# Patient Record
Sex: MALE | Race: WHITE | Employment: OTHER | URBAN - METROPOLITAN AREA
[De-identification: names, ages, dates, MRNs, and addresses within clinical notes are randomized per-mention and may not be internally consistent; named-entity substitution may affect disease eponyms.]

---

## 2022-10-03 ENCOUNTER — HOSPITAL ENCOUNTER (OUTPATIENT)
Age: 67
Discharge: HOME | End: 2022-10-03
Payer: MEDICARE

## 2022-10-03 PROCEDURE — 80053 COMPREHEN METABOLIC PANEL: CPT

## 2022-10-03 PROCEDURE — 84153 ASSAY OF PSA TOTAL: CPT

## 2022-10-03 PROCEDURE — 84443 ASSAY THYROID STIM HORMONE: CPT

## 2022-10-03 PROCEDURE — 85025 COMPLETE CBC W/AUTO DIFF WBC: CPT

## 2022-10-04 LAB
A/G RATIO: 1.3 (ref 1.1–2.2)
ALBUMIN SERPL-MCNC: 4 G/DL (ref 3.4–5)
ALP BLD-CCNC: 120 U/L (ref 40–129)
ALT SERPL-CCNC: 7 U/L (ref 10–40)
ANION GAP SERPL CALCULATED.3IONS-SCNC: 18 MMOL/L (ref 3–16)
AST SERPL-CCNC: 16 U/L (ref 15–37)
BASOPHILS ABSOLUTE: 0.1 K/UL (ref 0–0.2)
BASOPHILS RELATIVE PERCENT: 1 %
BILIRUB SERPL-MCNC: 0.5 MG/DL (ref 0–1)
BUN BLDV-MCNC: 56 MG/DL (ref 7–20)
CALCIUM SERPL-MCNC: 8.6 MG/DL (ref 8.3–10.6)
CHLORIDE BLD-SCNC: 94 MMOL/L (ref 99–110)
CO2: 20 MMOL/L (ref 21–32)
CREAT SERPL-MCNC: 2.5 MG/DL (ref 0.8–1.3)
EOSINOPHILS ABSOLUTE: 0 K/UL (ref 0–0.6)
EOSINOPHILS RELATIVE PERCENT: 0.2 %
GFR AFRICAN AMERICAN: 31
GFR NON-AFRICAN AMERICAN: 26
GLUCOSE BLD-MCNC: 127 MG/DL (ref 70–99)
HCT VFR BLD CALC: 30.8 % (ref 40.5–52.5)
HEMOGLOBIN: 9.7 G/DL (ref 13.5–17.5)
LYMPHOCYTES ABSOLUTE: 0.2 K/UL (ref 1–5.1)
LYMPHOCYTES RELATIVE PERCENT: 3.8 %
MCH RBC QN AUTO: 24.2 PG (ref 26–34)
MCHC RBC AUTO-ENTMCNC: 31.5 G/DL (ref 31–36)
MCV RBC AUTO: 76.9 FL (ref 80–100)
MONOCYTES ABSOLUTE: 0.5 K/UL (ref 0–1.3)
MONOCYTES RELATIVE PERCENT: 7.5 %
NEUTROPHILS ABSOLUTE: 5.6 K/UL (ref 1.7–7.7)
NEUTROPHILS RELATIVE PERCENT: 87.5 %
PDW BLD-RTO: 17.6 % (ref 12.4–15.4)
PLATELET # BLD: 351 K/UL (ref 135–450)
PMV BLD AUTO: 6.5 FL (ref 5–10.5)
POTASSIUM SERPL-SCNC: 3.7 MMOL/L (ref 3.5–5.1)
PROSTATE SPECIFIC ANTIGEN: 2.71 NG/ML (ref 0–4)
RBC # BLD: 4 M/UL (ref 4.2–5.9)
SODIUM BLD-SCNC: 132 MMOL/L (ref 136–145)
TOTAL PROTEIN: 7.1 G/DL (ref 6.4–8.2)
TSH SERPL DL<=0.05 MIU/L-ACNC: 1.48 UIU/ML (ref 0.27–4.2)
WBC # BLD: 6.4 K/UL (ref 4–11)

## 2022-10-11 ENCOUNTER — HOSPITAL ENCOUNTER (OUTPATIENT)
Dept: CT IMAGING | Age: 67
Discharge: HOME | End: 2022-10-11
Payer: MEDICARE

## 2022-10-11 DIAGNOSIS — E11.9 DIABETES MELLITUS, STABLE (HCC): ICD-10-CM

## 2022-10-11 DIAGNOSIS — N40.1 ENLARGED PROSTATE WITH URINARY OBSTRUCTION: ICD-10-CM

## 2022-10-11 DIAGNOSIS — R33.9 RETENTION OF URINE, UNSPECIFIED: ICD-10-CM

## 2022-10-11 DIAGNOSIS — N39.46 MIXED INCONTINENCE URGE AND STRESS (MALE)(FEMALE): ICD-10-CM

## 2022-10-11 DIAGNOSIS — N13.8 ENLARGED PROSTATE WITH URINARY OBSTRUCTION: ICD-10-CM

## 2022-10-11 PROCEDURE — 74176 CT ABD & PELVIS W/O CONTRAST: CPT

## 2022-10-13 ENCOUNTER — APPOINTMENT (OUTPATIENT)
Dept: CT IMAGING | Age: 67
DRG: 871 | End: 2022-10-13
Payer: MEDICARE

## 2022-10-13 ENCOUNTER — APPOINTMENT (OUTPATIENT)
Dept: GENERAL RADIOLOGY | Age: 67
DRG: 871 | End: 2022-10-13
Payer: MEDICARE

## 2022-10-13 ENCOUNTER — HOSPITAL ENCOUNTER (INPATIENT)
Age: 67
LOS: 1 days | Discharge: ANOTHER HOSPITAL | DRG: 871 | End: 2022-10-14
Attending: EMERGENCY MEDICINE | Admitting: INTERNAL MEDICINE
Payer: MEDICARE

## 2022-10-13 DIAGNOSIS — R33.9 URINARY RETENTION: ICD-10-CM

## 2022-10-13 DIAGNOSIS — N17.9 AKI (ACUTE KIDNEY INJURY) (HCC): ICD-10-CM

## 2022-10-13 DIAGNOSIS — A41.9 SEVERE SEPSIS (HCC): ICD-10-CM

## 2022-10-13 DIAGNOSIS — T68.XXXA HYPOTHERMIA, INITIAL ENCOUNTER: ICD-10-CM

## 2022-10-13 DIAGNOSIS — E86.0 DEHYDRATION: Primary | ICD-10-CM

## 2022-10-13 DIAGNOSIS — N30.00 ACUTE CYSTITIS WITHOUT HEMATURIA: ICD-10-CM

## 2022-10-13 DIAGNOSIS — R65.20 SEVERE SEPSIS (HCC): ICD-10-CM

## 2022-10-13 LAB
A/G RATIO: 1 (ref 1.1–2.2)
ALBUMIN SERPL-MCNC: 3.6 G/DL (ref 3.4–5)
ALP BLD-CCNC: 130 U/L (ref 40–129)
ALT SERPL-CCNC: 7 U/L (ref 10–40)
ANION GAP SERPL CALCULATED.3IONS-SCNC: 13 MMOL/L (ref 3–16)
ANION GAP SERPL CALCULATED.3IONS-SCNC: 21 MMOL/L (ref 3–16)
AST SERPL-CCNC: 8 U/L (ref 15–37)
BASE EXCESS VENOUS: -8.5 MMOL/L (ref -3–3)
BASOPHILS ABSOLUTE: 0 K/UL (ref 0–0.2)
BASOPHILS RELATIVE PERCENT: 0.1 %
BILIRUB SERPL-MCNC: 0.5 MG/DL (ref 0–1)
BILIRUBIN URINE: NEGATIVE
BLOOD, URINE: ABNORMAL
BUN BLDV-MCNC: 119 MG/DL (ref 7–20)
BUN BLDV-MCNC: 128 MG/DL (ref 7–20)
CALCIUM SERPL-MCNC: 6.5 MG/DL (ref 8.3–10.6)
CALCIUM SERPL-MCNC: 8.6 MG/DL (ref 8.3–10.6)
CARBOXYHEMOGLOBIN: 2.9 % (ref 0–1.5)
CHLORIDE BLD-SCNC: 84 MMOL/L (ref 99–110)
CHLORIDE BLD-SCNC: 97 MMOL/L (ref 99–110)
CHLORIDE URINE RANDOM: 46 MMOL/L
CLARITY: ABNORMAL
CO2: 16 MMOL/L (ref 21–32)
CO2: 17 MMOL/L (ref 21–32)
COLOR: YELLOW
COMMENT UA: ABNORMAL
CREAT SERPL-MCNC: 3.8 MG/DL (ref 0.8–1.3)
CREAT SERPL-MCNC: 4.8 MG/DL (ref 0.8–1.3)
EKG ATRIAL RATE: 88 BPM
EKG DIAGNOSIS: NORMAL
EKG P AXIS: 92 DEGREES
EKG P-R INTERVAL: 164 MS
EKG Q-T INTERVAL: 364 MS
EKG QRS DURATION: 86 MS
EKG QTC CALCULATION (BAZETT): 440 MS
EKG R AXIS: 81 DEGREES
EKG T AXIS: 55 DEGREES
EKG VENTRICULAR RATE: 88 BPM
EOSINOPHILS ABSOLUTE: 0 K/UL (ref 0–0.6)
EOSINOPHILS RELATIVE PERCENT: 0.1 %
GFR AFRICAN AMERICAN: 15
GFR AFRICAN AMERICAN: 19
GFR NON-AFRICAN AMERICAN: 12
GFR NON-AFRICAN AMERICAN: 16
GLUCOSE BLD-MCNC: 104 MG/DL (ref 70–99)
GLUCOSE BLD-MCNC: 186 MG/DL (ref 70–99)
GLUCOSE BLD-MCNC: 189 MG/DL (ref 70–99)
GLUCOSE URINE: NEGATIVE MG/DL
HCO3 VENOUS: 16.8 MMOL/L (ref 23–29)
HCT VFR BLD CALC: 29.3 % (ref 40.5–52.5)
HEMOGLOBIN: 9.4 G/DL (ref 13.5–17.5)
INFLUENZA A: NOT DETECTED
INFLUENZA B: NOT DETECTED
KETONES, URINE: NEGATIVE MG/DL
LACTIC ACID, SEPSIS: 1.4 MMOL/L (ref 0.4–1.9)
LACTIC ACID, SEPSIS: 3.3 MMOL/L (ref 0.4–1.9)
LEUKOCYTE ESTERASE, URINE: ABNORMAL
LIPASE: 30 U/L (ref 13–60)
LYMPHOCYTES ABSOLUTE: 0.2 K/UL (ref 1–5.1)
LYMPHOCYTES RELATIVE PERCENT: 3.3 %
MCH RBC QN AUTO: 24.3 PG (ref 26–34)
MCHC RBC AUTO-ENTMCNC: 32 G/DL (ref 31–36)
MCV RBC AUTO: 76.2 FL (ref 80–100)
METHEMOGLOBIN VENOUS: 0.3 %
MICROSCOPIC EXAMINATION: YES
MONOCYTES ABSOLUTE: 0.3 K/UL (ref 0–1.3)
MONOCYTES RELATIVE PERCENT: 4.6 %
NEUTROPHILS ABSOLUTE: 6.4 K/UL (ref 1.7–7.7)
NEUTROPHILS RELATIVE PERCENT: 91.9 %
NITRITE, URINE: NEGATIVE
O2 CONTENT, VEN: 9 VOL %
O2 SAT, VEN: 81 %
O2 THERAPY: ABNORMAL
OSMOLALITY URINE: 304 MOSM/KG (ref 390–1070)
PCO2, VEN: 33.3 MMHG (ref 40–50)
PDW BLD-RTO: 18.4 % (ref 12.4–15.4)
PERFORMED ON: ABNORMAL
PH UA: 6 (ref 5–8)
PH VENOUS: 7.32 (ref 7.35–7.45)
PLATELET # BLD: 264 K/UL (ref 135–450)
PMV BLD AUTO: 6.7 FL (ref 5–10.5)
PO2, VEN: 47.7 MMHG (ref 25–40)
POTASSIUM REFLEX MAGNESIUM: 4.1 MMOL/L (ref 3.5–5.1)
POTASSIUM SERPL-SCNC: 3.9 MMOL/L (ref 3.5–5.1)
POTASSIUM, UR: 17.1 MMOL/L
PRO-BNP: 1069 PG/ML (ref 0–124)
PROTEIN UA: 100 MG/DL
RBC # BLD: 3.85 M/UL (ref 4.2–5.9)
RBC UA: ABNORMAL /HPF (ref 0–4)
SARS-COV-2 RNA, RT PCR: NOT DETECTED
SODIUM BLD-SCNC: 122 MMOL/L (ref 136–145)
SODIUM BLD-SCNC: 126 MMOL/L (ref 136–145)
SODIUM URINE: 74 MMOL/L
SPECIFIC GRAVITY UA: 1.01 (ref 1–1.03)
TCO2 CALC VENOUS: 18 MMOL/L
TOTAL PROTEIN: 7.1 G/DL (ref 6.4–8.2)
TROPONIN: 0.02 NG/ML
TROPONIN: 0.03 NG/ML
TROPONIN: 0.04 NG/ML
URINE REFLEX TO CULTURE: YES
URINE TYPE: ABNORMAL
UROBILINOGEN, URINE: 0.2 E.U./DL
WBC # BLD: 6.9 K/UL (ref 4–11)
WBC UA: >100 /HPF (ref 0–5)

## 2022-10-13 PROCEDURE — 93005 ELECTROCARDIOGRAM TRACING: CPT | Performed by: EMERGENCY MEDICINE

## 2022-10-13 PROCEDURE — 2580000003 HC RX 258: Performed by: INTERNAL MEDICINE

## 2022-10-13 PROCEDURE — 70450 CT HEAD/BRAIN W/O DYE: CPT

## 2022-10-13 PROCEDURE — 2000000000 HC ICU R&B

## 2022-10-13 PROCEDURE — 82436 ASSAY OF URINE CHLORIDE: CPT

## 2022-10-13 PROCEDURE — 93010 ELECTROCARDIOGRAM REPORT: CPT | Performed by: INTERNAL MEDICINE

## 2022-10-13 PROCEDURE — 99285 EMERGENCY DEPT VISIT HI MDM: CPT

## 2022-10-13 PROCEDURE — 6360000002 HC RX W HCPCS: Performed by: EMERGENCY MEDICINE

## 2022-10-13 PROCEDURE — 2700000000 HC OXYGEN THERAPY PER DAY

## 2022-10-13 PROCEDURE — 87086 URINE CULTURE/COLONY COUNT: CPT

## 2022-10-13 PROCEDURE — 85025 COMPLETE CBC W/AUTO DIFF WBC: CPT

## 2022-10-13 PROCEDURE — 96367 TX/PROPH/DG ADDL SEQ IV INF: CPT

## 2022-10-13 PROCEDURE — 94761 N-INVAS EAR/PLS OXIMETRY MLT: CPT

## 2022-10-13 PROCEDURE — 80053 COMPREHEN METABOLIC PANEL: CPT

## 2022-10-13 PROCEDURE — 96365 THER/PROPH/DIAG IV INF INIT: CPT

## 2022-10-13 PROCEDURE — 84133 ASSAY OF URINE POTASSIUM: CPT

## 2022-10-13 PROCEDURE — 2580000003 HC RX 258: Performed by: NURSE PRACTITIONER

## 2022-10-13 PROCEDURE — 99223 1ST HOSP IP/OBS HIGH 75: CPT | Performed by: INTERNAL MEDICINE

## 2022-10-13 PROCEDURE — 82803 BLOOD GASES ANY COMBINATION: CPT

## 2022-10-13 PROCEDURE — 6360000002 HC RX W HCPCS: Performed by: INTERNAL MEDICINE

## 2022-10-13 PROCEDURE — 36415 COLL VENOUS BLD VENIPUNCTURE: CPT

## 2022-10-13 PROCEDURE — 84484 ASSAY OF TROPONIN QUANT: CPT

## 2022-10-13 PROCEDURE — 2500000003 HC RX 250 WO HCPCS: Performed by: INTERNAL MEDICINE

## 2022-10-13 PROCEDURE — 6370000000 HC RX 637 (ALT 250 FOR IP): Performed by: INTERNAL MEDICINE

## 2022-10-13 PROCEDURE — 87636 SARSCOV2 & INF A&B AMP PRB: CPT

## 2022-10-13 PROCEDURE — 6360000002 HC RX W HCPCS: Performed by: NURSE PRACTITIONER

## 2022-10-13 PROCEDURE — 83605 ASSAY OF LACTIC ACID: CPT

## 2022-10-13 PROCEDURE — 83690 ASSAY OF LIPASE: CPT

## 2022-10-13 PROCEDURE — 84300 ASSAY OF URINE SODIUM: CPT

## 2022-10-13 PROCEDURE — 81001 URINALYSIS AUTO W/SCOPE: CPT

## 2022-10-13 PROCEDURE — 2580000003 HC RX 258: Performed by: EMERGENCY MEDICINE

## 2022-10-13 PROCEDURE — 87040 BLOOD CULTURE FOR BACTERIA: CPT

## 2022-10-13 PROCEDURE — 83935 ASSAY OF URINE OSMOLALITY: CPT

## 2022-10-13 PROCEDURE — 83880 ASSAY OF NATRIURETIC PEPTIDE: CPT

## 2022-10-13 PROCEDURE — P9047 ALBUMIN (HUMAN), 25%, 50ML: HCPCS | Performed by: INTERNAL MEDICINE

## 2022-10-13 PROCEDURE — 71045 X-RAY EXAM CHEST 1 VIEW: CPT

## 2022-10-13 RX ORDER — ACETAMINOPHEN 650 MG/1
650 SUPPOSITORY RECTAL EVERY 6 HOURS PRN
Status: DISCONTINUED | OUTPATIENT
Start: 2022-10-13 | End: 2022-10-15 | Stop reason: HOSPADM

## 2022-10-13 RX ORDER — SODIUM CHLORIDE 9 MG/ML
INJECTION, SOLUTION INTRAVENOUS PRN
Status: DISCONTINUED | OUTPATIENT
Start: 2022-10-13 | End: 2022-10-13 | Stop reason: SDUPTHER

## 2022-10-13 RX ORDER — ALBUMIN (HUMAN) 12.5 G/50ML
25 SOLUTION INTRAVENOUS EVERY 6 HOURS
Status: COMPLETED | OUTPATIENT
Start: 2022-10-13 | End: 2022-10-14

## 2022-10-13 RX ORDER — ACETAMINOPHEN 325 MG/1
650 TABLET ORAL EVERY 6 HOURS PRN
Status: DISCONTINUED | OUTPATIENT
Start: 2022-10-13 | End: 2022-10-15 | Stop reason: HOSPADM

## 2022-10-13 RX ORDER — 0.9 % SODIUM CHLORIDE 0.9 %
1000 INTRAVENOUS SOLUTION INTRAVENOUS ONCE
Status: COMPLETED | OUTPATIENT
Start: 2022-10-13 | End: 2022-10-13

## 2022-10-13 RX ORDER — POLYETHYLENE GLYCOL 3350 17 G/17G
17 POWDER, FOR SOLUTION ORAL DAILY PRN
Status: DISCONTINUED | OUTPATIENT
Start: 2022-10-13 | End: 2022-10-15 | Stop reason: HOSPADM

## 2022-10-13 RX ORDER — SODIUM CHLORIDE 9 MG/ML
INJECTION, SOLUTION INTRAVENOUS PRN
Status: DISCONTINUED | OUTPATIENT
Start: 2022-10-13 | End: 2022-10-15 | Stop reason: HOSPADM

## 2022-10-13 RX ORDER — SODIUM CHLORIDE 0.9 % (FLUSH) 0.9 %
5-40 SYRINGE (ML) INJECTION EVERY 12 HOURS SCHEDULED
Status: DISCONTINUED | OUTPATIENT
Start: 2022-10-13 | End: 2022-10-15 | Stop reason: HOSPADM

## 2022-10-13 RX ORDER — ONDANSETRON 2 MG/ML
4 INJECTION INTRAMUSCULAR; INTRAVENOUS EVERY 6 HOURS PRN
Status: DISCONTINUED | OUTPATIENT
Start: 2022-10-13 | End: 2022-10-15 | Stop reason: HOSPADM

## 2022-10-13 RX ORDER — SODIUM CHLORIDE 9 MG/ML
INJECTION, SOLUTION INTRAVENOUS CONTINUOUS
Status: CANCELLED | OUTPATIENT
Start: 2022-10-13

## 2022-10-13 RX ORDER — MIDODRINE HYDROCHLORIDE 10 MG/1
10 TABLET ORAL
Status: DISCONTINUED | OUTPATIENT
Start: 2022-10-14 | End: 2022-10-15 | Stop reason: HOSPADM

## 2022-10-13 RX ORDER — SODIUM CHLORIDE 0.9 % (FLUSH) 0.9 %
5-40 SYRINGE (ML) INJECTION PRN
Status: DISCONTINUED | OUTPATIENT
Start: 2022-10-13 | End: 2022-10-13 | Stop reason: SDUPTHER

## 2022-10-13 RX ORDER — ONDANSETRON 4 MG/1
4 TABLET, ORALLY DISINTEGRATING ORAL EVERY 8 HOURS PRN
Status: DISCONTINUED | OUTPATIENT
Start: 2022-10-13 | End: 2022-10-15 | Stop reason: HOSPADM

## 2022-10-13 RX ORDER — MIDODRINE HYDROCHLORIDE 10 MG/1
10 TABLET ORAL ONCE
Status: COMPLETED | OUTPATIENT
Start: 2022-10-13 | End: 2022-10-13

## 2022-10-13 RX ORDER — HEPARIN SODIUM 5000 [USP'U]/ML
5000 INJECTION, SOLUTION INTRAVENOUS; SUBCUTANEOUS 2 TIMES DAILY
Status: DISCONTINUED | OUTPATIENT
Start: 2022-10-13 | End: 2022-10-15 | Stop reason: HOSPADM

## 2022-10-13 RX ORDER — TAMSULOSIN HYDROCHLORIDE 0.4 MG/1
0.4 CAPSULE ORAL DAILY
Status: ON HOLD | COMMUNITY
End: 2022-10-22 | Stop reason: HOSPADM

## 2022-10-13 RX ORDER — SODIUM CHLORIDE 0.9 % (FLUSH) 0.9 %
5-40 SYRINGE (ML) INJECTION EVERY 12 HOURS SCHEDULED
Status: DISCONTINUED | OUTPATIENT
Start: 2022-10-13 | End: 2022-10-13 | Stop reason: SDUPTHER

## 2022-10-13 RX ORDER — 0.9 % SODIUM CHLORIDE 0.9 %
30 INTRAVENOUS SOLUTION INTRAVENOUS ONCE
Status: COMPLETED | OUTPATIENT
Start: 2022-10-13 | End: 2022-10-13

## 2022-10-13 RX ORDER — SODIUM CHLORIDE 0.9 % (FLUSH) 0.9 %
5-40 SYRINGE (ML) INJECTION PRN
Status: DISCONTINUED | OUTPATIENT
Start: 2022-10-13 | End: 2022-10-15 | Stop reason: HOSPADM

## 2022-10-13 RX ADMIN — HEPARIN SODIUM 5000 UNITS: 5000 INJECTION INTRAVENOUS; SUBCUTANEOUS at 21:15

## 2022-10-13 RX ADMIN — ALBUMIN (HUMAN) 25 G: 0.25 INJECTION, SOLUTION INTRAVENOUS at 23:52

## 2022-10-13 RX ADMIN — VANCOMYCIN HYDROCHLORIDE 1250 MG: 10 INJECTION, POWDER, LYOPHILIZED, FOR SOLUTION INTRAVENOUS at 13:47

## 2022-10-13 RX ADMIN — SODIUM CHLORIDE 1000 ML: 9 INJECTION, SOLUTION INTRAVENOUS at 14:17

## 2022-10-13 RX ADMIN — MIDODRINE HYDROCHLORIDE 10 MG: 10 TABLET ORAL at 18:10

## 2022-10-13 RX ADMIN — ALBUMIN (HUMAN) 25 G: 0.25 INJECTION, SOLUTION INTRAVENOUS at 18:05

## 2022-10-13 RX ADMIN — SODIUM BICARBONATE: 84 INJECTION, SOLUTION INTRAVENOUS at 17:15

## 2022-10-13 RX ADMIN — SODIUM CHLORIDE 1470 ML: 9 INJECTION, SOLUTION INTRAVENOUS at 11:55

## 2022-10-13 RX ADMIN — CEFEPIME 1000 MG: 1 INJECTION, POWDER, FOR SOLUTION INTRAMUSCULAR; INTRAVENOUS at 20:19

## 2022-10-13 RX ADMIN — PIPERACILLIN AND TAZOBACTAM 3375 MG: 3; .375 INJECTION, POWDER, FOR SOLUTION INTRAVENOUS at 12:24

## 2022-10-13 ASSESSMENT — PAIN SCALES - GENERAL: PAINLEVEL_OUTOF10: 0

## 2022-10-13 NOTE — PROGRESS NOTES
10/13/22 1533   Encounter Summary   Encounter Overview/Reason  Initial Encounter   Service Provided For: Patient and family together   Referral/Consult From: [facility]   Support System Spouse   Last Encounter  10/13/22   Complexity of Encounter Moderate   Begin Time 1518   End Time  1543   Total Time Calculated 25 min   Spiritual/Emotional needs   Type Spiritual Support

## 2022-10-13 NOTE — PROGRESS NOTES
17:34 Pt to [address] [facility], alert to self, situation, and place confused to month year .  VSS BP on soft side Daughter and ex wife at bedside and updated, Dr. Ruby Pitts aware pt to ICU  19:00 Bedside handoff given to night nurse Ani Dubon , pt awake alert, stable @ handoff BP improving 102/65 , pt stable at handoff

## 2022-10-13 NOTE — ED NOTES
1608-Call placed to Nephrology on consult. 1630-Call back received from Dr. Yumiko Chan spoke with Dr. Mattie Quick.       Bev Martinez  10/13/22 4686

## 2022-10-13 NOTE — ED NOTES
Report given to Nola Noriega RN face to face, questions answered, care transferred. Pt off unit via stretcher by RN x2 on monitor.      Tony Burgos RN  10/13/22 7703

## 2022-10-13 NOTE — ED NOTES
Two lines obtained. Pt is oriented to self only. Pt is verbal. Pt is hypotensive 70's SBP and hypothermic. Pt placed on Bear Huggar and warm fluids started. Other bag of fluids placed on pressure bag. Bladder scanner performed and reports of 0ml in bladder. Pt brief is dry but has remnants of pellets from previous soiled diaper.  Pt reports dyuria and sts he just peed prior to arrival.      Meir Encinas RN  10/13/22 4943

## 2022-10-13 NOTE — PROGRESS NOTES
Nephrology consult called in ED, 10/13/22-Carlos Dey.     Urology consult called in ED- 10/13/22 Lizy Fernández

## 2022-10-13 NOTE — ED NOTES
1215-Call placed to Urology for consult. Bev Martinez  10/13/22 1220  1332-Call back received from Urology spoke with Dr. Mattie Quick.      Bev Martinez  10/13/22 9830

## 2022-10-13 NOTE — ED NOTES
0414-Dr. Mercedes Sánchez informed this writer that she spoke with Pulmonary Critical Care Dr. Matteo Aden regarding the consult that was placed.       Shruti Blank  10/13/22 1083

## 2022-10-13 NOTE — PROGRESS NOTES
4 Eyes Skin Assessment     The patient is being assess for   Admission    I agree that 2 RN's have performed a thorough Head to Toe Skin Assessment on the patient. ALL assessment sites listed below have been assessed. Areas assessed by both nurses:   [x]   Head, Face, and Ears   [x]   Shoulders, Back, and Chest, Abdomen  [x]   Arms, Elbows, and Hands   [x]   Coccyx, Sacrum, and Ischium  [x]   Legs, Feet, and Heels            **SHARE this note so that the co-signing nurse is able to place an eSignature**    Co-signer eSignature: Electronically signed by Mery Del Valle RN on 10/13/22 at 6:52 PM EDT    Does the Patient have Skin Breakdown?   No, pt has scattered bruising and abrasions but no open areas, coccyx red but blanchable          Damon Prevention initiated:  Yes   Wound Care Orders initiated:  NA      [facility] nurse consulted for Pressure Injury (Stage 3,4, Unstageable, DTI, NWPT, Complex wounds)and New or Established Ostomies:  NA      Primary Nurse eSignature: Electronically signed by Ame Dominguez RN on 10/13/22 at 6:59 PM EDT

## 2022-10-13 NOTE — ED NOTES
Pt calm and resting quietly with equal rise and fall of chest. Pt in NAD, RR even and unlabored. Side rails up, bed locked and in lowest position. Pt updated on plan of care. No needs at this time. Pt instructed on use of call light if needed.       Vicenta Leung RN  10/13/22 7702

## 2022-10-13 NOTE — ED PROVIDER NOTES
[address] ED      CHIEF COMPLAINT  Dysuria (Reports painful urination, reports not eating in over a week, not drinking. Reports getting results from the urologist and scans were completed. Told to come to the hospital. )       HISTORY OF PRESENT ILLNESS  Huey Mercado is a 75 y.o. male who presents to the emergency department for evaluation of urinary incontinence. Patient reports receiving a call from urology that his CT scan that was done as outpatient was abnormal and that he needed to come to the hospital.  Per the family members who arrived to bedside, patient has not been eating much over the past week. He was complaining of pain with urination. They received a phone call that the CT scan showed a bladder mass that was concerning for cancer. Family reports patient lives alone. He is coming to the doctors. Has lost a ton of weight over the past few weeks. Says they do not think he has eaten or drank any fluids over the past week to week and a half. No recent illnesses that they are aware of. No other complaints, modifying factors or associated symptoms. I have reviewed the following from the nursing documentation. Past Medical History:   Diagnosis Date    Anxiety     Depression     Hypertension      Past Surgical History:   Procedure Laterality Date    CYST REMOVAL      FINGER SURGERY       No family history on file.   Social History     Socioeconomic History    Marital status:      Spouse name: Not on file    Number of children: Not on file    Years of education: Not on file    Highest education level: Not on file   Occupational History    Not on file   Tobacco Use    Smoking status: Every Day     Packs/day: 2.00     Types: Cigarettes    Smokeless tobacco: Never   Substance and Sexual Activity    Alcohol use: Yes     Comment: beer 3 times a week    Drug use: No    Sexual activity: Not on file   Other Topics Concern    Not on file   Social History Narrative    Not on mg/dL    Urobilinogen, Urine 0.2 <2.0 E.U./dL    Nitrite, Urine Negative Negative    Leukocyte Esterase, Urine SMALL (A) Negative    Microscopic Examination YES     Urine Type NotGiven     Urine Reflex to Culture Yes    Comprehensive Metabolic Panel w/ Reflex to MG   Result Value Ref Range    Sodium 122 (L) 136 - 145 mmol/L    Potassium reflex Magnesium 4.1 3.5 - 5.1 mmol/L    Chloride 84 (L) 99 - 110 mmol/L    CO2 17 (L) 21 - 32 mmol/L    Anion Gap 21 (H) 3 - 16    Glucose 189 (H) 70 - 99 mg/dL     (HH) 7 - 20 mg/dL    Creatinine 4.8 (H) 0.8 - 1.3 mg/dL    GFR Non- 12 (A) >60    GFR  15 (A) >60    Calcium 8.6 8.3 - 10.6 mg/dL    Total Protein 7.1 6.4 - 8.2 g/dL    Albumin 3.6 3.4 - 5.0 g/dL    Albumin/Globulin Ratio 1.0 (L) 1.1 - 2.2    Total Bilirubin 0.5 0.0 - 1.0 mg/dL    Alkaline Phosphatase 130 (H) 40 - 129 U/L    ALT 7 (L) 10 - 40 U/L    AST 8 (L) 15 - 37 U/L   Lactate, Sepsis   Result Value Ref Range    Lactic Acid, Sepsis 3.3 (H) 0.4 - 1.9 mmol/L   Lactate, Sepsis   Result Value Ref Range    Lactic Acid, Sepsis 1.4 0.4 - 1.9 mmol/L   Blood gas, venous   Result Value Ref Range    pH, Emeka 7.320 (L) 7.350 - 7.450    pCO2, Emeka 33.3 (L) 40.0 - 50.0 mmHg    pO2, Emeka 47.7 (H) 25.0 - 40.0 mmHg    HCO3, Venous 16.8 (L) 23.0 - 29.0 mmol/L    Base Excess, Emeka -8.5 (L) -3.0 - 3.0 mmol/L    O2 Sat, Emeka 81 Not Established %    Carboxyhemoglobin 2.9 (H) 0.0 - 1.5 %    MetHgb, Emeka 0.3 <1.5 %    TC02 (Calc), Emeka 18 Not Established mmol/L    O2 Content, Emeka 9 Not Established VOL %    O2 Therapy Unknown    Brain Natriuretic Peptide   Result Value Ref Range    Pro-BNP 1,069 (H) 0 - 124 pg/mL   Microscopic Urinalysis   Result Value Ref Range    WBC, UA >100 (A) 0 - 5 /HPF    RBC, UA see below (A) 0 - 4 /HPF    Urinalysis Comments see below    POCT Glucose   Result Value Ref Range    POC Glucose 186 (H) 70 - 99 mg/dl    Performed on ACCU-CHEK    EKG 12 Lead   Result Value Ref Range Ventricular Rate 88 BPM    Atrial Rate 88 BPM    P-R Interval 164 ms    QRS Duration 86 ms    Q-T Interval 364 ms    QTc Calculation (Bazett) 440 ms    P Axis 92 degrees    R Axis 81 degrees    T Axis 55 degrees    Diagnosis       Sinus rhythm with Premature supraventricular complexesOtherwise normal ECGNo previous ECGs available       I have reviewed all labs for this visit. ECG  The Ekg interpreted by me shows  Sinus rhythm with premature supraventricular complexes with a rate of 88  Axis is normal  QTc is normal  Intervals and Durations are unremarkable. ST Segments: nonspecific ST changes    RADIOLOGY  CT ABDOMEN PELVIS WO CONTRAST Additional Contrast? None    Result Date: 10/11/2022  EXAMINATION: CT OF THE ABDOMEN AND PELVIS WITHOUT CONTRAST 10/11/2022 6:57 pm TECHNIQUE: CT of the abdomen and pelvis was performed without the administration of intravenous contrast. Multiplanar reformatted images are provided for review. Automated exposure control, iterative reconstruction, and/or weight based adjustment of the mA/kV was utilized to reduce the radiation dose to as low as reasonably achievable. Moderately limited exam due to significant motion artifact. COMPARISON: None. HISTORY: ORDERING SYSTEM PROVIDED HISTORY: Retention of urine, unspecified TECHNOLOGIST PROVIDED HISTORY: Additional Contrast?->None Reason for Exam: urinary retention FINDINGS: Lower Chest: Emphysematous changes. Lung bases otherwise clear. Organs: The unenhanced liver, gallbladder, spleen, pancreas and adrenal glands are grossly unremarkable. There is moderate right hydroureteronephrosis. No left hydronephrosis. Probable 3 mm left nephrolithiasis. GI/Bowel: No dilated loops of bowel or bowel wall thickening. No free air. Moderate amount stool in the colon. Pelvis: The bladder is markedly distended measuring up to 20 cm in the craniocaudal dimension.   There are multifocal areas of nodular wall thickening throughout the bladder, for example on the left lateral aspect measuring up to 9 x 3 cm (image 111). Within the base of the bladder, there is also more focal masslike soft tissue measuring 4 x 3 cm. Prostate gland is grossly unremarkable. No definite pathologically enlarged adenopathy or free fluid. Peritoneum/Retroperitoneum: The aorta is tortuous. There is a 3.3 cm abdominal aortic aneurysm. Severe atherosclerosis is noted. No pathologically enlarged adenopathy or free fluid. Bones/Soft Tissues: Diffuse osteopenia. 1. Markedly dilated bladder measuring up to 20 cm in the craniocaudal dimension. Multifocal areas of nodular wall thickening, the largest measuring up to 9 x 3 cm along the left lateral aspect suspicious for neoplasm. 2. Moderate right hydroureteronephrosis. 3. 3.3 cm abdominal aortic aneurysm. Results were discussed with Dr. Sherleen Schaumann on 10/11/2022 at 19:54. RECOMMENDATIONS: 3.3 cm abdominal aortic aneurysm. Recommend follow-up every 3 years. Reference: J Am Jacky Radiol 7703;71:197-290. CT head without contrast    Result Date: 10/13/2022  EXAMINATION: CT OF THE HEAD WITHOUT CONTRAST  10/13/2022 12:41 pm TECHNIQUE: CT of the head was performed without the administration of intravenous contrast. Automated exposure control, iterative reconstruction, and/or weight based adjustment of the mA/kV was utilized to reduce the radiation dose to as low as reasonably achievable. COMPARISON: None HISTORY: ORDERING SYSTEM PROVIDED HISTORY: hypothermic. TECHNOLOGIST PROVIDED HISTORY: Reason for exam:->hypothermic. Has a \"code stroke\" or \"stroke alert\" been called? ->No Decision Support Exception - unselect if not a suspected or confirmed emergency medical condition->Emergency Medical Condition (MA) Reason for Exam: hypothermic FINDINGS: BRAIN/VENTRICLES: The ventricular system is within normal limits. No evidence of mass effect or midline shift.   Prominence of sulci overlying convexities of cerebral hemispheres and cerebellum consistent with atrophy. Foci of low attenuation within periventricular/subcortical white matter nonspecific however likely on the basis of changes of minimal ischemic leukoencephalopathy. Few foci of low attenuation approaching density of CSF identified in right centrosylvian/basal ganglia and periventricular region suggestive of small foci of remote ischemic change. There is suggestion of tiny remote lacunar infarct involving the left thalamus as well (image 35). No abnormal extra-axial fluid collection is identified. There is atherosclerotic calcification of distal internal carotid and to lesser extent distal vertebral arteries. ORBITS: The visualized portion of the orbits demonstrate no acute abnormality. SINUSES: The visualized paranasal sinuses and mastoid air cells demonstrate no acute abnormality. SOFT TISSUES/SKULL:  No acute abnormality of the visualized skull or soft tissues. Focal lucency surrounding portions of the roots of the left maxillary premolars may be on the basis of periodontal disease (images 9-13). No evidence of acute intracranial abnormality. XR CHEST PORTABLE    Result Date: 10/13/2022  EXAMINATION: ONE XRAY VIEW OF THE CHEST 10/13/2022 12:15 pm COMPARISON: None available. HISTORY: ORDERING SYSTEM PROVIDED HISTORY: hypothermic TECHNOLOGIST PROVIDED HISTORY: Reason for exam:->hypothermic Reason for Exam: Dysuria (Reports painful urination, reports not eating in over a week, not drinking. Reports getting results from the urologist and scans were completed. Told to come to the hospital. ) FINDINGS: Airspace disease within the bilateral upper lungs is likely due to scarring as there is associated volume loss. The lungs are hyperexpanded. The cardiac silhouette is within normal limits. There is no pneumothorax or pleural effusion. The cardiac silhouette is within normal limits. There is no pneumothorax or pleural effusion.      1. Bilateral upper lobe airspace disease favoring scarring rather than pneumonia. VL Extremity Venous Bilateral    Result Date: 10/14/2022  Lower Extremities DVT Study  Demographics   Patient Name       Henrik Licona   Date of Study      10/14/2022         Gender              Male   Patient Number     [id number]         Date of Birth       1955   Visit Number       [id number]          Age                 75 year(s)   Accession Number   [id number]         Room Number         3007   Corporate ID       [id number]           Gia Andrea RVT,                                                            [address]   Ordering Physician Alpa Ryan,   Seth        [facility]                     MD                 Physician           Readers                                                            Karlee Polo MD  Procedure Type of Study:   Veins:Lower Extremities DVT Study, VASC EXTREMITY VENOUS DUPLEX BILATERAL. Vascular Sonographer Report  Additional Indications:Evaluate for DVT during current hospitalization. Leg swelling Left > right. Port/ICU. Venous Duplex Scan: B-mode imaging of the deep and superficial veins, with compression maneuvers, including color and Doppler spectral waveform analysis. Impressions Right Impression Technically difficult and limited study due to body habitus, positioning and calcific shadowing. There is evidence of acute totally occluding deep venous thrombosis involving the right peroneal veins (2 of 2). There is no other evidence of deep or superficial venous thrombosis involving the right lower extremity. Left Impression There is evidence of acute totally occluding deep venous thrombosis involving the left common femoral extending into the external iliac vein, deep femoral, femoral, popliteal, soleal, posterior tibial (2 of 2, proximal calf) and peroneal ( 2 of 2, proximal to mid calf) veins.  This does not appear to extend into the common iliac vein or inferior vena cava, however, visualization is limited due to overlying bowel gas and peristalsis. There is acute totally occluding superficial venous thrombosis involving the left greater saphenous vein at the saphenofemoral junction. There is no other evidence of deep or superficial venous thrombosis involving the left lower extremity. Incidental finding of an aneurysmal formation involving the distal abdominal aorta measuring 2.6 x 3.8 cm. CT abdomen/pelvis 10/7/22: States \"There is a 3.3 cm abdominal aortic aneurysm. \" Limited abdominal vascular ultrasound exam due to overlying bowel gas. There are no previous studies for comparison. Conclusions   Summary   Extensive acute deep venous thrombosis involving the left lower extremity as  described above. Acute deep venous thrombosis involving the right peroneal veins. Within the limits of this exam, there is no other evidence of deep or  superficial venous thrombosis in the lower extremities bilaterally. Incidental finding of an aneurysmal formation involving the distal abdominal  aorta measuring 2.6 x 3.8 cm. Signature   ------------------------------------------------------------------  Electronically signed by Brendon Decker MD (Interpreting  physician) on 10/14/2022 at 04:06 PM  ------------------------------------------------------------------  Patient Status:Routine. Study [address] [place] [address] Vascular Lab. Technical Quality:Limited visualization due to calcific shadowing.   - Results were reported to:Rean VILLALTA upon completion of exam. Velocities are measured in cm/s ; Diameters are measured in mm Right Lower Extremities DVT Study Measurements Right 2D Measurements +------------------------+----------+---------------+----------+ ! Location                ! Visualized! Compressibility! Thrombosis! +------------------------+----------+---------------+----------+ ! Sapheno Femoral Junction! Yes       ! Yes            ! None      ! +------------------------+----------+---------------+----------+ ! GSV Thigh               ! Yes       ! Yes            ! None      ! +------------------------+----------+---------------+----------+ ! Common Femoral          !Yes       ! Yes            ! None      ! +------------------------+----------+---------------+----------+ ! Prox Femoral            !Yes       ! Yes            ! None      ! +------------------------+----------+---------------+----------+ ! Mid Femoral             !Yes       ! Yes            ! None      ! +------------------------+----------+---------------+----------+ ! Dist Femoral            !Partial   !Yes            ! None      ! +------------------------+----------+---------------+----------+ ! Deep Femoral            !Yes       ! Yes            ! None      ! +------------------------+----------+---------------+----------+ ! Popliteal               !Yes       ! Yes            ! None      ! +------------------------+----------+---------------+----------+ ! GSV Below Knee          ! Yes       ! Yes            ! None      ! +------------------------+----------+---------------+----------+ ! Gastroc                 ! Yes       ! Yes            ! None      ! +------------------------+----------+---------------+----------+ ! Soleal                  !Yes       ! Yes            ! None      ! +------------------------+----------+---------------+----------+ ! PTV                     ! Yes       ! Yes            ! None      ! +------------------------+----------+---------------+----------+ ! Peroneal                !Yes       ! No             !Acute     ! +------------------------+----------+---------------+----------+ ! GSV Calf                ! Yes       ! Yes            ! None      ! +------------------------+----------+---------------+----------+ ! SSV                     ! Yes       ! Yes            ! None      ! +------------------------+----------+---------------+----------+ Right Doppler Measurements +------------------------+------+------+------------+ ! Location                ! Signal!Reflux! Reflux (sec)! +------------------------+------+------+------------+ ! Sapheno Femoral Junction! Phasic! No    !            ! +------------------------+------+------+------------+ ! Common Femoral          !Phasic! No    !            ! +------------------------+------+------+------------+ ! Prox Femoral            !Phasic! No    !            ! +------------------------+------+------+------------+ ! Dist Femoral            !Phasic!      !            ! +------------------------+------+------+------------+ ! Deep Femoral            !Phasic! No    !            ! +------------------------+------+------+------------+ ! Popliteal               !Phasic! No    !            ! +------------------------+------+------+------------+ Left Lower Extremities DVT Study Measurements Left 2D Measurements +------------------------+----------+---------------+----------+ ! Location                ! Visualized! Compressibility! Thrombosis! +------------------------+----------+---------------+----------+ ! Sapheno Femoral Junction! Yes       ! No             !Acute     ! +------------------------+----------+---------------+----------+ ! GSV Thigh               ! Yes       ! Yes            ! None      ! +------------------------+----------+---------------+----------+ ! Common Femoral          !Yes       ! No             !Acute     ! +------------------------+----------+---------------+----------+ ! Prox Femoral            !Yes       ! No             !Acute     ! +------------------------+----------+---------------+----------+ ! Mid Femoral             !Yes       ! No             !Acute     ! +------------------------+----------+---------------+----------+ ! Dist Femoral            !Yes       ! No             !Acute     ! +------------------------+----------+---------------+----------+ ! Deep Femoral            !Yes       ! No             !Acute     ! +------------------------+----------+---------------+----------+ ! Popliteal               !Yes       ! No             !Acute     ! +------------------------+----------+---------------+----------+ ! GSV Below Knee          ! Yes       ! Yes            ! None      ! +------------------------+----------+---------------+----------+ ! Gastroc                 ! Yes       ! Yes            ! None      ! +------------------------+----------+---------------+----------+ ! Soleal                  !Yes       ! No             !Acute     ! +------------------------+----------+---------------+----------+ ! PTV                     ! Yes       ! No             !Acute     ! +------------------------+----------+---------------+----------+ ! Peroneal                !Yes       ! No             !Acute     ! +------------------------+----------+---------------+----------+ ! GSV Calf                ! Yes       ! Yes            ! None      ! +------------------------+----------+---------------+----------+ ! SSV                     ! Partial   !Yes            ! None      ! +------------------------+----------+---------------+----------+ Left Doppler Measurements +------------------------+------+------+------------+ ! Location                ! Signal!Reflux! Reflux (sec)! +------------------------+------+------+------------+ ! Sapheno Femoral Junction! Absent!      !            ! +------------------------+------+------+------------+ ! Common Femoral          !Absent!      !            ! +------------------------+------+------+------------+ ! Prox Femoral            !Absent!      !            ! +------------------------+------+------+------------+ ! Dist Femoral            !Absent!      !            ! +------------------------+------+------+------------+ ! Deep Femoral            !Absent!      !            ! +------------------------+------+------+------------+ ! Popliteal               !Absent!      !            ! +------------------------+------+------+------------+     ED COURSE/MDM  Patient seen and evaluated. At presentation, patient was hypothermic to temp of 93, hypotensive with blood pressure of 72/55, and generally ill-appearing. His abdomen was distended. Per chart review, he had a CT abdomen pelvis done as outpatient that showed markedly distended bladder measuring up to 20 cm with multifocal areas of wall thickening concerning for neoplasm. Septic work-up obtained. Patient given 30 cc/kg IV fluid bolus. He was placed on a Rodrigo hugger. Given vancomycin and cefepime for empiric antibiotics. Mc placed. I spoke with urology who will follow the patient. Lactic elevated at 3.3. Urinalysis shows greater than 100 WBC. No leukocytosis. Hemoglobin 9.4. Repeat lactic after the 30 cc/kg bolus was improved at 1.4. Family reports he has not been eating or drinking for at least 1 week. Patient appears dry and labs indicative of prerenal MATHEUS with BUN of 128. Will give additional 1 L IV fluid bolus. Blood pressure is improved. Temperature improved to 94. Initial troponin 0.04. Patient denies having chest pain, shortness of breath, and suspect that the elevated troponin is due to renal function. 3-hour troponin ordered and pending at this time. Discussed consulting nephrology with hospitalist.  Hospitalist recommends repeating BMP for now given the urinary retention with 20cm bladder and >3.1L UOP since mc placement. Spoke with Dr. Alfredo Wells with nephrology who will place orders for fluids with bicarb. Patient [facility] by pulm. Patient admitted to the ICU. Is this patient to be included in the SEP-1 Core Measure due to severe sepsis or septic shock? Yes   SEP-1 CORE MEASURE DATA      Sepsis Criteria   Severe Sepsis Criteria   Septic Shock Criteria     Must be confirmed or suspected to move forward with diagnosis of sepsis.     Must meet 2:    X temperature > 100.9 F (38.3 C)        or < 96.8 F (36 C)  X HR > 90  RR > 20  X WBC > 12 or < 4 or 10% bands      AND:      Infection Confirmed or        Suspected. Must meet 1:    X lactate > 2       or   X signs of Organ Dysfunction:    - SBP < 90 or MAP < 65  - Altered mental status  - Creatinine > 2 or increased from      baseline  - Urine Output < 0.5 ml/kg/hr  - Bilirubin > 2  - INR > 1.5 (not anticoagulated)  - Platelets < 172,149  - Acute Respiratory Failure as     evidenced by new need for NIPPV     or mechanical ventilation      No criteria met for Severe Sepsis. Must meet 1:    Lactate > 4        or   SBP < 90 or MAP < 65 for at        least two readings in the first        hour after fluid bolus        administration      Vasopressors initiated (if hypotension persists after fluid resuscitation)        No criteria met for Septic Shock.    Patient Vitals for the past 6 hrs:   BP Temp Pulse Resp SpO2 Height Weight Weight Method Percent Weight Change   10/13/22 1138 (!) 72/55 -- 92 24 -- -- -- -- --   10/13/22 1139 (!) 72/55 -- 97 (!) 31 -- -- -- -- --   10/13/22 1152 (!) 76/51 (!) 91.9 °F (33.3 °C) 88 25 100 % -- -- -- --   10/13/22 1202 111/84 -- -- 27 -- -- -- -- --   10/13/22 1211 -- -- -- -- -- 5' 6\" (1.676 m) 108 lb (49 kg) Stated;Estimated 0   10/13/22 1219 (!) 90/57 -- 92 25 99 % -- -- -- --   10/13/22 1231 (!) 88/58 -- 92 24 97 % -- -- -- --   10/13/22 1246 94/62 -- 87 19 96 % -- -- -- --   10/13/22 1253 -- (!) 93.4 °F (34.1 °C) -- -- -- -- -- -- --   10/13/22 1327 90/64 -- 91 16 97 % -- -- -- --   10/13/22 1346 85/66 -- 88 17 97 % -- -- -- --   10/13/22 1359 101/61 -- 89 19 99 % -- -- -- --   10/13/22 1402 92/61 -- 87 20 99 % -- -- -- --   10/13/22 1413 (!) 81/55 -- 91 14 98 % -- -- -- --   10/13/22 1417 (!) 78/59 -- 87 13 98 % -- -- -- --   10/13/22 1420 (!) 83/56 -- 88 15 98 % -- -- -- --   10/13/22 1422 -- -- 90 17 97 % -- -- -- --   10/13/22 1437 (!) 88/51 -- 86 19 98 % -- -- -- --   10/13/22 1447 (!) 80/59 -- 90 21 97 % -- -- -- --   10/13/22 1452 -- -- 86 15 98 % -- -- -- -- 10/13/22 1501 99/61 -- 89 17 97 % -- -- -- --   10/13/22 1517 97/64 -- 92 16 96 % -- -- -- --   10/13/22 1532 94/68 (!) 94 °F (34.4 °C) 90 15 96 % -- -- -- --      Recent Labs     10/13/22  1247   WBC 6.9   CREATININE 4.8*   BILITOT 0.5            Time Septic Shock Identified: 1204    Fluid Resuscitation Rational: at least 30mL/kg based on entered actual weight at time of triage    Repeat lactate level: improving    Reassessment Exam:   Not applicable. Patient does not have septic shock. I provided at least 60 minutes of critical care excluding separately billable procedures. Pt was seen during the [place] 19 pandemic. Appropriate PPE worn by ME during patient encounters. Patient was cared for during a time with constrained hospital bed capacity with nationwide stress on resources and staffing. During the patient's ED course, the patient was given:  Medications   sodium chloride flush 0.9 % injection 5-40 mL (has no administration in time range)   sodium chloride flush 0.9 % injection 5-40 mL (has no administration in time range)   0.9 % sodium chloride infusion (has no administration in time range)   piperacillin-tazobactam (ZOSYN) 3,375 mg in sodium chloride 0.9 % 100 mL IVPB (mini-bag) (0 mg IntraVENous Stopped 10/13/22 1254)   vancomycin (VANCOCIN) 1,250 mg in dextrose 5 % 250 mL IVPB (0 mg IntraVENous Stopped 10/13/22 1517)   0.9 % sodium chloride IV bolus 1,470 mL (0 mL/kg × 49 kg IntraVENous Stopped 10/13/22 1225)   0.9 % sodium chloride bolus (0 mLs IntraVENous Stopped 10/13/22 1554)        CLINICAL IMPRESSION  1. Dehydration    2. Urinary retention    3. Acute cystitis without hematuria    4. MATHEUS (acute kidney injury) ([facility] [address]    5. Hypothermia, initial encounter    6. Severe sepsis (HCC)        Blood pressure 94/68, pulse 90, temperature (!) 94 °F (34.4 °C), temperature source Rectal, resp. rate 15, height 5' 6\" (1.676 m), weight 108 lb (49 kg), SpO2 96 %.     [address] was admitted to the hospital.      Patient was given scripts for the following medications. I counseled patient how to take these medications. New Prescriptions    No medications on file       Follow-up with:  No follow-up provider specified. DISCLAIMER: This chart was created using Dragon dictation software. Efforts were made by me to ensure accuracy, however some errors may be present due to limitations of this technology and occasionally words are not transcribed correctly.        Shawn Santos MD  10/15/22 8390

## 2022-10-13 NOTE — ED NOTES
Chief Complaint   Patient presents with    Dysuria     Reports painful urination, reports not eating in over a week, not drinking. Reports getting results from the urologist and scans were completed. Told to come to the hospital.         / Level of Consciousness: New confusion or agitation (1)    Vitals:    10/13/22 1517 10/13/22 1532 10/13/22 1601 10/13/22 1621   BP: 97/64 94/68 90/62 (!) 82/55   Pulse: 92 90 91 92   Resp: 16 15 18 18   Temp:  (!) 94 °F (34.4 °C)     TempSrc:  Rectal     SpO2: 96% 96% 96% 96%   Weight:       Height:              Allergies   Allergen Reactions    Bee Venom Itching and Swelling       Current Facility-Administered Medications   Medication Dose Route Frequency Provider Last Rate Last Admin    sodium chloride flush 0.9 % injection 5-40 mL  5-40 mL IntraVENous 2 times per day Dwayne Headley MD        sodium chloride flush 0.9 % injection 5-40 mL  5-40 mL IntraVENous PRN Dwayne Headley MD        0.9 % sodium chloride infusion   IntraVENous PRN Dwayne Headley MD         Current Outpatient Medications   Medication Sig Dispense Refill    clonazePAM (KLONOPIN) 0.5 MG tablet Take 0.5 mg by mouth 2 times daily as needed      lisinopril-hydrochlorothiazide (PRINZIDE;ZESTORETIC) 20-12.5 MG per tablet Take 1 tablet by mouth daily      citalopram (CELEXA) 20 MG tablet Take 20 mg by mouth daily      hydrOXYzine (ATARAX) 25 MG tablet Take 25 mg by mouth 3 times daily as needed for Itching      ibuprofen (ADVIL;MOTRIN) 400 MG tablet Take 400 mg by mouth every 8 hours as needed for Pain      desoximetasone 0.25 % LIQD Applied to the affected area twice daily for 2 weeks 100 mL 0      List of medications patient is currently taking is complete. Source of medications in list are unable to obtain.        Patient Active Problem List   Diagnosis    Right carpal tunnel syndrome    Sepsis ([facility] [address]                     Vitals:    10/13/22 1517 10/13/22 1532 10/13/22 1601 10/13/22 1621   BP: 97/64 94/68 90/62 (!) 82/55   Pulse: 92 90 91 92   Resp: 16 15 18 18   Temp:  (!) 94 °F (34.4 °C)     TempSrc:  Rectal     SpO2: 96% 96% 96% 96%   Weight:       Height:              -----------------------------------------------------------------------------------------    Pt was updated about admission. *To be filled out after report is complete*    Bed assignment: 3007    Report called to ICU. Pertinent labs, allergies, medications and conditions were reviewed. Present  issues include hypothermia and hypotension. Patient belongings that will be going with the patient during admission include robe, slippers, sweatpants, long sleeve shirt. Emergency contact daughter at bedside was notified of admission.      PAWAN Feliz RN  10/13/22 4052

## 2022-10-13 NOTE — ED NOTES
[id number]-Call back received from Romulo Ramirez NP spoke with Dr. Amy Espinal.      Sandip Murray  10/13/22 4742

## 2022-10-13 NOTE — PROGRESS NOTES
RN [facility]; [id number]  16:46 Room in process of being cleaned  Electronically signed by Serina Lee RN on 10/13/2022 at 4:47 PM

## 2022-10-13 NOTE — PLAN OF CARE
Admit to ICU    Sepsis  Hypotension  Hypothermic   UTI  Urinary retention, per ED mc placed with good output  MATHEUS, hyponatremia, HAGMA on labs upon arrival   Repeat to see if improved after mc placed   CT on 10/11 showed possible bladder cancer   Hyponatremia   Nephrology consulted, and will defer continuous IVF to nephrology   Urology consulted  intensivist consult, was seen in ED     Mildly elevated troponin, likely 2/2 MATHEUS--trend     Home meds not verified, nursing communication placed.      Timi Flowers, APRN - CNP

## 2022-10-14 ENCOUNTER — APPOINTMENT (OUTPATIENT)
Dept: VASCULAR LAB | Age: 67
DRG: 871 | End: 2022-10-14
Payer: MEDICARE

## 2022-10-14 PROBLEM — D64.9 ANEMIA: Status: ACTIVE | Noted: 2022-10-14

## 2022-10-14 PROBLEM — J44.9 COPD (CHRONIC OBSTRUCTIVE PULMONARY DISEASE) (HCC): Status: ACTIVE | Noted: 2022-10-14

## 2022-10-14 PROBLEM — E43 SEVERE PROTEIN-CALORIE MALNUTRITION (HCC): Status: ACTIVE | Noted: 2022-10-14

## 2022-10-14 PROBLEM — R33.9 URINARY RETENTION: Status: ACTIVE | Noted: 2022-10-14

## 2022-10-14 PROBLEM — R31.9 HEMATURIA: Status: ACTIVE | Noted: 2022-10-14

## 2022-10-14 PROBLEM — N17.9 AKI (ACUTE KIDNEY INJURY) (HCC): Status: ACTIVE | Noted: 2022-10-14

## 2022-10-14 PROBLEM — E87.1 HYPONATREMIA: Status: ACTIVE | Noted: 2022-10-14

## 2022-10-14 PROBLEM — N30.01 ACUTE CYSTITIS WITH HEMATURIA: Status: ACTIVE | Noted: 2022-10-14

## 2022-10-14 LAB
A/G RATIO: 1.6 (ref 1.1–2.2)
ABO/RH: NORMAL
ALBUMIN SERPL-MCNC: 3.6 G/DL (ref 3.4–5)
ALP BLD-CCNC: 86 U/L (ref 40–129)
ALT SERPL-CCNC: 6 U/L (ref 10–40)
ANION GAP SERPL CALCULATED.3IONS-SCNC: 14 MMOL/L (ref 3–16)
ANION GAP SERPL CALCULATED.3IONS-SCNC: 16 MMOL/L (ref 3–16)
ANTIBODY SCREEN: NORMAL
AST SERPL-CCNC: 8 U/L (ref 15–37)
BASOPHILS ABSOLUTE: 0 K/UL (ref 0–0.2)
BASOPHILS ABSOLUTE: 0 K/UL (ref 0–0.2)
BASOPHILS RELATIVE PERCENT: 0.5 %
BASOPHILS RELATIVE PERCENT: 0.7 %
BILIRUB SERPL-MCNC: 0.5 MG/DL (ref 0–1)
BLOOD BANK DISPENSE STATUS: NORMAL
BLOOD BANK PRODUCT CODE: NORMAL
BPU ID: NORMAL
BUN BLDV-MCNC: 100 MG/DL (ref 7–20)
BUN BLDV-MCNC: 104 MG/DL (ref 7–20)
CALCIUM SERPL-MCNC: 7.6 MG/DL (ref 8.3–10.6)
CALCIUM SERPL-MCNC: 7.8 MG/DL (ref 8.3–10.6)
CHLORIDE BLD-SCNC: 96 MMOL/L (ref 99–110)
CHLORIDE BLD-SCNC: 97 MMOL/L (ref 99–110)
CO2: 17 MMOL/L (ref 21–32)
CO2: 19 MMOL/L (ref 21–32)
CREAT SERPL-MCNC: 2.6 MG/DL (ref 0.8–1.3)
CREAT SERPL-MCNC: 2.9 MG/DL (ref 0.8–1.3)
DESCRIPTION BLOOD BANK: NORMAL
EOSINOPHILS ABSOLUTE: 0 K/UL (ref 0–0.6)
EOSINOPHILS ABSOLUTE: 0 K/UL (ref 0–0.6)
EOSINOPHILS RELATIVE PERCENT: 0.2 %
EOSINOPHILS RELATIVE PERCENT: 0.5 %
GFR AFRICAN AMERICAN: 26
GFR AFRICAN AMERICAN: 30
GFR NON-AFRICAN AMERICAN: 22
GFR NON-AFRICAN AMERICAN: 25
GLUCOSE BLD-MCNC: 79 MG/DL (ref 70–99)
GLUCOSE BLD-MCNC: 88 MG/DL (ref 70–99)
HCT VFR BLD CALC: 21.4 % (ref 40.5–52.5)
HCT VFR BLD CALC: 21.4 % (ref 40.5–52.5)
HCT VFR BLD CALC: 24.1 % (ref 40.5–52.5)
HEMOGLOBIN: 6.7 G/DL (ref 13.5–17.5)
HEMOGLOBIN: 6.9 G/DL (ref 13.5–17.5)
HEMOGLOBIN: 7.9 G/DL (ref 13.5–17.5)
LACTIC ACID: 0.6 MMOL/L (ref 0.4–2)
LYMPHOCYTES ABSOLUTE: 0.2 K/UL (ref 1–5.1)
LYMPHOCYTES ABSOLUTE: 0.2 K/UL (ref 1–5.1)
LYMPHOCYTES RELATIVE PERCENT: 4.5 %
LYMPHOCYTES RELATIVE PERCENT: 5.7 %
MCH RBC QN AUTO: 23.7 PG (ref 26–34)
MCH RBC QN AUTO: 24.2 PG (ref 26–34)
MCHC RBC AUTO-ENTMCNC: 31.3 G/DL (ref 31–36)
MCHC RBC AUTO-ENTMCNC: 32.3 G/DL (ref 31–36)
MCV RBC AUTO: 74.7 FL (ref 80–100)
MCV RBC AUTO: 75.6 FL (ref 80–100)
MONOCYTES ABSOLUTE: 0.2 K/UL (ref 0–1.3)
MONOCYTES ABSOLUTE: 0.2 K/UL (ref 0–1.3)
MONOCYTES RELATIVE PERCENT: 4.1 %
MONOCYTES RELATIVE PERCENT: 4.3 %
NEUTROPHILS ABSOLUTE: 3.5 K/UL (ref 1.7–7.7)
NEUTROPHILS ABSOLUTE: 3.8 K/UL (ref 1.7–7.7)
NEUTROPHILS RELATIVE PERCENT: 89.3 %
NEUTROPHILS RELATIVE PERCENT: 90.2 %
OCCULT BLOOD DIAGNOSTIC: ABNORMAL
PDW BLD-RTO: 18.2 % (ref 12.4–15.4)
PDW BLD-RTO: 18.5 % (ref 12.4–15.4)
PHOSPHORUS: 4.6 MG/DL (ref 2.5–4.9)
PLATELET # BLD: 179 K/UL (ref 135–450)
PLATELET # BLD: 181 K/UL (ref 135–450)
PMV BLD AUTO: 6.3 FL (ref 5–10.5)
PMV BLD AUTO: 6.8 FL (ref 5–10.5)
POTASSIUM REFLEX MAGNESIUM: 3.6 MMOL/L (ref 3.5–5.1)
POTASSIUM SERPL-SCNC: 3.6 MMOL/L (ref 3.5–5.1)
POTASSIUM SERPL-SCNC: 3.7 MMOL/L (ref 3.5–5.1)
PROCALCITONIN: 0.15 NG/ML (ref 0–0.15)
RBC # BLD: 2.83 M/UL (ref 4.2–5.9)
RBC # BLD: 2.86 M/UL (ref 4.2–5.9)
SODIUM BLD-SCNC: 129 MMOL/L (ref 136–145)
SODIUM BLD-SCNC: 130 MMOL/L (ref 136–145)
TOTAL PROTEIN: 5.8 G/DL (ref 6.4–8.2)
TROPONIN: 0.03 NG/ML
URINE CULTURE, ROUTINE: NORMAL
VANCOMYCIN RANDOM: 14.6 UG/ML
WBC # BLD: 3.9 K/UL (ref 4–11)
WBC # BLD: 4.2 K/UL (ref 4–11)

## 2022-10-14 PROCEDURE — 85014 HEMATOCRIT: CPT

## 2022-10-14 PROCEDURE — 94761 N-INVAS EAR/PLS OXIMETRY MLT: CPT

## 2022-10-14 PROCEDURE — 86901 BLOOD TYPING SEROLOGIC RH(D): CPT

## 2022-10-14 PROCEDURE — 99291 CRITICAL CARE FIRST HOUR: CPT | Performed by: INTERNAL MEDICINE

## 2022-10-14 PROCEDURE — 86923 COMPATIBILITY TEST ELECTRIC: CPT

## 2022-10-14 PROCEDURE — 86850 RBC ANTIBODY SCREEN: CPT

## 2022-10-14 PROCEDURE — 85018 HEMOGLOBIN: CPT

## 2022-10-14 PROCEDURE — 83605 ASSAY OF LACTIC ACID: CPT

## 2022-10-14 PROCEDURE — 93970 EXTREMITY STUDY: CPT

## 2022-10-14 PROCEDURE — 99223 1ST HOSP IP/OBS HIGH 75: CPT | Performed by: INTERNAL MEDICINE

## 2022-10-14 PROCEDURE — 2700000000 HC OXYGEN THERAPY PER DAY

## 2022-10-14 PROCEDURE — 36415 COLL VENOUS BLD VENIPUNCTURE: CPT

## 2022-10-14 PROCEDURE — 84145 PROCALCITONIN (PCT): CPT

## 2022-10-14 PROCEDURE — 84484 ASSAY OF TROPONIN QUANT: CPT

## 2022-10-14 PROCEDURE — 86900 BLOOD TYPING SEROLOGIC ABO: CPT

## 2022-10-14 PROCEDURE — 6360000002 HC RX W HCPCS: Performed by: NURSE PRACTITIONER

## 2022-10-14 PROCEDURE — 85025 COMPLETE CBC W/AUTO DIFF WBC: CPT

## 2022-10-14 PROCEDURE — 82270 OCCULT BLOOD FECES: CPT

## 2022-10-14 PROCEDURE — 2500000003 HC RX 250 WO HCPCS: Performed by: INTERNAL MEDICINE

## 2022-10-14 PROCEDURE — P9016 RBC LEUKOCYTES REDUCED: HCPCS

## 2022-10-14 PROCEDURE — 6370000000 HC RX 637 (ALT 250 FOR IP): Performed by: INTERNAL MEDICINE

## 2022-10-14 PROCEDURE — 2000000000 HC ICU R&B

## 2022-10-14 PROCEDURE — 80202 ASSAY OF VANCOMYCIN: CPT

## 2022-10-14 PROCEDURE — 2580000003 HC RX 258: Performed by: NURSE PRACTITIONER

## 2022-10-14 PROCEDURE — 2580000003 HC RX 258: Performed by: INTERNAL MEDICINE

## 2022-10-14 PROCEDURE — 36430 TRANSFUSION BLD/BLD COMPNT: CPT

## 2022-10-14 PROCEDURE — 80053 COMPREHEN METABOLIC PANEL: CPT

## 2022-10-14 RX ORDER — SODIUM CHLORIDE 9 MG/ML
INJECTION, SOLUTION INTRAVENOUS PRN
Status: DISCONTINUED | OUTPATIENT
Start: 2022-10-14 | End: 2022-10-15 | Stop reason: HOSPADM

## 2022-10-14 RX ADMIN — MIDODRINE HYDROCHLORIDE 10 MG: 10 TABLET ORAL at 08:07

## 2022-10-14 RX ADMIN — CEFEPIME 1000 MG: 1 INJECTION, POWDER, FOR SOLUTION INTRAMUSCULAR; INTRAVENOUS at 20:08

## 2022-10-14 RX ADMIN — SODIUM BICARBONATE: 84 INJECTION, SOLUTION INTRAVENOUS at 22:25

## 2022-10-14 RX ADMIN — Medication 10 ML: at 20:06

## 2022-10-14 RX ADMIN — Medication 10 ML: at 08:06

## 2022-10-14 RX ADMIN — CEFEPIME 1000 MG: 1 INJECTION, POWDER, FOR SOLUTION INTRAMUSCULAR; INTRAVENOUS at 08:13

## 2022-10-14 RX ADMIN — SODIUM BICARBONATE: 84 INJECTION, SOLUTION INTRAVENOUS at 07:47

## 2022-10-14 RX ADMIN — VANCOMYCIN HYDROCHLORIDE 500 MG: 500 INJECTION, POWDER, LYOPHILIZED, FOR SOLUTION INTRAVENOUS at 08:11

## 2022-10-14 ASSESSMENT — PAIN SCALES - GENERAL
PAINLEVEL_OUTOF10: 0

## 2022-10-14 NOTE — PROGRESS NOTES
Comprehensive Nutrition Assessment    Type and Reason for Visit:  Initial, Positive Nutrition Screen (+ screen for MST = 4)    Nutrition Recommendations/Plan:   Continue ADULT DIET; Regular diet order. Added Ensure Enlive with meals. Monitor appetite, meal intake, and acceptance/intake of ONS. Monitor nutrition-related labs, bowel function, and weight trends. Malnutrition Assessment:  Malnutrition Status:  Severe malnutrition (10/14/22 1413)    Context:  Acute Illness     Findings of the 6 clinical characteristics of malnutrition:  Energy Intake:  50% or less of estimated energy requirements for 5 or more days  Weight Loss:  Unable to assess     Body Fat Loss: Moderate body fat loss Orbital, Triceps, Fat Overlying Ribs, Buccal region   Muscle Mass Loss: Moderate muscle mass loss Temples (temporalis), Clavicles (pectoralis & deltoids), Thigh (quadraceps), Calf (gastrocnemius), Hand (interosseous), Scapula (trapezius)  Fluid Accumulation:  No significant fluid accumulation     Strength:  Not Performed    Nutrition Assessment:    patient is severely malnourished AEB poor appetite/po intake PTA and bladder mass found (concern for cancer) and he is at risk for further compromise d/t BMI = 11.70, severe fat loss and muscle wasting, and altered nutrition-related labs; will continue ADULT DIET; Regular diet order and add Ensure Enlive with meals    Nutrition Related Findings:    patient is A & O x 4; CT showed a bladder mass with a concern for cancer; surgery not planned at this time; abdomen is flat, soft, and bowel sounds are active; per patient's daughter, patient's po intake was decreased x 1 week PTA; + 1 unit PRBC given;  Na Bicarb 100 mEq in 1/2 NS at 75 ml/hr currently infusing; patient is very thin and cachectic; patient's ex-wife and his daughter were at bedside this am during rounds Wound Type: None       Current Nutrition Intake & Therapies:    Average Meal Intake: Unable to assess  Average Supplements Intake: None Ordered  ADULT DIET; Regular  ADULT ORAL NUTRITION SUPPLEMENT; Breakfast, Lunch, Dinner; Standard High Calorie/High Protein Oral Supplement    Anthropometric Measures:  Height: 5' 6\" (167.6 cm)  Ideal Body Weight (IBW): 142 lbs (65 kg)    Admission Body Weight: 70 lb 4.8 oz (31.9 kg) (obtained on 10/13/22; actual weight)  Current Body Weight: 72 lb 8 oz (32.9 kg) (obtained on 10/14/22; actual weight), 51.1 % IBW.  Weight Source: Bed Scale  Current BMI (kg/m2): 11.7  Usual Body Weight: 70 lb 4.8 oz (31.9 kg) (obtained on 10/13/22; actual weight)  % Weight Change (Calculated): 3.1  Weight Adjustment For: No Adjustment                 BMI Categories: Underweight (BMI less than 22) age over 72    Estimated Daily Nutrient Needs:  Energy Requirements Based On: Kcal/kg  Weight Used for Energy Requirements: Current  Energy (kcal/day): 1089 - 1155 kcals based on 33-35 kcals/kg/CBW  Weight Used for Protein Requirements: Current  Protein (g/day): 56 - 59 g protein based on 1.7-1.8 g/kg/CBW  Method Used for Fluid Requirements: 1 ml/kcal  Fluid (ml/day): 1089 - 1155 ml    Nutrition Diagnosis:   Severe malnutrition related to inadequate protein-energy intake, altered GI function, increase demand for energy/nutrients, catabolic illness as evidenced by poor intake prior to admission, BMI, lab values, GI abnormality, moderate loss of subcutaneous fat, moderate muscle loss    Nutrition Interventions:   Food and/or Nutrient Delivery: Continue Current Diet, Start Oral Nutrition Supplement  Nutrition Education/Counseling: No recommendation at this time  Coordination of Nutrition Care: Continue to monitor while inpatient, Interdisciplinary Rounds  Plan of Care discussed with: ICU Team    Goals:     Goals: PO intake 50% or greater, by next RD assessment       Nutrition Monitoring and Evaluation:   Behavioral-Environmental Outcomes: None Identified  Food/Nutrient Intake Outcomes: Food and Nutrient Intake, Supplement Intake, IVF Intake  Physical Signs/Symptoms Outcomes: Biochemical Data, GI Status, Meal Time Behavior, Nutrition Focused Physical Findings, Skin, Weight    Discharge Planning:    Continue current diet, Continue Oral Nutrition Supplement     Lara Rodney, [address], LD  Contact: [phone]

## 2022-10-14 NOTE — PROGRESS NOTES
Admission questions completed. Pt states that he only takes lisinopril but not every day. Does not remember the last time he took it. Pt is currently a full code. Consult to spiritual care to consider advanced directives. Patient will need  to set up possible home care, meals on wheels and assistance with medications. Pt needs hearing aides but expresses financial strain.

## 2022-10-14 NOTE — PROGRESS NOTES
Left leg swelling noted. Updated Dr. Heriberto Ortega and Dr. Osmin Chappell at bedside, new order for venous doppler.

## 2022-10-14 NOTE — PROGRESS NOTES
Michelle Mensah, [address], at bedside, manually irrigated mc with no plan for surgery. Patient ok for diet.

## 2022-10-14 NOTE — PROGRESS NOTES
Received call from access center, information given to assist with patient transfer to [facility].

## 2022-10-14 NOTE — PROGRESS NOTES
[facility]                                 Pulmonary Consult /Progress Note :                                                                  CHIEF COMPLAINT: hypotension ,urinary retention ,    Consulting provider: Dr Amy Espinal     HPI:   Doing better  On bicarb 75   BP stable  Did not need pressors  On 2 L  Significant hematuria  Left leg > Right leg swelling ,    Objective:   PHYSICAL EXAM:    Blood pressure 104/73, pulse 96, temperature 99.4 °F (37.4 °C), temperature source Bladder, resp. rate 15, height 5' 6\" (1.676 m), weight 72 lb 8 oz (32.9 kg), SpO2 100 %.' on RA  Gen: No distress. Eyes: PERRL. No sclera icterus. No conjunctival injection. ENT: No discharge. Pharynx clear. Neck: Trachea midline. No obvious mass. Resp: rhonchi   CV: Regular rate. Regular rhythm. No murmur or rub. No edema. GI: Non-tender. Non-distended. No hernia. Skin: Warm and dry. No nodule on exposed extremities. Lymph: No cervical LAD. No supraclavicular LAD. M/S: No cyanosis. No joint deformity. No clubbing. Neuro: Awake. Alert. Moves all four extremities. Psych: Oriented x 3. No anxiety.              LABS/IMAGING:    CBC:  Lab Results   Component Value Date    WBC 3.9 (L) 10/14/2022    HGB 6.7 (LL) 10/14/2022    HCT 21.4 (L) 10/14/2022    MCV 75.6 (L) 10/14/2022     10/14/2022    LYMPHOPCT 4.5 10/14/2022    RBC 2.83 (L) 10/14/2022    MCH 23.7 (L) 10/14/2022    MCHC 31.3 10/14/2022    RDW 18.5 (H) 10/14/2022    NEUTOPHILPCT 90.2 10/14/2022    MONOPCT 4.1 10/14/2022    BASOPCT 0.7 10/14/2022    NEUTROABS 3.5 10/14/2022    LYMPHSABS 0.2 (L) 10/14/2022    MONOSABS 0.2 10/14/2022    EOSABS 0.0 10/14/2022    BASOSABS 0.0 10/14/2022       Recent Labs     10/14/22  0630 10/14/22  0352 10/13/22  1247   WBC 3.9* 4.2 6.9   HGB 6.7* 6.9* 9.4*   HCT 21.4* 21.4* 29.3*   MCV 75.6* 74.7* 76.2*    179 264         BMP:   Recent Labs     10/13/22  1551 10/14/22  0055 10/14/22  0352   * 129* 130*   K 3.9 3.7 3.6  3.6   CL 97* 96* 97*   CO2 16* 17* 19*   PHOS  --   --  4.6   * 104* 100*   CREATININE 3.8* 2.9* 2.6*         MG: No results found for: MG  Ca/Phos:   Lab Results   Component Value Date    CALCIUM 7.8 (L) 10/14/2022    PHOS 4.6 10/14/2022     LIVER PROFILE:   Recent Labs     10/13/22  1247 10/14/22  0352   AST 8* 8*   ALT 7* 6*   LIPASE 30.0  --    BILITOT 0.5 0.5   ALKPHOS 130* 86         PT/INR: No results for input(s): PROTIME, INR in the last 72 hours. APTT: No results for input(s): APTT in the last 72 hours. Cardiac Enzymes:  Lab Results   Component Value Date    TROPONINI 0.03 (H) 10/14/2022       Assessment:       -Sepsis   -UTI with possible obstructive uropathy   -bladder tumor  -MATHEUS   -Hyponateremia  COPD  Anemia    Bilateral DVT left> right . Plan:      *-S/P ,urology consulted may need cystscopy ,staff to call and find the plan   *-CT scan chest at some point   *-BP whenI saw 100/55 abd awake and alert snd respond to fluid ,if hypotensive nocturnist to place central line    *- had vancomycin will keep   *_keep cefpime 1 g bid   *- had more than 3 L fluid  *-repeat bmp ,lactate  Hold heparin ,scd   Bactroban   Ct chest when better condition   Keep in ICU   Discussed in details in family he want [facility] after surgery  Will ask vascular for IVC as soon ,IM team in contact with them   Can not anticoagulate Hb 6.5 and gross hematuria     Care reviewed with nursing staff, medical and surgical specialty care, primary care and the patient's family as available. Chart review/lab review/X-ray viewing/documentation and had long Conversation with patient/family re: prognosis, care options and any end of life issues:      Critical care time spent reviewing labs/films, examining patient, collaborating with other physicians more than 35  Minutes  excluding procedures . Almaz Luna M.D.   10/14/2022  10:03 AM      Thank you very much for allowing me to participate in the care of this pleasant patient , should you have any questions ,please do not hesitate to contact me      Starr Vela MD,[facility]  Pulmonary&Critical Care Medicine    Reagan Pearson    NOTE: This report was transcribed using voice recognition software. Every effort was made to ensure accuracy; however, inadvertent computerized transcription errors may be present.

## 2022-10-14 NOTE — PROGRESS NOTES
Day: 2  Recent Labs     10/13/22  1553 10/14/22  0055 10/14/22  0352   CREATININE 3.8* 2.9* 2.6*     Estimated Creatinine Clearance: 13 mL/min (A) (based on SCr of 2.6 mg/dL (H)). Recent Labs     10/13/22  1247 10/14/22  0352   WBC 6.9 4.2       Cefepime-Extended Infusion (4-hour infusion) - Preferred Dosing Strategy   Renal Function (CrCl mL/min) ? 60 30 - 59 11 - 29 ? 10, HD PD CRRT   All indications - Loading dose of 2000 milligrams x 1 over 30 minutes or via IV push. Maintenance dose   should begin at the next regularly scheduled dosing interval based on indication/renal function. Intra-abdominal infections, Skin and soft tissue infections, Urinary tract infections 2000mg q12h 2000mg q24h 1000mg q24h 500mg q24h 1000mg q24h 2000mg q24h*   Bacteremia, CNS infections, Cystic fibrosis, Diabetic foot infections, Endocarditis, Febrile neutropenia, Healthcare-associated infections, Osteomyelitis/joint infections, Pneumonia, Sepsis, BMI > 40* 2000mg q8h 2000mg q12h 1000mg q12h 1000mg q24h 1000mg q24h 2000mg q12h   *Consider 2000mg q12h for indication of Urinary tract infections in BMI > 40. Adjust for decreased renal function. ^Consider 2000mg q12h for CRRT effluent rates > 3L/h  Consider 2000mg q8h for CRRT effluent rates ? 3L/h      Cefepime Intermittent Dosing (30 min infusion)    Renal Function (CrCl mL/min) ? 64 30 - 61 11 - 34 ?10, HD PD CRRT   Intra-abdominal infections, Skin and soft tissue infections, Urinary tract infections 1000mg q8h 1000mg q12h 1000mg q24h 500 mg q24h 1000mg q24h 2000mg q12h^   Bacteremia, CNS infections, Cystic fibrosis, Diabetic foot infections, Endocarditis, Febrile neutropenia, Healthcare-associated infections, Osteomyelitis/joint infections, Pneumonia, Sepsis, BMI ?  40* 2000mg q8h 2000mg q12h 1000mg q12h 1000mg q24h       Cefepime increased to q12h for improved renal function    HERMILO Pablo RUDDY [facility] 10/14/2022 5:49 AM

## 2022-10-14 NOTE — PROGRESS NOTES
Pharmacy Vancomycin Consult     Vancomycin Day: 2 of 7  Current Dosing: pulse dosing  Current indication: UTI, sepsis    Temp max:  99.3    Recent Labs     10/13/22  1247 10/13/22  1553 10/14/22  0055 10/14/22  0352   *   < > 104* 100*   CREATININE 4.8*   < > 2.9* 2.6*   WBC 6.9  --   --  4.2    < > = values in this interval not displayed. Intake/Output Summary (Last 24 hours) at 10/14/2022 0622  Last data filed at 10/14/2022 0516  Gross per 24 hour   Intake 4610.11 ml   Output 2950 ml   Net 1660.11 ml     Culture Date      Source                       Results      Ht Readings from Last 1 Encounters:   10/13/22 5' 6\" (1.676 m)        Wt Readings from Last 1 Encounters:   10/14/22 72 lb 8 oz (32.9 kg)       Body mass index is 11.7 kg/m². Estimated Creatinine Clearance: 13 mL/min (A) (based on SCr of 2.6 mg/dL (H)). Random: 14.6    Assessment/Plan:  Will give 500 mg dose of vancomycin today. Continue with daily levels and pulse-dosing of vancomycin.

## 2022-10-14 NOTE — CONSULTS
[facility] Consult Note           Reason for Consult:  Acute kidney injury  Requesting Physician:  Dr. Rehana Wright    Chief Complaint:  Difficulty voiding, decreased intake  History Obtained From:  patient, electronic medical record    History of Present Ilness:    75year old male with HTN admitted with dysuria, difficulty voiding, decreased intake. We have been asked to assist in further mgmt of his MATHEUS. Hypotensive on arrival, ~70's/50's, given ~4 liters of fluids. Mc placed with 3 liters of urine drained. CT with massively enlarged bladder and abnormal bladder wall findings with moderate right hydroureteronephrosis. SCr 4.8 on admission, improving with IVF's, mc, having hematuria after mc placed. Sr previously 2.5 from earlier this month. Has had difficulty voiding with urine incontinence for ~1 year, worsening. +weak, intake reduced, low grade fevers. Past Medical History:        Diagnosis Date    Anxiety     Depression     Hypertension        Past Surgical History:        Procedure Laterality Date    CYST REMOVAL      FINGER SURGERY         Home Medications:    No current facility-administered medications on file prior to encounter.      Current Outpatient Medications on File Prior to Encounter   Medication Sig Dispense Refill    tamsulosin (FLOMAX) 0.4 MG capsule Take 0.4 mg by mouth daily         Allergies:  Bee venom    Social History:    Social History     Socioeconomic History    Marital status:      Spouse name: Not on file    Number of children: Not on file    Years of education: Not on file    Highest education level: Not on file   Occupational History    Not on file   Tobacco Use    Smoking status: Every Day     Packs/day: 2.00     Types: Cigarettes    Smokeless tobacco: Never   Substance and Sexual Activity    Alcohol use: Yes     Comment: beer 3 times a week    Drug use: No    Sexual activity: Not on file   Other Topics Concern Not on file   Social History Narrative    Not on file     Social Determinants of Health     Financial Resource Strain: Not on file   Food Insecurity: Not on file   Transportation Needs: Not on file   Physical Activity: Not on file   Stress: Not on file   Social Connections: Not on file   Intimate Partner Violence: Not on file   Housing Stability: Not on file       Family History:   No history of kidney disease. Review of Systems:   Pertinent positives stated above in HPI. Remainder of 10 point review of systems were reviewed and were negative.     Physical exam:   Constitutional:  VITALS:  /66   Pulse 95   Temp 100.1 °F (37.8 °C) (Bladder)   Resp 19   Ht 5' 6\" (1.676 m)   Wt 72 lb 8 oz (32.9 kg)   SpO2 94%   BMI 11.70 kg/m²   Gen: alert, awake, ill-appearing  Skin: no rash, turgor wnl  Heent:  eomi, mmm  Neck: no bruits or jvd noted, thyroid normal  Cardiovascular:  S1, S2 without m/r/g  Respiratory: CTA B without w/r/r; respiratory effort normal  Abdomen:  +bs, soft, nt, nd, no hepatosplenomegaly  Ext: no lower extremity edema  Psychiatric: mood and affect appropriate; judgement and insight intact  Musculoskeletal:  Rom, muscular strength limited; digits, nails normal    Data/  CBC:   Lab Results   Component Value Date/Time    WBC 3.9 10/14/2022 06:30 AM    RBC 2.83 10/14/2022 06:30 AM    HGB 6.7 10/14/2022 06:30 AM    HCT 21.4 10/14/2022 06:30 AM    MCV 75.6 10/14/2022 06:30 AM    MCH 23.7 10/14/2022 06:30 AM    MCHC 31.3 10/14/2022 06:30 AM    RDW 18.5 10/14/2022 06:30 AM     10/14/2022 06:30 AM    MPV 6.3 10/14/2022 06:30 AM     BMP:    Lab Results   Component Value Date/Time     10/14/2022 03:52 AM    K 3.6 10/14/2022 03:52 AM    K 3.6 10/14/2022 03:52 AM    CL 97 10/14/2022 03:52 AM    CO2 19 10/14/2022 03:52 AM     10/14/2022 03:52 AM    LABALBU 3.6 10/14/2022 03:52 AM    CREATININE 2.6 10/14/2022 03:52 AM    CALCIUM 7.8 10/14/2022 03:52 AM    GFRAA 30 10/14/2022 03:52 AM LABGLOM 25 10/14/2022 03:52 AM    GLUCOSE 79 10/14/2022 03:52 AM     UA moderate blood, 100 protein, s.g. 1.025, >100 wbc's  Urine sodium 74  Urine chloride 46  Urine osmolality 304    Assessment/    - Acute kidney injury - pre-renal + bladder outlet obstruction    - Bladder outlet obstruction with suspected enlarged prostate - plans per Urology with eventual cystoscopy    - Hyponatremia - responding to volume    - Anion-gap metabolic acidosis - better with improving MATHEUS & IVF's with bicarb replacement      Plan/    - Continue IVF's with bicarb replacement  - Trend labs, bp's, & urine output for hopeful improvement      Thank you for the consultation. Please do not hesitate to call with questions. ____________________________________  Bernabe Osorio MD  [facility]  [email or web address]  Office: [phone]

## 2022-10-14 NOTE — PROGRESS NOTES
1 unit PRBC transfusing, patient observed for initial 15 minutes with no s/s of transfusion reaction. Reassessment complete. Patient with no changes to physical assessment at this time. Patient with no needs voiced. Family at bedside. Call light and personal belongings within reach.

## 2022-10-14 NOTE — CONSULTS
Urology Consult Note      Reason for Consultation: urinary retention    Chief Complaint: \"dribbling urine\"  HPI:  Radha Lopez is a 75 y.o. male who does not follow with a primary care provider. He has been having urinary symptoms for the past 1 year, he states its been very difficult for him to void, he is constantly incontinent and dribbling urine. He has to strain to void. No prior history of prostate issues. Was seen at the [facility] office last week and found to be in urinary retention. Prostate on NARCISO noted to be 4+. Sent for CT scan which showed massively enlarged bladder with some abnormal bladder wall findings. Came to the ER was hypotensive, hypothermic, renal failure. Alexander catheter placed and 3 L of urine drained.   Admitted to the ICU for further care    Past Medical History:   Diagnosis Date    Anxiety     Depression     Hypertension        Past Surgical History:   Procedure Laterality Date    CYST REMOVAL      FINGER SURGERY         Medication List reviewed:      Current Facility-Administered Medications   Medication Dose Route Frequency Provider Last Rate Last Admin    cefepime (MAXIPIME) 1000 mg IVPB minibag  1,000 mg IntraVENous Q12H Radhaadriana Styles APRN - CNP 12.5 mL/hr at 10/14/22 0813 1,000 mg at 10/14/22 0813    0.9 % sodium chloride infusion   IntraVENous PRN Danya Goel MD        mupirocin (BACTROBAN) 2 % ointment   Nasal BID South Cline MD        sodium chloride flush 0.9 % injection 5-40 mL  5-40 mL IntraVENous 2 times per day Radha Styles, APRN - CNP   10 mL at 10/14/22 0806    sodium chloride flush 0.9 % injection 5-40 mL  5-40 mL IntraVENous PRN Radha Styles, APRN - CNP        0.9 % sodium chloride infusion   IntraVENous PRN Radha Styles, APRN - CNP        [Held by provider] heparin (porcine) injection 5,000 Units  5,000 Units SubCUTAneous BID Radha Styles, APRN - CNP   5,000 Units at 10/13/22 2115    ondansetron (ZOFRAN-ODT) disintegrating tablet 4 mg  4 mg Oral Q8H PRN Janki Nicola, APRN - CNP        Or    ondansetron [facility]) injection 4 mg  4 mg IntraVENous Q6H PRN Janki Nicola, APRN - CNP        polyethylene glycol (GLYCOLAX) packet 17 g  17 g Oral Daily PRN Janki Nicola, APRN - CNP        acetaminophen (TYLENOL) tablet 650 mg  650 mg Oral Q6H PRN Janki Nicola, APRN - CNP        Or    acetaminophen (TYLENOL) suppository 650 mg  650 mg Rectal Q6H PRN Janki Nicola, APRN - CNP        sodium bicarbonate 100 mEq in sodium chloride 0.45 % 1,000 mL infusion   IntraVENous Continuous Fransisco Morales MD 75 mL/hr at 10/14/22 0747 New Bag at 10/14/22 0747    midodrine (PROAMATINE) tablet 10 mg  10 mg Oral TID  South Carballo MD   10 mg at 10/14/22 9049    vancomycin (VANCOCIN) intermittent dosing (placeholder)   Other RX Placeholder Janki Nicola, APRN - CNP           Allergies   Allergen Reactions    Bee Venom Itching and Swelling       No family history on file. Social History     Tobacco Use    Smoking status: Every Day     Packs/day: 2.00     Types: Cigarettes    Smokeless tobacco: Never   Substance Use Topics    Alcohol use: Yes     Comment: beer 3 times a week    Drug use: No         Review of Systems: A 12 point ROS was performed and was unremarkable unless listed in the history of present illness. I/O last 3 completed shifts: In: 4610.1 [I.V.:3000; IV Piggyback:1610.1]  Out: 2950 [Urine:2950]    Vitals:  /63   Pulse 91   Temp 99.8 °F (37.7 °C) (Bladder)   Resp 16   Ht 5' 6\" (1.676 m)   Wt 72 lb 8 oz (32.9 kg)   SpO2 99%   BMI 11.70 kg/m²   Temp  Av.4 °F (33 °C)  Min: 33.8 °F (1 °C)  Max: 99.8 °F (37.7 °C)    Intake/Output Summary (Last 24 hours) at 10/14/2022 1133  Last data filed at 10/14/2022 1057  Gross per 24 hour   Intake 4610.11 ml   Output 3550 ml   Net 1060.11 ml         Physical:  Cachectic male who appears older than stated age  Mood indicates no abnormalities. Pt doesnt appear depressed.   Very hard of hearing  Orientated to time and place  Neck is supple, trachea is midline  Abdomen no masses or hernias are palpated, there is no tenderness. No apparent organomegaly       Male :  Penis appears normal and circumcised  Urethral meatus is normal in size and location  Alexander in place draining light red urine      Labs:  WBC:    Lab Results   Component Value Date/Time    WBC 3.9 10/14/2022 06:30 AM     Hemoglobin/Hematocrit:    Lab Results   Component Value Date/Time    HGB 6.7 10/14/2022 06:30 AM    HCT 21.4 10/14/2022 06:30 AM     BMP:    Lab Results   Component Value Date/Time     10/14/2022 03:52 AM    K 3.6 10/14/2022 03:52 AM    K 3.6 10/14/2022 03:52 AM    CL 97 10/14/2022 03:52 AM    CO2 19 10/14/2022 03:52 AM     10/14/2022 03:52 AM    LABALBU 3.6 10/14/2022 03:52 AM    CREATININE 2.6 10/14/2022 03:52 AM    CALCIUM 7.8 10/14/2022 03:52 AM    GFRAA 30 10/14/2022 03:52 AM    LABGLOM 25 10/14/2022 03:52 AM     PT/INR:  No results found for: PROTIME, INR  PTT:  No results found for: APTT[APTT    Urinalysis:    Latest Reference Range & Units 10/13/22 12:44   Color, UA Straw/Yellow  Yellow   Clarity, UA Clear  CLOUDY ! Glucose, UA Negative mg/dL Negative   Bilirubin, Urine Negative  Negative   Ketones, Urine Negative mg/dL Negative   Specific Gravity, UA 1.005 - 1.030  1.015   Blood, Urine Negative  MODERATE !   pH, UA 5.0 - 8.0  6.0   Protein, UA Negative mg/dL 100 ! Urobilinogen, Urine <2.0 E.U./dL 0.2   Nitrite, Urine Negative  Negative   Leukocyte Esterase, Urine Negative  SMALL ! Urine Type  NotGiven   Urinalysis Comments  see below   Urine Reflex to Culture  Yes   WBC, UA 0 - 5 /HPF >100 ! RBC, UA 0 - 4 /HPF see below ! Microscopic Examination  YES   !: Data is abnormal    Urine Culture: NG    Blood Culture: pending     Antibiotic Therapy: cefepime     Imaging    CT AP  FINDINGS:   Lower Chest: Emphysematous changes. Lung bases otherwise clear. Organs:  The unenhanced liver, gallbladder, spleen, pancreas and adrenal   glands are grossly unremarkable. There is moderate right   hydroureteronephrosis. No left hydronephrosis. Probable 3 mm left   nephrolithiasis. GI/Bowel: No dilated loops of bowel or bowel wall thickening. No free air. Moderate amount stool in the colon. Pelvis: The bladder is markedly distended measuring up to 20 cm in the   craniocaudal dimension. There are multifocal areas of nodular wall   thickening throughout the bladder, for example on the left lateral aspect   measuring up to 9 x 3 cm (image 111). Within the base of the bladder, there   is also more focal masslike soft tissue measuring 4 x 3 cm. Prostate gland   is grossly unremarkable. No definite pathologically enlarged adenopathy or   free fluid. Peritoneum/Retroperitoneum: The aorta is tortuous. There is a 3.3 cm   abdominal aortic aneurysm. Severe atherosclerosis is noted. No   pathologically enlarged adenopathy or free fluid. Bones/Soft Tissues: Diffuse osteopenia. Impression   1. Markedly dilated bladder measuring up to 20 cm in the craniocaudal   dimension. Multifocal areas of nodular wall thickening, the largest   measuring up to 9 x 3 cm along the left lateral aspect suspicious for   neoplasm. 2. Moderate right hydroureteronephrosis. 3. 3.3 cm abdominal aortic aneurysm. Results were discussed with Dr. Giovany Rodriguez on 10/11/2022 at 19:54. RECOMMENDATIONS:   3.3 cm abdominal aortic aneurysm. Recommend follow-up every 3 years. Impression/Plan:     Urinary retention  Right hydronephrosis   Abnormal bladder wall thickening   Hematuria   Enlarged prostate   UTI  MATHEUS    - suspect enlarged prostate leading to retention and right hydro  - start flomax when medically improved if BP can tolerate   - abx for UTI  - keep mc - he will likely need to go home with mc in place   - monitor hematuria.   I easily irrigated it today without clots noted   - he will eventually need outpatient cystoscopy to evaluate abnormal bladder on CT and evaluation for prostate surgery. He requests to go to go [facility] office due to lack of transportation and proximity to his home.   We will arrange outpatient follow up    Eli Page PA-C  10/14/2022

## 2022-10-14 NOTE — CARE COORDINATION
Case Management Assessment  Initial Evaluation      Patient Name: Dennise Collins  YOB: 1955  Diagnosis: Dehydration [E86.0]  Urinary retention [R33.9]  MATHEUS (acute kidney injury) ([facility] [N17.9]  Acute cystitis without hematuria [N30.00]  Hypothermia, initial encounter Lala Fall. XXXA]  Sepsis ([facility] [A41.9]  Severe sepsis ([facility] [A41.9, R65.20]  Date / Time: 10/13/2022 11:27 AM    Admission status/Date:10/13/2022 Inpatient   Chart Reviewed: Yes      Patient Interviewed: Isaias Guzman:  Yes - pt's daughter Reba Medellin and ex-wife      Hospitalization in the last 30 days:  No    Health Care Decision Maker :   Primary Decision Maker: Genet Luevano - Child - [phone]    (CM - must 1st enter selection under Navigator - emergency contact- [place] Relationship and pick relationship)   Who do you trust or have selected to make healthcare decisions for you    Met with: pt and family   Interview conducted  (bedside/phone):bedside    Current PCP: Gerald Vela; saw a week ago    [address] Medicare  Precert required for SNF : Y          3 night stay required -  N    ADLS  Support Systems/Care Needs: Spouse/Significant Other, Children  Transportation: family    Meal Preparation: self    Housing  Living Arrangements: pt lives at home alone with his dog  Steps: 1 on the back [address] for return to present living arrangements: Yes  Identified Issues: [id number] [address] [facility] with [address] : No Agency:(Services)  Type of Home Care Services: None  Passport/Waiver : No  :                      Phone Number:    Passport/Waiver Services:  n/a          Durable Medical Equiptment   DME Provider: n/a  Equipment: n/a    Home O2 Use :  No    If No for home O2---if presently on O2 during hospitalization:  Yes  if yes CM to follow for potential DC O2 need    Community Service Affiliation  Dialysis:  No    Agency:  Location:  Dialysis Schedule:  Phone:   Fax:     Other Community Services: n/a    DISCHARGE PLAN: Explained Case Management role/services. Chart review completed. Met with pt, his daughter and ex-wife at bedside with pt's permission. Pt stated he is normally independent at home with his ADL's and plans on returning home. He stated that he will have a ride home on discharge. His family stated he only leaves the house if he has too for appointments, etc.     Completed Interdisciplinary rounds with ICU staff. CM will continue to follow and assist. Please notify CM if needs or concerns arise.     Cherise Aguilar MSW, TERRI-S

## 2022-10-14 NOTE — PROGRESS NOTES
Occult stool positive. Perfect serve to Dr. Deisy Orosco sent and read with no orders received at this time.

## 2022-10-14 NOTE — H&P
History and Physical      Chief Complaint   Patient presents with    Dysuria     Reports painful urination, reports not eating in over a week, not drinking. Reports getting results from the urologist and scans were completed. Told to come to the hospital.         HISTORY OF PRESENT ILLNESS:     Patient is a 62-year-old white man with a very poor historian. He was sent to the hospital urinary symptoms. He has had trouble voiding and has been incontinent of urine. He has had issues with straining to urinate. He was found to have urinary retention. Prostate was enlarged. CT scan showed a massively enlarged bladder. Showed bladder wall thickening. In the ER he was hypotensive and hypothermic. He had renal failure. A Alexander catheter was placed and 3 L of urine was obtained. He is admitted the ICU for further care. He has a Alexander in place. This shows hematuria. Denies any chest pain or shortness of breath. Overall weak. He has a long smoking history. He does not follow-up with her PCP. Patient is allergic to bee venom. Past Medical History:   Diagnosis Date    Anxiety     Depression     Hypertension        Past Surgical History:   Procedure Laterality Date    CYST REMOVAL      FINGER SURGERY         Scheduled Meds:   cefepime  1,000 mg IntraVENous Q12H    vancomycin  500 mg IntraVENous Once    sodium chloride flush  5-40 mL IntraVENous 2 times per day    heparin (porcine)  5,000 Units SubCUTAneous BID    midodrine  10 mg Oral TID WC    vancomycin (VANCOCIN) intermittent dosing (placeholder)   Other RX Placeholder       Continuous Infusions:   sodium chloride      sodium chloride      IV infusion builder 75 mL/hr at 10/13/22 1715       PRN Meds:  sodium chloride, sodium chloride flush, sodium chloride, ondansetron **OR** ondansetron, polyethylene glycol, acetaminophen **OR** acetaminophen       reports that he has been smoking cigarettes. He has been smoking an average of 2 packs per day.  He has never used smokeless tobacco.    No family history on file. Social History     Socioeconomic History    Marital status:      Spouse name: None    Number of children: None    Years of education: None    Highest education level: None   Tobacco Use    Smoking status: Every Day     Packs/day: 2.00     Types: Cigarettes    Smokeless tobacco: Never   Substance and Sexual Activity    Alcohol use: Yes     Comment: beer 3 times a week    Drug use: No     REVIEW OF SYSTEMS:   Constitutional: Negative for fever + generalized weakness + weight loss  HENT: Negative for sore throat   Eyes: Negative for redness   Respiratory: Negative for dyspnea, cough   Cardiovascular: Negative for chest pain   Gastrointestinal: Negative for vomiting, diarrhea   Genitourinary: Positive for hematuria   Musculoskeletal: Negative for arthralgias   Skin: Negative for rash   Neurological: Negative for syncope   Hematological: Negative for adenopathy   Psychiatric/Behavorial: Negative for anxiety    VS:   BP 83/66   Pulse 91   Temp 99.3 °F (37.4 °C) (Bladder)   Resp 13   Ht 5' 6\" (1.676 m)   Wt 72 lb 8 oz (32.9 kg)   SpO2 100%   BMI 11.70 kg/m²     Gen: Awake and alert. Ill-appearing. Eyes: PERRL. No sclera icterus. No conjunctival injection. ENT: No discharge. Pharynx clear. Neck: Trachea midline. Normal thyroid. Resp: No accessory muscle use. No crackles. No wheezes. No rhonchi. No dullness on percussion. CV: Regular rate. Regular rhythm. No murmur or rub. LLE swollen. GI: Non-tender. Non-distended. No masses. No organomegaly. Normal bowel sounds. No hernia. Skin: Warm and dry. No nodule on exposed extremities. No rash on exposed extremities. Lymph: No cervical LAD. No supraclavicular LAD. M/S: No cyanosis. No joint deformity. No clubbing. Neuro: Awake. Reflexes 2+ symmetric bilaterally. Moves all 4 extremities, non focal  Psych: Oriented x 3. No anxiety or agitation.      CBC:   Recent Labs     10/13/22  1247 10/14/22  0352 10/14/22  0630   WBC 6.9 4.2 3.9*   HGB 9.4* 6.9* 6.7*   HCT 29.3* 21.4* 21.4*   MCV 76.2* 74.7* 75.6*    179 181     BMP:   Recent Labs     10/13/22  1553 10/14/22  0055 10/14/22  0352   * 129* 130*   K 3.9 3.7 3.6  3.6   CL 97* 96* 97*   CO2 16* 17* 19*   PHOS  --   --  4.6   * 104* 100*   CREATININE 3.8* 2.9* 2.6*     LIVER PROFILE:   Recent Labs     10/13/22  1247 10/14/22  0352   AST 8* 8*   ALT 7* 6*   LIPASE 30.0  --    BILITOT 0.5 0.5   ALKPHOS 130* 86     PT/INR: No results for input(s): PROTIME, INR in the last 72 hours. APTT: No results for input(s): APTT in the last 72 hours. UA:  Recent Labs     10/13/22  1244   COLORU Yellow   PHUR 6.0   WBCUA >100*   RBCUA see below*   CLARITYU CLOUDY*   SPECGRAV 1.015   LEUKOCYTESUR SMALL*   UROBILINOGEN 0.2   BILIRUBINUR Negative   BLOODU MODERATE*   GLUCOSEU Negative      Latest Reference Range & Units 10/14/22 00:55   Procalcitonin 0.00 - 0.15 ng/mL 0.15     CT head without contrast   Preliminary Result   No evidence of acute intracranial abnormality. XR CHEST PORTABLE   Final Result   1. Bilateral upper lobe airspace disease favoring scarring rather than   pneumonia. VL Extremity Venous Bilateral    (Results Pending)        ASSESSMENT:    Principal Problem:    Sepsis ([address]  Resolved Problems:    * No resolved hospital problems. *      PLAN:    #Sepsis. Most likely obstructive urinary tract infection. Admitted to the ICU. Intensivist consultation. Received IV fluids. Received vancomycin and cefepime. Continue cefepime. Await urine cultures. #UTI with hematuria. On IV cefepime. Urology consultation. #Hematuria with possible bladder mass. Concerning for bladder cancer. Urology will need to do a cystoscopy when he is more stable. #MATHEUS. Possibly obstructive. Nephrology consultation. Has a Alexander in place. Monitor I's and O's. #Hyponatremia. Likely from urinary retention and dehydration. Sodium is slowly improving. Monitor. #COPD. Not in exacerbation. #Anemia likely from acute blood loss including blood loss from hematuria. Patient will need a blood transfusion. #SCD for DVT prophylaxis. #LLE swelling. Check venous US of the LLE    #Severe protein calorie malnutrition. Patient is a limited prognosis. IMPORTANT: Please note that some portions of this note may have been created using Dragon voice recognition software. Some \"sound-alike\" and totally wrong word substitutions may have taken place due to known inherent limitations of any such software, including this voice recognition software. In spite of efforts to eliminate such errors, some may not have been corrected. So please read the note with this in mind and recognize such mistakes and understand the correct version using the  context. If there are still uncertainties in the mind of the medical provider reading this note about any aspect of the note, the provider can feel free to contact me. Thanks. Aria Matamoros MD 10/14/2022 7:27 AM      Addendum:    DVT of the LLE. I have ordered IVC filter placement.

## 2022-10-14 NOTE — CONSULTS
[facility]                                 Pulmonary Consult /Progress Note :                                                                  CHIEF COMPLAINT: hypotension ,urinary retention ,    Consulting provider: Dr Ranjeet Jamison     HPI:     Denia Rosales is a 75 y.o. male who presents to the emergency department for evaluation of urinary /retention. Patient reports receiving a call from urology that his CT scan that was done as outpatient was abnormal and that he needed to come to the hospital.  , patient has not been eating much over the past week. He was complaining of pain with urination. They received a phone call that the CT scan showed a bladder mass that was concerning for cancer. His ex wife states he is more than 60-70 PPY smoking history and he was not following with any physician   He has been weak      No other complaints, modifying factors or associated symptoms. Past Medical History:   Diagnosis Date    Anxiety     Depression     Hypertension        Past Surgical History:        Procedure Laterality Date    CYST REMOVAL      FINGER SURGERY         Allergies:  is allergic to bee venom. Social History:    TOBACCO:   reports that he has been smoking cigarettes. He has been smoking an average of 2 packs per day. He has never used smokeless tobacco.  ETOH:   reports current alcohol use. Family History:   No family history on file.     Current Medications:    Current Facility-Administered Medications:     sodium chloride flush 0.9 % injection 5-40 mL, 5-40 mL, IntraVENous, 2 times per day, Alfredito Grief, APRN - CNP    sodium chloride flush 0.9 % injection 5-40 mL, 5-40 mL, IntraVENous, PRN, Alfredito Grief, APRN - CNP    0.9 % sodium chloride infusion, , IntraVENous, PRN, Alfredito Grief, APRN - CNP    heparin (porcine) injection 5,000 Units, 5,000 Units, SubCUTAneous, BID, Alfredito Grief, APRN - CNP, 5,000 Units at 10/13/22 2115    ondansetron (ZOFRAN-ODT) disintegrating tablet 4 mg, 4 mg, Oral, Q8H PRN **OR** ondansetron (ZOFRAN) injection 4 mg, 4 mg, IntraVENous, Q6H PRN, NALINI Davison CNP    polyethylene glycol (GLYCOLAX) packet 17 g, 17 g, Oral, Daily PRN, Stephani Juarez APRN - CNP    acetaminophen (TYLENOL) tablet 650 mg, 650 mg, Oral, Q6H PRN **OR** acetaminophen (TYLENOL) suppository 650 mg, 650 mg, Rectal, Q6H PRN, NALINI Davison - CNP    sodium bicarbonate 100 mEq in sodium chloride 0.45 % 1,000 mL infusion, , IntraVENous, Continuous, Jesse Banegas MD, Last Rate: 75 mL/hr at 10/13/22 1715, New Bag at 10/13/22 1715    albumin human 25 % IV solution 25 g, 25 g, IntraVENous, Q6H, South Chapa MD, Stopped at 10/13/22 1940      REVIEW OF SYSTEMS:  Constitutional: Negative for fever  HENT: Negative for sore throat  Eyes: Negative for redness   Respiratory: mild   Cardiovascular: Negative for chest pain  Gastrointestinal: Negative for vomiting, diarrhea   Genitourinary:retention   Musculoskeletal: Negative for arthralgias   Skin: Negative for rash  Neurological: Negative for syncope  Hematological: Negative for adenopathy  Psychiatric/Behavorial: Negative for anxiety      Objective:   PHYSICAL EXAM:    Blood pressure (!) 85/52, pulse 99, temperature 97.3 °F (36.3 °C), temperature source Bladder, resp. rate 16, height 5' 6\" (1.676 m), weight 70 lb 4.8 oz (31.9 kg), SpO2 92 %.' on RA  Gen: No distress. Eyes: PERRL. No sclera icterus. No conjunctival injection. ENT: No discharge. Pharynx clear. Neck: Trachea midline. No obvious mass. Resp: rhonchi   CV: Regular rate. Regular rhythm. No murmur or rub. No edema. GI: Non-tender. Non-distended. No hernia. Skin: Warm and dry. No nodule on exposed extremities. Lymph: No cervical LAD. No supraclavicular LAD. M/S: No cyanosis. No joint deformity. No clubbing. Neuro: Awake. Alert. Moves all four extremities. Psych: Oriented x 3. No anxiety.              LABS/IMAGING:    CBC:  Lab Results Component Value Date    WBC 6.9 10/13/2022    HGB 9.4 (L) 10/13/2022    HCT 29.3 (L) 10/13/2022    MCV 76.2 (L) 10/13/2022     10/13/2022    LYMPHOPCT 3.3 10/13/2022    RBC 3.85 (L) 10/13/2022    MCH 24.3 (L) 10/13/2022    MCHC 32.0 10/13/2022    RDW 18.4 (H) 10/13/2022    NEUTOPHILPCT 91.9 10/13/2022    MONOPCT 4.6 10/13/2022    BASOPCT 0.1 10/13/2022    NEUTROABS 6.4 10/13/2022    LYMPHSABS 0.2 (L) 10/13/2022    MONOSABS 0.3 10/13/2022    EOSABS 0.0 10/13/2022    BASOSABS 0.0 10/13/2022       Recent Labs     10/13/22  1247 10/03/22  1726   WBC 6.9 6.4   HGB 9.4* 9.7*   HCT 29.3* 30.8*   MCV 76.2* 76.9*    351       BMP:   Recent Labs     10/13/22  1247 10/13/22  1553   * 126*   K 4.1 3.9   CL 84* 97*   CO2 17* 16*   * 119*   CREATININE 4.8* 3.8*       MG: No results found for: MG  Ca/Phos:   Lab Results   Component Value Date    CALCIUM 6.5 (L) 10/13/2022     LIVER PROFILE:   Recent Labs     10/13/22  1247   AST 8*   ALT 7*   LIPASE 30.0   BILITOT 0.5   ALKPHOS 130*       PT/INR: No results for input(s): PROTIME, INR in the last 72 hours. APTT: No results for input(s): APTT in the last 72 hours.     Cardiac Enzymes:  Lab Results   Component Value Date    TROPONINI 0.03 (H) 10/13/2022       Assessment:       -Sepsis   -UTI with possible obstructive uropathy   -bladder tumor  -MATHEUS   -Hyponateremia  COPD  Anemia        Plan:      *-S/P ,urology consulted mneed cystscopy   *-CT scan  *-BP whenI saw 100/55 abd awake and alert snd respond to fluid ,if hypotensive nocturnist to place central line    *- had vancomycin  *_keep cefpime 1 g bid   *- had more than 3 L fluid  *-repeat bmp ,lactate  Heparin dvt px  Bactroban   Ct chest when better condition   Keep in ICU       Thank you very much for allowing me to participate in the care of this pleasant patient , should you have any questions ,please do not hesitate to contact me      [place] Hanson MD,[facility]  Pulmonary&Critical Care Medicine Professor Jesus Oneal    NOTE: This report was transcribed using voice recognition software. Every effort was made to ensure accuracy; however, inadvertent computerized transcription errors may be present.

## 2022-10-14 NOTE — PROGRESS NOTES
Reassessment complete. Patient seen awake in bed. Physical assessment unchanged at this time. Dr. Servando Martin at bedside to discuss possibility of transfer with patient and family r/t unable to place IVC filter and unable to anti-coagulate, Dr. Servando Martin to contact Dr. Talon Cabrera regarding transfer for vascular surgery.

## 2022-10-14 NOTE — PROGRESS NOTES
Shift assessment complete- see flowsheets. Pt is A&Ox4. VSS  BP soft. 85/52 MAP 63. Goal of MAP above 60. Respirations are easy, even and unlabored. 88% at times. Placed on 2L NC. PIVs WDL. Flushed at this time. - 100mEq bicarb infusing at 75mLhr. Alexander in place. Patent. Blood noted to urine. Plan of care and goals reviewed. Call light within reach. Bed in lowest position with wheels locked.

## 2022-10-14 NOTE — PROGRESS NOTES
Morning assessment complete. Patient seen awake in bed. Patient alert and oriented x 4. Patient seen 100% on 2 lpm o2 per NC. Attempted to remove oxygen, patient with desaturation to 87% at rest. Patient with no c/o pain. Patient with no s/s of distress noted at this time. Physical assessment as charted in flow sheets. Scheduled medications given per orders. Sodium BiCarb gtt infusing at 75 mL/hr. Peripheral IV C/D/I and functioning properly. Alexander intact and secure, red bloody urine draining. Patient repositioned for comfort. Patient's family at bedside and updated on current plan of care. Patient educated on use of call light and to call out with needs, verbalized understanding. Call light and personal belongings within reach.

## 2022-10-14 NOTE — PLAN OF CARE
IR contacted regarding possible IVC filter placement, no plans for filter placement at this time, discussed with requesting pulmonary/critical care physician. 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       Ronit Gutierres DO  10/14/2022, 3:37 PM  Interventional Radiology  [phone])

## 2022-10-14 NOTE — CONSULTS
Extended Infusion B-Lactam Antibiotics: Cefepime     Day: 1  Recent Labs     10/13/22  1247 10/13/22  1553   CREATININE 4.8* 3.8*     Estimated Creatinine Clearance: 8 mL/min (A) (based on SCr of 3.8 mg/dL (H)). Recent Labs     10/13/22  1247   WBC 6.9       Sepsis, UTI  Cefepime 1000 mg IV Q24 hours (extended infusion) Infuse each dose over 4 hours. Pharmacy will continue to monitor and adjust as necessary.

## 2022-10-14 NOTE — CONSULTS
Pharmacy Note  Vancomycin Consult    Francisca Mahan is a 75 y.o. male started on Vancomycin for UTI, Sepsis; consult received from NALINI Pelaez CNP to manage therapy. Also receiving the following antibiotics: Cefepime IV. Allergies:  Bee venom     Tmax: 98.4    Recent Labs     10/13/22  1247 10/13/22  1553   CREATININE 4.8* 3.8*       Recent Labs     10/13/22  1247   WBC 6.9       Estimated Creatinine Clearance: 8 mL/min (A) (based on SCr of 3.8 mg/dL (H)). Intake/Output Summary (Last 24 hours) at 10/13/2022 2322  Last data filed at 10/13/2022 2242  Gross per 24 hour   Intake 4510.11 ml   Output 2200 ml   Net 2310.11 ml       Wt Readings from Last 1 Encounters:   10/13/22 70 lb 4.8 oz (31.9 kg)         Body mass index is 11.35 kg/m². Pulse dose: fluctuating renal function, MATHEUS, ESRD   Goal Vancomycin trough: 10-15 mcg/mL or 15-20 mcg/mL       Assessment/Plan:  Patient received a one time dose of Vancomycin 1250 mg IV x 1 in the ED at 1347 today. Based on patient's weight and current renal function will pulse dose vancomycin. Vancomycin random level ordered for tomorrow morning at 0600. Will pulse dose vancomycin based on random level results. Pharmacy will continue to monitor and adjust as necessary. Thank you for the consult.

## 2022-10-14 NOTE — ACP (ADVANCE CARE PLANNING)
Advance Care Planning     General Advance Care Planning (ACP) Conversation    Date of Conversation: 10/13/2022  Conducted with: Patient with Decision Making Capacity   Healthcare Decision Maker: Next of Kin by law (only applies in absence of above) (name) pt's daughter Reba Medellin and ex-wife at bedside with pt's permission     Healthcare Decision Maker:    Primary Decision Maker: Vicenta Medrano - [facility] - [phone]  Click here to complete Healthcare Decision Makers including selection of the Healthcare Decision Maker Relationship (ie \"Primary\"). Today we documented Decision Maker(s) consistent with Legal Next of Kin hierarchy. Pt wants to complete ACP documents for healthcare POA. He is aware Spiritual Care will be consulted to assist.     Content/Action Overview:  Has NO ACP documents/care preferences - requested patient complete ACP documents    Addressed current code status of Full Code what this means. Pt stated he would never want to be put on a Vent but would want CPR, Resuscitative Medications and Shocking. Rena VILLALTA aware and Dr. Markie Baxter notified verbally via ICU rounds. Dr. Markie Baxter addressed code status with pt and family at bedside; MD states that pt will remain a full code until surgery then after surgery, MD will change code status.      Length of Voluntary ACP Conversation in minutes:  <16 minutes (Non-Billable)    Wheeler Goldmann MSW, ERENDIRA

## 2022-10-15 ENCOUNTER — HOSPITAL ENCOUNTER (INPATIENT)
Age: 67
LOS: 7 days | Discharge: NURSING, REHAB OR LONG-TERM CARE | DRG: 668 | End: 2022-10-22
Attending: HOSPITALIST | Admitting: HOSPITALIST
Payer: MEDICARE

## 2022-10-15 VITALS
DIASTOLIC BLOOD PRESSURE: 70 MMHG | HEART RATE: 93 BPM | RESPIRATION RATE: 19 BRPM | TEMPERATURE: 98 F | HEIGHT: 66 IN | SYSTOLIC BLOOD PRESSURE: 120 MMHG | OXYGEN SATURATION: 98 % | WEIGHT: 72.5 LBS | BODY MASS INDEX: 11.65 KG/M2

## 2022-10-15 DIAGNOSIS — N32.89 MASS OF URINARY BLADDER: Primary | ICD-10-CM

## 2022-10-15 DIAGNOSIS — C67.9 MALIGNANT NEOPLASM OF URINARY BLADDER, UNSPECIFIED SITE (HCC): ICD-10-CM

## 2022-10-15 PROBLEM — I82.462 ACUTE DEEP VEIN THROMBOSIS (DVT) OF CALF MUSCLE VEIN OF LEFT LOWER EXTREMITY (HCC): Status: ACTIVE | Noted: 2022-10-15

## 2022-10-15 PROBLEM — D50.9 MICROCYTIC ANEMIA: Status: ACTIVE | Noted: 2022-10-14

## 2022-10-15 PROBLEM — I82.422 ACUTE DEEP VEIN THROMBOSIS (DVT) OF ILIAC VEIN OF LEFT LOWER EXTREMITY (HCC): Status: ACTIVE | Noted: 2022-10-15

## 2022-10-15 PROBLEM — Z72.0 TOBACCO ABUSE: Status: ACTIVE | Noted: 2022-10-15

## 2022-10-15 LAB
A/G RATIO: 1.3 (ref 1.1–2.2)
ALBUMIN SERPL-MCNC: 3.3 G/DL (ref 3.4–5)
ALP BLD-CCNC: 83 U/L (ref 40–129)
ALT SERPL-CCNC: <5 U/L (ref 10–40)
ANION GAP SERPL CALCULATED.3IONS-SCNC: 11 MMOL/L (ref 3–16)
AST SERPL-CCNC: 11 U/L (ref 15–37)
BASOPHILS ABSOLUTE: 0 K/UL (ref 0–0.2)
BASOPHILS RELATIVE PERCENT: 0.5 %
BILIRUB SERPL-MCNC: 0.8 MG/DL (ref 0–1)
BUN BLDV-MCNC: 55 MG/DL (ref 7–20)
CALCIUM SERPL-MCNC: 7.8 MG/DL (ref 8.3–10.6)
CHLORIDE BLD-SCNC: 101 MMOL/L (ref 99–110)
CO2: 27 MMOL/L (ref 21–32)
CREAT SERPL-MCNC: 1 MG/DL (ref 0.8–1.3)
EOSINOPHILS ABSOLUTE: 0 K/UL (ref 0–0.6)
EOSINOPHILS RELATIVE PERCENT: 0.2 %
FERRITIN: 71.2 NG/ML (ref 30–400)
FOLATE: 6.34 NG/ML (ref 4.78–24.2)
GFR AFRICAN AMERICAN: >60
GFR NON-AFRICAN AMERICAN: >60
GLUCOSE BLD-MCNC: 105 MG/DL (ref 70–99)
HCT VFR BLD CALC: 25.8 % (ref 40.5–52.5)
HEMOGLOBIN: 8.4 G/DL (ref 13.5–17.5)
IMMATURE RETIC FRACT: 0.54 (ref 0.21–0.37)
INR BLD: 1.22 (ref 0.87–1.14)
IRON SATURATION: 31 % (ref 20–50)
IRON: 62 UG/DL (ref 59–158)
LACTATE DEHYDROGENASE: 221 U/L (ref 100–190)
LYMPHOCYTES ABSOLUTE: 0.4 K/UL (ref 1–5.1)
LYMPHOCYTES RELATIVE PERCENT: 9.1 %
MAGNESIUM: 1.8 MG/DL (ref 1.8–2.4)
MCH RBC QN AUTO: 25.8 PG (ref 26–34)
MCHC RBC AUTO-ENTMCNC: 32.6 G/DL (ref 31–36)
MCV RBC AUTO: 79.3 FL (ref 80–100)
MONOCYTES ABSOLUTE: 0.3 K/UL (ref 0–1.3)
MONOCYTES RELATIVE PERCENT: 6.8 %
NEUTROPHILS ABSOLUTE: 4 K/UL (ref 1.7–7.7)
NEUTROPHILS RELATIVE PERCENT: 83.4 %
PDW BLD-RTO: 19.7 % (ref 12.4–15.4)
PLATELET # BLD: 155 K/UL (ref 135–450)
PMV BLD AUTO: 6.7 FL (ref 5–10.5)
POTASSIUM REFLEX MAGNESIUM: 3.1 MMOL/L (ref 3.5–5.1)
PROTHROMBIN TIME: 15.4 SEC (ref 11.7–14.5)
RBC # BLD: 3.25 M/UL (ref 4.2–5.9)
RETICULOCYTE ABSOLUTE COUNT: 0.05 M/UL
RETICULOCYTE COUNT PCT: 1.52 % (ref 0.5–2.18)
SODIUM BLD-SCNC: 139 MMOL/L (ref 136–145)
TOTAL IRON BINDING CAPACITY: 200 UG/DL (ref 260–445)
TOTAL PROTEIN: 5.9 G/DL (ref 6.4–8.2)
VANCOMYCIN RANDOM: 9.1 UG/ML
VITAMIN B-12: 480 PG/ML (ref 211–911)
WBC # BLD: 4.7 K/UL (ref 4–11)

## 2022-10-15 PROCEDURE — 2700000000 HC OXYGEN THERAPY PER DAY

## 2022-10-15 PROCEDURE — 37191 INS ENDOVAS VENA CAVA FILTR: CPT | Performed by: SURGERY

## 2022-10-15 PROCEDURE — 83615 LACTATE (LD) (LDH) ENZYME: CPT

## 2022-10-15 PROCEDURE — 6360000002 HC RX W HCPCS: Performed by: INTERNAL MEDICINE

## 2022-10-15 PROCEDURE — 2580000003 HC RX 258: Performed by: INTERNAL MEDICINE

## 2022-10-15 PROCEDURE — 83540 ASSAY OF IRON: CPT

## 2022-10-15 PROCEDURE — 97530 THERAPEUTIC ACTIVITIES: CPT

## 2022-10-15 PROCEDURE — 2060000000 HC ICU INTERMEDIATE R&B

## 2022-10-15 PROCEDURE — 83550 IRON BINDING TEST: CPT

## 2022-10-15 PROCEDURE — 82607 VITAMIN B-12: CPT

## 2022-10-15 PROCEDURE — 6370000000 HC RX 637 (ALT 250 FOR IP): Performed by: INTERNAL MEDICINE

## 2022-10-15 PROCEDURE — 85610 PROTHROMBIN TIME: CPT

## 2022-10-15 PROCEDURE — 85025 COMPLETE CBC W/AUTO DIFF WBC: CPT

## 2022-10-15 PROCEDURE — 97162 PT EVAL MOD COMPLEX 30 MIN: CPT

## 2022-10-15 PROCEDURE — 2500000003 HC RX 250 WO HCPCS

## 2022-10-15 PROCEDURE — 85045 AUTOMATED RETICULOCYTE COUNT: CPT

## 2022-10-15 PROCEDURE — 80053 COMPREHEN METABOLIC PANEL: CPT

## 2022-10-15 PROCEDURE — 97116 GAIT TRAINING THERAPY: CPT

## 2022-10-15 PROCEDURE — C1880 VENA CAVA FILTER: HCPCS

## 2022-10-15 PROCEDURE — 6360000002 HC RX W HCPCS

## 2022-10-15 PROCEDURE — 2709999900 HC NON-CHARGEABLE SUPPLY

## 2022-10-15 PROCEDURE — 97110 THERAPEUTIC EXERCISES: CPT

## 2022-10-15 PROCEDURE — 80202 ASSAY OF VANCOMYCIN: CPT

## 2022-10-15 PROCEDURE — 06H03DZ INSERTION OF INTRALUMINAL DEVICE INTO INFERIOR VENA CAVA, PERCUTANEOUS APPROACH: ICD-10-PCS | Performed by: SURGERY

## 2022-10-15 PROCEDURE — 97166 OT EVAL MOD COMPLEX 45 MIN: CPT

## 2022-10-15 PROCEDURE — 82728 ASSAY OF FERRITIN: CPT

## 2022-10-15 PROCEDURE — C1769 GUIDE WIRE: HCPCS

## 2022-10-15 PROCEDURE — 94761 N-INVAS EAR/PLS OXIMETRY MLT: CPT

## 2022-10-15 PROCEDURE — 99222 1ST HOSP IP/OBS MODERATE 55: CPT | Performed by: SURGERY

## 2022-10-15 PROCEDURE — C1894 INTRO/SHEATH, NON-LASER: HCPCS

## 2022-10-15 PROCEDURE — 36415 COLL VENOUS BLD VENIPUNCTURE: CPT

## 2022-10-15 PROCEDURE — 37191 INS ENDOVAS VENA CAVA FILTR: CPT

## 2022-10-15 PROCEDURE — 97535 SELF CARE MNGMENT TRAINING: CPT

## 2022-10-15 PROCEDURE — 82746 ASSAY OF FOLIC ACID SERUM: CPT

## 2022-10-15 PROCEDURE — 83735 ASSAY OF MAGNESIUM: CPT

## 2022-10-15 RX ORDER — CALCIUM CARBONATE 200(500)MG
1000 TABLET,CHEWABLE ORAL 3 TIMES DAILY PRN
Status: DISCONTINUED | OUTPATIENT
Start: 2022-10-15 | End: 2022-10-22 | Stop reason: HOSPADM

## 2022-10-15 RX ORDER — NICOTINE 21 MG/24HR
1 PATCH, TRANSDERMAL 24 HOURS TRANSDERMAL DAILY
Status: DISCONTINUED | OUTPATIENT
Start: 2022-10-15 | End: 2022-10-22 | Stop reason: HOSPADM

## 2022-10-15 RX ORDER — ACETAMINOPHEN 650 MG/1
650 SUPPOSITORY RECTAL EVERY 6 HOURS PRN
Status: DISCONTINUED | OUTPATIENT
Start: 2022-10-15 | End: 2022-10-22 | Stop reason: HOSPADM

## 2022-10-15 RX ORDER — LANOLIN ALCOHOL/MO/W.PET/CERES
3 CREAM (GRAM) TOPICAL NIGHTLY PRN
Status: DISCONTINUED | OUTPATIENT
Start: 2022-10-15 | End: 2022-10-22 | Stop reason: HOSPADM

## 2022-10-15 RX ORDER — SODIUM CHLORIDE 9 MG/ML
INJECTION, SOLUTION INTRAVENOUS PRN
Status: DISCONTINUED | OUTPATIENT
Start: 2022-10-15 | End: 2022-10-22 | Stop reason: HOSPADM

## 2022-10-15 RX ORDER — IPRATROPIUM BROMIDE AND ALBUTEROL SULFATE 2.5; .5 MG/3ML; MG/3ML
1 SOLUTION RESPIRATORY (INHALATION) 4 TIMES DAILY PRN
Status: DISCONTINUED | OUTPATIENT
Start: 2022-10-15 | End: 2022-10-15

## 2022-10-15 RX ORDER — TAMSULOSIN HYDROCHLORIDE 0.4 MG/1
0.4 CAPSULE ORAL DAILY
Status: DISCONTINUED | OUTPATIENT
Start: 2022-10-15 | End: 2022-10-22 | Stop reason: HOSPADM

## 2022-10-15 RX ORDER — POTASSIUM CHLORIDE 7.45 MG/ML
10 INJECTION INTRAVENOUS
Status: COMPLETED | OUTPATIENT
Start: 2022-10-15 | End: 2022-10-15

## 2022-10-15 RX ORDER — ONDANSETRON 2 MG/ML
4 INJECTION INTRAMUSCULAR; INTRAVENOUS EVERY 6 HOURS PRN
Status: DISCONTINUED | OUTPATIENT
Start: 2022-10-15 | End: 2022-10-22 | Stop reason: HOSPADM

## 2022-10-15 RX ORDER — IPRATROPIUM BROMIDE AND ALBUTEROL SULFATE 2.5; .5 MG/3ML; MG/3ML
1 SOLUTION RESPIRATORY (INHALATION) 4 TIMES DAILY PRN
Status: DISCONTINUED | OUTPATIENT
Start: 2022-10-15 | End: 2022-10-22 | Stop reason: HOSPADM

## 2022-10-15 RX ORDER — SODIUM CHLORIDE 0.9 % (FLUSH) 0.9 %
5-40 SYRINGE (ML) INJECTION PRN
Status: DISCONTINUED | OUTPATIENT
Start: 2022-10-15 | End: 2022-10-22 | Stop reason: HOSPADM

## 2022-10-15 RX ORDER — ONDANSETRON 4 MG/1
4 TABLET, ORALLY DISINTEGRATING ORAL EVERY 8 HOURS PRN
Status: DISCONTINUED | OUTPATIENT
Start: 2022-10-15 | End: 2022-10-22 | Stop reason: HOSPADM

## 2022-10-15 RX ORDER — POLYETHYLENE GLYCOL 3350 17 G/17G
17 POWDER, FOR SOLUTION ORAL DAILY PRN
Status: DISCONTINUED | OUTPATIENT
Start: 2022-10-15 | End: 2022-10-22 | Stop reason: HOSPADM

## 2022-10-15 RX ORDER — ACETAMINOPHEN 325 MG/1
650 TABLET ORAL EVERY 6 HOURS PRN
Status: DISCONTINUED | OUTPATIENT
Start: 2022-10-15 | End: 2022-10-22 | Stop reason: HOSPADM

## 2022-10-15 RX ORDER — MIDODRINE HYDROCHLORIDE 5 MG/1
10 TABLET ORAL
Status: DISCONTINUED | OUTPATIENT
Start: 2022-10-15 | End: 2022-10-22 | Stop reason: HOSPADM

## 2022-10-15 RX ORDER — SODIUM CHLORIDE 9 MG/ML
INJECTION, SOLUTION INTRAVENOUS CONTINUOUS
Status: DISCONTINUED | OUTPATIENT
Start: 2022-10-15 | End: 2022-10-16

## 2022-10-15 RX ADMIN — MIDODRINE HYDROCHLORIDE 10 MG: 5 TABLET ORAL at 12:57

## 2022-10-15 RX ADMIN — POTASSIUM CHLORIDE 10 MEQ: 7.46 INJECTION, SOLUTION INTRAVENOUS at 14:00

## 2022-10-15 RX ADMIN — Medication 10 ML: at 09:35

## 2022-10-15 RX ADMIN — POTASSIUM CHLORIDE 10 MEQ: 7.46 INJECTION, SOLUTION INTRAVENOUS at 15:07

## 2022-10-15 RX ADMIN — MUPIROCIN: 20 OINTMENT TOPICAL at 22:02

## 2022-10-15 RX ADMIN — SODIUM CHLORIDE: 9 INJECTION, SOLUTION INTRAVENOUS at 14:02

## 2022-10-15 RX ADMIN — MUPIROCIN: 20 OINTMENT TOPICAL at 09:35

## 2022-10-15 RX ADMIN — TAMSULOSIN HYDROCHLORIDE 0.4 MG: 0.4 CAPSULE ORAL at 09:34

## 2022-10-15 RX ADMIN — CEFEPIME 1000 MG: 1 INJECTION, POWDER, FOR SOLUTION INTRAMUSCULAR; INTRAVENOUS at 09:37

## 2022-10-15 RX ADMIN — CEFEPIME 1000 MG: 1 INJECTION, POWDER, FOR SOLUTION INTRAMUSCULAR; INTRAVENOUS at 22:02

## 2022-10-15 RX ADMIN — POTASSIUM CHLORIDE 10 MEQ: 7.46 INJECTION, SOLUTION INTRAVENOUS at 16:11

## 2022-10-15 RX ADMIN — MIDODRINE HYDROCHLORIDE 10 MG: 5 TABLET ORAL at 09:35

## 2022-10-15 RX ADMIN — VANCOMYCIN HYDROCHLORIDE 750 MG: 750 INJECTION, POWDER, LYOPHILIZED, FOR SOLUTION INTRAVENOUS at 06:28

## 2022-10-15 RX ADMIN — MIDODRINE HYDROCHLORIDE 10 MG: 5 TABLET ORAL at 17:32

## 2022-10-15 ASSESSMENT — PAIN SCALES - GENERAL: PAINLEVEL_OUTOF10: 0

## 2022-10-15 NOTE — CONSULTS
Pharmacy to Dose Vancomycin    Dx: UTI  Goal trough = 10-20  Pt wt = 43.1 kg  Recent Labs     10/13/22  1553 10/14/22  0055 10/14/22  0352   CREATININE 3.8* 2.9* 2.6*     Recent Labs     10/13/22  1247 10/14/22  0352 10/14/22  0630   WBC 6.9 4.2 3.9*     Pt Tx from [facility] on Vancomycin. Last dose Vanc 500 mg x1 given 10/14 at 0800. Pulse Dose  Vancomycin random 10/15 [address] [place] [address] 10/15/2022 2:20 AM    Vancomycin Level, Random [[id number]]    Collected: 10/15/22 0426    Updated: 10/15/22 0544    Specimen Type: Blood     Vancomycin Rm 9.1 ug/mL     Recent Labs     10/14/22  0055 10/14/22  0352 10/15/22  0426   CREATININE 2.9* 2.6* 1.0       Estimated Creatinine Clearance: 43 mL/min (based on SCr of 1 mg/dL). Give Vancomycin 750 mg x1 now. Vanc random 10/16 [address] [place] [address] 10/15/2022 5:56 AM    Vancomycin Level, Random [[id number]]    Collected: 10/16/22 0413    Updated: 10/16/22 0509    Specimen Type: Blood     Vancomycin Rm 6.9 ug/mL     Recent Labs     10/14/22  0352 10/15/22  0426 10/16/22  0413   CREATININE 2.6* 1.0 0.7*       Estimated Creatinine Clearance: 62 mL/min (A) (based on SCr of 0.7 mg/dL (L)). Regimen: 750 mg IV every 12 hours. Start time: 05:44 on 10/16/2022  Exposure target: AUC24 (range)400-600 mg/L.hr   AUC24,ss: 547 mg/L.hr  Probability of AUC24 > 400: 82 %  Ctrough,ss: 16.9 mg/L  Probability of Ctrough,ss > 20: 35 %  Probability of nephrotoxicity (Lodise LENCHO 2009): 13 %  10/17 0600 vtr  [address] 10/16/2022 5:42 AM    Vancomycin Level, Trough [[id number]]    Collected: 10/17/22 0423    Updated: 10/17/22 0600    Specimen Type: Blood     Vancomycin Tr 10.0 ug/mL     Regimen: 750 mg IV every 12 hours.   Start time: 06:35 on 10/17/2022  Exposure target: AUC24 (range)400-600 mg/L.hr   AUC24,ss: 540 mg/L.hr  Probability of AUC24 > 400: 91 %  Ctrough,ss: 16.6 mg/L  Probability of Ctrough,ss > 20: 27 %  Probability of nephrotoxicity (Lodise LENCHO 2009): 12 %  Vanc monitor  [employer], Pharm D.10/17/2022 6:32 AM

## 2022-10-15 NOTE — PROGRESS NOTES
4 Eyes Skin Assessment     The patient is being assess for   Admission    I agree that 2 RN's have performed a thorough Head to Toe Skin Assessment on the patient. ALL assessment sites listed below have been assessed. Areas assessed for pressure by both nurses:   [x]   Head, Face, and Ears   [x]   Shoulders, Back, and Chest, Abdomen  [x]   Arms, Elbows, and Hands   [x]   Coccyx, Sacrum, and Ischium  [x]   Legs, Feet, and Heels    Red coccyx- blanchable. Heels red- blanchable. Scattered abrasions and bruising  Skin Assessed Under all Medical Devices by both nurses:  O2 device tubing and mc              All Mepilex Borders were peeled back and area peeked at by both nurses:  Yes  Please list where Mepilex Borders are located:  Coccyx/Sacrum             **SHARE this note so that the co-signing nurse is able to place an eSignature**    Co-signer eSignature: Electronically signed by Randa Moss RN on 10/15/22 at 7:00 AM EDT    Does the Patient have Skin Breakdown related to pressure?   No     (Insert Photo here)         Damon Prevention initiated:  Yes   Wound Care Orders initiated:  Yes      [id number] nurse consulted for Pressure Injury (Stage 3,4, Unstageable, DTI, NWPT, Complex wounds)and New or Established Ostomies:  NA      Primary Nurse eSignature: Electronically signed by Rizwana Snow RN on 10/15/22 at 1:02 AM EDT

## 2022-10-15 NOTE — RT PROTOCOL NOTE
RT Inhaler-Nebulizer Bronchodilator Protocol Note    There is a bronchodilator order in the chart from a provider indicating to follow the RT Bronchodilator Protocol and there is an Initiate RT Inhaler-Nebulizer Bronchodilator Protocol order as well (see protocol at bottom of note). CXR Findings:  XR CHEST PORTABLE    Result Date: 10/13/2022  1. Bilateral upper lobe airspace disease favoring scarring rather than pneumonia. The findings from the last RT Protocol Assessment were as follows:   History Pulmonary Disease: Chronic pulmonary disease  Respiratory Pattern: Regular pattern and RR 12-20 bpm  Breath Sounds: Clear breath sounds  Cough: Strong, spontaneous, non-productive  Indication for Bronchodilator Therapy: None  Bronchodilator Assessment Score: 2    Aerosolized bronchodilator medication orders have been revised according to the RT Inhaler-Nebulizer Bronchodilator Protocol below. Respiratory Therapist to perform RT Therapy Protocol Assessment initially then follow the protocol. Repeat RT Therapy Protocol Assessment PRN for score 0-3 or on second treatment, BID, and PRN for scores above 3. No Indications - adjust the frequency to every 6 hours PRN wheezing or bronchospasm, if no treatments needed after 48 hours then discontinue using Per Protocol order mode. If indication present, adjust the RT bronchodilator orders based on the Bronchodilator Assessment Score as indicated below. Use Inhaler orders unless patient has one or more of the following: on home nebulizer, not able to hold breath for 10 seconds, is not alert and oriented, cannot activate and use MDI correctly, or respiratory rate 25 breaths per minute or more, then use the equivalent nebulizer order(s) with same Frequency and PRN reasons based on the score. If a patient is on this medication at home then do not decrease Frequency below that used at home.     0-3 - enter or revise RT bronchodilator order(s) to equivalent RT Bronchodilator order with Frequency of every 4 hours PRN for wheezing or increased work of breathing using Per Protocol order mode. 4-6 - enter or revise RT Bronchodilator order(s) to two equivalent RT bronchodilator orders with one order with BID Frequency and one order with Frequency of every 4 hours PRN wheezing or increased work of breathing using Per Protocol order mode. 7-10 - enter or revise RT Bronchodilator order(s) to two equivalent RT bronchodilator orders with one order with TID Frequency and one order with Frequency of every 4 hours PRN wheezing or increased work of breathing using Per Protocol order mode. 11-13 - enter or revise RT Bronchodilator order(s) to one equivalent RT bronchodilator order with QID Frequency and an Albuterol order with Frequency of every 4 hours PRN wheezing or increased work of breathing using Per Protocol order mode. Greater than 13 - enter or revise RT Bronchodilator order(s) to one equivalent RT bronchodilator order with every 4 hours Frequency and an Albuterol order with Frequency of every 2 hours PRN wheezing or increased work of breathing using Per Protocol order mode. RT to enter RT Home Evaluation for COPD & MDI Assessment order using Per Protocol order mode.     Electronically signed by Rachna Maldonado RCP on 10/15/2022 at 1:42 AM

## 2022-10-15 NOTE — FLOWSHEET NOTE
10/15/22 0030   Vital Signs   Temp 98.1 °F (36.7 °C)   Temp Source Oral   Heart Rate 95   Heart Rate Source Monitor   Resp 18   /76   BP Location Right upper arm   BP Method Automatic   MAP (Calculated) 94.33   Patient Position Semi fowlers   Level of Consciousness 0   MEWS Score 1   Pain Assessment   Pain Assessment None - Denies Pain   Pain Level 0   Oxygen Therapy   SpO2 96 %   Pulse Oximetry Type Intermittent   O2 Device Nasal cannula   O2 Flow Rate (L/min) 2 L/min   Height and Weight   Weight 95 lb 0.3 oz (43.1 kg)   Weight Method Bed scale   BMI (Calculated) 0

## 2022-10-15 NOTE — PROGRESS NOTES
Pt. Arrived via ambulance to [address] [place] [address] in room 422. Pt belongings in tow. Page out to hospitalist to notify him of pt arrival. Awaiting orders.

## 2022-10-15 NOTE — H&P
Hospital Medicine History & Physical      Patient: Ilan Knapp  :  1955  MRN:  [id number]    Date of Service: 10/15/22    Chief Complaint  Dysuria (Reports painful urination, reports not eating in over a week, not drinking. Reports getting results from the urologist and scans were completed. Told to come to the hospital. )     HISTORY OF PRESENT ILLNESS:    Ilan Knapp is a 75 y.o. male. He was accepted in transfer from [address] [place] [address]. He was initially hospitalized on 10/13/22. He had seen a urologist for a year of progressive urinary symptoms. The urologist ordered an abdominopelvic CT scan. The scan showed a very distended bladder and a bladder mass. He was initially hypothermic and hypotensive. A mc catheter was placed (3L urine drained initially) and empiric antibiotics (cefepime, vancomycin) were started with a working diagnosis of septic shock due to UTI. He was also found to have an MATHEUS w/ presenting SCr of 4.8. He was also found to have an extensive left lower extremity DVT. Because he was having quite a bit of hematuria there were concerns about accelerating bleeding if anticoagulants were started so IR evaluated the patient for an IVC filter. His  IVC was too small to safely accommodate a filter though. He was transferred to [facility] for evaluation by vascular surgery and possible thrombectomy. Patient has had minimal medical follow up for years and has a 60-70 pack year smoking history. He reports gradual unintentional weight loss and progressive weakness limiting his mobility. Review of Systems:  All pertinent positives and negatives are as noted in the HPI section. All other systems were reviewed and are negative.     Past Medical History:   Diagnosis Date    Anxiety     Depression     Hypertension        Past Surgical History:   Procedure Laterality Date    CYST REMOVAL      FINGER SURGERY           Prior to Admission medications Medication Sig Start Date End Date Taking? Authorizing Provider   tamsulosin (FLOMAX) 0.4 MG capsule Take 0.4 mg by mouth daily    Historical Provider, MD       Allergies:   Bee venom    Social:   reports that he has been smoking cigarettes. He has been smoking an average of 2 packs per day. He has never used smokeless tobacco.   reports current alcohol use. Social History     Substance and Sexual Activity   Drug Use No       History reviewed. No pertinent family history. Confirmed no known contributory family history. PHYSICAL EXAM:  I performed this physical examination. Vitals:  Patient Vitals for the past 24 hrs:   BP Temp Temp src Pulse Resp SpO2 Weight   10/15/22 0030 131/76 98.1 °F (36.7 °C) Oral 95 18 96 % 95 lb 0.3 oz (43.1 kg)     Room air    GEN:  Appearance:  Cachectic WM appearing older than stated age [de-identified] NAD . Very [place]  Level of Consciousness:  alert . Orientation:  full    HEENT: Sclera anicteric.  no conjunctival chemosis. moist mucus membranes. no specific or diagnostic oral lesions. NECK:  no signs of meningismus. Jugular veins not distended. Carotid pulses  2+.  no cervical lymphadenopathy. no thyromegaly. CV:  regular rhythm. normal S1 & S2.    no murmur. no rub.  no gallop. PULM:  Chest excursion is symmetric. Breath sounds are globally diminished throughout all fields. Adventitious sounds:  none    AB:  Abdominal shape is scaphoid. Bowel sounds are active. Generally soft to palpation. no tenderness is present. no involuntary guarding. no rebound guarding. RECTAL:   :  Alexander in situ. Blood tinged urine in reservoir. EXTR:  Skin is warm. Capillary refill brisk. no specific or pathognomic rash. no clubbing. 1-2+ pitting edema of the LLE. Left leg is notably larger than right  no active wound or ulcer.   Pulses 2+ x 4    LABS:  Lab Results   Component Value Date    WBC 3.9 (L) 10/14/2022    HGB 7.9 (L) 10/14/2022    HCT 24.1 (L) 10/14/2022    MCV 75.6 (L) 10/14/2022     10/14/2022     Lab Results   Component Value Date    CREATININE 2.6 (H) 10/14/2022     (HH) 10/14/2022     (L) 10/14/2022    K 3.6 10/14/2022    K 3.6 10/14/2022    CL 97 (L) 10/14/2022    CO2 19 (L) 10/14/2022     Lab Results   Component Value Date    ALT 6 (L) 10/14/2022    AST 8 (L) 10/14/2022    ALKPHOS 86 10/14/2022    BILITOT 0.5 10/14/2022     Lab Results   Component Value Date    TROPONINI 0.03 (H) 10/14/2022     No results for input(s): PHART, XNN8OVY, PO2ART in the last 72 hours. IMAGING:  CT ABDOMEN PELVIS WO CONTRAST Additional Contrast? None    Result Date: 10/11/2022  EXAMINATION: CT OF THE ABDOMEN AND PELVIS WITHOUT CONTRAST 10/11/2022 6:57 pm TECHNIQUE: CT of the abdomen and pelvis was performed without the administration of intravenous contrast. Multiplanar reformatted images are provided for review. Automated exposure control, iterative reconstruction, and/or weight based adjustment of the mA/kV was utilized to reduce the radiation dose to as low as reasonably achievable. Moderately limited exam due to significant motion artifact. COMPARISON: None. HISTORY: ORDERING SYSTEM PROVIDED HISTORY: Retention of urine, unspecified TECHNOLOGIST PROVIDED HISTORY: Additional Contrast?->None Reason for Exam: urinary retention FINDINGS: Lower Chest: Emphysematous changes. Lung bases otherwise clear. Organs: The unenhanced liver, gallbladder, spleen, pancreas and adrenal glands are grossly unremarkable. There is moderate right hydroureteronephrosis. No left hydronephrosis. Probable 3 mm left nephrolithiasis. GI/Bowel: No dilated loops of bowel or bowel wall thickening. No free air. Moderate amount stool in the colon. Pelvis: The bladder is markedly distended measuring up to 20 cm in the craniocaudal dimension.   There are multifocal areas of nodular wall thickening throughout the bladder, for example on the left lateral aspect measuring up to 9 x 3 cm (image 111). Within the base of the bladder, there is also more focal masslike soft tissue measuring 4 x 3 cm. Prostate gland is grossly unremarkable. No definite pathologically enlarged adenopathy or free fluid. Peritoneum/Retroperitoneum: The aorta is tortuous. There is a 3.3 cm abdominal aortic aneurysm. Severe atherosclerosis is noted. No pathologically enlarged adenopathy or free fluid. Bones/Soft Tissues: Diffuse osteopenia. 1. Markedly dilated bladder measuring up to 20 cm in the craniocaudal dimension. Multifocal areas of nodular wall thickening, the largest measuring up to 9 x 3 cm along the left lateral aspect suspicious for neoplasm. 2. Moderate right hydroureteronephrosis. 3. 3.3 cm abdominal aortic aneurysm. CT head without contrast    Result Date: 10/13/2022  EXAMINATION: CT OF THE HEAD WITHOUT CONTRAST  10/13/2022 12:41 pm TECHNIQUE: CT of the head was performed without the administration of intravenous contrast. Automated exposure control, iterative reconstruction, and/or weight based adjustment of the mA/kV was utilized to reduce the radiation dose to as low as reasonably achievable. COMPARISON: None HISTORY: ORDERING SYSTEM PROVIDED HISTORY: hypothermic. TECHNOLOGIST PROVIDED HISTORY: Reason for exam:->hypothermic. Has a \"code stroke\" or \"stroke alert\" been called? ->No Decision Support Exception - unselect if not a suspected or confirmed emergency medical condition->Emergency Medical Condition (MA) Reason for Exam: hypothermic FINDINGS: BRAIN/VENTRICLES: The ventricular system is within normal limits. No evidence of mass effect or midline shift. Prominence of sulci overlying convexities of cerebral hemispheres and cerebellum consistent with atrophy. Foci of low attenuation within periventricular/subcortical white matter nonspecific however likely on the basis of changes of minimal ischemic leukoencephalopathy.   Few foci of low attenuation approaching density of CSF identified in right centrosylvian/basal ganglia and periventricular region suggestive of small foci of remote ischemic change. There is suggestion of tiny remote lacunar infarct involving the left thalamus as well (image 35). No abnormal extra-axial fluid collection is identified. There is atherosclerotic calcification of distal internal carotid and to lesser extent distal vertebral arteries. ORBITS: The visualized portion of the orbits demonstrate no acute abnormality. SINUSES: The visualized paranasal sinuses and mastoid air cells demonstrate no acute abnormality. SOFT TISSUES/SKULL:  No acute abnormality of the visualized skull or soft tissues. Focal lucency surrounding portions of the roots of the left maxillary premolars may be on the basis of periodontal disease (images 9-13). No evidence of acute intracranial abnormality. XR CHEST PORTABLE    Result Date: 10/13/2022  EXAMINATION: ONE XRAY VIEW OF THE CHEST 10/13/2022 12:15 pm COMPARISON: None available. HISTORY: ORDERING SYSTEM PROVIDED HISTORY: hypothermic TECHNOLOGIST PROVIDED HISTORY: Reason for exam:->hypothermic Reason for Exam: Dysuria (Reports painful urination, reports not eating in over a week, not drinking. Reports getting results from the urologist and scans were completed. Told to come to the hospital. ) FINDINGS: Airspace disease within the bilateral upper lungs is likely due to scarring as there is associated volume loss. The lungs are hyperexpanded. The cardiac silhouette is within normal limits. There is no pneumothorax or pleural effusion. The cardiac silhouette is within normal limits. There is no pneumothorax or pleural effusion. 1. Bilateral upper lobe airspace disease favoring scarring rather than pneumonia. VL Extremity Venous Bilateral  Result Date: 10/14/2022  Right Impression Technically difficult and limited study due to body habitus, positioning and calcific shadowing.  There is evidence of acute totally occluding deep venous thrombosis involving the right peroneal veins (2 of 2). There is no other evidence of deep or superficial venous thrombosis involving the right lower extremity. Left Impression There is evidence of acute totally occluding deep venous thrombosis involving the left common femoral extending into the external iliac vein, deep femoral, femoral, popliteal, soleal, posterior tibial (2 of 2, proximal calf) and peroneal ( 2 of 2, proximal to mid calf) veins. This does not appear to extend into the common iliac vein or inferior vena cava, however, visualization is limited due to overlying bowel gas and peristalsis. There is acute totally occluding superficial venous thrombosis involving the left greater saphenous vein at the saphenofemoral junction. There is no other evidence of deep or superficial venous thrombosis involving the left lower extremity. Incidental finding of an aneurysmal formation involving the distal abdominal aorta measuring 2.6 x 3.8 cm. CT abdomen/pelvis 10/7/22: States \"There is a 3.3 cm abdominal aortic aneurysm. \" Limited abdominal vascular ultrasound exam due to overlying bowel gas. There are no previous studies for comparison. Conclusions   Summary   Extensive acute deep venous thrombosis involving the left lower extremity as  described above. Acute deep venous thrombosis involving the right peroneal veins. Within the limits of this exam, there is no other evidence of deep or  superficial venous thrombosis in the lower extremities bilaterally. Incidental finding of an aneurysmal formation involving the distal abdominal  aorta measuring 2.6 x 3.8 cm. I directly reviewed all recent imaging studies as well as pertinent prior studies. Radiology reports may or may not be available at the time of my review. EKG:  New and pertinent prior tracings were directly reviewed. My interpretation is as follows:  Normal sinus rhythm.   Extensive baseline noise and wander limits interpretation. Active Hospital Problems    Diagnosis Date Noted    Bladder mass [N32.89] 10/15/2022     Priority: Medium    Tobacco abuse [Z72.0] 10/15/2022     Priority: Medium    Acute deep vein thrombosis (DVT) of iliac vein of left lower extremity ([facility] [address] [I82.422] 10/15/2022     Priority: Medium    Acute deep vein thrombosis (DVT) of calf muscle vein of left lower extremity ([facility] [address] [I82.462] 10/15/2022     Priority: Medium    Urinary retention [R33.9] 10/14/2022     Priority: Medium    MATHEUS (acute kidney injury) ([facility] [address] [N17.9] 10/14/2022     Priority: Medium    Acute cystitis with hematuria [N30.01] 10/14/2022     Priority: Medium    Hematuria [R31.9] 10/14/2022     Priority: Medium       ASSESSMENT & PLAN  LLE DVT  -  Anticoagulation not started at [facility] d/t anemia and ongoing hematuria. IVC considered but patient's anatomy was not conducive. -  Vascular surgery input requested and appreciated regarding thrombectomy. Patient will be NPO overnight. Bladder Mass  -  Presumed to be malignant until proven otherwise. Other issues have delayed cystoscopy and biopsy. Will request urology continue to follow the patient while hospitalized. Malnutrition and poor functional status limited treatment options in the short term. -  Alexander catheter placed on presentation and continues to be necessary due to bladder outlet obstruction. MATHEUS, Anion gap metabolic acidosis  -  Obstructive. Unknown baseline SCr but on 10/3 SCr was 2.5 and then by the time of presentation up to 4.8.  SCr has downtrended since admission and currently 2.6.  -  Will continue 100mM NaHCO3 in 0.45NS infusion started at [facility]. Will request nephrology continue to assist with patient. Possible UTI  -  Initial U/A was suggestive of UTI. No bacturia. There was significant pyruia, but in the context of gross hematuria. Blood and Urine cx 10/13 are NGTD.   -  Patient has been maintained on cefepime and vancomycin since presentation on 10/13.  3-5 days of empiric treatment is indicated while awaiting final culture results. Should at least be safe to d/c vancomycin after three days if no GPC grow. Microcytic anemia  -  Hgb = 9.4 g/dL on presentation and droppedas low as 6.7 following initial crystalloid resuscitation. He was given 1 unit pRBC the afternoon of 10/14. His Hgb increased to 7.9.  -  Anemia screening panel added to morning labs. COPD, Tobacco abuse  -  Titrate level of respiratory support according to patient's needs. Target goal SpO2 = 90-94%. Currently patient is requiring no support. -  Short acting bronchodilators made available on an as-needed basis. -  Smoking cessation advised. NRT provided. Malnutrition  -  Resume regular diet and nutritional supplements when no longer NPO. DVT prophylaxis: None  Code Status:  Full code. Guarded prognosis. Palliative care follow up requested. Disposition:  Inpatient for the foreseeable future. Placement likely required for therapy. PT/OT assessments requested.     Luiz Salazar MD MD

## 2022-10-15 NOTE — CONSULTS
[facility]   Follow-up note           Reason for Consult:  Acute kidney injury  Requesting Physician:  Dr. Dane Cameron history  Patient has been transferred from [facility] to [place] on 10/14 for vascular surgery input    Patient does not have any new complaints  Creatinine at 1, peak creatinine of 4.8 at [facility] on 10/13/2022    Last 24 h uop 950 mL charted    ROS: No chest pain/shortness of breath/fever/nausea/vomiting  PSFH: +visitor    Scheduled Meds:   tamsulosin  0.4 mg Oral Daily    cefepime  1,000 mg IntraVENous Q12H    midodrine  10 mg Oral TID WC    mupirocin   Nasal BID    nicotine  1 patch TransDERmal Daily    vancomycin (VANCOCIN) intermittent dosing (placeholder)   Other RX Placeholder     Continuous Infusions:   IV infusion builder 75 mL/hr at 10/15/22 0133    sodium chloride      sodium chloride       PRN Meds:.sodium chloride, sodium chloride, acetaminophen **OR** acetaminophen, ondansetron **OR** ondansetron, polyethylene glycol, sodium chloride flush, melatonin, calcium carbonate, HYDROmorphone, ipratropium-albuterol      History of Present Illness on 10/14/2022  75year old male with HTN admitted with dysuria, difficulty voiding, decreased intake. We have been asked to assist in further mgmt of his MATHEUS. Hypotensive on arrival, ~70's/50's, given ~4 liters of fluids. Mc placed with 3 liters of urine drained. CT with massively enlarged bladder and abnormal bladder wall findings with moderate right hydroureteronephrosis. SCr 4.8 on admission, improving with IVF's, mc, having hematuria after mc placed. Sr previously 2.5 from earlier this month. Has had difficulty voiding with urine incontinence for ~1 year, worsening. +weak, intake reduced, low grade fevers.     Physical exam:   Constitutional:  VITALS:  /65   Pulse 95   Temp 98 °F (36.7 °C) (Axillary)   Resp 18   Wt 95 lb 0.3 oz (43.1 kg) SpO2 99%   BMI 15.34 kg/m²   Gen: alert, awake, ill-appearing  Skin: no rash, turgor wnl  Heent:  eomi, mmm  Neck: no bruits or jvd noted, thyroid normal  Cardiovascular:  S1, S2 without m/r/g  Respiratory: CTA B without w/r/r; respiratory effort normal  Abdomen:  +bs, soft, nt, nd, no hepatosplenomegaly  Ext: no lower extremity edema  Psychiatric: mood and affect appropriate; judgement and insight intact  Musculoskeletal:  Rom, muscular strength limited; digits, nails normal    Data/  CBC:   Lab Results   Component Value Date/Time    WBC 4.7 10/15/2022 04:26 AM    RBC 3.25 10/15/2022 04:26 AM    HGB 8.4 10/15/2022 04:26 AM    HCT 25.8 10/15/2022 04:26 AM    MCV 79.3 10/15/2022 04:26 AM    MCH 25.8 10/15/2022 04:26 AM    MCHC 32.6 10/15/2022 04:26 AM    RDW 19.7 10/15/2022 04:26 AM     10/15/2022 04:26 AM    MPV 6.7 10/15/2022 04:26 AM     BMP:    Lab Results   Component Value Date/Time     10/15/2022 04:26 AM    K 3.1 10/15/2022 04:26 AM     10/15/2022 04:26 AM    CO2 27 10/15/2022 04:26 AM    BUN 55 10/15/2022 04:26 AM    LABALBU 3.3 10/15/2022 04:26 AM    CREATININE 1.0 10/15/2022 04:26 AM    CALCIUM 7.8 10/15/2022 04:26 AM    GFRAA >60 10/15/2022 04:26 AM    LABGLOM >60 10/15/2022 04:26 AM    GLUCOSE 105 10/15/2022 04:26 AM     UA moderate blood, 100 protein, s.g. 1.025, >100 wbc's  Urine sodium 74  Urine chloride 46  Urine osmolality 304    Assessment/    - Acute kidney injury - pre-renal + bladder outlet obstruction, resolved    - Bladder outlet obstruction with suspected enlarged prostate - plans per Urology with eventual cystoscopy    - Hyponatremia - responding to volume    - Anion-gap metabolic acidosis - better with improving MATHEUS & IVF's with bicarb replacement    -Hypokalemia, replace potassium    -Lower extremity DVT    IVC considered but patient's anatomy was not conducive.    Vascular surgery input requested and appreciated regarding thrombectomy      Plan/    -Change IV fluid to normal saline as ordered  -Replace potassium  -Await vascular surgery input  - Trend labs, bp's, & urine output for hopeful improvement      Thank you for the consultation. Please do not hesitate to call with questions. ____________________________________  Macey Hartman MD  [facility]  [email or web address]  Office: [phone]

## 2022-10-15 NOTE — PROGRESS NOTES
Physical Therapy  Facility/Department: [facility]  Physical Therapy Initial Assessment/ Treatment    Name: Anastacio Henao  : 1955  MRN: [id number]  Date of Service: 10/15/2022    Discharge Recommendations:  Subacute/Skilled Nursing Facility   PT Equipment Recommendations  Equipment Needed: No    If pt discharges prior to next PT session this note will serve as discharge summary. Patient Diagnosis(es): There were no encounter diagnoses. Past Medical History:  has a past medical history of Anxiety, Depression, and Hypertension. Past Surgical History:  has a past surgical history that includes cyst removal and Finger surgery. Assessment   Body Structures, Functions, Activity Limitations Requiring Skilled Therapeutic Intervention: Decreased functional mobility ; Decreased endurance;Decreased safe awareness;Decreased strength;Decreased balance;Decreased posture  Assessment: 76 yo male adm with septic shock, UTI, bladder mass, MATHEUS. Hx tobacco and ETOH use. PLOF: pt lives alone with one step to enter a single level home. He has been indep holding furniture to walk, rarely leaves the home. Today pt CG to amb with RW up to 25 ft with early fatigue. Pt will benefit from skilled PT to address deficits. Recommend SNF at discharge  Treatment Diagnosis: weakness, decreased gait and endurance  Therapy Prognosis: Good  Decision Making: Medium Complexity  Barriers to Learning: [place]  Requires PT Follow-Up: Yes  Activity Tolerance  Activity Tolerance: Patient tolerated evaluation without incident;Patient tolerated treatment well     Plan   Physcial Therapy Plan  General Plan: 3-5 times per week  Current Treatment Recommendations: Strengthening, Balance training, Functional mobility training, Transfer training, Gait training, Endurance training, Safety education & training, Therapeutic activities  Safety Devices  Type of Devices:  All fall risk precautions in place, Chair alarm in place, Patient at risk for falls, Gait belt, Left in chair, Nurse notified     Restrictions  Restrictions/Precautions: Fall Risk, General Precautions, Up as Tolerated, NPO  Other position/activity restrictions: 2L O2, IV, tele, BRIANNA Sure     Subjective   Chart Reviewed: Yes  Patient assessed for rehabilitation services?: Yes  Referring Practitioner: Imelda Fu  Referral Date : 10/15/22  Diagnosis: bladder mass, septic shock UTI, DVT LLE, MATHEUS  Follows Commands: Within Functional Limits  Comments: RN cleared pt for therapy, pt resting in bed on approach  Subjective  Subjective: Pt agrees to therapy     Pain: Pt denies         Social/Functional History  Social/Functional History  Lives With: Alone  Type of Home: House  Home Layout: One level  Home Access: Stairs to enter with rails  Entrance Stairs - Number of Steps: 1  Entrance Stairs - Rails: Both (railing)  Bathroom Shower/Tub: Tub/Shower unit  Bathroom Toilet: Standard  Receives Help From: Family  ADL Assistance: Independent (pt reports independent but states does not change clothes or groom self often)  Homemaking Assistance: Needs assistance  Ambulation Assistance: Needs assistance (furniture surf or uses people to assist him)  Transfer Assistance: Independent  Leisure & Hobbies: watching TV  Additional Comments: ex-wife and daughter live 30 and 40 minutes away, cannot be there 24/7  Vision/Hearing  Vision  Vision: Impaired  Vision Exceptions: Wears glasses at all times  Hearing  Hearing: Exceptions to [facility]  Hearing Exceptions: Hard of hearing/hearing concerns; No hearing aid    Cognition   Orientation  Overall Orientation Status: Impaired  Orientation Level: Oriented to place;Oriented to person;Oriented to situation;Disoriented to time  Cognition  Overall Cognitive Status: WFL     Objective   Heart Rate: 91  Heart Rate Source: Monitor  BP: (!) 105/49  BP Location: Left upper arm  BP Method: Automatic  Patient Position: Semi fowlers  MAP (Calculated): 67.67  Resp: 18  SpO2: 100 %  O2 Device: Nasal cannula        Gross Assessment  AROM: Within functional limits  Strength: Generally decreased, functional         Bed mobility  Supine to Sit: Modified independent (HOB elevated)  Scooting: Modified independent    Transfers  Sit to Stand: Stand by assistance  Stand to Sit: Stand by assistance  Bed to Chair: Contact guard assistance    Ambulation  Surface: Level tile  Device: Rolling Walker  Assistance: Contact guard assistance  Quality of Gait: slow pace, shortened stride, decreased foot clearance,mild flexed posture, early fatigue  Distance: 15 ft x 1 and 25 ft x 1, seated rest between trials     Balance  Sitting - Static: Good  Standing - Static: Good;-  Standing - Dynamic: Good;-  Exercise Treatment: Ankle pumps: 10 x B  SLR: 5 x B  Heel slides: 5 x B  Hip abd/add: 5 x B  Gluteal sets: 10 x B      AM-PAC Score  AM-PAC Inpatient Mobility Raw Score : 20 (10/15/22 1110)  AM-PAC Inpatient T-Scale Score : 47.67 (10/15/22 1110)  Mobility Inpatient CMS 0-100% Score: 35.83 (10/15/22 1110)  Mobility Inpatient CMS G-Code Modifier : CJ (10/15/22 1110)     Goals  Short Term Goals  Time Frame for Short Term Goals: 1 week (10/22) unless otherwise specified  Short Term Goal 1: pt to be modified indep for transfers  Short Term Goal 2: pt to amb with least restrictive or no device 150 ft supervised  Short Term Goal 3: Pt to participate in Therapeutic exercises 10-12 reps by 10/18  Patient Goals   Patient Goals : \"to get stronger\"       Education  Patient Education  Education Given To: Patient  Education Provided: Role of Therapy;Plan of Care  Education Provided Comments: DIsease specific: prevention of complications of bedrest  Education Method: Verbal  Barriers to Learning: Hearing  Education Outcome: Verbalized understanding      Therapy Time   Individual Concurrent Group Co-treatment   Time In 0831         Time Out 0904         Minutes 33         Timed Code Treatment Minutes: [address] [facility], PT

## 2022-10-15 NOTE — FLOWSHEET NOTE
10/15/22 0106   Assessment   Charting Type Admission   Psychosocial   Psychosocial (WDL) X   Patient Behaviors Flat affect   Rest/Sleep for Patient Poor   Neurological   Neuro (WDL) X   Level of Consciousness 0   Orientation Level Oriented to person;Oriented to place; Disoriented to time;Disoriented to situation   Cognition Follows commands; Short term memory loss;Poor attention/concentration   Speech Clear;Delayed responses   RLE Sensation  Full sensation   LLE Sensation  Decreased; No tingling; No numbness;Pain   Swallow Screening   Is the patient unable to remain alert for testing? No   Is the patient on a modified diet (thickened liquids) due to pre-existing dysphagia? No   Is there presence of existing enteral tube feeding via the stomach or nose? No   Is there presence of head-of-bed restrictions (less than 30 degrees)? No   Is there presence of tracheotomy tube? No   Is the patient ordered nothing-by-mouth status?  (!) Yes   [place] Coma Scale   Eye Opening 4   Best Verbal Response 5   Best Motor Response 6   [place]bert Coma Scale Score 15   HEENT (Head, Ears, Eyes, Nose, & Throat)   HEENT (WDL) X   Right Eye Impaired vision;Glasses   Left Eye Impaired vision;Glasses   Right Ear Impaired hearing   Left Ear Impaired hearing   Respiratory   Respiratory (WDL) X   Respiratory Pattern Regular   Respiratory Depth Normal   Respiratory Quality/Effort Unlabored   Chest Assessment Chest expansion symmetrical;Trachea midline   L Breath Sounds Diminished   R Breath Sounds Diminished   Level of Activity/Mobility 2   Breath Sounds   Right Upper Lobe Diminished   Right Middle Lobe Diminished   Right Lower Lobe Diminished   Left Upper Lobe Diminished   Left Lower Lobe Diminished   Cardiac   Cardiac (WDL) WDL   Cardiac Regularity Regular   Cardiac Rhythm Sinus rhythm   Rhythm Interpretation   Heart Rate 99   Cardiac Monitor   Cardiac/Telemetry Monitor On Portable telemetry pack applied   Alarm Audible Centralized cardiac monitoring   Alarms Set Centralized cardiac monitoring   Gastrointestinal   Abdominal (WDL) X   RUQ Bowel Sounds Active   LUQ Bowel Sounds Active   RLQ Bowel Sounds Active   LLQ Bowel Sounds Active   Abdomen Inspection Flat   Last BM (including prior to admit) 10/15/22   Tenderness Soft;Nontender   GI Symptoms Loss of appetite   Genitourinary   Genitourinary (WDL) X   Flank Tenderness JING  (pt. denies)   Suprapubic Tenderness No   Dysuria (Pain/Burning w/Urination) No   Difficulty Urinating/Starting Stream JING   Urine Assessment   Urinary Status Voiding; Alexander   Urine Color Pink   Urine Appearance Sediment; Red flecks   Peripheral Vascular   Peripheral Vascular (WDL) X   Edema Left lower extremity   LLE Edema +2;Pitting   RLE Neurovascular Assessment   R Pedal Pulse +1   Capillary Refill Less than/Equal to 3 seconds   Color Pale;Ecchymosis   Temperature Warm   R Post Tibial Pulse +1 (Weak)   R Calf Tenderness  Negative   LLE Neurovascular Assessment   L Pedal Pulse +1   Capillary Refill Less than/Equal to 3 seconds   Color Pale;Ecchymosis   Temperature Cool   L Femoral Pulse +2 (Moderate)   L Post Tibial Pulse Doppler   L Calf Tenderness Positive   Dual Clinician Skin Assessment   Dual Skin Assessment (4 Eyes) X  (scattered bruising; redness sacrum/heels)   Second Clinical  (First and Last Name) Sindy Al RN   Pressure Injury Documentation Pressure Injury Noted-See Wound LDA to Document   Skin Integumentary    Skin Integumentary (WDL) X   Skin Color Pale;Ecchymosis (comment)   Skin Condition/Temp Warm;Dry;Flaky; Swollen/edematous  (swollen LLE)   Skin Integrity Ecchymosis;Abrasion  (scattered)   Location scattered   Wound Prevention/Protection Method Yes   Location of Wound Prevention Sacrum; Heel  (sacrum; bilat heels)   Nails X   Nail Condition Thick; Discolored   Orientation of Wound Prevention Mid   Wound Offloading (Prevention Methods) Repositioning;Elevate heels;Pillows   Dressing Present  Changed   Skin Fold Management No   Care Plan - Skin/Tissue Integrity Goals   Skin Integrity Remains Intact Monitor for areas of redness and/or skin breakdown   Musculoskeletal   Musculoskeletal (WDL) X   RUE Weakness   LUE Weakness   RL Extremity Weakness   LL Extremity Weakness   Urinary Catheter 10/13/22 Alexander-Temperature   Placement Date/Time: 10/13/22 1252   Inserted by: Braulio Oneal   2nd Staff Assisting: Alix Upton RN   Insertion attempts: 1  Catheter Type: Alexander-Temperature  Catheter Size: 16 FR  Catheter Balloon Size: 10 mL  Insertion Procedure Practices: Insertion da. .. Catheter Indications Urinary retention (acute or chronic), continuous bladder irrigation or bladder outlet obstruction   Site Assessment No urethral drainage   Urine Color Diluted; Cherry   Urine Appearance Red flecks   Urine Odor Malodorous   Collection Container Standard   Securement Method Securing device (Describe)   Catheter Care Completed Yes   Catheter Best Practices  Drainage tube clipped to bed;Catheter secured to thigh; Tamper seal intact; Bag below bladder;Lack of dependent loop in tubing;Bag not on floor;Drainage bag less than half full   Status Draining   Care Plan - Discharge Planning Goals   Discharge to home or other facility with appropriate resources Identify barriers to discharge with patient and caregiver;Arrange for needed discharge resources and transportation as appropriate; Identify discharge learning needs (meds, wound care, etc)   Handoff   Communication Given Transfer Handoff   Handoff Given To [facility] Received From [facility] (Kellie RN)   Handoff Communication Telephone; In route   Time Handoff Given 2300  (10/14/2022)   End of Shift Check Performed N/A

## 2022-10-15 NOTE — PROGRESS NOTES
Occupational Therapy  [facility] [facility]  Occupational Therapy Initial Assessment/Treatment Note    Name: Talon Doe  : 1955  MRN: [id number]  Date of Service: 10/15/2022    Discharge Recommendations:  [address]           Patient Diagnosis(es): There were no encounter diagnoses. Past Medical History:  has a past medical history of Anxiety, Depression, and Hypertension. Past Surgical History:  has a past surgical history that includes cyst removal and Finger surgery. Assessment   Performance deficits / Impairments: Decreased functional mobility ; Decreased safe awareness;Decreased balance;Decreased ADL status; Decreased endurance;Decreased high-level IADLs;Decreased strength  Assessment: After evaluation and diagnosis, hematemesis, pt found to be presenting with the above mentioned occupational performance deficits which are affecting participation in daily living skills. Pt reports PLOF as able to complete functional mobility around his home without an AD, he is able to complete ADLs by himself, needs some help with IADLs, and can complete all functional transfers on his own. Pt is functioning below baseline function, needing increased assist for functional transfer and mobility to ensure safety. Pt needed min-mod assist for functional mobility using RW for line and walker managment. Pt also demo's decreased strength and endurance impacting pts abilitly to complete I/ADLs on his own. Pt would benefit from continued skilled occupational therapy to address ADLs, functional mobility, and safety while in acute care. Recommend SNF at discharge due to pts limited assistance at home, decreased safety awareness, strength, and endurance.    Prognosis: Good  Decision Making: Medium Complexity  REQUIRES OT FOLLOW-UP: Yes  Activity Tolerance  Activity Tolerance: Patient Tolerated treatment well;Patient limited by fatigue        Plan   Occupational Therapy Plan  Times Per Week: 3-5x a week  Current Treatment Recommendations: Strengthening, ROM, Functional mobility training, Endurance training, Self-Care / ADL     Restrictions  Restrictions/Precautions  Restrictions/Precautions: Fall Risk, General Precautions, Up as Tolerated, NPO  Position Activity Restriction  Other position/activity restrictions: 2L O2, IV, tele, BRIANNA Sure    Subjective   General  Chart Reviewed: Yes, Orders, Progress Notes, Labs, History and Physical  Patient assessed for rehabilitation services?: Yes  Family / Caregiver Present: Yes (daughter and ex-wife)  Referring Practitioner: Alexsander Aggarwal MD  Diagnosis: acute deep vein thrombosis of iliac vein of left lower extremity  Subjective  Subjective: Pt supine in bed, agreeable to therapy  General Comment  Comments: RN approved therapy     Social/Functional History  Social/Functional History  Lives With: Alone  Type of Home: House  Home Layout: One level  Home Access: Stairs to enter with rails  Entrance Stairs - Number of Steps: 1  Entrance Stairs - Rails: Both (railing)  Bathroom Shower/Tub: Tub/Shower unit  Bathroom Toilet: Standard  Receives Help From: Family  ADL Assistance: Independent (pt reports independent but states does not change clothes or groom self often)  Homemaking Assistance: Needs assistance  Ambulation Assistance: Needs assistance (furniture surf or uses people to assist him)  Transfer Assistance: [address]: watching TV  Additional Comments: ex-wife and daughter live 30 and 40 minutes away, cannot be there 24/7       Objective   Heart Rate: 91  Heart Rate Source: Monitor  BP: (!) 105/49  BP Location: Left upper arm  BP Method: Automatic  Patient Position: Semi fowlers  MAP (Calculated): 67.67  Resp: 18  SpO2: 100 %  O2 Device: Nasal cannula          Observation/Palpation  Posture: Fair        Toilet Transfers  Toilet - Technique: Ambulating  Equipment Used: Standard toilet  Toilet Transfer: Contact guard assistance  Toilet Transfers Comments: using RW  AROM: Within functional limits  PROM: Within functional limits  Strength: Generally decreased, functional  Coordination: Within functional limits  Tone: Normal  Sensation: Intact  ADL  Feeding: NPO  LE Dressing: Contact guard assistance  LE Dressing Skilled Clinical Factors: seated EOB don bilateral socks  Toileting: Dependent/Total (catheter)  Toileting Skilled Clinical Factors: completed pericare with stand by assist        Bed mobility  Supine to Sit: Modified independent (HOB elevated)  Sit to Supine: Unable to assess  Scooting: Modified independent  Bed Mobility Comments: seated in bedside chair at end of evaluation  Transfers  Sit to stand: Stand by assistance  Stand to sit: Stand by assistance  Vision - Basic Assessment  Prior Vision: Wears glasses all the time  Vision  Vision: Impaired  Vision Exceptions: Wears glasses at all times (daughter reports he needs prescription glasses)  Hearing  Hearing: Exceptions to [facility]  Hearing Exceptions: Hard of hearing/hearing concerns (needs hearing aid)  Orientation  Orientation Level: Oriented to place;Oriented to person;Oriented to situation;Disoriented to time (family reports he never knows the month or year)     Education Given To: Patient; Family  Education Provided: Role of Therapy;Plan of Care;Energy Conservation;Home Exercise Program;IADL Safety; Family Education  Education Method: Demonstration;Verbal  Barriers to Learning: Hearing  Education Outcome: Continued education needed  LUE AROM (degrees)  LUE AROM : WFL  RUE AROM (degrees)  RUE AROM : [facility]    AM-PAC Score        AM-[facility] Inpatient Daily Activity Raw Score: 17 (10/15/22 1048)  AM-PAC Inpatient ADL T-Scale Score : 37.26 (10/15/22 1048)  ADL Inpatient CMS 0-100% Score: 50.11 (10/15/22 1048)  ADL Inpatient CMS G-Code Modifier : CK (10/15/22 1048)      Goals  Short Term Goals  Time Frame for Short Term Goals: 1 week unless otherwise specified 10/22/22  Short Term Goal 1: Pt will

## 2022-10-15 NOTE — CONSULTS
Vascular Surgery Consultation    Date of Admission:  10/15/2022 12:28 AM  Date of Consultation:  10/15/2022    PCP:  NALINI Abel CNP       Chief Complaint: Dysuria/Heamturia    History of Present Illness: We are asked to see this patient in consultation by Dr. Chaparro Robison regarding LLE DVT. Talon Doe is a 75 y.o. male who was transferred from [facility]. He presented with above c/o. He has been found on CT to have bladder mass and extreme bladder distension which has now been decompressed with Alexander catheter. He was also noted to have LLE swelling and duplex has shown extensive LLE DVT. He has not been anticoagulated due to gross hematuria and thus Filter placement requested. Radiology was consulted at [facility] for this but felt that based on CT Cava was too small for this. He was thus transferred to [facility] for consideration of Venous thrombectomy. Past Medical History:  Past Medical History:   Diagnosis Date    Anxiety     Depression     Hypertension        Past Surgical History:  Past Surgical History:   Procedure Laterality Date    CYST REMOVAL      FINGER SURGERY         Home Medications:   Prior to Admission medications    Medication Sig Start Date End Date Taking?  Authorizing Provider   tamsulosin (FLOMAX) 0.4 MG capsule Take 0.4 mg by mouth daily    Historical Provider, MD        Facility Administered Medications:    tamsulosin  0.4 mg Oral Daily    cefepime  1,000 mg IntraVENous Q12H    midodrine  10 mg Oral TID WC    mupirocin   Nasal BID    nicotine  1 patch TransDERmal Daily    vancomycin (VANCOCIN) intermittent dosing (placeholder)   Other RX Placeholder    potassium chloride  10 mEq IntraVENous Q1H       Allergies:  Bee venom     Social History:      Social History     Socioeconomic History    Marital status:      Spouse name: Not on file    Number of children: Not on file    Years of education: Not on file    Highest education level: Not on file   Occupational History    Not on file   Tobacco Use    Smoking status: Every Day     Packs/day: 2.00     Types: Cigarettes    Smokeless tobacco: Never   Substance and Sexual Activity    Alcohol use: Yes     Comment: beer 3 times a week    Drug use: No    Sexual activity: Not on file   Other Topics Concern    Not on file   Social History Narrative    Not on file     Social Determinants of Health     Financial Resource Strain: Not on file   Food Insecurity: Not on file   Transportation Needs: Not on file   Physical Activity: Not on file   Stress: Not on file   Social Connections: Not on file   Intimate Partner Violence: Not on file   Housing Stability: Not on file       Family History:    History reviewed. No pertinent family history. Review of Systems:  A 14 point review of systems was completed. Pertinent positives identified in the HPI, all other review of systems negative. Physical Examination:    /65   Pulse 95   Temp 98 °F (36.7 °C) (Axillary)   Resp 18   Wt 95 lb 0.3 oz (43.1 kg)   SpO2 99%   BMI 15.34 kg/m²        Admission Weight: 95 lb 0.3 oz (43.1 kg)       General appearance: NAD, frail   Eyes: PERRLA  Neck: no JVD, no lymphadenopathy. Respiratory: effort is unlabored, no crackles, wheezes or rubs. Cardiovascular: regular, no murmur. No carotid bruits. No edema or varicosities. Pulses:    femoral   RIGHT 2   LEFT 2   GI: abdomen soft, nondistended, no organomegaly. Musculoskeletal: strength and tone normal.  Extremities: warm and pink. Skin: no dermatitis or ulceration. Neuro/psychiatric: grossly intact. ASA 4 - Patient with severe systemic disease that is a constant threat to life    Mallampati Airway Assessment:  Mallampati Class II - (soft palate, fauces & uvula are visible)     MEDICAL DECISION MAKING/TESTING      Venous duplex:    Right Impression   Technically difficult and limited study due to body habitus, positioning and   calcific shadowing.    There is evidence of acute totally occluding deep venous thrombosis involving   the right peroneal veins (2 of 2). There is no other evidence of deep or superficial venous thrombosis involving   the right lower extremity. Left Impression   There is evidence of acute totally occluding deep venous thrombosis involving   the left common femoral extending into the external iliac vein, deep femoral,   femoral, popliteal, soleal, posterior tibial (2 of 2, proximal calf) and   peroneal ( 2 of 2, proximal to mid calf) veins. This does not appear to extend   into the common iliac vein or inferior vena cava, however, visualization is   limited due to overlying bowel gas and peristalsis. There is acute totally occluding superficial venous thrombosis involving the   left greater saphenous vein at the saphenofemoral junction. There is no other evidence of deep or superficial venous thrombosis involving   the left lower extremity. Incidental finding of an aneurysmal formation involving the distal abdominal   aorta measuring 2.6 x 3.8 cm. CT abdomen/pelvis 10/7/22: States \"There is a   3.3 cm abdominal aortic aneurysm. \" Limited abdominal vascular ultrasound exam   due to overlying bowel gas. There are no previous studies for comparison. Conclusions        Summary        Extensive acute deep venous thrombosis involving the left lower extremity as    described above. Acute deep venous thrombosis involving the right peroneal veins. Within the limits of this exam, there is no other evidence of deep or    superficial venous thrombosis in the lower extremities bilaterally. Incidental finding of an aneurysmal formation involving the distal abdominal    aorta measuring 2.6 x 3.8 cm. CT:  non contrast images personally reviewed. Tortuous infra renal aorta with slight dilatation. Cava not well visualized.          Labs:   CBC:   Recent Labs     10/14/22  0352 10/14/22  0630 10/14/22  1527 10/15/22  0426   WBC 4.2 3.9*  -- 4. 7   HGB 6.9* 6.7* 7.9* 8.4*   HCT 21.4* 21.4* 24.1* 25.8*   MCV 74.7* 75.6*  --  79.3*    181  --  155     BMP:   Recent Labs     10/14/22  0055 10/14/22  0352 10/15/22  0426   * 130* 139   K 3.7 3.6  3.6 3.1*   CL 96* 97* 101   CO2 17* 19* 27   PHOS  --  4.6  --    * 100* 55*   CREATININE 2.9* 2.6* 1.0   CALCIUM 7.6* 7.8* 7.8*   MG  --   --  1.80     Cardiac Enzymes:   Recent Labs     10/13/22  1553 10/13/22  2139 10/14/22  0352   TROPONINI 0.02* 0.03* 0.03*     PT/INR:   Recent Labs     10/15/22  0426   PROTIME 15.4*   INR 1.22*     APTT: No results for input(s): APTT in the last 72 hours. Liver Profile:  Lab Results   Component Value Date/Time    AST 11 10/15/2022 04:26 AM    ALT <5 10/15/2022 04:26 AM    BILITOT 0.8 10/15/2022 04:26 AM    ALKPHOS 83 10/15/2022 04:26 AM   No results found for: CHOL, HDL, TRIG  TSH:  Lab Results   Component Value Date/Time    TSH 1.48 10/03/2022 05:26 PM     UA:   Lab Results   Component Value Date/Time    COLORU Yellow 10/13/2022 12:44 PM    PHUR 6.0 10/13/2022 12:44 PM    WBCUA >100 10/13/2022 12:44 PM    RBCUA see below 10/13/2022 12:44 PM    CLARITYU CLOUDY 10/13/2022 12:44 PM    SPECGRAV 1.015 10/13/2022 12:44 PM    LEUKOCYTESUR SMALL 10/13/2022 12:44 PM    UROBILINOGEN 0.2 10/13/2022 12:44 PM    BILIRUBINUR Negative 10/13/2022 12:44 PM    BLOODU MODERATE 10/13/2022 12:44 PM    GLUCOSEU Negative 10/13/2022 12:44 PM         Assessment:  Bladder mass with hematuria   Left Iliofemoral DVT with contraindication to anticoagulation due to above. Venous thrombectomy is not an option as cannot perform without anticoagulating as will re thrombose and likely cause PE during procedure. Vena Cava deemed \"too small\" by IR for Filter placement- however CT was done without contrast and very difficult to accurately measure size of Cava on CT.  IFU for filter requires size between 15-28mm. Plan: Inferior Venocavagram with possible IVC filter placement if able. Inferior Vena cavagram most accurate way to determine suitability for filter placement. Procedure, risks- including penetration, perforation, migration, and alternatives explained to patient and daughter and understood.

## 2022-10-15 NOTE — CONSULTS
Urology Attending Consult Note      Reason for Admission: Hematuria, bladder mass, DVT    History: 75year old male with multiple medical problems, with hematuria and questionable bladder masses, here for evaluation and treatment of DVT. Currently having an IVC filter placed, and will need long term anticoagulation. Has several apparent bladder masses, and will need these addressed prior to anticoagulation. Meds: see med rec  Family History, Social History, Review of Systems:  Reviewed and agreed to as per chart    Exam:    Vitals:  /65   Pulse 95   Temp 98 °F (36.7 °C) (Axillary)   Resp 18   Wt 95 lb 0.3 oz (43.1 kg)   SpO2 99%   BMI 15.34 kg/m²   Temp  Av.3 °F (36.8 °C)  Min: 97.9 °F (36.6 °C)  Max: 100.1 °F (37.8 °C)    Intake/Output Summary (Last 24 hours) at 10/15/2022 1454  Last data filed at 10/15/2022 1404  Gross per 24 hour   Intake 0 ml   Output 1650 ml   Net -1650 ml       Physical:   Well developed, well nourished in no acute distress  Mood indicates no abnormalities. Pt doesnt appear depressed  Orientated to time and place  Neck is supple, trachea is midline  Respiratory effort is normal  Cardiovascular bilateral lower extremity swelling  Abdomen no masses or hernias are palpated, there is no tenderness. Liver and Spleen appear normal.  Skin show no abnormal lesions  Lymph nodes are not palpated in the inguinal, neck, or axillary area.     Labs:  WBC:    Lab Results   Component Value Date/Time    WBC 4.7 10/15/2022 04:26 AM     Hemoglobin/Hematocrit:    Lab Results   Component Value Date/Time    HGB 8.4 10/15/2022 04:26 AM    HCT 25.8 10/15/2022 04:26 AM     BMP:    Lab Results   Component Value Date/Time     10/15/2022 04:26 AM    K 3.1 10/15/2022 04:26 AM     10/15/2022 04:26 AM    CO2 27 10/15/2022 04:26 AM    BUN 55 10/15/2022 04:26 AM    LABALBU 3.3 10/15/2022 04:26 AM    CREATININE 1.0 10/15/2022 04:26 AM    CALCIUM 7.8 10/15/2022 04:26 AM    GFRAA >60 10/15/2022 04:26 AM    LABGLOM >60 10/15/2022 04:26 AM     PT/INR:    Lab Results   Component Value Date/Time    PROTIME 15.4 10/15/2022 04:26 AM    INR 1.22 10/15/2022 04:26 AM     PTT:  No results found for: APTT[APTT      Impression/Plan:     Bladder masses with hematuria  Plan for cystoscopy and resection of bladder tumors tomorrow morning.   NPO after midnight      Nina Long MD

## 2022-10-15 NOTE — PROGRESS NOTES
Hospitalist Progress Note  10/15/2022 2:04 PM  Subjective:   Admit Date: 10/15/2022  PCP: NALINI Burch CNP Status: Inpatient  Interval History: Hospital Day: 1, admitted overnight from [facility], initially hospitalized 10/13. He had seen a urologist for a year of progressive urinary symptoms. The urologist ordered an abdominopelvic CT scan. The scan showed a very distended bladder and a bladder mass. He was initially hypothermic and hypotensive. A mc catheter was placed (3L urine drained initially) and empiric antibiotics (cefepime, vancomycin) were started with a working diagnosis of septic shock due to UTI. He was also found to have an MATHEUS w/ presenting SCr of 4.8. He was also found to have an extensive left lower extremity DVT. Because he was having quite a bit of hematuria there were concerns about accelerating bleeding if anticoagulants were started so IR evaluated the patient for an IVC filter. His  IVC was too small to safely accommodate a filter though. He was transferred to [facility] for evaluation by vascular surgery and possible thrombectomy.     Diet: NPO (sips of water with meds)  Left antecubital peripheral IV (10/13, day #3)  Medications:   Sodium chloride at 50 ml/hr  tamsulosin  0.4 mg Oral Daily   cefepime  1,000 mg IntraVENous Q12H (10/13, day #3)   midodrine  10 mg Oral TID WC   nicotine  21 mgpatch TransDERmal Daily   vancomycin  Dosed by pharmacy Intravenous Per pharmacy (10/13, day #3)     Recent Labs     10/14/22  0352 10/14/22  0630 10/14/22  1527 10/15/22  0426   WBC 4.2 3.9*  --  4.7   HGB 6.9* 6.7* 7.9* 8.4*    181  --  155   MCV 74.7* 75.6*  --  79.3*     Recent Labs     10/14/22  0055 10/14/22  0352 10/15/22  0426   * 130* 139   K 3.7 3.6  3.6 3.1*   CL 96* 97* 101   CO2 17* 19* 27   * 100* 55*   CREATININE 2.9* 2.6* 1.0   GLUCOSE 88 79 105*     Recent Labs     10/13/22  1247 10/14/22  0352 10/15/22  0426   AST 8* 8* 11*   ALT 7* 6* <5* BILITOT 0.5 0.5 0.8   ALKPHOS 130* 86 83       Troponin T:   Recent Labs     10/13/22  1553 10/13/22  2139 10/14/22  0352   TROPONINI 0.02* 0.03* 0.03*     INR (10/15) 1.22  Magnesium (10/15) 1.80 mg/dL  Reticulocyte (10/15) 1.52%  LDH (10/15) 221 U/L  Lactate (10/14) 0.6 mmol/L    Procalcitonin (10/14) 0.15 ng/mL  FOBT (10/14) positive       Lower extremity venous doppler (10/14) Extensive acute deep venous thrombosis involving the left lower extremity as described above. Acute deep venous thrombosis involving the right peroneal veins. Within the limits of this exam, there is no other evidence of deep or superficial venous thrombosis in the lower  extremities bilaterally. Incidental finding of an aneurysmal formation involving the distal abdominal aorta measuring 2.6 x 3.8 cm. Noncontrast CT head (10/13) No evidence of acute intracranial abnormality. Portable CXR (10/13) Bilateral upper lobe airspace disease favoring scarring rather than pneumonia. Objective:   Vitals:  /65   Pulse 95   Temp 98 °F (36.7 °C) (Axillary)   Resp 18   Wt 95 lb 0.3 oz (43.1 kg)   SpO2 99%   BMI 15.34 kg/m²   General appearance: alert and cooperative with exam, lying flat post procedure  Lungs: clear to auscultation bilaterally  Heart: regular rate and rhythm, S1, S2 normal, no murmur, click, rub or gallop  Abdomen: soft, non-tender; bowel sounds normal; no masses,  no organomegaly  Extremities: extremities normal, atraumatic, no cyanosis or edema  Neurologic: No obvious focal neurologic deficits. Assessment and Plan:   Extensive left iliofemoral deep venous thrombosis with contraindication to anticoagulation due anemia, hematuria and positive FOBT. CT Cava too small for IVC filter per interventional radiology. Vascular surgery evaluation and venous thrombectomy is not an option as cannot perform without anticoagulating as will re thrombose and likely cause PE during procedure.   Inferior Venocavagram with possible IVC filter placement if able. Inferior Vena cavagram most accurate way to determine suitability for filter placement. Bladder mass and extreme bladder distension noted on CT which has now been decompressed with Alexander catheter. Presumed malignant. Other issues have delayed cystoscopy and biopsy. Will request urology continue to follow the patient while hospitalized. Malnutrition and poor functional status limited treatment options in the short term. Acute kidney injury with anion gap metabolic acidosis. Nephrology evaluation, changed bicarbonate infusion to normal saline. Replace potassium. Possible urinary tract infection. No bacteriuria. There was significant pyruia, but in the context of gross hematuria. Blood and Urine cx 10/13 are NGTD. Patient has been maintained on cefepime and vancomycin since presentation on 10/13.  3-5 days of empiric treatment is indicated while awaiting final culture results. Should at least be safe to d/c vancomycin after three days if no GPC grow. Microcytic anemia:  Hemoglobin decreased from 9.4 gm/dL on admission to 6.7 gm/dL with 1 unit PRBC transfused. COPD on supplemental oxygen to maintain O2sat 90-94%. Hypokalemia with normal magnesium. Replace with IV potassium. Malnutrition:  Regular diet with dietary supplements when no longer NPO.      Advance Directive: Full Code  DVT prophylaxis with intermittent compression (NO enoxaparin due to possible bleed)  Discharge planning: two more days inpatient status      Liz Patricia MD  [facility] Hospital[facility]

## 2022-10-16 ENCOUNTER — ANESTHESIA EVENT (OUTPATIENT)
Dept: OPERATING ROOM | Age: 67
DRG: 668 | End: 2022-10-16
Payer: MEDICARE

## 2022-10-16 ENCOUNTER — APPOINTMENT (OUTPATIENT)
Dept: GENERAL RADIOLOGY | Age: 67
DRG: 668 | End: 2022-10-16
Attending: HOSPITALIST
Payer: MEDICARE

## 2022-10-16 ENCOUNTER — ANESTHESIA (OUTPATIENT)
Dept: OPERATING ROOM | Age: 67
DRG: 668 | End: 2022-10-16
Payer: MEDICARE

## 2022-10-16 LAB
A/G RATIO: 1.3 (ref 1.1–2.2)
ALBUMIN SERPL-MCNC: 2.8 G/DL (ref 3.4–5)
ALP BLD-CCNC: 80 U/L (ref 40–129)
ALT SERPL-CCNC: <5 U/L (ref 10–40)
ANION GAP SERPL CALCULATED.3IONS-SCNC: 8 MMOL/L (ref 3–16)
AST SERPL-CCNC: 9 U/L (ref 15–37)
BASOPHILS ABSOLUTE: 0 K/UL (ref 0–0.2)
BASOPHILS RELATIVE PERCENT: 0.6 %
BILIRUB SERPL-MCNC: 0.4 MG/DL (ref 0–1)
BUN BLDV-MCNC: 22 MG/DL (ref 7–20)
CALCIUM SERPL-MCNC: 7.2 MG/DL (ref 8.3–10.6)
CHLORIDE BLD-SCNC: 102 MMOL/L (ref 99–110)
CO2: 28 MMOL/L (ref 21–32)
CREAT SERPL-MCNC: 0.7 MG/DL (ref 0.8–1.3)
EOSINOPHILS ABSOLUTE: 0 K/UL (ref 0–0.6)
EOSINOPHILS RELATIVE PERCENT: 0.7 %
GFR AFRICAN AMERICAN: >60
GFR NON-AFRICAN AMERICAN: >60
GLUCOSE BLD-MCNC: 98 MG/DL (ref 70–99)
HCT VFR BLD CALC: 23.6 % (ref 40.5–52.5)
HEMOGLOBIN: 7.7 G/DL (ref 13.5–17.5)
INR BLD: 1.18 (ref 0.87–1.14)
LYMPHOCYTES ABSOLUTE: 0.4 K/UL (ref 1–5.1)
LYMPHOCYTES RELATIVE PERCENT: 6.9 %
MAGNESIUM: 1.4 MG/DL (ref 1.8–2.4)
MCH RBC QN AUTO: 25.8 PG (ref 26–34)
MCHC RBC AUTO-ENTMCNC: 32.5 G/DL (ref 31–36)
MCV RBC AUTO: 79.3 FL (ref 80–100)
MONOCYTES ABSOLUTE: 0.3 K/UL (ref 0–1.3)
MONOCYTES RELATIVE PERCENT: 5.6 %
NEUTROPHILS ABSOLUTE: 4.5 K/UL (ref 1.7–7.7)
NEUTROPHILS RELATIVE PERCENT: 86.2 %
PDW BLD-RTO: 20.4 % (ref 12.4–15.4)
PLATELET # BLD: 141 K/UL (ref 135–450)
PMV BLD AUTO: 6.4 FL (ref 5–10.5)
POTASSIUM REFLEX MAGNESIUM: 3.5 MMOL/L (ref 3.5–5.1)
PROTHROMBIN TIME: 15 SEC (ref 11.7–14.5)
RBC # BLD: 2.97 M/UL (ref 4.2–5.9)
SODIUM BLD-SCNC: 138 MMOL/L (ref 136–145)
TOTAL PROTEIN: 5 G/DL (ref 6.4–8.2)
VANCOMYCIN RANDOM: 6.9 UG/ML
WBC # BLD: 5.2 K/UL (ref 4–11)

## 2022-10-16 PROCEDURE — 2580000003 HC RX 258: Performed by: UROLOGY

## 2022-10-16 PROCEDURE — 2060000000 HC ICU INTERMEDIATE R&B

## 2022-10-16 PROCEDURE — 85610 PROTHROMBIN TIME: CPT

## 2022-10-16 PROCEDURE — 3700000000 HC ANESTHESIA ATTENDED CARE: Performed by: UROLOGY

## 2022-10-16 PROCEDURE — 94761 N-INVAS EAR/PLS OXIMETRY MLT: CPT

## 2022-10-16 PROCEDURE — 6370000000 HC RX 637 (ALT 250 FOR IP): Performed by: INTERNAL MEDICINE

## 2022-10-16 PROCEDURE — 7100000001 HC PACU RECOVERY - ADDTL 15 MIN: Performed by: UROLOGY

## 2022-10-16 PROCEDURE — 2720000010 HC SURG SUPPLY STERILE: Performed by: UROLOGY

## 2022-10-16 PROCEDURE — 80053 COMPREHEN METABOLIC PANEL: CPT

## 2022-10-16 PROCEDURE — B51C1ZZ FLUOROSCOPY OF LEFT LOWER EXTREMITY VEINS USING LOW OSMOLAR CONTRAST: ICD-10-PCS | Performed by: SURGERY

## 2022-10-16 PROCEDURE — 88307 TISSUE EXAM BY PATHOLOGIST: CPT

## 2022-10-16 PROCEDURE — 3600000002 HC SURGERY LEVEL 2 BASE: Performed by: UROLOGY

## 2022-10-16 PROCEDURE — 2700000000 HC OXYGEN THERAPY PER DAY

## 2022-10-16 PROCEDURE — 85025 COMPLETE CBC W/AUTO DIFF WBC: CPT

## 2022-10-16 PROCEDURE — 80202 ASSAY OF VANCOMYCIN: CPT

## 2022-10-16 PROCEDURE — 2580000003 HC RX 258: Performed by: INTERNAL MEDICINE

## 2022-10-16 PROCEDURE — 3600000012 HC SURGERY LEVEL 2 ADDTL 15MIN: Performed by: UROLOGY

## 2022-10-16 PROCEDURE — 3700000001 HC ADD 15 MINUTES (ANESTHESIA): Performed by: UROLOGY

## 2022-10-16 PROCEDURE — 7100000000 HC PACU RECOVERY - FIRST 15 MIN: Performed by: UROLOGY

## 2022-10-16 PROCEDURE — 2709999900 HC NON-CHARGEABLE SUPPLY: Performed by: UROLOGY

## 2022-10-16 PROCEDURE — 6360000002 HC RX W HCPCS: Performed by: ANESTHESIOLOGY

## 2022-10-16 PROCEDURE — 0TBB8ZZ EXCISION OF BLADDER, VIA NATURAL OR ARTIFICIAL OPENING ENDOSCOPIC: ICD-10-PCS | Performed by: SURGERY

## 2022-10-16 PROCEDURE — 2500000003 HC RX 250 WO HCPCS: Performed by: ANESTHESIOLOGY

## 2022-10-16 PROCEDURE — 6360000002 HC RX W HCPCS: Performed by: INTERNAL MEDICINE

## 2022-10-16 PROCEDURE — 6370000000 HC RX 637 (ALT 250 FOR IP): Performed by: UROLOGY

## 2022-10-16 PROCEDURE — 36415 COLL VENOUS BLD VENIPUNCTURE: CPT

## 2022-10-16 PROCEDURE — 83735 ASSAY OF MAGNESIUM: CPT

## 2022-10-16 PROCEDURE — 2580000003 HC RX 258: Performed by: ANESTHESIOLOGY

## 2022-10-16 RX ORDER — LIDOCAINE HYDROCHLORIDE 20 MG/ML
INJECTION, SOLUTION INFILTRATION; PERINEURAL PRN
Status: DISCONTINUED | OUTPATIENT
Start: 2022-10-16 | End: 2022-10-16 | Stop reason: SDUPTHER

## 2022-10-16 RX ORDER — ATROPA BELLADONNA AND OPIUM 16.2; 3 MG/1; MG/1
30 SUPPOSITORY RECTAL ONCE
Status: COMPLETED | OUTPATIENT
Start: 2022-10-16 | End: 2022-10-16

## 2022-10-16 RX ORDER — OXYCODONE HYDROCHLORIDE 5 MG/1
10 TABLET ORAL PRN
Status: DISCONTINUED | OUTPATIENT
Start: 2022-10-16 | End: 2022-10-16 | Stop reason: HOSPADM

## 2022-10-16 RX ORDER — MAGNESIUM SULFATE IN WATER 40 MG/ML
2000 INJECTION, SOLUTION INTRAVENOUS ONCE
Status: COMPLETED | OUTPATIENT
Start: 2022-10-16 | End: 2022-10-16

## 2022-10-16 RX ORDER — POTASSIUM CHLORIDE 20 MEQ/1
40 TABLET, EXTENDED RELEASE ORAL ONCE
Status: COMPLETED | OUTPATIENT
Start: 2022-10-16 | End: 2022-10-16

## 2022-10-16 RX ORDER — EPHEDRINE SULFATE 50 MG/ML
INJECTION INTRAVENOUS PRN
Status: DISCONTINUED | OUTPATIENT
Start: 2022-10-16 | End: 2022-10-16 | Stop reason: SDUPTHER

## 2022-10-16 RX ORDER — SODIUM CHLORIDE 0.9 % (FLUSH) 0.9 %
5-40 SYRINGE (ML) INJECTION PRN
Status: DISCONTINUED | OUTPATIENT
Start: 2022-10-16 | End: 2022-10-16 | Stop reason: HOSPADM

## 2022-10-16 RX ORDER — MAGNESIUM HYDROXIDE 1200 MG/15ML
LIQUID ORAL PRN
Status: DISCONTINUED | OUTPATIENT
Start: 2022-10-16 | End: 2022-10-16 | Stop reason: ALTCHOICE

## 2022-10-16 RX ORDER — SODIUM CHLORIDE 9 MG/ML
INJECTION, SOLUTION INTRAVENOUS CONTINUOUS PRN
Status: DISCONTINUED | OUTPATIENT
Start: 2022-10-16 | End: 2022-10-16 | Stop reason: SDUPTHER

## 2022-10-16 RX ORDER — OXYCODONE HYDROCHLORIDE 5 MG/1
5 TABLET ORAL PRN
Status: DISCONTINUED | OUTPATIENT
Start: 2022-10-16 | End: 2022-10-16 | Stop reason: HOSPADM

## 2022-10-16 RX ORDER — PROPOFOL 10 MG/ML
INJECTION, EMULSION INTRAVENOUS PRN
Status: DISCONTINUED | OUTPATIENT
Start: 2022-10-16 | End: 2022-10-16 | Stop reason: SDUPTHER

## 2022-10-16 RX ORDER — ROCURONIUM BROMIDE 10 MG/ML
INJECTION, SOLUTION INTRAVENOUS PRN
Status: DISCONTINUED | OUTPATIENT
Start: 2022-10-16 | End: 2022-10-16 | Stop reason: SDUPTHER

## 2022-10-16 RX ORDER — MEPERIDINE HYDROCHLORIDE 50 MG/ML
12.5 INJECTION INTRAMUSCULAR; INTRAVENOUS; SUBCUTANEOUS EVERY 5 MIN PRN
Status: DISCONTINUED | OUTPATIENT
Start: 2022-10-16 | End: 2022-10-16 | Stop reason: HOSPADM

## 2022-10-16 RX ORDER — LABETALOL HYDROCHLORIDE 5 MG/ML
10 INJECTION, SOLUTION INTRAVENOUS
Status: DISCONTINUED | OUTPATIENT
Start: 2022-10-16 | End: 2022-10-16 | Stop reason: HOSPADM

## 2022-10-16 RX ORDER — SODIUM CHLORIDE 9 MG/ML
25 INJECTION, SOLUTION INTRAVENOUS PRN
Status: DISCONTINUED | OUTPATIENT
Start: 2022-10-16 | End: 2022-10-16 | Stop reason: HOSPADM

## 2022-10-16 RX ORDER — DIPHENHYDRAMINE HYDROCHLORIDE 50 MG/ML
12.5 INJECTION INTRAMUSCULAR; INTRAVENOUS
Status: DISCONTINUED | OUTPATIENT
Start: 2022-10-16 | End: 2022-10-16 | Stop reason: HOSPADM

## 2022-10-16 RX ORDER — ONDANSETRON 2 MG/ML
INJECTION INTRAMUSCULAR; INTRAVENOUS PRN
Status: DISCONTINUED | OUTPATIENT
Start: 2022-10-16 | End: 2022-10-16 | Stop reason: SDUPTHER

## 2022-10-16 RX ORDER — ONDANSETRON 2 MG/ML
4 INJECTION INTRAMUSCULAR; INTRAVENOUS
Status: DISCONTINUED | OUTPATIENT
Start: 2022-10-16 | End: 2022-10-16 | Stop reason: HOSPADM

## 2022-10-16 RX ORDER — SODIUM CHLORIDE 0.9 % (FLUSH) 0.9 %
5-40 SYRINGE (ML) INJECTION EVERY 12 HOURS SCHEDULED
Status: DISCONTINUED | OUTPATIENT
Start: 2022-10-16 | End: 2022-10-16 | Stop reason: HOSPADM

## 2022-10-16 RX ADMIN — SODIUM CHLORIDE: 9 INJECTION, SOLUTION INTRAVENOUS at 10:23

## 2022-10-16 RX ADMIN — PHENYLEPHRINE HYDROCHLORIDE 100 MCG: 10 INJECTION INTRAVENOUS at 11:20

## 2022-10-16 RX ADMIN — HYDROMORPHONE HYDROCHLORIDE 0.5 MG: 1 INJECTION, SOLUTION INTRAMUSCULAR; INTRAVENOUS; SUBCUTANEOUS at 12:43

## 2022-10-16 RX ADMIN — HYDROMORPHONE HYDROCHLORIDE 0.5 MG: 1 INJECTION, SOLUTION INTRAMUSCULAR; INTRAVENOUS; SUBCUTANEOUS at 12:56

## 2022-10-16 RX ADMIN — LIDOCAINE HYDROCHLORIDE 3 ML: 20 INJECTION, SOLUTION INFILTRATION; PERINEURAL at 10:28

## 2022-10-16 RX ADMIN — MUPIROCIN: 20 OINTMENT TOPICAL at 22:51

## 2022-10-16 RX ADMIN — PHENYLEPHRINE HYDROCHLORIDE 100 MCG: 10 INJECTION INTRAVENOUS at 10:43

## 2022-10-16 RX ADMIN — VANCOMYCIN HYDROCHLORIDE 750 MG: 750 INJECTION, POWDER, LYOPHILIZED, FOR SOLUTION INTRAVENOUS at 07:52

## 2022-10-16 RX ADMIN — PHENYLEPHRINE HYDROCHLORIDE 100 MCG: 10 INJECTION INTRAVENOUS at 11:17

## 2022-10-16 RX ADMIN — CEFEPIME 1000 MG: 1 INJECTION, POWDER, FOR SOLUTION INTRAMUSCULAR; INTRAVENOUS at 22:51

## 2022-10-16 RX ADMIN — SUGAMMADEX 100 MG: 100 INJECTION, SOLUTION INTRAVENOUS at 12:00

## 2022-10-16 RX ADMIN — POTASSIUM CHLORIDE 40 MEQ: 1500 TABLET, EXTENDED RELEASE ORAL at 14:21

## 2022-10-16 RX ADMIN — ONDANSETRON 4 MG: 2 INJECTION INTRAMUSCULAR; INTRAVENOUS at 11:58

## 2022-10-16 RX ADMIN — TAMSULOSIN HYDROCHLORIDE 0.4 MG: 0.4 CAPSULE ORAL at 08:37

## 2022-10-16 RX ADMIN — MUPIROCIN: 20 OINTMENT TOPICAL at 08:47

## 2022-10-16 RX ADMIN — PHENYLEPHRINE HYDROCHLORIDE 100 MCG: 10 INJECTION INTRAVENOUS at 10:39

## 2022-10-16 RX ADMIN — EPHEDRINE SULFATE 15 MG: 50 INJECTION INTRAVENOUS at 10:44

## 2022-10-16 RX ADMIN — CEFEPIME 1000 MG: 1 INJECTION, POWDER, FOR SOLUTION INTRAMUSCULAR; INTRAVENOUS at 08:47

## 2022-10-16 RX ADMIN — PHENYLEPHRINE HYDROCHLORIDE 100 MCG: 10 INJECTION INTRAVENOUS at 10:35

## 2022-10-16 RX ADMIN — MIDODRINE HYDROCHLORIDE 10 MG: 5 TABLET ORAL at 17:24

## 2022-10-16 RX ADMIN — MAGNESIUM SULFATE HEPTAHYDRATE 2000 MG: 40 INJECTION, SOLUTION INTRAVENOUS at 14:17

## 2022-10-16 RX ADMIN — HYDROMORPHONE HYDROCHLORIDE 0.5 MG: 1 INJECTION, SOLUTION INTRAMUSCULAR; INTRAVENOUS; SUBCUTANEOUS at 12:38

## 2022-10-16 RX ADMIN — ACETAMINOPHEN 650 MG: 325 TABLET ORAL at 08:37

## 2022-10-16 RX ADMIN — VANCOMYCIN HYDROCHLORIDE 750 MG: 750 INJECTION, POWDER, LYOPHILIZED, FOR SOLUTION INTRAVENOUS at 18:35

## 2022-10-16 RX ADMIN — ROCURONIUM BROMIDE 30 MG: 10 SOLUTION INTRAVENOUS at 10:28

## 2022-10-16 RX ADMIN — HYDROMORPHONE HYDROCHLORIDE 0.5 MG: 1 INJECTION, SOLUTION INTRAMUSCULAR; INTRAVENOUS; SUBCUTANEOUS at 13:10

## 2022-10-16 RX ADMIN — HYDROMORPHONE HYDROCHLORIDE 0.5 MG: 1 INJECTION, SOLUTION INTRAMUSCULAR; INTRAVENOUS; SUBCUTANEOUS at 12:50

## 2022-10-16 RX ADMIN — PROPOFOL 120 MG: 10 INJECTION, EMULSION INTRAVENOUS at 10:28

## 2022-10-16 RX ADMIN — PHENYLEPHRINE HYDROCHLORIDE 100 MCG: 10 INJECTION INTRAVENOUS at 10:30

## 2022-10-16 ASSESSMENT — PAIN SCALES - GENERAL
PAINLEVEL_OUTOF10: 10
PAINLEVEL_OUTOF10: 10
PAINLEVEL_OUTOF10: 6
PAINLEVEL_OUTOF10: 10
PAINLEVEL_OUTOF10: 10

## 2022-10-16 ASSESSMENT — PAIN DESCRIPTION - LOCATION: LOCATION: BACK

## 2022-10-16 ASSESSMENT — LIFESTYLE VARIABLES: SMOKING_STATUS: 1

## 2022-10-16 NOTE — OP NOTE
Operative Note      Patient: Huey Mercado  YOB: 1955  MRN: [id number]    Date of Procedure: 10/16/2022    Pre-Op Diagnosis: [facility] [[facility]]    Post-Op Diagnosis: Same       Procedure(s):  CYSTOSCOPY TRANSURETHRAL RESECTION BLADDER >5CM    Surgeon(s):  Trish Snow MD    Assistant:   Surgical Assistant: Asael Castañeda    Anesthesia: General    Estimated Blood Loss (mL): Minimal    Complications: None    Specimens:   ID Type Source Tests Collected by Time Destination   A : bladder tumor Specimen Abdomen SURGICAL PATHOLOGY Trish Snow MD 10/16/2022 1045        Implants:  * No implants in log *      Drains:   Urinary Catheter 10/16/22 3 Way (Active)       [REMOVED] Urinary Catheter 10/13/22 Alexander-Temperature (Removed)   $ Urethral catheter insertion $ Not inserted for procedure 10/13/22 1252   Catheter Indications Urinary retention (acute or chronic), continuous bladder irrigation or bladder outlet obstruction 10/15/22 1513   Site Assessment No urethral drainage 10/15/22 1513   Urine Color Diluted; Cherry 10/15/22 1513   Urine Appearance Red flecks 10/15/22 1513   Urine Odor Malodorous 10/15/22 1513   Collection Container Standard 10/15/22 1513   Securement Method Securing device (Describe) 10/15/22 1513   Catheter Care Completed Yes 10/15/22 1119   Catheter Best Practices  Drainage tube clipped to bed;Catheter secured to thigh; Tamper seal intact; Bag below bladder;Lack of dependent loop in tubing;Bag not on floor;Drainage bag less than half full 10/15/22 1513   Status Draining 10/15/22 1513   Output (mL) 375 mL 10/16/22 0640       Findings: Large, approximately 12cm bladder tumor. Detailed Description of Procedure:   After the induction General the patient was placed in the dorsal lithotomy position and prepped and draped in the usual sterile fashion.  The urethral meatus was normal. A 26 Urdu resectoscope sheath was placed without difficulty. No strictures were noted along the urethra. Cystoscopic examination proceeded. The ureteral orifices were never identified. There was a large tumor noted in the bladder, replacing the the trigone and left side wall of the bladder, This was resected almost completely, and the tumor was irrigated free from the bladder. Hemostasis was obtained, At this point, a [address] [place] 3 way Alexander catheter was inserted at the conclusion of the procedure using a guide. Continuous bladder irrigation initiated with clear returns.           Electronically signed by Juanjo Martinez MD on 10/16/2022 at 12:06 PM

## 2022-10-16 NOTE — ANESTHESIA POSTPROCEDURE EVALUATION
Department of Anesthesiology  Postprocedure Note    Patient: Ivone Ornelas  MRN: [id number]  [place]: 1955  Date of evaluation: 10/16/2022      Procedure Summary     Date: 10/16/22 Room / Location: [facility] OR [address] [place] [address]    Anesthesia Start: 0455 Anesthesia Stop:     Procedure: CYSTOSCOPY TRANSURETHRAL RESECTION BLADDER >5CM (Bladder) Diagnosis:       Malignant neoplasm of urinary bladder, unspecified site [facility])      (Malignant neoplasm of urinary bladder, unspecified site [facility]) [C67.9])    Surgeons: Jessica Rosa MD Responsible Provider: Kathy Noriega MD    Anesthesia Type: general ASA Status: 3          Anesthesia Type: No value filed. Saida Phase I: Saida Score: 10    Saida Phase II:        Anesthesia Post Evaluation    Patient location during evaluation: PACU  Patient participation: complete - patient participated  Level of consciousness: awake and alert  Airway patency: patent  Nausea & Vomiting: no nausea and no vomiting  Complications: no  Cardiovascular status: blood pressure returned to baseline  Respiratory status: acceptable  Hydration status: euvolemic  Comments: VSS on transfer to phase 2 recovery. No anesthetic complications.

## 2022-10-16 NOTE — DISCHARGE SUMMARY
Name:  Lakesha Harper  Room:  3007/3007-01  MRN:    [id number]    Discharge Summary      This discharge summary is in conjunction with a complete physical exam done on the day of discharge. Discharging Physician: Sherilyn Canavan, MD      Admit: 10/13/2022  Discharge:  10/14/2022    Diagnoses this Admission    Principal Problem:    Sepsis ([facility]  Active Problems:    Acute cystitis with hematuria    Hyponatremia    MATHEUS (acute kidney injury) ([facility]    COPD (chronic obstructive pulmonary disease) ([facility])    Microcytic anemia    Hematuria    Urinary retention    Severe protein-calorie malnutrition (HCC)  Resolved Problems:    * No resolved hospital problems. *        Procedures (Please Review Full Report for Details)  none    Consults    IP CONSULT TO UROLOGY  IP CONSULT TO HOSPITALIST  IP CONSULT TO PULMONOLOGY  IP CONSULT TO NEPHROLOGY  PHARMACY TO DOSE VANCOMYCIN  IP CONSULT TO UROLOGY  IP CONSULT TO PULMONOLOGY  IP CONSULT TO NEPHROLOGY  PHARMACY CONSULT FOR RENAL DOSING  IP CONSULT TO SPIRITUAL SERVICES      HPI:    Patient is a 74-year-old white man with a very poor historian. He was sent to the hospital urinary symptoms. He has had trouble voiding and has been incontinent of urine. He has had issues with straining to urinate. He was found to have urinary retention. Prostate was enlarged. CT scan showed a massively enlarged bladder. Showed bladder wall thickening. In the ER he was hypotensive and hypothermic. He had renal failure. A Alexander catheter was placed and 3 L of urine was obtained. He is admitted the ICU for further care. He has a Alexander in place. This shows hematuria. Denies any chest pain or shortness of breath. Overall weak. He has a long smoking history. He does not follow-up with her PCP.        Physical Exam at Discharge:  /70   Pulse 93   Temp 98 °F (36.7 °C) (Oral)   Resp 19   Ht 5' 6\" (1.676 m)   Wt 72 lb 8 oz (32.9 kg)   SpO2 98%   BMI 11.70 kg/m²     See H and P note. Hospital Course    #Sepsis. Most likely obstructive urinary tract infection. Admitted to the ICU. Intensivist consultation. Received IV fluids. Received vancomycin and cefepime. Continue cefepime. Await urine cultures. #Extensive DVT of LLE. Patient cannot be on anti coagulation. IVC ordered but IR feels IVC caliber is too small. I talked to vascular surgery. He [facility] be [facility]. #UTI with hematuria. On IV cefepime. Urology consultation. #Hematuria with possible bladder mass. Concerning for bladder cancer. Urology will need to do a cystoscopy when he is more stable. #MATHEUS. Possibly obstructive. Nephrology consultation. Has a Alexander in place. Monitor I's and O's. #Hyponatremia. Likely from urinary retention and dehydration. Sodium is slowly improving. Monitor. #COPD. Not in exacerbation. #Anemia likely from acute blood loss including blood loss from hematuria. Patient will need a blood transfusion. #SCD for DVT prophylaxis. #LLE swelling. Check venous US of the LLE     #Severe protein calorie malnutrition. CBC:   Recent Labs     10/14/22  0630 10/14/22  1527 10/15/22  0426 10/16/22  0413   WBC 3.9*  --  4.7 5.2   HGB 6.7* 7.9* 8.4* 7.7*   HCT 21.4* 24.1* 25.8* 23.6*   MCV 75.6*  --  79.3* 79.3*     --  155 141     BMP:   Recent Labs     10/14/22  0352 10/15/22  0426 10/16/22  0413   * 139 138   K 3.6  3.6 3.1* 3.5   CL 97* 101 102   CO2 19* 27 28   PHOS 4.6  --   --    * 55* 22*   CREATININE 2.6* 1.0 0.7*     LIVER PROFILE:   Recent Labs     10/13/22  1247 10/14/22  0352 10/15/22  0426 10/16/22  0413   AST 8* 8* 11* 9*   ALT 7* 6* <5* <5*   LIPASE 30.0  --   --   --    BILITOT 0.5 0.5 0.8 0.4   ALKPHOS 130* 86 83 80     PT/INR:   Recent Labs     10/15/22  0426 10/16/22  0413   PROTIME 15.4* 15.0*   INR 1.22* 1.18*     APTT: No results for input(s): APTT in the last 72 hours.   UA:  Recent Labs     10/13/22  1244 COLORU Yellow   PHUR 6.0   WBCUA >100*   RBCUA see below*   CLARITYU CLOUDY*   SPECGRAV 1.015   LEUKOCYTESUR SMALL*   UROBILINOGEN 0.2   BILIRUBINUR Negative   BLOODU MODERATE*   GLUCOSEU Negative         VL Extremity Venous Bilateral   Final Result      CT head without contrast   Preliminary Result   No evidence of acute intracranial abnormality. XR CHEST PORTABLE   Final Result   1. Bilateral upper lobe airspace disease favoring scarring rather than   pneumonia. Discharge Medications     Medication List        ASK your doctor about these medications      tamsulosin 0.4 MG capsule  Commonly known as: FLOMAX                Transfer to [facility] for vascular surgery consult in stable condition. More than 60 mts spent on transfer.          Antoinette Oppenheim, MD 10/16/2022 8:20 AM

## 2022-10-16 NOTE — ANESTHESIA PRE PROCEDURE
Department of Anesthesiology  Preprocedure Note       Name:  Adwoa Anthony   Age:  75 y.o.  :  1955                                          MRN:  [id number]         Date:  10/16/2022      Surgeon: Steve Whitman):  Martha Alegria MD    Procedure: Procedure(s):  CYSTOSCOPY TRANSURETHRAL RESECTION BLADDER    Medications prior to admission:   Prior to Admission medications    Medication Sig Start Date End Date Taking?  Authorizing Provider   tamsulosin (FLOMAX) 0.4 MG capsule Take 0.4 mg by mouth daily    Historical Provider, MD       Current medications:    Current Facility-Administered Medications   Medication Dose Route Frequency Provider Last Rate Last Admin    vancomycin (VANCOCIN) 750 mg in dextrose 5 % 250 mL IVPB  750 mg IntraVENous Q12H Tia MD Jagdeep   Stopped at 10/16/22 5340    magnesium sulfate 2000 mg in 50 mL IVPB premix  2,000 mg IntraVENous Once Macey Hartman MD        potassium chloride (KLOR-CON M) extended release tablet 40 mEq  40 mEq Oral Once Macey Hartman MD        tamsulosin (FLOMAX) capsule 0.4 mg  0.4 mg Oral Daily Mari Child MD   0.4 mg at 10/16/22 0837    cefepime (MAXIPIME) 1000 mg IVPB minibag  1,000 mg IntraVENous Q12H Mari Child MD 12.5 mL/hr at 10/16/22 0847 1,000 mg at 10/16/22 0847    midodrine (PROAMATINE) tablet 10 mg  10 mg Oral TID WC Mari Child MD   10 mg at 10/15/22 1732    mupirocin (BACTROBAN) 2 % ointment   Nasal BID Mari Child MD   Given at 10/16/22 0847    0.9 % sodium chloride infusion   IntraVENous PRN Mari Child MD        0.9 % sodium chloride infusion   IntraVENous PRN Mari Child MD        acetaminophen (TYLENOL) tablet 650 mg  650 mg Oral Q6H PRN Mari Child MD   650 mg at 10/16/22 6750    Or    acetaminophen (TYLENOL) suppository 650 mg  650 mg Rectal Q6H PRN Mari Child MD        ondansetron (ZOFRAN-ODT) disintegrating tablet 4 mg  4 mg Oral Q8H PRN Mari Child MD Or    ondansetron (ZOFRAN) injection 4 mg  4 mg IntraVENous Q6H PRN Zoila Wang MD        polyethylene glycol [facility]) packet 17 g  17 g Oral Daily PRN Zoila Wang MD        sodium chloride flush 0.9 % injection 5-40 mL  5-40 mL IntraVENous PRN Zoila Wang MD   10 mL at 10/15/22 0935    melatonin tablet 3 mg  3 mg Oral Nightly PRN Zoila Wang MD        calcium carbonate (TUMS) chewable tablet 1,000 mg  1,000 mg Oral TID PRN Zoila Wang MD        HYDROmorphone (DILAUDID) injection 0.5 mg  0.5 mg IntraVENous Q3H PRN Zoila Wang MD        ipratropium-albuterol (DUONEB) nebulizer solution 1 ampule  1 ampule Inhalation 4x Daily PRN Kevin Pickering MD        nicotine (NICODERM CQ) 14 MG/24HR 1 patch  1 patch TransDERmal Daily Zoila Wang MD   1 patch at 10/16/22 2156       Allergies:     Allergies   Allergen Reactions    Bee Venom Itching and Swelling       Problem List:    Patient Active Problem List   Diagnosis Code    Right carpal tunnel syndrome G56.01    Sepsis ([facility] A41.9    Acute cystitis with hematuria N30.01    Hyponatremia E87.1    MATHEUS (acute kidney injury) ([facility] N17.9    COPD (chronic obstructive pulmonary disease) ([facility] J44.9    Microcytic anemia D50.9    Hematuria R31.9    Urinary retention R33.9    Severe protein-calorie malnutrition ([facility] E43    Mass of urinary bladder N32.89    Tobacco abuse Z72.0    Acute deep vein thrombosis (DVT) of iliac vein of left lower extremity ([facility]) I82.422    Acute deep vein thrombosis (DVT) of calf muscle vein of left lower extremity ([facility]) I82.462       Past Medical History:        Diagnosis Date    Anxiety     Depression     Hypertension        Past Surgical History:        Procedure Laterality Date    CYST REMOVAL      FINGER SURGERY         Social History:    Social History     Tobacco Use    Smoking status: Every Day     Packs/day: 2.00     Types: Cigarettes    Smokeless tobacco: Never   Substance Use Topics    Alcohol use: Yes     Comment: beer 3 times a week                                Ready to quit: Not Answered  Counseling given: Not Answered      Vital Signs (Current):   Vitals:    10/16/22 0003 10/16/22 0458 10/16/22 0840 10/16/22 0948   BP: 124/85 111/67 132/78 (!) 105/6   Pulse: 85 93 95 94   Resp: 16 19 18 17   Temp:  98.8 °F (37.1 °C) 97.9 °F (36.6 °C) 99.7 °F (37.6 °C)   TempSrc:  Oral Axillary Temporal   SpO2: 100% 93% 95% 91%   Weight:                                                  BP Readings from Last 3 Encounters:   10/16/22 (!) 105/6   10/14/22 120/70   08/30/16 128/83       NPO Status:                                                                                 BMI:   Wt Readings from Last 3 Encounters:   10/15/22 95 lb 0.3 oz (43.1 kg)   10/14/22 72 lb 8 oz (32.9 kg)   08/30/16 130 lb 15.3 oz (59.4 kg)     Body mass index is 15.34 kg/m². CBC:   Lab Results   Component Value Date/Time    WBC 5.2 10/16/2022 04:13 AM    RBC 2.97 10/16/2022 04:13 AM    HGB 7.7 10/16/2022 04:13 AM    HCT 23.6 10/16/2022 04:13 AM    MCV 79.3 10/16/2022 04:13 AM    RDW 20.4 10/16/2022 04:13 AM     10/16/2022 04:13 AM       CMP:   Lab Results   Component Value Date/Time     10/16/2022 04:13 AM    K 3.5 10/16/2022 04:13 AM     10/16/2022 04:13 AM    CO2 28 10/16/2022 04:13 AM    BUN 22 10/16/2022 04:13 AM    CREATININE 0.7 10/16/2022 04:13 AM    GFRAA >60 10/16/2022 04:13 AM    AGRATIO 1.3 10/16/2022 04:13 AM    LABGLOM >60 10/16/2022 04:13 AM    GLUCOSE 98 10/16/2022 04:13 AM    PROT 5.0 10/16/2022 04:13 AM    CALCIUM 7.2 10/16/2022 04:13 AM    BILITOT 0.4 10/16/2022 04:13 AM    ALKPHOS 80 10/16/2022 04:13 AM    AST 9 10/16/2022 04:13 AM    ALT <5 10/16/2022 04:13 AM       POC Tests:   Recent Labs     10/13/22  1156   POCGLU 186*       Coags:   Lab Results   Component Value Date/Time    PROTIME 15.0 10/16/2022 04:13 AM    INR 1.18 10/16/2022 04:13 AM       HCG (If Applicable):  No results found for: PREGTESTUR, PREGSERUM, HCG, HCGQUANT     ABGs: No results found for: PHART, PO2ART, TZM5ZVC, JKS4OLQ, BEART, F9AYVZQA     Type & Screen (If Applicable):  No results found for: LABABO, LABRH    Drug/Infectious Status (If Applicable):  No results found for: HIV, HEPCAB    COVID-19 Screening (If Applicable):   Lab Results   Component Value Date/Time    COVID19 NOT DETECTED 10/13/2022 12:22 PM           Anesthesia Evaluation   no history of anesthetic complications:   Airway: Mallampati: II  TM distance: <3 FB   Neck ROM: limited  Mouth opening: > = 3 FB   Dental: normal exam         Pulmonary:   (+) COPD:  current smoker                           Cardiovascular:    (+) hypertension:,                   Neuro/Psych:   (+) neuromuscular disease:, psychiatric history:depression/anxiety             GI/Hepatic/Renal:            ROS comment: Bladder obstruction. Endo/Other:    (+) malignancy/cancer. ROS comment: anemia Abdominal:             Vascular:   + DVT, . Other Findings: beard          Anesthesia Plan      general     ASA 3     (Pt agrees to risks, benefits and alternatives of GETA. Questions answered. Willing to proceed with plan.)  Induction: intravenous. Anesthetic plan and risks discussed with patient.                         Vadim Iqbal MD   10/16/2022

## 2022-10-16 NOTE — PROGRESS NOTES
[facility]   Follow-up note           Reason for Consult:  Acute kidney injury  Requesting Physician:  Dr. Aurelio Mohs history  Patient has been transferred from [facility] to [place] on 10/14 for vascular surgery input    Patient does not have any new complaints  Creatinine at 0.7, peak creatinine of 4.8 at [facility] on 10/13/2022    Last 24 h uop 1575 mL charted    ROS: No chest pain/shortness of breath/fever/nausea/vomiting  PSFH: +visitor    Scheduled Meds:   vancomycin  750 mg IntraVENous Q12H    tamsulosin  0.4 mg Oral Daily    cefepime  1,000 mg IntraVENous Q12H    midodrine  10 mg Oral TID WC    mupirocin   Nasal BID    nicotine  1 patch TransDERmal Daily     Continuous Infusions:   sodium chloride      sodium chloride      sodium chloride 50 mL/hr at 10/16/22 0655     PRN Meds:.sodium chloride, sodium chloride, acetaminophen **OR** acetaminophen, ondansetron **OR** ondansetron, polyethylene glycol, sodium chloride flush, melatonin, calcium carbonate, HYDROmorphone, ipratropium-albuterol      History of Present Illness on 10/14/2022  75year old male with HTN admitted with dysuria, difficulty voiding, decreased intake. We have been asked to assist in further mgmt of his MATHEUS. Hypotensive on arrival, ~70's/50's, given ~4 liters of fluids. Mc placed with 3 liters of urine drained. CT with massively enlarged bladder and abnormal bladder wall findings with moderate right hydroureteronephrosis. SCr 4.8 on admission, improving with IVF's, cm, having hematuria after mc placed. Sr previously 2.5 from earlier this month. Has had difficulty voiding with urine incontinence for ~1 year, worsening. +weak, intake reduced, low grade fevers.     Physical exam:   Constitutional:  VITALS:  /78   Pulse 95   Temp 97.9 °F (36.6 °C) (Axillary)   Resp 18   Wt 95 lb 0.3 oz (43.1 kg)   SpO2 95%   BMI 15.34 kg/m²   Gen: alert, awake, ill-appearing  Skin: no rash, turgor wnl  Heent:  eomi, mmm  Neck: no bruits or jvd noted, thyroid normal  Cardiovascular:  S1, S2 without m/r/g  Respiratory: CTA B without w/r/r; respiratory effort normal  Abdomen:  +bs, soft, nt, nd, no hepatosplenomegaly  Ext: no lower extremity edema  Psychiatric: mood and affect appropriate; judgement and insight intact  Musculoskeletal:  Rom, muscular strength limited; digits, nails normal    Data/  CBC:   Lab Results   Component Value Date/Time    WBC 5.2 10/16/2022 04:13 AM    RBC 2.97 10/16/2022 04:13 AM    HGB 7.7 10/16/2022 04:13 AM    HCT 23.6 10/16/2022 04:13 AM    MCV 79.3 10/16/2022 04:13 AM    MCH 25.8 10/16/2022 04:13 AM    MCHC 32.5 10/16/2022 04:13 AM    RDW 20.4 10/16/2022 04:13 AM     10/16/2022 04:13 AM    MPV 6.4 10/16/2022 04:13 AM     BMP:    Lab Results   Component Value Date/Time     10/16/2022 04:13 AM    K 3.5 10/16/2022 04:13 AM     10/16/2022 04:13 AM    CO2 28 10/16/2022 04:13 AM    BUN 22 10/16/2022 04:13 AM    LABALBU 2.8 10/16/2022 04:13 AM    CREATININE 0.7 10/16/2022 04:13 AM    CALCIUM 7.2 10/16/2022 04:13 AM    GFRAA >60 10/16/2022 04:13 AM    LABGLOM >60 10/16/2022 04:13 AM    GLUCOSE 98 10/16/2022 04:13 AM     UA moderate blood, 100 protein, s.g. 1.025, >100 wbc's  Urine sodium 74  Urine chloride 46  Urine osmolality 304    Assessment/    - Acute kidney injury - pre-renal + bladder outlet obstruction, resolved    - Bladder outlet obstruction with suspected enlarged prostate - plans per Urology with eventual cystoscopy    - Hyponatremia - responding to volume    - Anion-gap metabolic acidosis - better with improving MATHEUS & IVF's with bicarb replacement    -Hypokalemia, replace potassium    -Lower extremity DVT    IVC filter placed by Dr Sekou Fernando on 10/15      Plan/    -stop IV fluid   -Replace mag 2gm  -kcl 40 meq po times one  - Trend labs, bp's, & urine output for hopeful improvement    Will sign off       Thank you for the consultation. Please do not hesitate to call with questions. ____________________________________  Kim Chand MD  [facility]  [email or web address]  Office: [phone]

## 2022-10-16 NOTE — PROGRESS NOTES
Pt comfortable after being medicated per PRN orders. Vitals stable. Pt on 2L NC due to drowsiness after pain medications. RR adequate. CBI in place. Pink, clear urine in mc bag. Pt meets criteria for d/c to room.

## 2022-10-16 NOTE — PROGRESS NOTES
Hospitalist Progress Note  10/16/2022 1:01 PM  Subjective:   Admit Date: 10/15/2022  PCP: NALINI Blount CNP Status:   Interval History: Hospital Day: 2, post-op day #1 IVC filter placement (10/15, Dr Parisa Hobbs) and post-op cystoscopy transurethral resection bladder for possible malignant neoplasm of the bladder (10/16, Dr True Wing). History of present illness:  Admitted from [facility], initially hospitalized 10/13. He had seen a urologist for a year of progressive urinary symptoms. The urologist ordered an abdominopelvic CT scan. The scan showed a very distended bladder and a bladder mass. He was initially hypothermic and hypotensive. A mc catheter was placed (3L urine drained initially) and empiric antibiotics (cefepime, vancomycin) were started with a working diagnosis of septic shock due to UTI. He was also found to have an MATHEUS w/ presenting SCr of 4.8. He was also found to have an extensive left lower extremity DVT. Because he was having quite a bit of hematuria there were concerns about accelerating bleeding if anticoagulants were started so IR evaluated the patient for an IVC filter. His  IVC was too small to safely accommodate a filter though. He was transferred to [facility] for evaluation by vascular surgery and possible thrombectomy.     Diet: regular  Left antecubital peripheral IV (10/13, day #4)  Medications:     vancomycin  750 mg IntraVENous Q12H (10/13, day #4)   potassium chloride  40 mEq Oral Once   tamsulosin  0.4 mg Oral Daily   cefepime  1,000 mg IntraVENous Q12H (10/13, day    midodrine  10 mg Oral TID WC   mupirocin   Nasal BID   nicotine  1 patch TransDERmal Daily     Recent Labs     10/14/22  0630 10/14/22  1527 10/15/22  0426 10/16/22  0413   WBC 3.9*  --  4.7 5.2   HGB 6.7* 7.9* 8.4* 7.7*     --  155 141   MCV 75.6*  --  79.3* 79.3*     Recent Labs     10/14/22  0352 10/15/22  0426 10/16/22  0413   * 139 138   K 3.6  3.6 3.1* 3.5   CL 97* 101 102 CO2 19* 27 28   * 55* 22*   CREATININE 2.6* 1.0 0.7*   GLUCOSE 79 105* 98     Recent Labs     10/14/22  0352 10/15/22  0426 10/16/22  0413   AST 8* 11* 9*   ALT 6* <5* <5*   BILITOT 0.5 0.8 0.4   ALKPHOS 86 83 80     INR (10/15) 1.22, (10/16) 1.18  Magnesium (10/15) 1.80, (10/16) 1.40 mg/dL  Reticulocyte (10/15) 1.52%  LDH (10/15) 221 U/L  Lactate (10/14) 0.6 mmol/L     Procalcitonin (10/14) 0.15 ng/mL  FOBT (10/14) positive         Lower extremity venous doppler (10/14) Extensive acute deep venous thrombosis involving the left lower extremity as described above. Acute deep venous thrombosis involving the right peroneal veins. Within the limits of this exam, there is no other evidence of deep or superficial venous thrombosis in the lower  extremities bilaterally. Incidental finding of an aneurysmal formation involving the distal abdominal aorta measuring 2.6 x 3.8 cm. Noncontrast CT head (10/13) No evidence of acute intracranial abnormality. Portable CXR (10/13) Bilateral upper lobe airspace disease favoring scarring rather than pneumonia. Troponin T:   10/13/22  1553 10/13/22  2139 10/14/22  0352   0.02* 0.03* 0.03*     Objective:   Vitals:  /81   Pulse (!) 105   Temp 98.7 °F (37.1 °C)   Resp 22   Wt 95 lb 0.3 oz (43.1 kg)   SpO2 99%   BMI 15.34 kg/m²   General appearance: somnolent, postop  Lungs: clear to auscultation bilaterally  Heart: regular rate and rhythm, S1, S2 normal, no murmur, click, rub or gallop  Abdomen: soft, non-tender; bowel sounds normal; no masses,  no organomegaly  Extremities: extremities normal, atraumatic, no cyanosis or edema. Right groin access site without bleeding or hematoma  Neurologic: No obvious focal neurologic deficits.     Assessment and Plan:   Extensive left iliofemoral deep venous thrombosis with contraindication to anticoagulation due anemia, hematuria and positive FOBT s/p IVC filter placement (10/15, Dr Robert Jimenez)  Bladder mass and extreme bladder distension noted on CT which has now been decompressed with Alexander catheter. Presumed malignant s/p cystoscopy transurethral resection bladder (10/16, Dr Kenia Alarcon). Acute kidney injury with anion gap metabolic acidosis considered pre-renal and resolved with resolution of bladder outlet obstruction. Followed by nephrology. Possible urinary tract infection. No bacteriuria. There was significant pyruia, but in the context of gross hematuria. Blood and Urine cx 10/13 are NGTD. Patient has been maintained on cefepime and vancomycin since presentation on 10/13.  3-5 days of empiric treatment is indicated while awaiting final culture results. Should at least be safe to d/c vancomycin after three days if no GPC grow. Microcytic anemia:  Hemoglobin decreased from 9.4 gm/dL on admission to 6.7 gm/dL with 1 unit PRBC transfused. COPD on supplemental oxygen to maintain O2sat 90-94%. Hypokalemia:  Replace with IV potassium chloride to goal K+ > 4.0 mmol/L. Hypomagnesemia:  Replace with IV magnesium sulfate to goal Mg > 2.0 mg/dL  Hypovolemic hyponatremia responding to volume replacement. Malnutrition:  Regular diet with dietary supplements when no longer NPO.       Advance Directive: Full Code  DVT prophylaxis with intermittent compression (NO enoxaparin due to possible bleed)  Discharge planning: two more days inpatient status      Liz Patricia MD  [facility] Hospitalist

## 2022-10-16 NOTE — PROGRESS NOTES
Vascular    S/P IVC filter placement. R groin access site without bleeding or hematoma. Recc starting anticoagulation when/if able. If able to start and tolerates then filter should be removed. Call if further input needed during hospitalization.

## 2022-10-16 NOTE — OP NOTE
[address] [address]                                OPERATIVE REPORT    PATIENT NAME: Eduard Pineda                     :        1955  MED REC NO:   [id number]                          ROOM:       0422  ACCOUNT NO:   [de-identified]                           ADMIT DATE: 10/15/2022  PROVIDER:     Rica Bernal MD    DATE OF PROCEDURE:  10/15/2022    PREOPERATIVE DIAGNOSIS:  Left iliofemoral DVT with contraindication to  anticoagulation. POSTOPERATIVE DIAGNOSIS:  Left iliofemoral DVT with contraindication to  anticoagulation. OPERATION PERFORMED:  1. Ultrasound-guided right femoral venous access. 2..  Inferior vena cavogram.  3.  Placement of Celect inferior vena caval filter. SURGEON:  Rica Bernal MD    ANESTHESIA:  Local    INDICATIONS:  The patient is a 69-year-old male who presented with gross  hematuria and left leg swelling. He was found to have a left  iliofemoral DVT. Anticoagulation is contraindicated, therefore he is  brought to the angio suite at this time to undergo attempted filter  placement. OPERATIVE PROCEDURE:  The patient was brought to the angio suite, placed  in the supine position. The right femoral region was prepped and draped  in sterile fashion. Ultrasound was used to identify the common femoral  artery. This did show some chronic phlebitic changes with some calcium  in the wall of the vein, but it was freely compressible and free of  thrombus. Under direct visualization, this was accessed and a 5-[place] Islander  introducer sheath was placed. A copy of the ultrasound image was saved  and placed in the patient's record. Through the introducer sheath, a  Bentson wire was advanced into the inferior vena cava quite easily. A  modified hook catheter was then advanced over the wire. Inferior vena  cavogram was then performed. This identified the level of the renal  veins.   The inferior vena cava was then measured using computer software  just below the renal veins and this had a diameter of approximately 21  mmHg, which was well within the IFU for the Celect inferior vena cava  device. The catheter was then removed over the Evoke Pharmason wire. The  5-Comoran sheath was removed from the groin and 10-Comoran dilator was  advanced over the wire to dilate the skin and subcutaneous tissue. The  dilator and introducer sheath for the Celect filter were then advanced  over the wire. The dilator and wire were then removed. The filter was  then advanced through the sheath and deployed just below the level of  the renal veins. It expanded quite nicely. The delivery catheter and  sheath were then removed. Pressure was held in the right femoral region  for approximately 10 minutes achieving excellent hemostasis. There were  no complications to this procedure. Estimated blood loss was minimal.   At the end of procedure, the patient was transferred to his hospital bed  and then to the hospital floor in a stable condition.         Prince Parker MD    D: 10/15/2022 14:57:22       T: 10/15/2022 14:59:49     [id number]  Job#: [id number]     Doc#: [id number]    CC:

## 2022-10-17 LAB
ALBUMIN SERPL-MCNC: 2.9 G/DL (ref 3.4–5)
ANION GAP SERPL CALCULATED.3IONS-SCNC: 10 MMOL/L (ref 3–16)
BASOPHILS ABSOLUTE: 0 K/UL (ref 0–0.2)
BASOPHILS RELATIVE PERCENT: 0.5 %
BLOOD CULTURE, ROUTINE: NORMAL
BUN BLDV-MCNC: 12 MG/DL (ref 7–20)
CALCIUM SERPL-MCNC: 7.5 MG/DL (ref 8.3–10.6)
CHLORIDE BLD-SCNC: 99 MMOL/L (ref 99–110)
CO2: 25 MMOL/L (ref 21–32)
CREAT SERPL-MCNC: 0.7 MG/DL (ref 0.8–1.3)
CULTURE, BLOOD 2: NORMAL
EOSINOPHILS ABSOLUTE: 0.1 K/UL (ref 0–0.6)
EOSINOPHILS RELATIVE PERCENT: 0.9 %
GFR AFRICAN AMERICAN: >60
GFR NON-AFRICAN AMERICAN: >60
GLUCOSE BLD-MCNC: 122 MG/DL (ref 70–99)
HCT VFR BLD CALC: 25.6 % (ref 40.5–52.5)
HEMOGLOBIN: 8 G/DL (ref 13.5–17.5)
INR BLD: 1.17 (ref 0.87–1.14)
LYMPHOCYTES ABSOLUTE: 0.4 K/UL (ref 1–5.1)
LYMPHOCYTES RELATIVE PERCENT: 5.7 %
MAGNESIUM: 1.7 MG/DL (ref 1.8–2.4)
MCH RBC QN AUTO: 25.7 PG (ref 26–34)
MCHC RBC AUTO-ENTMCNC: 31.2 G/DL (ref 31–36)
MCV RBC AUTO: 82.3 FL (ref 80–100)
MONOCYTES ABSOLUTE: 0.3 K/UL (ref 0–1.3)
MONOCYTES RELATIVE PERCENT: 5.1 %
NEUTROPHILS ABSOLUTE: 5.9 K/UL (ref 1.7–7.7)
NEUTROPHILS RELATIVE PERCENT: 87.8 %
PDW BLD-RTO: 20.2 % (ref 12.4–15.4)
PHOSPHORUS: 1.8 MG/DL (ref 2.5–4.9)
PLATELET # BLD: 157 K/UL (ref 135–450)
PMV BLD AUTO: 7.2 FL (ref 5–10.5)
POTASSIUM SERPL-SCNC: 4.2 MMOL/L (ref 3.5–5.1)
PROTHROMBIN TIME: 14.9 SEC (ref 11.7–14.5)
RBC # BLD: 3.11 M/UL (ref 4.2–5.9)
SODIUM BLD-SCNC: 134 MMOL/L (ref 136–145)
VANCOMYCIN TROUGH: 10 UG/ML (ref 10–20)
WBC # BLD: 6.7 K/UL (ref 4–11)

## 2022-10-17 PROCEDURE — 85610 PROTHROMBIN TIME: CPT

## 2022-10-17 PROCEDURE — 83735 ASSAY OF MAGNESIUM: CPT

## 2022-10-17 PROCEDURE — 6360000002 HC RX W HCPCS: Performed by: INTERNAL MEDICINE

## 2022-10-17 PROCEDURE — 97530 THERAPEUTIC ACTIVITIES: CPT

## 2022-10-17 PROCEDURE — 80069 RENAL FUNCTION PANEL: CPT

## 2022-10-17 PROCEDURE — 2060000000 HC ICU INTERMEDIATE R&B

## 2022-10-17 PROCEDURE — 6370000000 HC RX 637 (ALT 250 FOR IP): Performed by: INTERNAL MEDICINE

## 2022-10-17 PROCEDURE — 85025 COMPLETE CBC W/AUTO DIFF WBC: CPT

## 2022-10-17 PROCEDURE — 97535 SELF CARE MNGMENT TRAINING: CPT

## 2022-10-17 PROCEDURE — 80202 ASSAY OF VANCOMYCIN: CPT

## 2022-10-17 PROCEDURE — 36415 COLL VENOUS BLD VENIPUNCTURE: CPT

## 2022-10-17 RX ORDER — MAGNESIUM SULFATE 1 G/100ML
1000 INJECTION INTRAVENOUS ONCE
Status: COMPLETED | OUTPATIENT
Start: 2022-10-17 | End: 2022-10-17

## 2022-10-17 RX ORDER — LIDOCAINE 4 G/G
1 PATCH TOPICAL DAILY
Status: DISCONTINUED | OUTPATIENT
Start: 2022-10-17 | End: 2022-10-22 | Stop reason: HOSPADM

## 2022-10-17 RX ADMIN — MIDODRINE HYDROCHLORIDE 10 MG: 5 TABLET ORAL at 16:32

## 2022-10-17 RX ADMIN — ACETAMINOPHEN 650 MG: 325 TABLET ORAL at 16:32

## 2022-10-17 RX ADMIN — TAMSULOSIN HYDROCHLORIDE 0.4 MG: 0.4 CAPSULE ORAL at 09:01

## 2022-10-17 RX ADMIN — HYDROMORPHONE HYDROCHLORIDE 0.5 MG: 1 INJECTION, SOLUTION INTRAMUSCULAR; INTRAVENOUS; SUBCUTANEOUS at 00:58

## 2022-10-17 RX ADMIN — MUPIROCIN: 20 OINTMENT TOPICAL at 10:15

## 2022-10-17 RX ADMIN — ACETAMINOPHEN 650 MG: 325 TABLET ORAL at 09:02

## 2022-10-17 RX ADMIN — MIDODRINE HYDROCHLORIDE 10 MG: 5 TABLET ORAL at 09:01

## 2022-10-17 RX ADMIN — MAGNESIUM SULFATE HEPTAHYDRATE 1000 MG: 1 INJECTION, SOLUTION INTRAVENOUS at 10:12

## 2022-10-17 RX ADMIN — MIDODRINE HYDROCHLORIDE 10 MG: 5 TABLET ORAL at 12:21

## 2022-10-17 ASSESSMENT — PAIN DESCRIPTION - LOCATION: LOCATION: BACK

## 2022-10-17 ASSESSMENT — PAIN SCALES - GENERAL: PAINLEVEL_OUTOF10: 9

## 2022-10-17 NOTE — PROGRESS NOTES
Care assumed from previous RN. Pt is sleeping but is arousable by voice. NSR on tele, SpO2 WNL on room air, SBP currently 90s. Manual irrigation required at start of shift, moderate amt clots returned, CBI requiring wide open running to prevent clotting of 3 way catheter. Bed alarm activated, pt aware of need to call for ambulation assistance.

## 2022-10-17 NOTE — PROGRESS NOTES
Urology Progress Note      POD1 cysto TURBT    Subjective: Tavon Concepcion denies complaints. No issues with mc, he is off CBI with clear urine     Vitals:  BP 94/60   Pulse 94   Temp 99.2 °F (37.3 °C) (Axillary)   Resp 18   Wt 95 lb 0.3 oz (43.1 kg)   SpO2 95%   BMI 15.34 kg/m²   Temp  Av.4 °F (36.9 °C)  Min: 97.2 °F (36.2 °C)  Max: 99.7 °F (37.6 °C)    Intake/Output Summary (Last 24 hours) at 10/17/2022 0941  Last data filed at 10/17/2022 0725  Gross per 24 hour   Intake 4600 ml   Output 2675 ml   Net 1925 ml       Exam:   Physical:  Cachectic male who appears older than stated age  Mood indicates no abnormalities. Pt doesnt appear depressed. Very hard of hearing  Orientated to time and place         Male :  Mc in place with clear urine in tubing. Off CBI    Labs:  WBC:    Lab Results   Component Value Date/Time    WBC 6.7 10/17/2022 04:23 AM     Hemoglobin/Hematocrit:    Lab Results   Component Value Date/Time    HGB 8.0 10/17/2022 04:23 AM    HCT 25.6 10/17/2022 04:23 AM     BMP:    Lab Results   Component Value Date/Time     10/17/2022 04:23 AM    K 4.2 10/17/2022 04:23 AM    K 3.5 10/16/2022 04:13 AM    CL 99 10/17/2022 04:23 AM    CO2 25 10/17/2022 04:23 AM    BUN 12 10/17/2022 04:23 AM    LABALBU 2.9 10/17/2022 04:23 AM    CREATININE 0.7 10/17/2022 04:23 AM    CALCIUM 7.5 10/17/2022 04:23 AM    GFRAA >60 10/17/2022 04:23 AM    LABGLOM >60 10/17/2022 04:23 AM     PT/INR:    Lab Results   Component Value Date/Time    PROTIME 14.9 10/17/2022 04:23 AM    INR 1.17 10/17/2022 04:23 AM     PTT:  No results found for: APTT[APTT    Urinalysis:     Latest Reference Range & Units 10/13/22 12:44   Color, UA Straw/Yellow  Yellow   Clarity, UA Clear  CLOUDY !    Glucose, UA Negative mg/dL Negative   Bilirubin, Urine Negative  Negative   Ketones, Urine Negative mg/dL Negative   Specific Gravity, UA 1.005 - 1.030  1.015   Blood, Urine Negative  MODERATE !   pH, UA 5.0 - 8.0  6.0   Protein, UA Negative mg/dL 100 ! Urobilinogen, Urine <2.0 E.U./dL 0.2   Nitrite, Urine Negative  Negative   Leukocyte Esterase, Urine Negative  SMALL ! Urine Type   NotGiven   Urinalysis Comments   see below   Urine Reflex to Culture   Yes   WBC, UA 0 - 5 /HPF >100 ! RBC, UA 0 - 4 /HPF see below ! Microscopic Examination   YES   !: Data is abnormal     Urine Culture: NG     Imaging     CT AP 10/11/22  FINDINGS:   Lower Chest: Emphysematous changes. Lung bases otherwise clear. Organs: The unenhanced liver, gallbladder, spleen, pancreas and adrenal   glands are grossly unremarkable. There is moderate right   hydroureteronephrosis. No left hydronephrosis. Probable 3 mm left   nephrolithiasis. GI/Bowel: No dilated loops of bowel or bowel wall thickening. No free air. Moderate amount stool in the colon. Pelvis: The bladder is markedly distended measuring up to 20 cm in the   craniocaudal dimension. There are multifocal areas of nodular wall   thickening throughout the bladder, for example on the left lateral aspect   measuring up to 9 x 3 cm (image 111). Within the base of the bladder, there   is also more focal masslike soft tissue measuring 4 x 3 cm. Prostate gland   is grossly unremarkable. No definite pathologically enlarged adenopathy or   free fluid. Peritoneum/Retroperitoneum: The aorta is tortuous. There is a 3.3 cm   abdominal aortic aneurysm. Severe atherosclerosis is noted. No   pathologically enlarged adenopathy or free fluid. Bones/Soft Tissues: Diffuse osteopenia. Impression   1. Markedly dilated bladder measuring up to 20 cm in the craniocaudal   dimension. Multifocal areas of nodular wall thickening, the largest   measuring up to 9 x 3 cm along the left lateral aspect suspicious for   neoplasm. 2. Moderate right hydroureteronephrosis. 3. 3.3 cm abdominal aortic aneurysm. Results were discussed with Dr. Keaton Esparza on 10/11/2022 at 19:54.        RECOMMENDATIONS: 3.3 cm abdominal aortic aneurysm. Recommend follow-up every 3 years. Impression/Plan:     Bladder mass   Hematuria - resolved   Urinary retention  Right hydronephrosis   Enlarged prostate   MATHEUS - resolved   Acute DVT     - suspect enlarged prostate leading to retention and right hydro. On flomax- please continue on discharge   - keep mc - he mayneed to go home with mc in place   - sp cysto TURBT on 10/16 with resection of large tumor   - urine is clear today. Monitor off CBI.  Would hold off on starting anticoagulation at this point     Campos Roman PA-C  [facility]

## 2022-10-17 NOTE — CARE COORDINATION
CASE MANAGEMENT INITIAL ASSESSMENT      Reviewed chart and completed assessment with patient  Family present: Pt's daughter Lars Kat   Explained Case Management role/services. Yes    Primary contact information:Pt's daughter [address] :   Primary Decision Maker: Nolan Roque - [phone]        Admit date/status:10/15/22   Is this a Readmission?:  No      Insurance:[facility] Medicare    Precert required for SNF: Yes       3 night stay required: No    Living arrangements, Adls, care needs, prior to admission:Pt lives at home alone and was fairly independent prior to admission to the hospital.     Durable Medical Equipment at home:  Walker__Cane__RTS__ BSC__Shower Chair__  02__ HHN__ CPAP__  BiPap__  Hospital Bed__ W/C___ OtherNone_____    Services in the home and/or outpatient, prior to admission:None     Current PCP:NALINI Fowler CNP                                Medications: Prescription coverage? Yes Will pt require financial assistance with medications No     Transportation needs: Family will transport     Dialysis Facility (if applicable) NA  Name:  Address:  Dialysis Schedule:  Phone:  Fax:    PT/OT recs:SNF    Hospital Exemption Notification (HEN):Needed for SNF     Barriers to discharge:None    Plan/comments:10/17/22 Pt's from home alone, fairly independent prior to admission. PT/OT recs SNF. Pt refuses SNF despite recs. Pt's daughter at bedside and agrees with Juan Day. Will make a referral to [facility] and [facility] per pt and family request. They have also requested a rolling walker for home. Referral made to [facility]. Will follow for additional needs.      ECOC on chart for MD signature

## 2022-10-17 NOTE — CONSULTS
PALLIATIVE MEDICINE CONSULTATION     Patient name:Marcial Lord   OUL:[id number]    PTZ:[birth year]  Room/Bed:0422/0422-01   LOS: 2 days         Date of consult:10/17/2022    Consult Information  Palliative Medicine Consult performed by: NALINI Alberts CNP      Inpatient consult to Palliative Care  Consult performed by: NALINI Dan CNP  Consult ordered by: Peter French MD         ASSESSMENT/RECOMMENDATIONS     75 y.o. male with COPD, bladder mass presumed to be malignant and LLE DVT      Symptom Management:  Goals of Care - Code status is full code. Patient would like to remain aggressive with his medical care at this time. He is agreeable to home palliative care to assist in the future with goals of care, family support and symptom management for what is presumed to be bladder cancer (path pending). Has ACP docs naming his daughter Reba Medellin his [place], docs requested for chart. Malnutrition -  Due to decreased appetite and possible malignancy. Albumin 2.9, total protein 5.0. BMI 15 with a weight of 95 lbs. Has unintentionally lost about 25 lbs over the past year. Currently on regular diet. Based on [address] and medical course could consider appetite stimulant  Debility - related to malnutrition, suspected malignancy, co-morbid conditions. Has back pain. Assessed by PT/OT with recs for SNF. Patient states he will not go to a nursing home, but will work with therapy in his home. Patient lives alone and was [place]. No recent falls. Patient/Family Goals of Care :    10/17/22    Spoke with patient at the bedside. He is extremely hard of hearing. He says he does not have any good side to hear out of and mostly tries to lip read. He appears decisional and is willing to talk to me about [address]. He states his daughter Audelia Connor is his executor and HCPOA. He does not know where the ACP paperwork is but thinks [facility] has copies of everything.   He says he feels the doctors have done a good job with informing him of what is going on. He believes he is going home with PT at [facility]. He does not want to go to a nursing home. He is not sure what the pathology report will say from the bladder biopsy. He understands that once he has a diagnosis it will be necessary to talk about next steps, and possibly prognosis and whether or not he wants treatment (if proven to be cancer). He agrees there is too much unknown still to make any plans and says he thinks he would want home palliative care to follow him to assist.   Discussed code status. He would want to be resuscitated if he were to go into cardiac or pulmonary arrest while in the hospital.      Disposition/Discharge Plan:   - pending medical course  -An outpatient [facility] referral was sent for post-discharge follow up when patient returns home    Advance Directives:  Surrogate Decision Maker: Per patient, Neil Morrell is daughter Yakov Meng. Requested documents  Code status:  Full Code      Palliative Performance Scale:     [] 60%  Amb reduced; Sig dz. Can't do hobbies/housework; Intake normal or reduced, Occasional assist; LOC full/confusion   [] 50%  Mainly sit/lie; Extensive disease. Mainly assist, Intake normal or reduced; Occasional assist; LOC full/confusion   [] 40%  Mainly in bed; Extensive disease; Mainly assist; Intake normal or reduced; Occasional assist; LOC full/confusion   [x] 30%  Bed bound, Extensive disease; Total care; Intake reduced; LOC full/confusion   [] 20%  Bed bound; Extensive disease; Total care; Intake minimal; Drowsy/coma   [] 10%  Bed bound; Extensive disease; Total care; Mouth care only; Drowsy/coma   []  0%   Death      Case discussed with: patient  Thank you for allowing us to participate in the care of this patient. HISTORY     CC: Dysuria  HPI: The patient is a 75 y.o. male who was accepted in transfer from [address] [place] [address]. He was initially hospitalized on 10/13/22.   He had seen a urologist for a year of progressive urinary symptoms. The urologist ordered an abdominopelvic CT scan. The scan showed a very distended bladder and a bladder mass. He was initially hypothermic and hypotensive. A mc catheter was placed (3L urine drained initially) and empiric antibiotics (cefepime, vancomycin) were started with a working diagnosis of septic shock due to UTI. He was also found to have an MATHEUS w/ presenting SCr of 4.8. He was also found to have an extensive left lower extremity DVT. Because he was having quite a bit of hematuria there were concerns about accelerating bleeding if anticoagulants were started so IR evaluated the patient for an IVC filter. His  IVC was too small to safely accommodate a filter though. He was transferred to [facility] for evaluation by vascular surgery and possible thrombectomy. Patient has had minimal medical follow up for years and has a 60-70 pack year smoking history. He reports gradual unintentional weight loss and progressive weakness limiting his mobility. Palliative Medicine SymptomScreening/ROS:    Review of Systems   Unable to perform ROS: Other (extremely hard of hearing. says overall he feels \"fine\" and denies any symptoms)         Patient unable to complete full ROS due to patient's hearing status. Information that is obtained from nursing and chart. Home med list and hospital medications reviewed in chart as of 10/17/2022     EXAM     Vitals:    10/17/22 1215   BP:    Pulse:    Resp:    Temp: 98.3 °F (36.8 °C)   SpO2:        Physical Examination:   Physical Exam  Vitals reviewed. Constitutional:       General: He is not in acute distress. Appearance: He is ill-appearing. Comments: Sitting up in bed   HENT:      Head: Normocephalic and atraumatic. Nose: Nose normal. No congestion or rhinorrhea. Mouth/Throat:      Mouth: Mucous membranes are dry.       Pharynx: No oropharyngeal exudate or posterior oropharyngeal erythema. Eyes:      General: No scleral icterus. Right eye: No discharge. Left eye: No discharge. Conjunctiva/sclera: Conjunctivae normal.      Pupils: Pupils are equal, round, and reactive to light. Cardiovascular:      Rate and Rhythm: Normal rate. Pulses: Normal pulses. Comments: HR 89  Pulmonary:      Effort: Pulmonary effort is normal. No respiratory distress. Breath sounds: No stridor. No wheezing. Comments: Room air  Abdominal:      Palpations: Abdomen is soft. Musculoskeletal:         General: Normal range of motion. Cervical back: Normal range of motion and neck supple. Right lower leg: No edema. Left lower leg: No edema. Skin:     General: Skin is warm and dry. Capillary Refill: Capillary refill takes less than 2 seconds. Neurological:      Mental Status: He is alert and oriented to person, place, and time. Psychiatric:         Mood and Affect: Mood normal.         Behavior: Behavior normal.         Thought Content:  Thought content normal.         Judgment: Judgment normal.           Current labs in the epic chart reviewed as of 10/17/2022   Review of previous notes, admits, labs, radiology and testing relevant to this consult done in this chart today 10/17/2022      Total time: 95 minutes  >50% of time spent counseling patient at bedside or POA/family member if applicable , reviewing information and discussing care, coordinating with care team  Signed By: Electronically signed by NALINI Mcgovern CNP on 10/17/2022 at 4:04 PM  Palliative Medicine   [phone]    October 17, 2022

## 2022-10-17 NOTE — CARE COORDINATION
[facility]    Referral received from  to follow for home care services.    [facility] unable to accept    Patient has no preference in agency    Referral sent to [facility] accepted by Tracie Mcgowan RN, BSN CTN  [facility] [phone]

## 2022-10-17 NOTE — PROGRESS NOTES
Occupational Therapy  Facility/Department: [facility] C4 PCU  Daily Treatment Note  NAME: Denia Rosales  : 1955  MRN: [id number]    Date of Service: 10/17/2022    Discharge Recommendations:  Subacute/Skilled Nursing Facility  OT Equipment Recommendations  Equipment Needed: No  Other: defer DME to D/c facility      Patient Diagnosis(es): The encounter diagnosis was Malignant neoplasm of urinary bladder, unspecified site [facility]). Assessment    Assessment: Denia Rosales is progressing in therapy slowly, currently limited by generalized fatigue and weakness, limiting prolonged functional activity. Demo'd fair safety awareness, requiring cues for safety with mc lines. Functional Mobility: SBA for supine to sit and for stand pivot bed to chair  ADL: set up with sig verbal cues for oral care, A for gown exchange  TA: BUE exercises with focus on opening up chest/lungs  Activity was tolerated fairly well, pt reporting increase in fatigue toward end of session. Continue OT POC to address performance deficits in ADLs and functional mobility. AM-PAC Score  AM-PAC Inpatient Daily Activity Raw Score: 17 (10/17/22 1305)  AM-PAC Inpatient ADL T-Scale Score : 37.26 (10/17/22 1305)  ADL Inpatient CMS 0-100% Score: 50.11 (10/17/22 1305)  ADL Inpatient CMS G-Code Modifier : CK (10/17/22 130)     Activity Tolerance: Patient tolerated treatment well  Discharge Recommendations: Subacute/Skilled Nursing Facility  Equipment Needed: No  Other: defer DME to D/c facility      Plan   Occupational Therapy Plan  Times Per Week: 3-5x a week  Current Treatment Recommendations: Strengthening;ROM; Functional mobility training; Endurance training;Self-Care / ADL; Safety education & training;Patient/Caregiver education & training;Equipment evaluation, education, & procurement     Restrictions  Restrictions/Precautions  Restrictions/Precautions: Fall Risk;General Precautions; Up as Tolerated;NPO  Position Activity Restriction  Other position/activity restrictions: IV, tele, 3 way mc with cont irrigation, Avasys    Subjective   Subjective  Subjective: pt in bed with daughter present, agreeable to OT with encouragement and education on POC; RN approved  Pain: denied pain during session  Orientation  Overall Orientation Status: Impaired  Orientation Level: Oriented to place;Oriented to person;Oriented to situation;Disoriented to time (dtr reports this is baseline)  Cognition  Overall Cognitive Status: WFL        Objective    Vitals  Vitals  Heart Rate: (!) 107  Heart Rate Source: Monitor  BP: 108/65  BP Location: Right upper arm  BP Method: Automatic  Patient Position: Up in chair  MAP (Calculated): 79.33  SpO2: 96 %  O2 Device: None (Room air)    Bed Mobility Training  Bed Mobility Training: Yes  Supine to Sit: Stand-by assistance (HOB elevated, A with mc line management)  Sit to Supine:  (pt up in chair at end of session)  Scooting: Stand-by assistance  Transfer Training  Transfer Training: Yes  Sit to Stand: Stand-by assistance  Stand to Sit: Stand-by assistance  Stand Pivot Transfers: Stand-by assistance  Bed to Chair: Stand-by assistance (via stand pivot, further ambulation held d/t lines and cont bladder irrigation)     ADL  Grooming: Verbal cueing;Setup;Stand by assistance  Grooming Skilled Clinical Factors: sig verbal cues for initiation and sequencing of oral care while sitting EOB  UE Dressing: Moderate assistance  UE Dressing Skilled Clinical Factors: gown exchance  Toileting: Dependent/Total  Toileting Skilled Clinical Factors: 3 way mc    OT Exercises  A/AROM Exercises: seated in chair, BUE with reciprocal pattern, shoulder flexion, elbow flexion/extension, and \"punches\" across midline with cues for trunk rotation     Safety Devices  Type of Devices: All fall risk precautions in place; Chair alarm in place; Patient at risk for falls;Gait belt;Left in chair;Nurse notified     Patient Education  Education Given To: Patient; Family  Education Provided: Role of Therapy;Plan of Care;Energy Conservation; Family Education;Transfer Training;Precautions; Home Exercise Program  Education Provided Comments: disease specific: benefits of up to chair/prevention of complication of bedrest, use of call light when resy to go back to bed  Education Method: Demonstration;Verbal  Barriers to Learning: Hearing  Education Outcome: Continued education needed;Verbalized understanding    Goals  Short Term Goals  Time Frame for Short Term Goals: 1 week unless otherwise specified 10/22/22  Short Term Goal 1: Pt will complete 3 ADLs in stance at sink with LRAD and SBA- goal progressing 10/17  Short Term Goal 2: Pt will complete x15 reps of BUE AROM exercises by 10/18/22- GOAL MET 10/17  Short Term Goal 3: Pt will complete toileting ADL with supervision- goal progressing 10/17  Patient Goals   Patient goals : \"go home\"       Therapy Time   Individual Concurrent Group Co-treatment   Time In 1140         Time Out 1204         Minutes 24         Timed Code Treatment Minutes: 24 Minutes     If pt is unable to be seen after this session, please let this note serve as discharge summary. Please see case management note for discharge disposition. Thank you.    Pam Murphy, OT

## 2022-10-17 NOTE — DISCHARGE INSTR - COC
Continuity of Care Form    Patient Name: Nilam Castellano   :  1955  MRN:  [id number]    Admit date:  10/15/2022  Discharge date:  10/22/2022    Code Status Order: Full Code   Advance Directives:     Admitting Physician:  Quan Metcalf MD  PCP: NALINI Barraza CNP    Discharging Nurse: [address] [place] [address] Unit/Room#: 1020/5034-41  Discharging Unit Phone Number: [phone]    Emergency Contact:   Extended Emergency Contact Information  Primary Emergency Contact: [address] Phone: [phone]  Mobile Phone: [phone]  Relation: Child  Secondary Emergency Contact: [address] [place] Phone: [phone]  Mobile Phone: [phone]  Relation: Child    Past Surgical History:  Past Surgical History:   Procedure Laterality Date    CYST REMOVAL      FINGER SURGERY         Immunization History: There is no immunization history on file for this patient.     Active Problems:  Patient Active Problem List   Diagnosis Code    Right carpal tunnel syndrome G56.01    Sepsis ([address] A41.9    Acute cystitis with hematuria N30.01    Hyponatremia E87.1    MATHEUS (acute kidney injury) ([address] N17.9    COPD (chronic obstructive pulmonary disease) ([facility]) J44.9    Microcytic anemia D50.9    Hematuria R31.9    Urinary retention R33.9    Severe protein-calorie malnutrition ([address] E43    Mass of urinary bladder N32.89    Tobacco abuse Z72.0    Acute deep vein thrombosis (DVT) of iliac vein of left lower extremity ([facility]) I82.422    Acute deep vein thrombosis (DVT) of calf muscle vein of left lower extremity ([facility]) I82.462       Isolation/Infection:   Isolation            No Isolation          Patient Infection Status       Infection Onset Added Last Indicated Last Indicated By Review Planned Expiration Resolved Resolved By    None active    Resolved    COVID-19 (Rule Out) 10/13/22 10/13/22 10/13/22 COVID-19 & Influenza Combo (Ordered)   10/13/22 Rule-Out Test Resulted            Nurse Assessment:  Last Vital Signs: BP 94/60   Pulse 94   Temp 99.2 °F (37.3 °C) (Axillary)   Resp 18   Wt 95 lb 0.3 oz (43.1 kg)   SpO2 95%   BMI 15.34 kg/m²     Last documented pain score (0-10 scale): Pain Level: 9  Last Weight:   Wt Readings from Last 1 Encounters:   10/15/22 95 lb 0.3 oz (43.1 kg)     Mental Status:  oriented    IV Access:  - None    Nursing Mobility/ADLs:  Walking   Assisted  Transfer  Assisted  Bathing  Assisted  Dressing  Assisted  Toileting  Assisted  Feeding  Independent  Med Admin  Dependent  Med Delivery   whole    Wound Care Documentation and Therapy:        Elimination:  Continence: Bowel: Yes  Bladder: mc catheter  Urinary Catheter: Indication for Use of Catheter: Bladder injury or continuous bladder irrigation   Colostomy/Ileostomy/Ileal Conduit: No       Date of Last BM: 10/19/2022    Intake/Output Summary (Last 24 hours) at 10/17/2022 1109  Last data filed at 10/17/2022 0725  Gross per 24 hour   Intake 4600 ml   Output 2675 ml   Net 1925 ml     I/O last 3 completed shifts: In: 6800.7 [P.O.:960; I.V.:1149.4; Other:4600; IV Piggyback:91.3]  Out: 3150 [Urine:3150]    Safety Concerns: At Risk for Falls    Impairments/Disabilities:      Hearing    Nutrition Therapy:  Current Nutrition Therapy:   - Oral Diet:  General  - Oral Nutrition Supplement:  Standard  three times a day    Routes of Feeding: Oral  Liquids: Thin Liquids  Daily Fluid Restriction: no  Last Modified Barium Swallow with Video (Video Swallowing Test): not done    Treatments at the Time of Hospital Discharge:   Respiratory Treatments: as needed   Oxygen Therapy:  is not on home oxygen therapy.   Ventilator:    - No ventilator support    Rehab Therapies: Physical Therapy, Occupational Therapy, and Speech/Language Therapy  Weight Bearing Status/Restrictions: No weight bearing restrictions  Other Medical Equipment (for information only, NOT a DME order):  walker, bedside commode, and hospital bed  Other Treatments: N/A    Patient's

## 2022-10-17 NOTE — PROGRESS NOTES
Hospitalist Progress Note      PCP: NALINI Arizmendi - CNP    Date of Admission: 10/15/2022    Chief Complaint: Sepsis    Hospital Course:     Admitted from [address] [place] [address], initially hospitalized 10/13. He had seen a urologist for a year of progressive urinary symptoms. The urologist ordered an abdominopelvic CT scan. The scan showed a very distended bladder and a bladder mass. He was initially hypothermic and hypotensive. A mc catheter was placed (3L urine drained initially) and empiric antibiotics (cefepime, vancomycin) were started with a working diagnosis of septic shock due to UTI. He was also found to have an MATHEUS w/ presenting SCr of 4.8. He was also found to have an extensive left lower extremity DVT. Because he was having quite a bit of hematuria there were concerns about accelerating bleeding if anticoagulants were started so IR evaluated the patient for an IVC filter. His  IVC was too small to safely accommodate a filter though. He was transferred to [place] for evaluation by vascular surgery and possible thrombectomy. Subjective:   Patient complains of back pain that is worse with cough. Heat helps.  Denies any chest pain, abdominal pain, shortness of breath,  N/V/C/D, fever and/or chills         Medications:  Reviewed    Infusion Medications    sodium chloride      sodium chloride       Scheduled Medications    tamsulosin  0.4 mg Oral Daily    midodrine  10 mg Oral TID WC    mupirocin   Nasal BID    nicotine  1 patch TransDERmal Daily     PRN Meds: sodium chloride, sodium chloride, acetaminophen **OR** acetaminophen, ondansetron **OR** ondansetron, polyethylene glycol, sodium chloride flush, melatonin, calcium carbonate, HYDROmorphone, ipratropium-albuterol      Intake/Output Summary (Last 24 hours) at 10/17/2022 0846  Last data filed at 10/17/2022 0725  Gross per 24 hour   Intake 4600 ml   Output 2675 ml   Net 1925 ml       Physical Exam Performed:    /69   Pulse (!) 104   Temp 98.6 °F (37 °C) (Oral)   Resp 16   Wt 95 lb 0.3 oz (43.1 kg)   SpO2 92%   BMI 15.34 kg/m²     General appearance: No apparent distress, appears older than stated age and cooperative. Thin  HEENT: Pupils equal, round, and reactive to light. Conjunctivae/corneas clear. Neck: Supple, with full range of motion. No jugular venous distention. Trachea midline. Respiratory:  Normal respiratory effort. Mild wheezing throughout   Cardiovascular: Regular rate and rhythm with normal S1/S2 without murmurs, rubs or gallops. Abdomen: Soft, non-tender, non-distended with normal bowel sounds. Musculoskeletal: No clubbing, cyanosis or edema bilaterally. Full range of motion without deformity. Skin: Skin color, texture, turgor normal.  No rashes or lesions. Neurologic:  Neurovascularly intact without any focal sensory/motor deficits. Cranial nerves: II-XII intact, grossly non-focal. Except very poor hearing   Psychiatric: Alert and oriented, thought content appropriate, normal insight  Capillary Refill: Brisk, 3 seconds, normal   Peripheral Pulses: +2 palpable, equal bilaterally       Labs:   Recent Labs     10/15/22  0426 10/16/22  0413 10/17/22  0423   WBC 4.7 5.2 6.7   HGB 8.4* 7.7* 8.0*   HCT 25.8* 23.6* 25.6*    141 157     Recent Labs     10/15/22  0426 10/16/22  0413 10/17/22  0423    138 134*   K 3.1* 3.5 4.2    102 99   CO2 27 28 25   BUN 55* 22* 12   CREATININE 1.0 0.7* 0.7*   CALCIUM 7.8* 7.2* 7.5*   PHOS  --   --  1.8*     Recent Labs     10/15/22  0426 10/16/22  0413   AST 11* 9*   ALT <5* <5*   BILITOT 0.8 0.4   ALKPHOS 83 80     Recent Labs     10/15/22  0426 10/16/22  0413 10/17/22  0423   INR 1.22* 1.18* 1.17*     No results for input(s): CKTOTAL, TROPONINI in the last 72 hours.     Urinalysis:      Lab Results   Component Value Date/Time    NITRU Negative 10/13/2022 12:44 PM    WBCUA >100 10/13/2022 12:44 PM    RBCUA see below 10/13/2022 12:44 PM    BLOODU MODERATE 10/13/2022 12:44 PM    SPECGRAV 1.015 10/13/2022 12:44 PM    GLUCOSEU Negative 10/13/2022 12:44 PM       Radiology:  No orders to display           Assessment/Plan:    Active Hospital Problems    Diagnosis     Mass of urinary bladder [N32.89]      Priority: Medium    Tobacco abuse [Z72.0]      Priority: Medium    Acute deep vein thrombosis (DVT) of iliac vein of left lower extremity (HCC) [I82.422]      Priority: Medium    Acute deep vein thrombosis (DVT) of calf muscle vein of left lower extremity (HCC) [I82.462]      Priority: Medium    Urinary retention [R33.9]      Priority: Medium    MATHEUS (acute kidney injury) ([address] [N17.9]      Priority: Medium    Acute cystitis with hematuria [N30.01]      Priority: Medium    Hematuria [R31.9]      Priority: Medium    Microcytic anemia [D50.9]      Priority: Medium     Extensive left iliofemoral deep venous thrombosis with contraindication to anticoagulation due anemia, hematuria and positive FOBT s/p IVC filter placement (10/15, Dr Sekou Fernando), recommend starting anticoagulation when/if able and if on [facility] and tolerates, then filter should be removed. Bladder mass and extreme bladder distension noted on CT which has now been decompressed with Alexander catheter. Presumed malignant s/p cystoscopy transurethral resection bladder (10/16, Dr Mari Jacobsen). Bladder tumor was almost resected completely. Continuous bladder irrigation initiated. Awaiting biopsy results     Acute kidney injury with anion gap metabolic acidosis considered Post-renal and resolved with resolution of bladder outlet obstruction. Nephrology signed off      Pyuria -   No bacteriuria. in the context of gross hematuria. Blood and Urine cx 10/13 are NGTD. Was on cefepime and vancomycin for 5 days. Microcytic anemia:  Hemoglobin decreased from 9.4 gm/dL on admission to 6.7 gm/dL with 1 unit PRBC transfused. Stable around 8. Continue to monitor with Hgb checks daily     COPD, Not in exacerbation.  on supplemental oxygen to maintain O2sat 90-94%. IS encouraged. Hypokalemia:  Replaced with IV potassium chloride to goal K+ > 4.0 mmol/L. Hypomagnesemia:  Replace with IV magnesium sulfate to goal Mg > 2.0 mg/dL. Lab values reviewed    Hypovolemic hyponatremia responding to volume replacement. Malnutrition:  Regular diet with dietary supplements    DVT Prophylaxis: SCDs  Diet: ADULT DIET;  Regular  Code Status: Full Code  PT/OT Eval Status: Recommend subacute/SNF    Dispo - Pending biopsy results and urology recommendations     Appropriate for A1 Discharge Unit: Kisha King DO

## 2022-10-18 LAB
ABO/RH: NORMAL
ANION GAP SERPL CALCULATED.3IONS-SCNC: 6 MMOL/L (ref 3–16)
ANTIBODY SCREEN: NORMAL
BASOPHILS ABSOLUTE: 0 K/UL (ref 0–0.2)
BASOPHILS RELATIVE PERCENT: 0.5 %
BLOOD BANK DISPENSE STATUS: NORMAL
BLOOD BANK PRODUCT CODE: NORMAL
BPU ID: NORMAL
BUN BLDV-MCNC: 11 MG/DL (ref 7–20)
CALCIUM SERPL-MCNC: 7.4 MG/DL (ref 8.3–10.6)
CHLORIDE BLD-SCNC: 100 MMOL/L (ref 99–110)
CO2: 28 MMOL/L (ref 21–32)
CREAT SERPL-MCNC: 0.7 MG/DL (ref 0.8–1.3)
DESCRIPTION BLOOD BANK: NORMAL
EOSINOPHILS ABSOLUTE: 0 K/UL (ref 0–0.6)
EOSINOPHILS RELATIVE PERCENT: 0.7 %
GFR SERPL CREATININE-BSD FRML MDRD: >60 ML/MIN/{1.73_M2}
GLUCOSE BLD-MCNC: 94 MG/DL (ref 70–99)
HCT VFR BLD CALC: 21.4 % (ref 40.5–52.5)
HCT VFR BLD CALC: 24.6 % (ref 40.5–52.5)
HEMOGLOBIN: 6.8 G/DL (ref 13.5–17.5)
HEMOGLOBIN: 7.7 G/DL (ref 13.5–17.5)
LYMPHOCYTES ABSOLUTE: 0.3 K/UL (ref 1–5.1)
LYMPHOCYTES RELATIVE PERCENT: 6.2 %
MAGNESIUM: 1.7 MG/DL (ref 1.8–2.4)
MCH RBC QN AUTO: 25.6 PG (ref 26–34)
MCHC RBC AUTO-ENTMCNC: 31.5 G/DL (ref 31–36)
MCV RBC AUTO: 81.3 FL (ref 80–100)
MONOCYTES ABSOLUTE: 0.3 K/UL (ref 0–1.3)
MONOCYTES RELATIVE PERCENT: 5.5 %
NEUTROPHILS ABSOLUTE: 4.1 K/UL (ref 1.7–7.7)
NEUTROPHILS RELATIVE PERCENT: 87.1 %
PDW BLD-RTO: 20.7 % (ref 12.4–15.4)
PLATELET # BLD: 158 K/UL (ref 135–450)
PMV BLD AUTO: 7 FL (ref 5–10.5)
POTASSIUM SERPL-SCNC: 4.1 MMOL/L (ref 3.5–5.1)
RBC # BLD: 2.64 M/UL (ref 4.2–5.9)
SODIUM BLD-SCNC: 134 MMOL/L (ref 136–145)
WBC # BLD: 4.7 K/UL (ref 4–11)

## 2022-10-18 PROCEDURE — 97110 THERAPEUTIC EXERCISES: CPT

## 2022-10-18 PROCEDURE — 2060000000 HC ICU INTERMEDIATE R&B

## 2022-10-18 PROCEDURE — P9016 RBC LEUKOCYTES REDUCED: HCPCS

## 2022-10-18 PROCEDURE — 92526 ORAL FUNCTION THERAPY: CPT

## 2022-10-18 PROCEDURE — 80048 BASIC METABOLIC PNL TOTAL CA: CPT

## 2022-10-18 PROCEDURE — 85014 HEMATOCRIT: CPT

## 2022-10-18 PROCEDURE — 36430 TRANSFUSION BLD/BLD COMPNT: CPT

## 2022-10-18 PROCEDURE — 92610 EVALUATE SWALLOWING FUNCTION: CPT

## 2022-10-18 PROCEDURE — 85018 HEMOGLOBIN: CPT

## 2022-10-18 PROCEDURE — 86923 COMPATIBILITY TEST ELECTRIC: CPT

## 2022-10-18 PROCEDURE — 83735 ASSAY OF MAGNESIUM: CPT

## 2022-10-18 PROCEDURE — 85025 COMPLETE CBC W/AUTO DIFF WBC: CPT

## 2022-10-18 PROCEDURE — 97535 SELF CARE MNGMENT TRAINING: CPT

## 2022-10-18 PROCEDURE — 36415 COLL VENOUS BLD VENIPUNCTURE: CPT

## 2022-10-18 PROCEDURE — 97116 GAIT TRAINING THERAPY: CPT

## 2022-10-18 PROCEDURE — 6370000000 HC RX 637 (ALT 250 FOR IP): Performed by: INTERNAL MEDICINE

## 2022-10-18 PROCEDURE — 6360000002 HC RX W HCPCS: Performed by: INTERNAL MEDICINE

## 2022-10-18 PROCEDURE — 86901 BLOOD TYPING SEROLOGIC RH(D): CPT

## 2022-10-18 PROCEDURE — 86850 RBC ANTIBODY SCREEN: CPT

## 2022-10-18 PROCEDURE — 97530 THERAPEUTIC ACTIVITIES: CPT

## 2022-10-18 PROCEDURE — 86900 BLOOD TYPING SEROLOGIC ABO: CPT

## 2022-10-18 RX ORDER — MAGNESIUM SULFATE IN WATER 40 MG/ML
2000 INJECTION, SOLUTION INTRAVENOUS ONCE
Status: COMPLETED | OUTPATIENT
Start: 2022-10-18 | End: 2022-10-18

## 2022-10-18 RX ORDER — SODIUM CHLORIDE 9 MG/ML
INJECTION, SOLUTION INTRAVENOUS PRN
Status: DISCONTINUED | OUTPATIENT
Start: 2022-10-18 | End: 2022-10-22 | Stop reason: HOSPADM

## 2022-10-18 RX ORDER — OXYCODONE HYDROCHLORIDE 5 MG/1
5 TABLET ORAL EVERY 4 HOURS PRN
Status: DISCONTINUED | OUTPATIENT
Start: 2022-10-18 | End: 2022-10-22 | Stop reason: HOSPADM

## 2022-10-18 RX ORDER — OXYCODONE HYDROCHLORIDE 5 MG/1
10 TABLET ORAL EVERY 4 HOURS PRN
Status: DISCONTINUED | OUTPATIENT
Start: 2022-10-18 | End: 2022-10-22 | Stop reason: HOSPADM

## 2022-10-18 RX ADMIN — MIDODRINE HYDROCHLORIDE 10 MG: 5 TABLET ORAL at 08:27

## 2022-10-18 RX ADMIN — MUPIROCIN: 20 OINTMENT TOPICAL at 08:27

## 2022-10-18 RX ADMIN — OXYCODONE 5 MG: 5 TABLET ORAL at 15:26

## 2022-10-18 RX ADMIN — Medication 1 SPRAY: at 16:35

## 2022-10-18 RX ADMIN — TAMSULOSIN HYDROCHLORIDE 0.4 MG: 0.4 CAPSULE ORAL at 08:27

## 2022-10-18 RX ADMIN — HYDROMORPHONE HYDROCHLORIDE 0.5 MG: 1 INJECTION, SOLUTION INTRAMUSCULAR; INTRAVENOUS; SUBCUTANEOUS at 09:16

## 2022-10-18 RX ADMIN — MIDODRINE HYDROCHLORIDE 10 MG: 5 TABLET ORAL at 13:59

## 2022-10-18 RX ADMIN — MIDODRINE HYDROCHLORIDE 10 MG: 5 TABLET ORAL at 16:35

## 2022-10-18 RX ADMIN — MAGNESIUM SULFATE HEPTAHYDRATE 2000 MG: 40 INJECTION, SOLUTION INTRAVENOUS at 08:35

## 2022-10-18 ASSESSMENT — PAIN DESCRIPTION - LOCATION
LOCATION: BACK
LOCATION: BACK

## 2022-10-18 ASSESSMENT — PAIN SCALES - GENERAL
PAINLEVEL_OUTOF10: 10
PAINLEVEL_OUTOF10: 10

## 2022-10-18 NOTE — PROGRESS NOTES
Occupational Therapy  Facility/Department: [id number] [place] [address] PCU  Daily Treatment Note  NAME: Austin Councilman  : 1955  MRN: [id number]    Date of Service: 10/18/2022    Discharge Recommendations:  [address] Austin Councilman scored a 17/24 on the AM-PAC ADL Inpatient form. Current research shows that an AM-PAC score of 17 or less is typically not associated with a discharge to the patient's home setting. Based on the patient's AM-PAC score and their current ADL deficits, it is recommended that the patient have 3-5 sessions per week of Occupational Therapy at d/c to increase the patient's independence. Please see assessment section for further patient specific details. If patient discharges prior to next session this note will serve as a discharge summary. Please see below for the latest assessment towards goals. Patient Diagnosis(es): The encounter diagnosis was Malignant neoplasm of urinary bladder, unspecified site [facility]). Assessment    Assessment: Pt with fair tolerance for therapy session. Pt tolerated short mobility to and from bathroom with SBA/CGA usign RW. Pt able to stand at sink for ~3 mins to complete oral care. Pt requesting to go back to bed at end of session. Education provided to family and patient on the importance of OOB in order to increase functional strength for ADLs. Pt lives alone and would benefit from a short term rehab stay in order to increase functional status prior to returning home. Activity Tolerance: Patient limited by endurance; Patient limited by fatigue  Discharge Recommendations: Subacute/Skilled Nursing Facility      Plan   Occupational Therapy Plan  Times Per Week: 3-5x a week  Current Treatment Recommendations: Strengthening;ROM; Functional mobility training; Endurance training;Self-Care / ADL; Safety education & training;Patient/Caregiver education & training;Equipment evaluation, education, & procurement belt;Nurse notified; Left in bed;Bed alarm in place;Call light within reach     Patient Education  Education Given To: Patient; Family  Education Provided: Role of Therapy;Plan of Care;Energy Conservation; Family Education;Transfer Training;Precautions; Home Exercise Program  Education Provided Comments: disease specific: benefits of up to chair/prevention of complication of bedrest, use of call light when ready to go back to bed; d/c recommendations for SNF vs. 24hr supervision  Education Method: Demonstration;Verbal  Barriers to Learning: Hearing  Education Outcome: Continued education needed;Verbalized understanding    Goals  Short Term Goals  Time Frame for Short Term Goals: 1 week unless otherwise specified 10/22/22  Short Term Goal 1: Pt will complete 3 ADLs in stance at sink with LRAD and SBA- goal progressing 10/17  Short Term Goal 2: Pt will complete x15 reps of BUE AROM exercises by 10/18/22- GOAL MET 10/17  Short Term Goal 3: Pt will complete toileting ADL with supervision- goal progressing 10/17  Patient Goals   Patient goals : \"go home\"       Therapy Time   Individual Concurrent Group Co-treatment   Time In 0855         Time Out 0918         Minutes 23         Timed Code Treatment Minutes: [facility], OTR/L

## 2022-10-18 NOTE — PROGRESS NOTES
Physical Therapy  Facility/Department: [place] [address] PCU  Daily Treatment Note  NAME: Francisca Mahan  : 1955  MRN: [id number]    Date of Service: 10/18/2022    Discharge Recommendations:  Subacute/Skilled Nursing Facility   PT Equipment Recommendations  Equipment Needed:  (recommend a rw if pt decides direct d/c to home instead of SNF)  [facility] 6 Clicks Inpatient Mobility:  AM-PAC Mobility Inpatient   How much difficulty turning over in bed?: None  How much difficulty sitting down on / standing up from a chair with arms?: A Little  How much difficulty moving from lying on back to sitting on side of bed?: None  How much help from another person moving to and from a bed to a chair?: A Little  How much help from another person needed to walk in hospital room?: A Little  How much help from another person for climbing 3-5 steps with a railing?: A Little  AM-PAC Inpatient Mobility Raw Score : 20  AM-PAC Inpatient T-Scale Score : 47.67  Mobility Inpatient CMS 0-100% Score: 35.83  Mobility Inpatient CMS G-Code Modifier : CJ    Patient Diagnosis(es): The encounter diagnosis was Malignant neoplasm of urinary bladder, unspecified site ([facility] Assessment   Assessment: 76 yo male adm with septic shock, UTI, bladder mass, MATHEUS. Hx tobacco and ETOH use. PLOF: pt lives alone with one step to enter a single level home. He has been indep holding furniture to walk, rarely leaves the home. Today pt sba to amb with RW up to 20 ft with early fatigue. Pt will benefit from skilled PT to address deficits. Recommend SNF at discharge  Activity Tolerance: Patient tolerated treatment well;Patient limited by endurance  Equipment Needed:  (recommend a rw if pt decides direct d/c to home instead of SNF)     Plan    Physcial Therapy Plan  General Plan: 3-5 times per week  Current Treatment Recommendations: Strengthening;Balance training;Functional mobility training;Transfer training;Gait training; Endurance training; Safety education & training; Therapeutic activities     Restrictions  Restrictions/Precautions  Restrictions/Precautions: Fall Risk, General Precautions, Up as Tolerated, NPO  Position Activity Restriction  Other position/activity restrictions: IV, tele, 3 way mc with cont irrigation, [employer]     Subjective    Subjective  Subjective: Pt supine in bed at approach and agrees to PT session w/ encouragement; dtr present. Pain: 6/10 in back  Orientation  Overall Orientation Status: Impaired  Orientation Level: Oriented to place;Oriented to person;Oriented to situation;Disoriented to time     Objective   Vitals  Heart Rate: 99  BP: 96/64  BP Location: Right upper arm  MAP (Calculated): 74.67  SpO2: 95 %  O2 Device: None (Room air)  Bed Mobility Training  Bed Mobility Training: Yes  Interventions: Verbal cues; Safety awareness training  Supine to Sit: Stand-by assistance (HOB elevated, A with mc line management)  Scooting: Stand-by assistance  Balance  Sitting: Intact  Standing: Impaired (rw)  Standing - Static: Fair  Standing - Dynamic: Fair  Transfer Training  Transfer Training: Yes (rw)  Sit to Stand: Stand-by assistance  Stand to Sit: Stand-by assistance  Gait Training  Gait Training: Yes  Gait  Overall Level of Assistance: Stand-by assistance  Interventions: Verbal cues; Safety awareness training  Step Length: Right shortened;Left shortened  Gait Abnormalities: Decreased step clearance  Distance (ft): 20 Feet  Assistive Device: Walker, rolling  Number of Stairs Trained:  (discussed stairs at home w/ dtr. She and pt declined need for this practice this am.)     PT Exercises  Exercise Treatment: performed BLE TE 10-15X EACH: AP, GS, HS, HIP ABD, LAQ, MARCHES     Safety Devices  Type of Devices: All fall risk precautions in place; Chair alarm in place; Patient at risk for falls;Gait belt;Left in chair;Nurse notified       Goals  Short Term Goals  Time Frame for Short Term Goals: 1 week (10/22) unless otherwise specified  Short Term Goal 1: pt to be modified indep for transfers  -[facility] Term Goal 2: pt to amb with least restrictive or no device 150 ft supervised   -1018 sba rw 20'  Short Term Goal 3: Pt to participate in Therapeutic exercises 10-12 reps by 10/18   -1018 progressing  Patient Goals   Patient Goals : \"to get stronger\"    Education  Patient Education  Education Given To: Patient; Family  Education Provided: Role of Therapy;Plan of Care  Education Provided Comments: DIsease specific: prevention of complications of bedrest  Education Method: Verbal  Barriers to Learning: Hearing  Education Outcome: Verbalized understanding    Therapy Time   Individual Concurrent Group Co-treatment   Time In 0810         Time Out 0835         Minutes 25         Timed Code Treatment Minutes: 1440 [facility]

## 2022-10-18 NOTE — PROGRESS NOTES
Hospitalist Progress Note      PCP: NALINI See - CNP    Date of Admission: 10/15/2022    Chief Complaint: Sepsis    Hospital Course:     Admitted from [address] [place] [address], initially hospitalized 10/13. He had seen a urologist for a year of progressive urinary symptoms. The urologist ordered an abdominopelvic CT scan. The scan showed a very distended bladder and a bladder mass. He was initially hypothermic and hypotensive. A mc catheter was placed (3L urine drained initially) and empiric antibiotics (cefepime, vancomycin) were started with a working diagnosis of septic shock due to UTI. He was also found to have an MATHEUS w/ presenting SCr of 4.8. He was also found to have an extensive left lower extremity DVT. Because he was having quite a bit of hematuria there were concerns about accelerating bleeding if anticoagulants were started so IR evaluated the patient for an IVC filter. His  IVC was too small to safely accommodate a filter though. He was transferred to [facility] for evaluation by vascular surgery and possible thrombectomy. Subjective:    Patient with continued back pain that is worse with cough. Has some suprapubic pain as well. CBI removed and mc with hematuria. His Hgb is 6.8 and he is agreeable to transfusion.  He has not had any reactions in the past     Denies any chest pain, shortness of breath, numbness, tingling, N/V/C/D, fever and/or chills        Medications:  Reviewed    Infusion Medications    sodium chloride      sodium chloride       Scheduled Medications    lidocaine  1 patch TransDERmal Daily    tamsulosin  0.4 mg Oral Daily    midodrine  10 mg Oral TID WC    mupirocin   Nasal BID    nicotine  1 patch TransDERmal Daily     PRN Meds: sodium chloride, sodium chloride, acetaminophen **OR** acetaminophen, ondansetron **OR** ondansetron, polyethylene glycol, sodium chloride flush, melatonin, calcium carbonate, HYDROmorphone, ipratropium-albuterol      Intake/Output Summary (Last 24 hours) at 10/18/2022 0725  Last data filed at 10/17/2022 0926  Gross per 24 hour   Intake --   Output -1400 ml   Net 1400 ml         Physical Exam Performed:    /63   Pulse 99   Temp 97.8 °F (36.6 °C) (Tympanic)   Resp 18   Wt 95 lb 0.3 oz (43.1 kg)   SpO2 97%   BMI 15.34 kg/m²     General appearance: No apparent distress, appears older than stated age and cooperative. Thin  HEENT: Pupils equal, round, and reactive to light. Conjunctivae/corneas clear. Neck: Supple, with full range of motion. No jugular venous distention. Trachea midline. Respiratory:  Normal respiratory effort. Mild wheezing throughout   Cardiovascular: Regular rate and rhythm with normal S1/S2 without murmurs, rubs or gallops. Abdomen: Soft, non-tender, non-distended with normal bowel sounds. Musculoskeletal: No clubbing, cyanosis or edema bilaterally. Full range of motion without deformity. Skin: Skin color, texture, turgor normal.  No rashes or lesions. Neurologic:  Neurovascularly intact without any focal sensory/motor deficits. Cranial nerves: II-XII intact, grossly non-focal. Except very poor hearing   : Alexander in place with 50 cc of hematuria.    Psychiatric: Alert and oriented, thought content appropriate, normal insight  Capillary Refill: Brisk, 3 seconds, normal   Peripheral Pulses: +2 palpable, equal bilaterally       Labs:   Recent Labs     10/16/22  0413 10/17/22  0423 10/18/22  0415   WBC 5.2 6.7 4.7   HGB 7.7* 8.0* 6.8*   HCT 23.6* 25.6* 21.4*    157 158       Recent Labs     10/16/22  0413 10/17/22  0423 10/18/22  0415    134* 134*   K 3.5 4.2 4.1    99 100   CO2 28 25 28   BUN 22* 12 11   CREATININE 0.7* 0.7* 0.7*   CALCIUM 7.2* 7.5* 7.4*   PHOS  --  1.8*  --        Recent Labs     10/16/22  0413   AST 9*   ALT <5*   BILITOT 0.4   ALKPHOS 80       Recent Labs     10/16/22  0413 10/17/22  0423   INR 1.18* 1.17*       No results for input(s): Arely Fernando in the last 72 hours.    Urinalysis:      Lab Results   Component Value Date/Time    NITRU Negative 10/13/2022 12:44 PM    WBCUA >100 10/13/2022 12:44 PM    RBCUA see below 10/13/2022 12:44 PM    BLOODU MODERATE 10/13/2022 12:44 PM    SPECGRAV 1.015 10/13/2022 12:44 PM    GLUCOSEU Negative 10/13/2022 12:44 PM       Radiology:  No orders to display           Assessment/Plan:    Active Hospital Problems    Diagnosis     Mass of urinary bladder [N32.89]      Priority: Medium    Tobacco abuse [Z72.0]      Priority: Medium    Acute deep vein thrombosis (DVT) of iliac vein of left lower extremity (HCC) [I82.422]      Priority: Medium    Acute deep vein thrombosis (DVT) of calf muscle vein of left lower extremity (HCC) [I82.462]      Priority: Medium    Urinary retention [R33.9]      Priority: Medium    MATHEUS (acute kidney injury) ([address] [N17.9]      Priority: Medium    Acute cystitis with hematuria [N30.01]      Priority: Medium    Hematuria [R31.9]      Priority: Medium    Microcytic anemia [D50.9]      Priority: Medium     Extensive left iliofemoral deep venous thrombosis with contraindication to anticoagulation due anemia, hematuria and positive FOBT s/p IVC filter placement (10/15, Dr Sekou Fernando), recommend starting anticoagulation when/if able and if on [facility] and tolerates, then filter should be removed. Bladder mass and extreme bladder distension noted on CT which has now been decompressed with Alexander catheter. Presumed malignant s/p cystoscopy transurethral resection bladder (10/16, Dr Mari Jacobsen). Bladder tumor was almost resected completely. Awaiting biopsy results. Continue to hold blood thinners due to high risk for re-bleeding. Alexander to remain in place. Continue flomax     Acute on chronic Microcytic anemia: Post op blood loss anemia Hemoglobin decreased from 9.4 gm/dL on admission to 6.7 gm/dL with 1 unit PRBC transfused. Stable around 8 but down to 6.8 post op. Transfuse another unit of PRBC today.  Continue to monitor with Hgb checks. Obtained written consent from patient. Acute kidney injury with anion gap metabolic acidosis considered Post-renal and resolved with resolution of bladder outlet obstruction. Nephrology signed off      Pyuria -   No bacteriuria. in the context of gross hematuria. Blood and Urine cx 10/13 are NGTD. Was on cefepime and vancomycin for 5 days. COPD, Not in exacerbation. on supplemental oxygen to maintain O2sat 90-94%. IS encouraged. Hypokalemia:  Replaced with IV potassium chloride to goal K+ > 4.0 mmol/L. Hypomagnesemia:  Replace with IV magnesium sulfate to goal Mg > 2.0 mg/dL. Lab values reviewed    Hypovolemic hyponatremia responding to volume replacement. Malnutrition:  Regular diet with dietary supplements    DVT Prophylaxis: SCDs  Diet: ADULT DIET; Regular  Code Status: Full Code  PT/OT Eval Status: Recommend subacute/SNF    Dispo - Pending stable hgb. Will be DC home with [facility].  AC will not be started at discharge     Appropriate for A1 Discharge Unit: Kisha Aggarwal DO

## 2022-10-18 NOTE — PROGRESS NOTES
Speech Language Pathology  Clinical Bedside Swallow Assessment  Facility/Department: [place] [id number] [facility]    Recommendations:  Diet recommendation: IDDSI 7 Regular Solids; IDDSI 0 Thin Liquids; Meds PO as tolerated  Instrumentation: Not clinically indicated at this time. Will cont to monitor   Risk management: upright for all intake, stay upright for at least 30 mins after intake, small bites/sips, oral care 2-3x/day to reduce adverse affects in the event of aspiration, alternate bites/sips, slow rate of intake, general aspiration precautions, and hold PO and contact SLP if s/s of aspiration or worsening respiratory status develop. NAME:Marcial Davis  : 1955 (75 y.o.)   MRN: [id number]  [facility]  ADMISSION DATE: 10/15/2022  PATIENT DIAGNOSIS(ES): Acute deep vein thrombosis (DVT) of iliac vein of left lower extremity (HCC) [I82.422]  Acute deep vein thrombosis (DVT) of calf muscle vein of left lower extremity ([address] [I82.462]  No chief complaint on file.     Patient Active Problem List    Diagnosis Date Noted    Mass of urinary bladder 10/15/2022    Tobacco abuse 10/15/2022    Acute deep vein thrombosis (DVT) of iliac vein of left lower extremity ([address] 10/15/2022    Acute deep vein thrombosis (DVT) of calf muscle vein of left lower extremity ([address] 10/15/2022    Acute cystitis with hematuria 10/14/2022    Hyponatremia 10/14/2022    MATHEUS (acute kidney injury) ([address] 10/14/2022    COPD (chronic obstructive pulmonary disease) ([address] 10/14/2022    Microcytic anemia 10/14/2022    Hematuria 10/14/2022    Urinary retention 10/14/2022    Severe protein-calorie malnutrition ([address] 10/14/2022    Sepsis ([address] 10/13/2022    Right carpal tunnel syndrome 2016     Past Medical History:   Diagnosis Date    Anxiety     Depression     Hypertension      Past Surgical History:   Procedure Laterality Date    CYST REMOVAL      CYSTOSCOPY N/A 10/16/2022    CYSTOSCOPY TRANSURETHRAL RESECTION BLADDER >5CM [facility]   Allergen Reactions    Bee Venom Itching and Swelling       DATE ONSET: 10/18/2022    Date of Evaluation: 10/18/2022   Evaluating Therapist: LAMONT Garcia    Chart Reviewed: : [x] Yes [] No    Current Diet: ADULT DIET; Regular    Recent Chest Radiography: [x] Chest XR   [] CT of Chest  Date: 10/13/2022   Impressions  Impression   1. Bilateral upper lobe airspace disease favoring scarring rather than   pneumonia. Pain: The patient does not complain of pain     Reason for Referral  Dylon Ellington was referred for a bedside swallow evaluation to assess the efficiency of their swallow function, identify signs and symptoms of aspiration and make recommendations regarding safe dietary consistencies, effective compensatory strategies, and safe eating environment. Assessment    Medical record review/interview: Per MD H&P: Erika Shelton is a 75 y.o. male. He was accepted in transfer from [address] [place] [address]. He was initially hospitalized on 10/13/22. He had seen a urologist for a year of progressive urinary symptoms. The urologist ordered an abdominopelvic CT scan. The scan showed a very distended bladder and a bladder mass. He was initially hypothermic and hypotensive. A mc catheter was placed (3L urine drained initially) and empiric antibiotics (cefepime, vancomycin) were started with a working diagnosis of septic shock due to UTI. He was also found to have an MATHEUS w/ presenting SCr of 4.8. He was also found to have an extensive left lower extremity DVT. Because he was having quite a bit of hematuria there were concerns about accelerating bleeding if anticoagulants were started so IR evaluated the patient for an IVC filter. His  IVC was too small to safely accommodate a filter though. He was transferred to [facility] for evaluation by vascular surgery and possible thrombectomy.      Patient has had minimal medical follow up for years and has a 60-70 pack year smoking history. He reports gradual unintentional weight loss and progressive weakness limiting his mobility. \"     Predisposing dysphagia risk factors: COPD and Hx of smoking  Clinical signs of possible chronic dysphagia: weight loss, reduced PO intake, and hx of malnutrition  Precipitating dysphagia risk factors: reduced physical mobility    Patient Complaints:  Odynophagia: [x] Yes [] No  Globus Sensation: [x] Yes [] No  SOB with PO intake: [] Yes [x] No  Increased WOB with PO intake: [x] Yes [] No  Reflux Sx's: [] Yes [x] No  Weight loss: [x] Yes [] No  Coughing/Choking with PO intake: [x] Yes [] No  Reduced Appetite: [x] Yes [] No    Additional Reported Symptoms/Complaints/Hospital Course:   No prior hx of SLP intervention. Pt has baseline dry cough. Pt very hard of hearing. Vitals/labs:    SpO2: 93%  RR: 20  BP: 120/62   HR: 85   O2 device: RA     CBC:   Recent Labs     10/18/22  0415   WBC 4.7   HGB 6.8*         BMP:  Recent Labs     10/18/22  0415   *   K 4.1      CO2 28   BUN 11   CREATININE 0.7*   GLUCOSE 94          Cranial nerve exam:   CN V (trigeminal): ophthalmic, maxillary, and mandibular facial sensation- WNL b/l  CN VII (facial): WNL b/l  CN IX/X (glossopharyngeal/vagus): MPT: DNT; pitch range: DNT; vocal quality: Adequate; cough: Strong-perceptually, Non-Productive, and Dry  CN XII (hypoglossal):  WNL b/l    Laryngeal function exam:   Secretions: oral mucosa pink and moist   Vocal quality: See CN exam above  MPT: See CN exam above  S/Z ratio: DNT   Pitch range: See CN exam above  Cough: See CN exam above    Oral Care Status:    [x] Oral Care [facility]  [] Poor oral care status  [] Edentulous  [] Upper Dentures  [] Lower Dentures  [] Missing/Broken Teeth  [] Evidence of dental cavities/carries    PO trials:   IDDSI 0 (thin): WFL     IDDSI 4 (puree): WFL     IDDSI 7 (regular): WFL     3 oz water: PASS    Impressions:  PAWAN ok'd SLP entry and eval. Family present during eval. RN present during last half of eval.  Pt agreeable for eval.    CN and laryngeal assessments were unremarkable. Pt reported odynophagia. When prompted to expand on pain he stated \"everywhere in my throat\". RN chuy served MD. MD recommended an oral spray. Vitals remained stable throughout evaluation. SpO2 93%, RR 20 and HR 85. Pt assessed with thin via cup, puree, and regular. No clinical s/s of aspiration. SLP educated the patient safe swallow strategies, aspiration precaution and compensatory strategies. Pt verbalized understanding. RN and caregivers aware. SLP rec IDDSI 7 Regular Solids; IDDSI 0 Thin Liquids; Meds PO as tolerated. RN aware and in agreement. Swallow eval ended. ST to follow. Recommendations:  Diet recommendation: IDDSI 7 Regular Solids; IDDSI 0 Thin Liquids; Meds PO as tolerated  Instrumentation: Not clinically indicated at this time. Will cont to monitor   Risk management: upright for all intake, stay upright for at least 30 mins after intake, small bites/sips, oral care 2-3x/day to reduce adverse affects in the event of aspiration, alternate bites/sips, slow rate of intake, general aspiration precautions, and hold PO and contact SLP if s/s of aspiration or worsening respiratory status develop.     Prognosis: Good    Recommended Intervention:   [x] Dysphagia tx  [] Videostroboscopy                      [] NPO   [] MBS       [] Speech/Cog Eval  [] Therapeutic PO Trials     [] Ice Chips   [] Other:  [] FEES                                                 Dysphagia Therapeutic Intervention:   []  Bolus control Exercises  []  Oral Motor Exercises  []  [employer] Protocol  []  Thermal Stimulation  [x]  Oral Care     []  Vital Stim/NMES  []  Laryngeal Exercises  [x]  Patient/Family Education  []  Pharyngeal Exercises  []  Therapeutic PO trials with SLP  [x]  Diet tolerance monitoring  []  Other:     Referrals:  [] ENT    [] PT  [] Pulmonology [] GI  [] Neurology  [] RD  [] OT   []     Goals:  Short Term Goals:  Timeframe for Short Term Goals: (5 days 10/23/2022)  Goal 1: The patient will tolerate recommended diet with no clinical s/s of aspiration 5/5  Goal 2: The patient/caregiver will demonstrate understanding of compensatory swallow strategies, for improved swallow safety  Goal 3: The patient will tolerate instrumental assessment when able , if appropriate. Long Term Goals:   Timeframe for Long Term Goals: (7 days 10/25/2022)  Goal 1: The patient will tolerate least restrictive diet with no clinical s/s of aspiration or worsening respiratory/pulmonary status    Treatment:  Skilled instruction completed with patient re: evidenced based practice regarding recommendations and POC, importance of oral care to reduce adverse affects in the event of aspiration, and instruction of recommended compensatory strategies developed based upon clinical exam. Pt able to recall/demonstrate compensatory strategies with min cues. Pt Education: SLP educated the patient re: Role of SLP, rationale for completion of assessment, anatomical components of swallow structures as they pertain to airway protection, results of assessment, recommendations, and POC  Pt Education Response: verbalized understanding, demonstrated understanding, RN aware, and caregiver aware    Duration/Frequency of Tx: 1-2x/wk     Individuals Consulted:   [x]  Patient     []  NP         [x]  RN   []  RD                   []  MD      []  Family Member                        []  PA    []  Other:      Safety Devices / Report:  [x]  All fall risk precautions in place [x]  Safety handoff completed with RN  [x]  Bed alarm in place  [x]  Left in bed     []  Chair alarm in place  []  Left in chair   [x]  Call light in reach   []  Other:                      Total Treatment Time / Charges       Time in Time out Total Time / units   Swallow Eval/Tx Time 1500  1534 34 mins/ 2 units Signature:  Andrea NASH-SLP  Clinical Fellow  [id number]

## 2022-10-18 NOTE — PROGRESS NOTES
Urology Progress Note      POD2 cysto TURBT    Subjective: Jh Dey denies complaints. No issues with mc, he is off CBI with very light pink urine     Vitals:  BP 96/64   Pulse 99   Temp 98.7 °F (37.1 °C) (Axillary)   Resp 18   Wt 95 lb 0.3 oz (43.1 kg)   SpO2 95%   BMI 15.34 kg/m²   Temp  Av.1 °F (36.7 °C)  Min: 97.5 °F (36.4 °C)  Max: 98.7 °F (37.1 °C)    Intake/Output Summary (Last 24 hours) at 10/18/2022 0900  Last data filed at 10/17/2022 6108  Gross per 24 hour   Intake --   Output -1400 ml   Net 1400 ml         Exam:   Physical:  Cachectic male who appears older than stated age  Mood indicates no abnormalities. Pt doesnt appear depressed. Very hard of hearing  Orientated to time and place         Male :  Mc in place with very light pink urine in tubing. Off CBI    Labs:  WBC:    Lab Results   Component Value Date/Time    WBC 4.7 10/18/2022 04:15 AM     Hemoglobin/Hematocrit:    Lab Results   Component Value Date/Time    HGB 6.8 10/18/2022 04:15 AM    HCT 21.4 10/18/2022 04:15 AM     BMP:    Lab Results   Component Value Date/Time     10/18/2022 04:15 AM    K 4.1 10/18/2022 04:15 AM    K 3.5 10/16/2022 04:13 AM     10/18/2022 04:15 AM    CO2 28 10/18/2022 04:15 AM    BUN 11 10/18/2022 04:15 AM    LABALBU 2.9 10/17/2022 04:23 AM    CREATININE 0.7 10/18/2022 04:15 AM    CALCIUM 7.4 10/18/2022 04:15 AM    GFRAA >60 10/17/2022 04:23 AM    LABGLOM >60 10/18/2022 04:15 AM     PT/INR:    Lab Results   Component Value Date/Time    PROTIME 14.9 10/17/2022 04:23 AM    INR 1.17 10/17/2022 04:23 AM     PTT:  No results found for: APTT[APTT    Urinalysis:     Latest Reference Range & Units 10/13/22 12:44   Color, UA Straw/Yellow  Yellow   Clarity, UA Clear  CLOUDY !    Glucose, UA Negative mg/dL Negative   Bilirubin, Urine Negative  Negative   Ketones, Urine Negative mg/dL Negative   Specific Gravity, UA 1.005 - 1.030  1.015   Blood, Urine Negative  MODERATE !   pH, UA 5.0 - 8.0 6.0   Protein, UA Negative mg/dL 100 ! Urobilinogen, Urine <2.0 E.U./dL 0.2   Nitrite, Urine Negative  Negative   Leukocyte Esterase, Urine Negative  SMALL ! Urine Type   NotGiven   Urinalysis Comments   see below   Urine Reflex to Culture   Yes   WBC, UA 0 - 5 /HPF >100 ! RBC, UA 0 - 4 /HPF see below ! Microscopic Examination   YES   !: Data is abnormal     Urine Culture: NG     Imaging     CT AP 10/11/22  FINDINGS:   Lower Chest: Emphysematous changes. Lung bases otherwise clear. Organs: The unenhanced liver, gallbladder, spleen, pancreas and adrenal   glands are grossly unremarkable. There is moderate right   hydroureteronephrosis. No left hydronephrosis. Probable 3 mm left   nephrolithiasis. GI/Bowel: No dilated loops of bowel or bowel wall thickening. No free air. Moderate amount stool in the colon. Pelvis: The bladder is markedly distended measuring up to 20 cm in the   craniocaudal dimension. There are multifocal areas of nodular wall   thickening throughout the bladder, for example on the left lateral aspect   measuring up to 9 x 3 cm (image 111). Within the base of the bladder, there   is also more focal masslike soft tissue measuring 4 x 3 cm. Prostate gland   is grossly unremarkable. No definite pathologically enlarged adenopathy or   free fluid. Peritoneum/Retroperitoneum: The aorta is tortuous. There is a 3.3 cm   abdominal aortic aneurysm. Severe atherosclerosis is noted. No   pathologically enlarged adenopathy or free fluid. Bones/Soft Tissues: Diffuse osteopenia. Impression   1. Markedly dilated bladder measuring up to 20 cm in the craniocaudal   dimension. Multifocal areas of nodular wall thickening, the largest   measuring up to 9 x 3 cm along the left lateral aspect suspicious for   neoplasm. 2. Moderate right hydroureteronephrosis. 3. 3.3 cm abdominal aortic aneurysm. Results were discussed with Dr. Mirta Mckeon on 10/11/2022 at 19:54. RECOMMENDATIONS:   3.3 cm abdominal aortic aneurysm. Recommend follow-up every 3 years. Impression/Plan:     Bladder mass   Hematuria    Urinary retention  Right hydronephrosis   Enlarged prostate   MATHEUS - resolved   Acute DVT with IVC filter in place      - suspect enlarged prostate leading to retention and right hydro. On flomax- please continue on discharge   - keep mc - he may need to go home with mc in place   - sp cysto TURBT on 10/16 with resection of large tumor - 12 cm  - urine is clear- light pink today. Continue to monitor off CBI.  Would hold off on starting anticoagulation at this point, concern he is high risk for re-bleeding with initiation of full dose anticoagulation for DVT    Brenna Martins PA-C  [facility]

## 2022-10-18 NOTE — CARE COORDINATION
Met with patient in room to re-visit SNF recommendations and discuss discharge plan. He states he does not want to go to SNF and still plans to go home with [facility]. COA referral also initiated per previous .

## 2022-10-19 ENCOUNTER — APPOINTMENT (OUTPATIENT)
Dept: CT IMAGING | Age: 67
DRG: 668 | End: 2022-10-19
Attending: HOSPITALIST
Payer: MEDICARE

## 2022-10-19 LAB
ANION GAP SERPL CALCULATED.3IONS-SCNC: 8 MMOL/L (ref 3–16)
BASOPHILS ABSOLUTE: 0 K/UL (ref 0–0.2)
BASOPHILS RELATIVE PERCENT: 1.1 %
BUN BLDV-MCNC: 9 MG/DL (ref 7–20)
CALCIUM SERPL-MCNC: 7.1 MG/DL (ref 8.3–10.6)
CHLORIDE BLD-SCNC: 96 MMOL/L (ref 99–110)
CO2: 26 MMOL/L (ref 21–32)
CREAT SERPL-MCNC: 0.7 MG/DL (ref 0.8–1.3)
EOSINOPHILS ABSOLUTE: 0 K/UL (ref 0–0.6)
EOSINOPHILS RELATIVE PERCENT: 0.5 %
GFR SERPL CREATININE-BSD FRML MDRD: >60 ML/MIN/{1.73_M2}
GLUCOSE BLD-MCNC: 100 MG/DL (ref 70–99)
GLUCOSE BLD-MCNC: 111 MG/DL (ref 70–99)
GLUCOSE BLD-MCNC: 85 MG/DL (ref 70–99)
GLUCOSE BLD-MCNC: 88 MG/DL (ref 70–99)
GLUCOSE BLD-MCNC: 95 MG/DL (ref 70–99)
HCT VFR BLD CALC: 25.4 % (ref 40.5–52.5)
HEMOGLOBIN: 8.2 G/DL (ref 13.5–17.5)
LYMPHOCYTES ABSOLUTE: 0.4 K/UL (ref 1–5.1)
LYMPHOCYTES RELATIVE PERCENT: 9 %
MCH RBC QN AUTO: 26.4 PG (ref 26–34)
MCHC RBC AUTO-ENTMCNC: 32.2 G/DL (ref 31–36)
MCV RBC AUTO: 82.2 FL (ref 80–100)
MONOCYTES ABSOLUTE: 0.3 K/UL (ref 0–1.3)
MONOCYTES RELATIVE PERCENT: 6.1 %
NEUTROPHILS ABSOLUTE: 3.7 K/UL (ref 1.7–7.7)
NEUTROPHILS RELATIVE PERCENT: 83.3 %
PDW BLD-RTO: 19.1 % (ref 12.4–15.4)
PERFORMED ON: ABNORMAL
PERFORMED ON: ABNORMAL
PERFORMED ON: NORMAL
PERFORMED ON: NORMAL
PLATELET # BLD: 151 K/UL (ref 135–450)
PMV BLD AUTO: 6.9 FL (ref 5–10.5)
POTASSIUM SERPL-SCNC: 4.3 MMOL/L (ref 3.5–5.1)
RBC # BLD: 3.09 M/UL (ref 4.2–5.9)
SODIUM BLD-SCNC: 130 MMOL/L (ref 136–145)
WBC # BLD: 4.4 K/UL (ref 4–11)

## 2022-10-19 PROCEDURE — 2060000000 HC ICU INTERMEDIATE R&B

## 2022-10-19 PROCEDURE — 6360000004 HC RX CONTRAST MEDICATION: Performed by: INTERNAL MEDICINE

## 2022-10-19 PROCEDURE — 6370000000 HC RX 637 (ALT 250 FOR IP): Performed by: INTERNAL MEDICINE

## 2022-10-19 PROCEDURE — 36415 COLL VENOUS BLD VENIPUNCTURE: CPT

## 2022-10-19 PROCEDURE — 51702 INSERT TEMP BLADDER CATH: CPT

## 2022-10-19 PROCEDURE — 6360000002 HC RX W HCPCS: Performed by: INTERNAL MEDICINE

## 2022-10-19 PROCEDURE — 85025 COMPLETE CBC W/AUTO DIFF WBC: CPT

## 2022-10-19 PROCEDURE — 74177 CT ABD & PELVIS W/CONTRAST: CPT

## 2022-10-19 PROCEDURE — 80048 BASIC METABOLIC PNL TOTAL CA: CPT

## 2022-10-19 RX ADMIN — IOPAMIDOL 75 ML: 755 INJECTION, SOLUTION INTRAVENOUS at 10:48

## 2022-10-19 RX ADMIN — OXYCODONE 10 MG: 5 TABLET ORAL at 08:09

## 2022-10-19 RX ADMIN — OXYCODONE 10 MG: 5 TABLET ORAL at 20:12

## 2022-10-19 RX ADMIN — MIDODRINE HYDROCHLORIDE 10 MG: 5 TABLET ORAL at 08:09

## 2022-10-19 RX ADMIN — Medication 1 SPRAY: at 18:15

## 2022-10-19 RX ADMIN — HYDROMORPHONE HYDROCHLORIDE 0.25 MG: 1 INJECTION, SOLUTION INTRAMUSCULAR; INTRAVENOUS; SUBCUTANEOUS at 18:13

## 2022-10-19 RX ADMIN — TAMSULOSIN HYDROCHLORIDE 0.4 MG: 0.4 CAPSULE ORAL at 08:09

## 2022-10-19 RX ADMIN — MIDODRINE HYDROCHLORIDE 10 MG: 5 TABLET ORAL at 12:37

## 2022-10-19 RX ADMIN — HYDROMORPHONE HYDROCHLORIDE 0.25 MG: 1 INJECTION, SOLUTION INTRAMUSCULAR; INTRAVENOUS; SUBCUTANEOUS at 10:04

## 2022-10-19 ASSESSMENT — PAIN SCALES - GENERAL
PAINLEVEL_OUTOF10: 10

## 2022-10-19 ASSESSMENT — PAIN DESCRIPTION - LOCATION
LOCATION: ABDOMEN;BACK
LOCATION: BACK
LOCATION: GENERALIZED

## 2022-10-19 NOTE — PROGRESS NOTES
Hospitalist Progress Note      PCP: NALINI Ohara - CNP    Date of Admission: 10/15/2022    Chief Complaint: Sepsis    Hospital Course:     Admitted from [address] [place] [address], initially hospitalized 10/13. He had seen a urologist for a year of progressive urinary symptoms. The urologist ordered an abdominopelvic CT scan. The scan showed a very distended bladder and a bladder mass. He was initially hypothermic and hypotensive. A mc catheter was placed (3L urine drained initially) and empiric antibiotics (cefepime, vancomycin) were started with a working diagnosis of septic shock due to UTI. He was also found to have an MATHEUS w/ presenting SCr of 4.8. He was also found to have an extensive left lower extremity DVT. Because he was having quite a bit of hematuria there were concerns about accelerating bleeding if anticoagulants were started so IR evaluated the patient for an IVC filter. His  IVC was too small to safely accommodate a filter though. He was transferred to [facility] for evaluation by vascular surgery and possible thrombectomy. Subjective:   Patient with sore throat and also worsening pelvic and back pain. Having more blood in mc bag.    Denies any chest pain, shortness of breath, numbness, tingling, fever and/or chills        Medications:  Reviewed    Infusion Medications    sodium chloride      sodium chloride      sodium chloride       Scheduled Medications    lidocaine  1 patch TransDERmal Daily    tamsulosin  0.4 mg Oral Daily    midodrine  10 mg Oral TID WC    mupirocin   Nasal BID    nicotine  1 patch TransDERmal Daily     PRN Meds: sodium chloride, oxyCODONE **OR** oxyCODONE, HYDROmorphone, phenol, sodium chloride, sodium chloride, acetaminophen **OR** acetaminophen, ondansetron **OR** ondansetron, polyethylene glycol, sodium chloride flush, melatonin, calcium carbonate, ipratropium-albuterol      Intake/Output Summary (Last 24 hours) at 10/19/2022 9416  Last data filed at 10/19/2022 0535  Gross per 24 hour   Intake 0 ml   Output 375 ml   Net -375 ml         Physical Exam Performed:    /71   Pulse 83   Temp 98.5 °F (36.9 °C) (Oral)   Resp 18   Wt 98 lb 15.8 oz (44.9 kg)   SpO2 93%   BMI 15.98 kg/m²     General appearance: No apparent distress, appears older than stated age and cooperative. Thin  HEENT: Pupils equal, round, and reactive to light. Conjunctivae/corneas clear. Throat clear  Neck: Supple, with full range of motion. No jugular venous distention. Trachea midline. Respiratory:  Normal respiratory effort. Mild wheezing throughout   Cardiovascular: Regular rate and rhythm with normal S1/S2 without murmurs, rubs or gallops. Abdomen: Soft,suprapubic tenderness, non-distended with normal bowel sounds. Musculoskeletal: No clubbing, cyanosis or edema bilaterally. Full range of motion without deformity. Skin: Skin color, texture, turgor normal.  No rashes or lesions. Neurologic:  Neurovascularly intact without any focal sensory/motor deficits. Cranial nerves: II-XII intact, grossly non-focal. Except very poor hearing   : Alexander in place with hematuria   Psychiatric: Alert and oriented, thought content appropriate, normal insight  Capillary Refill: Brisk, 3 seconds, normal   Peripheral Pulses: +2 palpable, equal bilaterally       Labs:   Recent Labs     10/17/22  0423 10/18/22  0415 10/18/22  1759 10/19/22  0413   WBC 6.7 4.7  --  4.4   HGB 8.0* 6.8* 7.7* 8.2*   HCT 25.6* 21.4* 24.6* 25.4*    158  --  151       Recent Labs     10/17/22  0423 10/18/22  0415 10/19/22  0413   * 134* 130*   K 4.2 4.1 4.3   CL 99 100 96*   CO2 25 28 26   BUN 12 11 9   CREATININE 0.7* 0.7* 0.7*   CALCIUM 7.5* 7.4* 7.1*   PHOS 1.8*  --   --        No results for input(s): AST, ALT, BILIDIR, BILITOT, ALKPHOS in the last 72 hours. Recent Labs     10/17/22  0423   INR 1.17*       No results for input(s): Yelitza Beth in the last 72 hours.     Urinalysis:      Lab Results Component Value Date/Time    NITRU Negative 10/13/2022 12:44 PM    WBCUA >100 10/13/2022 12:44 PM    RBCUA see below 10/13/2022 12:44 PM    BLOODU MODERATE 10/13/2022 12:44 PM    SPECGRAV 1.015 10/13/2022 12:44 PM    GLUCOSEU Negative 10/13/2022 12:44 PM       Radiology:  No orders to display           Assessment/Plan:    Active Hospital Problems    Diagnosis     Mass of urinary bladder [N32.89]      Priority: Medium    Tobacco abuse [Z72.0]      Priority: Medium    Acute deep vein thrombosis (DVT) of iliac vein of left lower extremity (HCC) [I82.422]      Priority: Medium    Acute deep vein thrombosis (DVT) of calf muscle vein of left lower extremity (HCC) [I82.462]      Priority: Medium    Urinary retention [R33.9]      Priority: Medium    MATHEUS (acute kidney injury) ([facility] [address] [N17.9]      Priority: Medium    Acute cystitis with hematuria [N30.01]      Priority: Medium    Hematuria [R31.9]      Priority: Medium    Microcytic anemia [D50.9]      Priority: Medium     Bladder mass and extreme bladder distension noted on CT which has now been decompressed with Alexander catheter. Presumed malignant s/p cystoscopy transurethral resection bladder (10/16, Dr Stiven Enriquez). Bladder tumor was almost resected completely. Awaiting biopsy results. Continue to hold blood thinners due to high risk for re-bleeding. Alexander to remain in place. Continue flomax. Urine more bloody today, will need to monitor. Hand irrigation by urology today without any clots     Abdominal pain and back pain - will get CT a/p in view of recent     Acute on chronic Microcytic anemia: Post op blood loss anemia Hemoglobin decreased from 9.4 gm/dL on admission to 6.7 gm/dL with 1 unit PRBC transfused. Stable around 8 but down to 6.8 post op. Transfuse another unit of PRBC today. Continue to monitor with Hgb checks. Obtained written consent from patient.    Extensive left iliofemoral deep venous thrombosis with contraindication to anticoagulation due anemia, hematuria and positive FOBT s/p IVC filter placement (10/15, Dr Kamran Servin), recommend starting anticoagulation when/if able and if on [facility] by [facility] and tolerates, then filter should be removed. Acute kidney injury with anion gap metabolic acidosis considered Post-renal and resolved with resolution of bladder outlet obstruction. Nephrology signed off     Sore throat - no erythema or thrush. Oral spray prn      Pyuria -   No bacteriuria. in the context of gross hematuria. Blood and Urine cx 10/13 are NGTD. Was on cefepime and vancomycin for 5 days. COPD, Not in exacerbation. on supplemental oxygen to maintain O2sat 90-94%. IS encouraged. Hypokalemia:  Replaced with IV potassium chloride to goal K+ > 4.0 mmol/L. Hypomagnesemia:  Replace with IV magnesium sulfate to goal Mg > 2.0 mg/dL. Lab values reviewed    Hypovolemic hyponatremia responding to volume replacement. Severe protein Malnutrition POA:  Regular diet with dietary supplements    DVT Prophylaxis: SCDs  Diet: ADULT DIET; Regular  Code Status: Full Code  PT/OT Eval Status: Recommend subacute/SNF    Dispo - Pending stable hgb and improvement of hematuria. Will be DC home with Mike Tuttle.  AC will not be started at discharge     Appropriate for A1 Discharge Unit: No      Kadeem York DO

## 2022-10-19 NOTE — FLOWSHEET NOTE
10/19/22 0730   Vital Signs   Temp 98.5 °F (36.9 °C)   Temp Source Oral   Heart Rate 83   Heart Rate Source Monitor   Resp 18   /71   BP Location Right upper arm   MAP (Calculated) 86.33   Patient Position Semi fowlers   Pain Assessment   Pain Assessment 0-10   Pain Level 10   Pain Location Back   Oxygen Therapy   SpO2 93 %   Pulse Oximetry Type Intermittent   O2 Device None (Room air)

## 2022-10-19 NOTE — PROGRESS NOTES
Urology Progress Note      POD3 cysto TURBT    Subjective: Liana Damon denies complaints. No issues with mc, but urine is slightly more bloody today      Vitals:  /71   Pulse 83   Temp 98.5 °F (36.9 °C) (Oral)   Resp 18   Wt 98 lb 15.8 oz (44.9 kg)   SpO2 93%   BMI 15.98 kg/m²   Temp  Av.4 °F (36.9 °C)  Min: 98 °F (36.7 °C)  Max: 99.3 °F (37.4 °C)    Intake/Output Summary (Last 24 hours) at 10/19/2022 0840  Last data filed at 10/19/2022 0535  Gross per 24 hour   Intake 0 ml   Output 375 ml   Net -375 ml         Exam:   Physical:  Cachectic male who appears older than stated age  Mood indicates no abnormalities. Pt doesnt appear depressed. Very hard of hearing  Orientated to time and place         Male :  Mc in place with med pink- red urine in tubing. Off CBI    Labs:  WBC:    Lab Results   Component Value Date/Time    WBC 4.4 10/19/2022 04:13 AM     Hemoglobin/Hematocrit:    Lab Results   Component Value Date/Time    HGB 8.2 10/19/2022 04:13 AM    HCT 25.4 10/19/2022 04:13 AM     BMP:    Lab Results   Component Value Date/Time     10/19/2022 04:13 AM    K 4.3 10/19/2022 04:13 AM    K 3.5 10/16/2022 04:13 AM    CL 96 10/19/2022 04:13 AM    CO2 26 10/19/2022 04:13 AM    BUN 9 10/19/2022 04:13 AM    LABALBU 2.9 10/17/2022 04:23 AM    CREATININE 0.7 10/19/2022 04:13 AM    CALCIUM 7.1 10/19/2022 04:13 AM    GFRAA >60 10/17/2022 04:23 AM    LABGLOM >60 10/19/2022 04:13 AM     PT/INR:    Lab Results   Component Value Date/Time    PROTIME 14.9 10/17/2022 04:23 AM    INR 1.17 10/17/2022 04:23 AM     PTT:  No results found for: APTT[APTT    Urinalysis:     Latest Reference Range & Units 10/13/22 12:44   Color, UA Straw/Yellow  Yellow   Clarity, UA Clear  CLOUDY !    Glucose, UA Negative mg/dL Negative   Bilirubin, Urine Negative  Negative   Ketones, Urine Negative mg/dL Negative   Specific Gravity, UA 1.005 - 1.030  1.015   Blood, Urine Negative  MODERATE !   pH, UA 5.0 - 8.0  6.0 Protein, UA Negative mg/dL 100 ! Urobilinogen, Urine <2.0 E.U./dL 0.2   Nitrite, Urine Negative  Negative   Leukocyte Esterase, Urine Negative  SMALL ! Urine Type   NotGiven   Urinalysis Comments   see below   Urine Reflex to Culture   Yes   WBC, UA 0 - 5 /HPF >100 ! RBC, UA 0 - 4 /HPF see below ! Microscopic Examination   YES   !: Data is abnormal     Urine Culture: NG     Imaging     CT AP 10/11/22  FINDINGS:   Lower Chest: Emphysematous changes. Lung bases otherwise clear. Organs: The unenhanced liver, gallbladder, spleen, pancreas and adrenal   glands are grossly unremarkable. There is moderate right   hydroureteronephrosis. No left hydronephrosis. Probable 3 mm left   nephrolithiasis. GI/Bowel: No dilated loops of bowel or bowel wall thickening. No free air. Moderate amount stool in the colon. Pelvis: The bladder is markedly distended measuring up to 20 cm in the   craniocaudal dimension. There are multifocal areas of nodular wall   thickening throughout the bladder, for example on the left lateral aspect   measuring up to 9 x 3 cm (image 111). Within the base of the bladder, there   is also more focal masslike soft tissue measuring 4 x 3 cm. Prostate gland   is grossly unremarkable. No definite pathologically enlarged adenopathy or   free fluid. Peritoneum/Retroperitoneum: The aorta is tortuous. There is a 3.3 cm   abdominal aortic aneurysm. Severe atherosclerosis is noted. No   pathologically enlarged adenopathy or free fluid. Bones/Soft Tissues: Diffuse osteopenia. Impression   1. Markedly dilated bladder measuring up to 20 cm in the craniocaudal   dimension. Multifocal areas of nodular wall thickening, the largest   measuring up to 9 x 3 cm along the left lateral aspect suspicious for   neoplasm. 2. Moderate right hydroureteronephrosis. 3. 3.3 cm abdominal aortic aneurysm. Results were discussed with Dr. Anabelle Zamora on 10/11/2022 at 19:54. RECOMMENDATIONS:   3.3 cm abdominal aortic aneurysm. Recommend follow-up every 3 years. Impression/Plan:     Bladder mass   Hematuria    Urinary retention  Right hydronephrosis   Enlarged prostate   MATHEUS - resolved   Acute DVT with IVC filter in place      - suspect enlarged prostate leading to retention and right hydro. On flomax- please continue on discharge   - keep mc - he may need to go home with mc in place   - sp cysto TURBT on 10/16 with resection of large tumor - 12 cm. 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 I hand irrigated without any clots noted. Restart slow CBI for today and monitor for clearance.  Would hold off on starting anticoagulation at this point     Neal Barnes PA-C  [facility]

## 2022-10-19 NOTE — PROGRESS NOTES
Physician Progress Note      PATIENT:               Nanda Knott  CSN #:                  [id number]  :                       1955  ADMIT DATE:       10/15/2022 12:28 AM  DISCH DATE:  RESPONDING  PROVIDER #:        Barney Sanderson DO          QUERY TEXT:    Pt admitted with \"Presumed malignant bladder mass/ left iliofemoral deep   venous thrombosis. \"- transfer from [facility]. Discharge and H&P from transfer [facility]    noted documentation \"severe protein malnutrition\". If possible, please   document in progress notes and discharge summary:        The medical record reflects the following:  Risk Factors: BMI-15/chronic illness  Clinical Indicators: H&P and PN- \"Malnutrition:  Regular diet with dietary   supplements. \"  Palliatative care consult- 1. Malnutrition -  Due to decreased   appetite and possible malignancy. Albumin 2.9, total protein 5.0. BMI 15 with   a weight of 95 lbs. Has unintentionally lost about 25 lbs over the past year. Currently on regular diet. Based on [address] and medical course could consider   appetite stimulant. 2.Debility - related to malnutrition, suspected   malignancy, co-morbid conditions. Discharge summary- 10/14- PN- \"#Severe   protein calorie malnutrition\"  Dietary consult- 10/14-\"  Treatment: I&O's, monitor labs and diet intake, could consider appetite   stimulant, supportive care    Thank-You, Kimi Tamez RN, BSN, CCDS  Options provided:  -- Severe protein malnutrition confirmed present on [facility] admission  -- Other - I will add my own diagnosis  -- Disagree - Not applicable / Not valid  -- Disagree - Clinically unable to determine / Unknown  -- Refer to Clinical Documentation Reviewer    PROVIDER RESPONSE TEXT:    The diagnosis of Severe protein malnutrition confirmed present on [facility]'s   admission.     Query created by: Hernesto Perales on 10/18/2022 1:20 PM      Electronically signed by:  Barney Sanderson DO 10/19/2022 10:30 AM

## 2022-10-20 LAB
ANION GAP SERPL CALCULATED.3IONS-SCNC: 6 MMOL/L (ref 3–16)
BASOPHILS ABSOLUTE: 0.1 K/UL (ref 0–0.2)
BASOPHILS RELATIVE PERCENT: 1.2 %
BUN BLDV-MCNC: 9 MG/DL (ref 7–20)
CALCIUM SERPL-MCNC: 7.3 MG/DL (ref 8.3–10.6)
CHLORIDE BLD-SCNC: 97 MMOL/L (ref 99–110)
CO2: 30 MMOL/L (ref 21–32)
CREAT SERPL-MCNC: 0.7 MG/DL (ref 0.8–1.3)
EOSINOPHILS ABSOLUTE: 0 K/UL (ref 0–0.6)
EOSINOPHILS RELATIVE PERCENT: 0.9 %
GFR SERPL CREATININE-BSD FRML MDRD: >60 ML/MIN/{1.73_M2}
GLUCOSE BLD-MCNC: 85 MG/DL (ref 70–99)
HCT VFR BLD CALC: 26.5 % (ref 40.5–52.5)
HEMOGLOBIN: 8.6 G/DL (ref 13.5–17.5)
LYMPHOCYTES ABSOLUTE: 0.5 K/UL (ref 1–5.1)
LYMPHOCYTES RELATIVE PERCENT: 10.5 %
MCH RBC QN AUTO: 26.5 PG (ref 26–34)
MCHC RBC AUTO-ENTMCNC: 32.2 G/DL (ref 31–36)
MCV RBC AUTO: 82.3 FL (ref 80–100)
MONOCYTES ABSOLUTE: 0.2 K/UL (ref 0–1.3)
MONOCYTES RELATIVE PERCENT: 5 %
NEUTROPHILS ABSOLUTE: 3.6 K/UL (ref 1.7–7.7)
NEUTROPHILS RELATIVE PERCENT: 82.4 %
PDW BLD-RTO: 19.9 % (ref 12.4–15.4)
PLATELET # BLD: 156 K/UL (ref 135–450)
PMV BLD AUTO: 6.5 FL (ref 5–10.5)
POTASSIUM SERPL-SCNC: 4.1 MMOL/L (ref 3.5–5.1)
RBC # BLD: 3.23 M/UL (ref 4.2–5.9)
SODIUM BLD-SCNC: 133 MMOL/L (ref 136–145)
WBC # BLD: 4.4 K/UL (ref 4–11)

## 2022-10-20 PROCEDURE — 94761 N-INVAS EAR/PLS OXIMETRY MLT: CPT

## 2022-10-20 PROCEDURE — 85025 COMPLETE CBC W/AUTO DIFF WBC: CPT

## 2022-10-20 PROCEDURE — 2060000000 HC ICU INTERMEDIATE R&B

## 2022-10-20 PROCEDURE — 2700000000 HC OXYGEN THERAPY PER DAY

## 2022-10-20 PROCEDURE — 6370000000 HC RX 637 (ALT 250 FOR IP): Performed by: INTERNAL MEDICINE

## 2022-10-20 PROCEDURE — 2580000003 HC RX 258: Performed by: INTERNAL MEDICINE

## 2022-10-20 PROCEDURE — 6360000002 HC RX W HCPCS: Performed by: INTERNAL MEDICINE

## 2022-10-20 PROCEDURE — 97530 THERAPEUTIC ACTIVITIES: CPT

## 2022-10-20 PROCEDURE — 80048 BASIC METABOLIC PNL TOTAL CA: CPT

## 2022-10-20 PROCEDURE — 97116 GAIT TRAINING THERAPY: CPT

## 2022-10-20 PROCEDURE — 36415 COLL VENOUS BLD VENIPUNCTURE: CPT

## 2022-10-20 RX ADMIN — HYDROMORPHONE HYDROCHLORIDE 0.25 MG: 1 INJECTION, SOLUTION INTRAMUSCULAR; INTRAVENOUS; SUBCUTANEOUS at 08:51

## 2022-10-20 RX ADMIN — OXYCODONE 10 MG: 5 TABLET ORAL at 05:35

## 2022-10-20 RX ADMIN — OXYCODONE 10 MG: 5 TABLET ORAL at 15:30

## 2022-10-20 RX ADMIN — Medication 10 ML: at 20:12

## 2022-10-20 RX ADMIN — OXYCODONE 5 MG: 5 TABLET ORAL at 10:10

## 2022-10-20 RX ADMIN — OXYCODONE 10 MG: 5 TABLET ORAL at 20:11

## 2022-10-20 RX ADMIN — HYDROMORPHONE HYDROCHLORIDE 0.25 MG: 1 INJECTION, SOLUTION INTRAMUSCULAR; INTRAVENOUS; SUBCUTANEOUS at 13:13

## 2022-10-20 RX ADMIN — MIDODRINE HYDROCHLORIDE 10 MG: 5 TABLET ORAL at 11:27

## 2022-10-20 RX ADMIN — OXYCODONE 10 MG: 5 TABLET ORAL at 01:22

## 2022-10-20 RX ADMIN — MIDODRINE HYDROCHLORIDE 10 MG: 5 TABLET ORAL at 08:14

## 2022-10-20 RX ADMIN — Medication 3 MG: at 01:22

## 2022-10-20 RX ADMIN — Medication 3 MG: at 20:12

## 2022-10-20 RX ADMIN — MIDODRINE HYDROCHLORIDE 10 MG: 5 TABLET ORAL at 16:46

## 2022-10-20 RX ADMIN — TAMSULOSIN HYDROCHLORIDE 0.4 MG: 0.4 CAPSULE ORAL at 08:14

## 2022-10-20 ASSESSMENT — PAIN SCALES - GENERAL
PAINLEVEL_OUTOF10: 8
PAINLEVEL_OUTOF10: 10
PAINLEVEL_OUTOF10: 0
PAINLEVEL_OUTOF10: 8
PAINLEVEL_OUTOF10: 6
PAINLEVEL_OUTOF10: 9
PAINLEVEL_OUTOF10: 10
PAINLEVEL_OUTOF10: 5

## 2022-10-20 ASSESSMENT — PAIN DESCRIPTION - LOCATION
LOCATION: BACK;GENERALIZED
LOCATION: BACK
LOCATION: BACK
LOCATION: GENERALIZED
LOCATION: BACK

## 2022-10-20 ASSESSMENT — PAIN DESCRIPTION - DESCRIPTORS: DESCRIPTORS: ACHING

## 2022-10-20 ASSESSMENT — PAIN DESCRIPTION - ORIENTATION: ORIENTATION: LOWER

## 2022-10-20 NOTE — PROGRESS NOTES
Occupational Therapy  Facility/Department: [facility] C4 PCU  Daily Co-Treatment Note  NAME: Nilam Castellano  : 1955  MRN: [id number]    Date of Service: 10/20/2022    Discharge Recommendations:  Subacute/Skilled Nursing Facility  OT Equipment Recommendations  Other: defer DME to D/c facility      Patient Diagnosis(es): The encounter diagnosis was Malignant neoplasm of urinary bladder, unspecified site [facility]). Assessment    Assessment: Pt tolerated therapy session fair this date. Co-tx indiciated d/t increased fatigue over past couple of days and reduced safety awareness per RN; cotx for increased safety throughout session. Pt completed bed mob w/ SBA and STS to RW w/ Sergio and cues for balance. Pt ambulated community distance w/ RW and [id number] w/ max cues for safety, RW management, pacing and line management; pt impulsive w/ decreased safety and environmental hazard awareness posing safety concern. Pt and pt's ex-wife edcuated extensively on d/c rec, benefits of additional rehab, and safety concerns in the home environment w/o 24hr assist. Cont to rec SNF at d/c d/t increased need for physical assistance, decreased safety awareness/cognition, and new potential medical needs to be managed at d/c. Activity Tolerance: Patient limited by endurance; Patient limited by fatigue  Discharge Recommendations: Subacute/Skilled Nursing Facility  Other: defer DME to D/c facility     AM-PAC Daily Activity Inpatient   How much help for putting on and taking off regular lower body clothing?: A Little  How much help for Bathing?: A Lot  How much help for Toileting?: Total  How much help for putting on and taking off regular upper body clothing?: A Little  How much help for taking care of personal grooming?: A Little  How much help for eating meals?: None  AM-PAC Inpatient Daily Activity Raw Score: 16  AM-PAC Inpatient ADL T-Scale Score : 35.96  ADL Inpatient CMS 0-100% Score: 53.32  ADL Inpatient CMS G-Code Modifier : MANISHA Plan   Occupational Therapy Plan  Times Per Week: 3-5x a week  Current Treatment Recommendations: Strengthening;ROM; Functional mobility training; Endurance training;Self-Care / ADL; Safety education & training;Patient/Caregiver education & training;Equipment evaluation, education, & procurement     Restrictions  Restrictions/Precautions  Restrictions/Precautions: Fall Risk;General Precautions; Up as Tolerated  Position Activity Restriction  Other position/activity restrictions: IV, tele, mc, [employer], CBI    Subjective   Subjective  Subjective: pt in bed with daughter present, agreeable to OT with encouragement. Pain: pt reporting 10/10 lower back pain, aching  Orientation  Overall Orientation Status: Within Functional Limits  Orientation Level: Oriented to person;Disoriented to time;Disoriented to place;Oriented to situation  Pain: pt reporting 10/10 lower back pain, aching    Cognition  Overall Cognitive Status: WFL  Arousal/Alertness: Delayed responses to stimuli  Following Commands: Follows one step commands with repetition  Attention Span: Appears intact  Memory: Appears intact  Safety Judgement: Decreased awareness of need for safety;Decreased awareness of need for assistance  Problem Solving: Decreased awareness of errors;Assistance required to correct errors made  Insights: Decreased awareness of deficits  Initiation: Requires cues for some  Sequencing: Requires cues for some  Cognition Comment: pt impulsive w/ amb this date req max cues for safety and pacing      Objective    Vitals  Vitals  Heart Rate: 75  Heart Rate Source: Monitor  BP: 116/69  BP Location: Right upper arm  BP Method: Automatic  Patient Position: Semi fowlers  MAP (Calculated): 84.67  SpO2: 92 %  O2 Device: None (Room air)    Bed Mobility Training  Bed Mobility Training: Yes  Interventions: Verbal cues; Safety awareness training  Supine to Sit: Stand-by assistance (HOB flat)  Sit to Supine:  (pt in chair at EOS)  Balance  Sitting: Intact  Standing: Impaired  Standing - Static: Constant support; Fair  Standing - Dynamic: Constant support;Occasional;Fair (w/ RW)  Transfer Training  Transfer Training: Yes  Sit to Stand: Contact-guard assistance; Adaptive equipment (to RW. cues for balance)  Stand to Sit: Contact-guard assistance; Adaptive equipment (w/ RW. max cues for safety and RW management)  Bed to Chair: Minimum assistance;Contact-guard assistance; Adaptive equipment;Assist X2 (w/ RW. max cues for RW management, pacing and line management)  Gait  Overall Level of Assistance: Contact-guard assistance;Minimum assistance; Adaptive equipment;Assist X2 (w/ RW. max cues for RW management, pacing, and line management for safety)  Interventions: Verbal cues; Safety awareness training; Tactile cues     ADL  Toileting: Dependent/Total  Toileting Skilled Clinical Factors: jesusita  Additional Comments: pt declined ADL participation     Safety Devices  Type of Devices: All fall risk precautions in place; Patient at risk for falls;Gait belt;Nurse notified;Call light within reach; Left in chair;Chair alarm in place  Restraints  Restraints Initially in Place: No     Patient Education  Education Given To: Patient; Family  Education Provided: Role of Therapy;Plan of Care;Energy Conservation; Family Education;Transfer Training;Precautions; Home Exercise Program  Education Provided Comments: disease specific: benefits of up to chair/prevention of complication of bedrest, use of call light when ready to go back to bed; d/c recommendations for SNF vs. 24hr supervision  Education Method: Demonstration;Verbal  Barriers to Learning: Hearing  Education Outcome: Continued education needed;Verbalized understanding    Disease Specific Education: Pt educated on importance of OOB mobility, prevention of complications of bedrest, and general safety during hospitalization.  Pt verbalized understanding     Goals  Short Term Goals  Time Frame for Short Term Goals: 1 week unless otherwise specified 10/22/22  Short Term Goal 1: Pt will complete 3 ADLs in stance at sink with LRAD and SBA- goal progressing 10/17  Short Term Goal 2: Pt will complete x15 reps of BUE AROM exercises by 10/18/22- GOAL MET 10/17  Short Term Goal 3: Pt will complete toileting ADL with supervision- goal progressing 10/17  Patient Goals   Patient goals : \"go home\"       Therapy Time   Individual Concurrent Group Co-treatment   Time In 1448         Time Out 1527         Minutes 39         Timed Code Treatment Minutes: 39 Minutes     If pt is unable to be seen after this session, please let this note serve as discharge summary. Please see case management note for discharge disposition. Thank you.      Alyssia Macias OTR/JESS

## 2022-10-20 NOTE — PROGRESS NOTES
Physical Therapy  Facility/Department: [facility] C4 PCU  Daily Treatment Note  NAME: Talon Doe  : 1955  MRN: [id number]    Date of Service: 10/20/2022    Discharge Recommendations:  Subacute/Skilled Nursing Facility   PT Equipment Recommendations  Equipment Needed: No  Other: Defer to next level of care    Patient Diagnosis(es): The encounter diagnosis was Malignant neoplasm of urinary bladder, unspecified site [facility]). Assessment   Assessment: Seen at cotx with OT today due to concerns from  about increased weakness and fatigue this date inorder to progress functional mobility appropriate. Completed bed mobility with SBA and transfers with CGA. Ambulate ~200 ft with CGA to min Ax2 for line management and safety, Mod A for LOB and max VC for speed and walker navigation. Navigated up<>down 4 stairs with min to Mod A for line managment and safety, max VC for sequencing. Cont. to rec SNF upon DC, pt is not safe to return home due to poor cognition and poor safety awarness during functional mobility. Activity Tolerance: Patient limited by endurance; Patient limited by fatigue  Equipment Needed: No  Other: Defer to next level of care     Plan    Physcial Therapy Plan  General Plan: 3-5 times per week  Current Treatment Recommendations: Strengthening;Balance training;Functional mobility training;Transfer training;Gait training; Endurance training; Safety education & training; Therapeutic activities     Restrictions  Restrictions/Precautions  Restrictions/Precautions: Fall Risk, General Precautions, Up as Tolerated  Position Activity Restriction  Other position/activity restrictions: IV, tele, mc, Avasys, CBI     Subjective    Subjective  Subjective: pt laying in bed upon arrival  Pain: pt reporting 10/10 lower back pain, aching  Orientation  Overall Orientation Status: Within Functional Limits  Orientation Level: Oriented to person;Disoriented to time;Disoriented to place;Oriented to situation  Cognition  Overall Cognitive Status: WFL  Arousal/Alertness: Delayed responses to stimuli  Following Commands: Follows one step commands with repetition  Attention Span: Appears intact  Memory: Appears intact  Safety Judgement: Decreased awareness of need for safety;Decreased awareness of need for assistance  Problem Solving: Decreased awareness of errors;Assistance required to correct errors made  Insights: Decreased awareness of deficits  Initiation: Requires cues for some  Sequencing: Requires cues for some  Cognition Comment: pt impulsive w/ amb this date req max cues for safety and pacing     Objective   Vitals  Heart Rate: 75  BP: (!) 116/59  BP Location: Right upper arm  MAP (Calculated): 78  SpO2: 92 %  O2 Device: None (Room air)  Bed Mobility Training  Bed Mobility Training: Yes  Interventions: Verbal cues; Safety awareness training  Supine to Sit: Stand-by assistance (HOB flat)  Sit to Supine:  (pt in chair at EOS)  Balance  Sitting: Intact  Standing: Impaired  Standing - Static: Constant support; Fair  Standing - Dynamic: Constant support;Occasional;Fair (w/ RW)  Transfer Training  Transfer Training: Yes  Sit to Stand: Contact-guard assistance; Adaptive equipment (to RW. cues for balance)  Stand to Sit: Contact-guard assistance; Adaptive equipment (w/ RW. max cues for safety and RW management)  Bed to Chair: Minimum assistance;Contact-guard assistance; Adaptive equipment;Assist X2 (w/ RW. max cues for RW management, pacing and line management)  Gait Training  Gait Training: Yes (Pt ambulates with fast speed and cammy with RW, 1-2 LOB, requires max VC for safety and assistance for slowing speed and line managment)  Gait  Overall Level of Assistance: Contact-guard assistance;Minimum assistance; Adaptive equipment;Assist X2;Moderate assistance (w/ RW. max cues for RW management, pacing, and line management for safety. Mod A for LOB)  Interventions: Verbal cues; Safety awareness training; Tactile cues  Speed/Lucia: Accelerated; Fluctuations  Step Length: Right shortened;Left shortened  Distance (ft): 200 Feet  Assistive Device: Walker, rolling  Rail Use: Both  Stairs - Level of Assistance: Assist X2;Minimum assistance; Moderate assistance (Required Ax2 for line managment and safety due to impaiored balance, max VC for sequencing)  Number of Stairs Trained: 4  Wheelchair Management  Wheelchair Management: No           Safety Devices  Type of Devices: All fall risk precautions in place; Patient at risk for falls;Gait belt;Nurse notified;Call light within reach; Left in chair;Chair alarm in place  Restraints  Restraints Initially in Place: No     [facility] 6 Clicks Inpatient Mobility:  AM-PAC Mobility Inpatient   How much difficulty turning over in bed?: None  How much difficulty sitting down on / standing up from a chair with arms?: A Little  How much difficulty moving from lying on back to sitting on side of bed?: None  How much help from another person moving to and from a bed to a chair?: A Little  How much help from another person needed to walk in hospital room?: A Lot (Requires max cues and mod assistance with RW for safety and for LOB)  How much help from another person for climbing 3-5 steps with a railing?: A Lot (Requires Max cues and mod A for safety and line and equipment navigation and sequencing)  AM-PAC Inpatient Mobility Raw Score : 18  AM-PAC Inpatient T-Scale Score : 43.63  Mobility Inpatient CMS 0-100% Score: 46.58  Mobility Inpatient CMS G-Code Modifier : CK    Goals  Short Term Goals  Time Frame for Short Term Goals: 1 week (10/22) unless otherwise specified  Short Term Goal 1: pt to be modified indep for transfers  -1018 sba  Short Term Goal 2: pt to amb with least restrictive or no device 150 ft supervised   -1018 sba rw 20'  Short Term Goal 3: Pt to participate in Therapeutic exercises 10-12 reps by 10/18 -- extend to 10/22  Patient Goals   Patient Goals : \"to get stronger\"    Education  Patient Education  Education Given To: Patient; Family  Education Provided: Role of Therapy;Plan of Care;Equipment;Transfer Training  Education Provided Comments: Max education to pt and family on purpose of DC reccomendations for safety  Education Method: Verbal  Barriers to Learning: Hearing  Education Outcome: Verbalized understanding    Therapy Time   Individual Concurrent Group Co-treatment   Time In 1448         Time Out 1527         Minutes 39         Timed Code Treatment Minutes: [address] [facility] Kristopher, PT, DPT    If pt is unable to be seen after this session, please let this note serve as discharge summary. Please see case management note for discharge disposition. Thank you.

## 2022-10-20 NOTE — PROGRESS NOTES
Hospitalist Progress Note      PCP: NALINI Enciso CNP    Date of Admission: 10/15/2022    Chief Complaint: Sepsis    Hospital Course:     Admitted from [facility], initially hospitalized 10/13. He had seen a urologist for a year of progressive urinary symptoms. The urologist ordered an abdominopelvic CT scan. The scan showed a very distended bladder and a bladder mass. He was initially hypothermic and hypotensive. A mc catheter was placed (3L urine drained initially) and empiric antibiotics (cefepime, vancomycin) were started with a working diagnosis of septic shock due to UTI. He was also found to have an MATHEUS w/ presenting SCr of 4.8. He was also found to have an extensive left lower extremity DVT. IVC filter was placed. Patient with low grade papillary urothelial carcinoma s/p cysto TURBT on 10.16 with resection     Subjective:   Patient with sore throat and back pain but resting comfortably.     Denies any chest pain, shortness of breath, numbness, tingling, fever and/or chills        Medications:  Reviewed    Infusion Medications    sodium chloride      sodium chloride      sodium chloride       Scheduled Medications    lidocaine  1 patch TransDERmal Daily    tamsulosin  0.4 mg Oral Daily    midodrine  10 mg Oral TID WC    nicotine  1 patch TransDERmal Daily     PRN Meds: sodium chloride, oxyCODONE **OR** oxyCODONE, HYDROmorphone, phenol, sodium chloride, sodium chloride, acetaminophen **OR** acetaminophen, ondansetron **OR** ondansetron, polyethylene glycol, sodium chloride flush, melatonin, calcium carbonate, ipratropium-albuterol      Intake/Output Summary (Last 24 hours) at 10/20/2022 1454  Last data filed at 10/20/2022 1326  Gross per 24 hour   Intake 3100 ml   Output 1575 ml   Net 1525 ml         Physical Exam Performed:    /69   Pulse 75   Temp 99.1 °F (37.3 °C) (Oral)   Resp 16   Wt 100 lb 1.4 oz (45.4 kg)   SpO2 92%   BMI 16.15 kg/m²     General appearance: No apparent distress, appears older than stated age and cooperative. Thin  HEENT: Pupils equal, round, and reactive to light. Conjunctivae/corneas clear. Throat clear  Neck: Supple, with full range of motion. No jugular venous distention. Trachea midline. Respiratory:  Normal respiratory effort. Mild wheezing throughout   Cardiovascular: Regular rate and rhythm with normal S1/S2 without murmurs, rubs or gallops. Abdomen: Soft,suprapubic tenderness, non-distended with normal bowel sounds. Musculoskeletal: No clubbing, cyanosis or edema bilaterally. Full range of motion without deformity. Skin: Skin color, texture, turgor normal.  No rashes or lesions. Neurologic:  Neurovascularly intact without any focal sensory/motor deficits. Cranial nerves: II-XII intact, grossly non-focal. Except very poor hearing   : Alexander in place. CBI   Psychiatric: Alert and oriented, thought content appropriate, normal insight  Capillary Refill: Brisk, 3 seconds, normal   Peripheral Pulses: +2 palpable, equal bilaterally       Labs:   Recent Labs     10/18/22  0415 10/18/22  1759 10/19/22  0413 10/20/22  0436   WBC 4.7  --  4.4 4.4   HGB 6.8* 7.7* 8.2* 8.6*   HCT 21.4* 24.6* 25.4* 26.5*     --  151 156       Recent Labs     10/18/22  0415 10/19/22  0413 10/20/22  0436   * 130* 133*   K 4.1 4.3 4.1    96* 97*   CO2 28 26 30   BUN 11 9 9   CREATININE 0.7* 0.7* 0.7*   CALCIUM 7.4* 7.1* 7.3*       No results for input(s): AST, ALT, BILIDIR, BILITOT, ALKPHOS in the last 72 hours. No results for input(s): INR in the last 72 hours. No results for input(s): Debbie Kings in the last 72 hours.     Urinalysis:      Lab Results   Component Value Date/Time    NITRU Negative 10/13/2022 12:44 PM    WBCUA >100 10/13/2022 12:44 PM    RBCUA see below 10/13/2022 12:44 PM    BLOODU MODERATE 10/13/2022 12:44 PM    SPECGRAV 1.015 10/13/2022 12:44 PM    GLUCOSEU Negative 10/13/2022 12:44 PM       Radiology:  CT ABDOMEN PELVIS W IV CONTRAST Additional Contrast? None   Final Result   Decompressed urinary bladder, with Alexander catheter. Bladder wall appears   thickened, which is nonspecific, related to nondistention and possible   postoperative changes or cystitis. There is hemorrhage or blood clot in the   non dependent bladder, source of bleeding unknown. There is no hydronephrosis. 3.3 cm infrarenal abdominal aortic aneurysm. 3 year follow-up imaging   recommended. 3rd spacing of fluid, small bilateral pleural effusions and anasarca. RECOMMENDATIONS:   Pathology: 3.2 cm infrarenal abdominal aortic aneurysm. Recommend follow-up every 3 years. Reference: J Am Jacky Radiol 1306;26:[id number]. Assessment/Plan:    Active Hospital Problems    Diagnosis     Mass of urinary bladder [N32.89]      Priority: Medium    Tobacco abuse [Z72.0]      Priority: Medium    Acute deep vein thrombosis (DVT) of iliac vein of left lower extremity (HCC) [I82.422]      Priority: Medium    Acute deep vein thrombosis (DVT) of calf muscle vein of left lower extremity (HCC) [I82.462]      Priority: Medium    Urinary retention [R33.9]      Priority: Medium    MATHEUS (acute kidney injury) ([address] [N17.9]      Priority: Medium    Acute cystitis with hematuria [N30.01]      Priority: Medium    Hematuria [R31.9]      Priority: Medium    Microcytic anemia [D50.9]      Priority: Medium     Bladder mass and extreme bladder distension noted on CT which has now been decompressed with Alexander catheter. Presumed malignant s/p cystoscopy transurethral resection bladder (10/16, Dr Monique Durham). Bladder tumor was almost resected completely. Continue to hold blood thinners due to high risk for re-bleeding. Alexander to remain in place. Continue flomax. Back on CBI.     Low grade papillary urothelial carcinoma     Abdominal pain and back pain - will get CT a/p in view of recent     Acute on chronic Microcytic anemia: Post op blood loss anemia Hemoglobin decreased from 9.4 gm/dL on admission to 6.7 gm/dL. 2 u pRBC given this admission. Continue to monitor with Hgb checks. Extensive left iliofemoral deep venous thrombosis with contraindication to anticoagulation due anemia, hematuria and positive FOBT s/p IVC filter placement (10/15, Dr Thi Lino), recommend starting anticoagulation when/if able and if on [facility] and tolerates, then filter should be removed. Acute kidney injury with anion gap metabolic acidosis considered Post-renal and resolved with resolution of bladder outlet obstruction. Nephrology signed off     Sore throat - no erythema or thrush. Oral spray prn      Pyuria -   No bacteriuria. in the context of gross hematuria. Blood and Urine cx 10/13 are NGTD. Was on cefepime and vancomycin for 5 days. COPD, Not in exacerbation. on supplemental oxygen to maintain O2sat 90-94%. IS encouraged. Hypokalemia:  Replaced with IV potassium chloride to goal K+ > 4.0 mmol/L. Hypomagnesemia:  Replaced with IV magnesium sulfate to goal Mg > 2.0 mg/dL. Lab values reviewed    Hypovolemic hyponatremia responding to volume replacement. Severe protein Malnutrition POA:  Regular diet with dietary supplements    DVT Prophylaxis: SCDs  Diet: ADULT DIET; Regular  Code Status: Full Code  PT/OT Eval Status: Recommend subacute/SNF    Dispo - Pending urology recommendations. Strongly encouraging SNF as patient will likely have mc in place and possible O2 and with his current condition, high risk of falls.   AC will not be started at discharge     Appropriate for A1 Discharge Unit: No      Jf Aixa, DO

## 2022-10-20 NOTE — CARE COORDINATION
Spoke with RN regarding patient appears weaker today. He has been refusing SNF though therapy recommends it. He lives alone and may need to go home with mc cath and new home O2. His initial AMPAC was 20 with therapy which is high and likely would be denied by insurance. Nancy Malhotra in PT will see him again today and re-evaluate. She will adjust AMPAC if indicated. Spoke with patient and  ex wife Bushra Arita at bedside and he is more agreeable today to SNF. Provided a [facility] SNF list. They will review it and speak with his daughter and give writer choice today. Will need [facility] pre-cert once facility choice determined.

## 2022-10-20 NOTE — PLAN OF CARE
Problem: Discharge Planning  Goal: Discharge to home or other facility with appropriate resources  Outcome: Progressing  Flowsheets (Taken 10/20/2022 0830)  Discharge to home or other facility with appropriate resources:   Identify barriers to discharge with patient and caregiver   Arrange for needed discharge resources and transportation as appropriate     Problem: ABCDS Injury Assessment  Goal: Absence of physical injury  Outcome: Progressing     Problem: Anxiety  Goal: Will report anxiety at manageable levels  Description: INTERVENTIONS:  1. Administer medication as ordered  2. Teach and rehearse alternative coping skills  3. Provide emotional support with 1:1 interaction with staff  Outcome: Progressing     Problem: Coping  Goal: Pt/Family able to verbalize concerns and demonstrate effective coping strategies  Description: INTERVENTIONS:  1. Assist patient/family to identify coping skills, available support systems and cultural and spiritual values  2. Provide emotional support, including active listening and acknowledgement of concerns of patient and caregivers  3. Reduce environmental stimuli, as able  4. Instruct patient/family in relaxation techniques, as appropriate  5. Assess for spiritual pain/suffering and initiate Spiritual Care, Psychosocial Clinical Specialist consults as needed  Outcome: Progressing     Problem: Change in Body Image  Goal: Pt/Family communicate acceptance of loss or change in body image and feel psychological comfort and peace  Description: INTERVENTIONS:  1. Assess patient/family anxiety and grief process related to change in body image, loss of functional status, loss of sense of self, and forgiveness  2. Provide emotional and spiritual support  3. Provide information about the patient's health status with consideration of family and cultural values  4. Communicate willingness to discuss loss and facilitate grief process with patient/family as appropriate  5.  Emphasize sustaining relationships within family system and community, or kumar/spiritual traditions  6. Initiate Spiritual Care, Psychosocial Clinical Specialist consult as needed  Outcome: Progressing     Problem: Decision Making  Goal: Pt/Family able to effectively weigh alternatives and participate in decision making related to treatment and care  Description: INTERVENTIONS:  1. Determine when there are differences between patient's view, family's view, and healthcare provider's view of condition  2. Facilitate patient and family articulation of goals for care  3. Help patient and family identify pros/cons of alternative solutions  4. Provide information as requested by patient/family  5. Respect patient/family right to receive or not to receive information  6. Serve as a liaison between patient and family and health care team  7. Initiate Consults from Ethics, Palliative Care or initiate [address] [place] [address] as is appropriate  Outcome: Progressing     Problem: Confusion  Goal: Confusion, delirium, dementia, or psychosis is improved or at baseline  Description: INTERVENTIONS:  1. Assess for possible contributors to thought disturbance, including medications, impaired vision or hearing, underlying metabolic abnormalities, dehydration, psychiatric diagnoses, and notify attending LIP  2. [place] high risk fall precautions, as indicated  3. Provide frequent short contacts to provide reality reorientation, refocusing and direction  4. Decrease environmental stimuli, including noise as appropriate  5. Monitor and intervene to maintain adequate nutrition, hydration, elimination, sleep and activity  6. If unable to ensure safety without constant attention obtain sitter and review sitter guidelines with assigned personnel  7.  Initiate Psychosocial CNS and Spiritual Care consult, as indicated  Outcome: Progressing     Problem: Behavior  Goal: Pt/Family maintain appropriate behavior and adhere to behavioral management agreement, if implemented  Description: INTERVENTIONS:  1. Assess patient/family's coping skills and  non-compliant behavior (including use of illegal substances)  2. Notify security of behavior or suspected illegal substances which indicate the need for search of the family and/or belongings  3. Encourage verbalization of thoughts and concerns in a socially appropriate manner  4. Utilize positive, consistent limit setting strategies supporting safety of patient, staff and others  5. Encourage participation in the decision making process about the behavioral management agreement  6. If a visitor's behavior poses a threat to safety call refer to organization policy. 7. Initiate consult with , Psychosocial CNS, Spiritual Care as appropriate  Outcome: Progressing     Problem: Spiritual Care  Goal: Pt/Family able to move forward in process of forgiving self, others, and/or higher power  Description: INTERVENTIONS:  1. Assist patient/family to overcome blocks to healing by use of spiritual practices (prayer, meditation, guided imagery, reiki, breath work, etc). 2. De-myth guilt and help patient/family identify possible irrational spiritual/cultural beliefs and values. 3. Explore possibilities of making amends & reconciliation with self, others, and/or a greater power. 4. Guide patient/family in identifying painful feelings of guilt. 5. Help patient/famiy explore and identify spiritual beliefs, cultural understandings or values that may help or hinder letting go of issue. 6. Help patient/family explore feelings of anger, bitterness, resentment. 7. Help patient/family identify and examine the situation in which these feelings are experienced. 8. Help patient/family identify destructive displacement of feelings onto other individuals. 9. Invite use of sacraments/rituals/ceremonies as appropriate (e.g. - confession, anointing, smudging).   10. Refer patient/family to formal counseling and/or to kumar community for further support work.   Outcome: Progressing     Problem: Neurosensory - Adult  Goal: Achieves stable or improved neurological status  Outcome: Progressing  Goal: Absence of seizures  Outcome: Progressing  Goal: Remains free of injury related to seizures activity  Outcome: Progressing  Goal: Achieves maximal functionality and self care  Outcome: Progressing     Problem: Respiratory - Adult  Goal: Achieves optimal ventilation and oxygenation  Outcome: Progressing     Problem: Cardiovascular - Adult  Goal: Maintains optimal cardiac output and hemodynamic stability  Outcome: Progressing  Goal: Absence of cardiac dysrhythmias or at baseline  Outcome: Progressing     Problem: Skin/Tissue Integrity - Adult  Goal: Skin integrity remains intact  Outcome: Progressing  Goal: Incisions, wounds, or drain sites healing without S/S of infection  Outcome: Progressing  Goal: Oral mucous membranes remain intact  Outcome: Progressing     Problem: Musculoskeletal - Adult  Goal: Return mobility to safest level of function  Outcome: Progressing  Goal: Maintain proper alignment of affected body part  Outcome: Progressing  Goal: Return ADL status to a safe level of function  Outcome: Progressing     Problem: Gastrointestinal - Adult  Goal: Minimal or absence of nausea and vomiting  Outcome: Progressing  Goal: Maintains or returns to baseline bowel function  Outcome: Progressing  Goal: Maintains adequate nutritional intake  Outcome: Progressing  Goal: Establish and maintain optimal ostomy function  Outcome: Progressing     Problem: Genitourinary - Adult  Goal: Absence of urinary retention  Outcome: Progressing  Goal: Urinary catheter remains patent  Outcome: Progressing     Problem: Infection - Adult  Goal: Absence of infection at discharge  Outcome: Progressing  Goal: Absence of infection during hospitalization  Outcome: Progressing  Goal: Absence of fever/infection during anticipated neutropenic period  Outcome: Progressing     Problem: Metabolic/Fluid and Electrolytes - Adult  Goal: Electrolytes maintained within normal limits  Outcome: Progressing  Goal: Hemodynamic stability and optimal renal function maintained  Outcome: Progressing  Goal: Glucose maintained within prescribed range  Outcome: Progressing     Problem: Hematologic - Adult  Goal: Maintains hematologic stability  Outcome: Progressing     Problem: Skin/Tissue Integrity  Goal: Absence of new skin breakdown  Description: 1. Monitor for areas of redness and/or skin breakdown  2. Assess vascular access sites hourly  3. Every 4-6 hours minimum:  Change oxygen saturation probe site  4. Every 4-6 hours:  If on nasal continuous positive airway pressure, respiratory therapy assess nares and determine need for appliance change or resting period.   Outcome: Progressing     Problem: Pain  Goal: Verbalizes/displays adequate comfort level or baseline comfort level  Outcome: Progressing

## 2022-10-20 NOTE — PROGRESS NOTES
Urology Progress Note      POD4 cysto TURBT    Subjective: Gladstone So denies complaints. No issues with mc, but urine is clear today     Vitals:  BP 97/66   Pulse 100   Temp 99.1 °F (37.3 °C) (Oral)   Resp 16   Wt 100 lb 1.4 oz (45.4 kg)   SpO2 94%   BMI 16.15 kg/m²   Temp  Av.4 °F (36.9 °C)  Min: 97.6 °F (36.4 °C)  Max: 99.1 °F (37.3 °C)    Intake/Output Summary (Last 24 hours) at 10/20/2022 1139  Last data filed at 10/20/2022 9025  Gross per 24 hour   Intake 3100 ml   Output 1475 ml   Net 1625 ml         Exam:   Physical:  Cachectic male who appears older than stated age  Mood indicates no abnormalities. Pt doesnt appear depressed. Very hard of hearing  Orientated to time and place         Male :  Mc in place with clear urine. Off CBI    Labs:  WBC:    Lab Results   Component Value Date/Time    WBC 4.4 10/20/2022 04:36 AM     Hemoglobin/Hematocrit:    Lab Results   Component Value Date/Time    HGB 8.6 10/20/2022 04:36 AM    HCT 26.5 10/20/2022 04:36 AM     BMP:    Lab Results   Component Value Date/Time     10/20/2022 04:36 AM    K 4.1 10/20/2022 04:36 AM    K 3.5 10/16/2022 04:13 AM    CL 97 10/20/2022 04:36 AM    CO2 30 10/20/2022 04:36 AM    BUN 9 10/20/2022 04:36 AM    LABALBU 2.9 10/17/2022 04:23 AM    CREATININE 0.7 10/20/2022 04:36 AM    CALCIUM 7.3 10/20/2022 04:36 AM    GFRAA >60 10/17/2022 04:23 AM    LABGLOM >60 10/20/2022 04:36 AM     PT/INR:    Lab Results   Component Value Date/Time    PROTIME 14.9 10/17/2022 04:23 AM    INR 1.17 10/17/2022 04:23 AM     PTT:  No results found for: APTT[APTT    Urinalysis:     Latest Reference Range & Units 10/13/22 12:44   Color, UA Straw/Yellow  Yellow   Clarity, UA Clear  CLOUDY !    Glucose, UA Negative mg/dL Negative   Bilirubin, Urine Negative  Negative   Ketones, Urine Negative mg/dL Negative   Specific Gravity, UA 1.005 - 1.030  1.015   Blood, Urine Negative  MODERATE !   pH, UA 5.0 - 8.0  6.0   Protein, UA Negative mg/dL 100 !   Urobilinogen, Urine <2.0 E.U./dL 0.2   Nitrite, Urine Negative  Negative   Leukocyte Esterase, Urine Negative  SMALL ! Urine Type   NotGiven   Urinalysis Comments   see below   Urine Reflex to Culture   Yes   WBC, UA 0 - 5 /HPF >100 ! RBC, UA 0 - 4 /HPF see below ! Microscopic Examination   YES   !: Data is abnormal     Urine Culture: NG     Imaging     CT AP 10/11/22  FINDINGS:   Lower Chest: Emphysematous changes. Lung bases otherwise clear. Organs: The unenhanced liver, gallbladder, spleen, pancreas and adrenal   glands are grossly unremarkable. There is moderate right   hydroureteronephrosis. No left hydronephrosis. Probable 3 mm left   nephrolithiasis. GI/Bowel: No dilated loops of bowel or bowel wall thickening. No free air. Moderate amount stool in the colon. Pelvis: The bladder is markedly distended measuring up to 20 cm in the   craniocaudal dimension. There are multifocal areas of nodular wall   thickening throughout the bladder, for example on the left lateral aspect   measuring up to 9 x 3 cm (image 111). Within the base of the bladder, there   is also more focal masslike soft tissue measuring 4 x 3 cm. Prostate gland   is grossly unremarkable. No definite pathologically enlarged adenopathy or   free fluid. Peritoneum/Retroperitoneum: The aorta is tortuous. There is a 3.3 cm   abdominal aortic aneurysm. Severe atherosclerosis is noted. No   pathologically enlarged adenopathy or free fluid. Bones/Soft Tissues: Diffuse osteopenia. Impression   1. Markedly dilated bladder measuring up to 20 cm in the craniocaudal   dimension. Multifocal areas of nodular wall thickening, the largest   measuring up to 9 x 3 cm along the left lateral aspect suspicious for   neoplasm. 2. Moderate right hydroureteronephrosis. 3. 3.3 cm abdominal aortic aneurysm. Results were discussed with Dr. Trena Puritt on 10/11/2022 at 19:54.        RECOMMENDATIONS:   3.3 cm abdominal aortic aneurysm. Recommend follow-up every 3 years. Impression/Plan:     Bladder mass   Hematuria    Urinary retention  Right hydronephrosis   Enlarged prostate   MATHEUS - resolved   Acute DVT with IVC filter in place      - suspect enlarged prostate leading to retention and right hydro. On flomax- please continue on discharge   - sp cysto TURBT on 10/16 with resection of large tumor - 12 cm. 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   Would hold off on starting [facility] at this point     Sydnee Coronado PA-C  [facility]

## 2022-10-21 LAB
ANION GAP SERPL CALCULATED.3IONS-SCNC: 6 MMOL/L (ref 3–16)
BASOPHILS ABSOLUTE: 0 K/UL (ref 0–0.2)
BASOPHILS RELATIVE PERCENT: 1.2 %
BUN BLDV-MCNC: 9 MG/DL (ref 7–20)
CALCIUM IONIZED: 0.99 MMOL/L (ref 1.12–1.32)
CALCIUM SERPL-MCNC: 7.1 MG/DL (ref 8.3–10.6)
CHLORIDE BLD-SCNC: 98 MMOL/L (ref 99–110)
CO2: 28 MMOL/L (ref 21–32)
CREAT SERPL-MCNC: 0.7 MG/DL (ref 0.8–1.3)
EOSINOPHILS ABSOLUTE: 0 K/UL (ref 0–0.6)
EOSINOPHILS RELATIVE PERCENT: 0.6 %
GFR SERPL CREATININE-BSD FRML MDRD: >60 ML/MIN/{1.73_M2}
GLUCOSE BLD-MCNC: 81 MG/DL (ref 70–99)
HCT VFR BLD CALC: 24.7 % (ref 40.5–52.5)
HEMOGLOBIN: 8 G/DL (ref 13.5–17.5)
LYMPHOCYTES ABSOLUTE: 0.3 K/UL (ref 1–5.1)
LYMPHOCYTES RELATIVE PERCENT: 9.7 %
MCH RBC QN AUTO: 26.5 PG (ref 26–34)
MCHC RBC AUTO-ENTMCNC: 32.3 G/DL (ref 31–36)
MCV RBC AUTO: 81.8 FL (ref 80–100)
MONOCYTES ABSOLUTE: 0.2 K/UL (ref 0–1.3)
MONOCYTES RELATIVE PERCENT: 5.4 %
NEUTROPHILS ABSOLUTE: 2.9 K/UL (ref 1.7–7.7)
NEUTROPHILS RELATIVE PERCENT: 83.1 %
PDW BLD-RTO: 19.7 % (ref 12.4–15.4)
PH VENOUS: 7.48 (ref 7.35–7.45)
PHOSPHORUS: 2.6 MG/DL (ref 2.5–4.9)
PLATELET # BLD: 148 K/UL (ref 135–450)
PMV BLD AUTO: 6.5 FL (ref 5–10.5)
POTASSIUM SERPL-SCNC: 3.8 MMOL/L (ref 3.5–5.1)
RBC # BLD: 3.01 M/UL (ref 4.2–5.9)
SODIUM BLD-SCNC: 132 MMOL/L (ref 136–145)
WBC # BLD: 3.5 K/UL (ref 4–11)

## 2022-10-21 PROCEDURE — 80048 BASIC METABOLIC PNL TOTAL CA: CPT

## 2022-10-21 PROCEDURE — 94761 N-INVAS EAR/PLS OXIMETRY MLT: CPT

## 2022-10-21 PROCEDURE — 6370000000 HC RX 637 (ALT 250 FOR IP): Performed by: INTERNAL MEDICINE

## 2022-10-21 PROCEDURE — 82330 ASSAY OF CALCIUM: CPT

## 2022-10-21 PROCEDURE — 85025 COMPLETE CBC W/AUTO DIFF WBC: CPT

## 2022-10-21 PROCEDURE — 6360000002 HC RX W HCPCS: Performed by: INTERNAL MEDICINE

## 2022-10-21 PROCEDURE — 51702 INSERT TEMP BLADDER CATH: CPT

## 2022-10-21 PROCEDURE — 36415 COLL VENOUS BLD VENIPUNCTURE: CPT

## 2022-10-21 PROCEDURE — 2060000000 HC ICU INTERMEDIATE R&B

## 2022-10-21 PROCEDURE — 84100 ASSAY OF PHOSPHORUS: CPT

## 2022-10-21 PROCEDURE — 2700000000 HC OXYGEN THERAPY PER DAY

## 2022-10-21 RX ORDER — CALCIUM CARBONATE 200(500)MG
500 TABLET,CHEWABLE ORAL 3 TIMES DAILY
Status: DISCONTINUED | OUTPATIENT
Start: 2022-10-21 | End: 2022-10-22 | Stop reason: HOSPADM

## 2022-10-21 RX ORDER — GUAIFENESIN 600 MG/1
600 TABLET, EXTENDED RELEASE ORAL 2 TIMES DAILY
Status: DISCONTINUED | OUTPATIENT
Start: 2022-10-21 | End: 2022-10-22 | Stop reason: HOSPADM

## 2022-10-21 RX ADMIN — GUAIFENESIN 600 MG: 600 TABLET, EXTENDED RELEASE ORAL at 22:13

## 2022-10-21 RX ADMIN — GUAIFENESIN 600 MG: 600 TABLET, EXTENDED RELEASE ORAL at 17:03

## 2022-10-21 RX ADMIN — OXYCODONE 10 MG: 5 TABLET ORAL at 17:03

## 2022-10-21 RX ADMIN — OXYCODONE 10 MG: 5 TABLET ORAL at 22:12

## 2022-10-21 RX ADMIN — MIDODRINE HYDROCHLORIDE 10 MG: 5 TABLET ORAL at 17:02

## 2022-10-21 RX ADMIN — HYDROMORPHONE HYDROCHLORIDE 0.25 MG: 1 INJECTION, SOLUTION INTRAMUSCULAR; INTRAVENOUS; SUBCUTANEOUS at 18:30

## 2022-10-21 RX ADMIN — ANTACID TABLETS 500 MG: 500 TABLET, CHEWABLE ORAL at 17:03

## 2022-10-21 RX ADMIN — ANTACID TABLETS 500 MG: 500 TABLET, CHEWABLE ORAL at 22:13

## 2022-10-21 RX ADMIN — MIDODRINE HYDROCHLORIDE 10 MG: 5 TABLET ORAL at 12:45

## 2022-10-21 RX ADMIN — MIDODRINE HYDROCHLORIDE 10 MG: 5 TABLET ORAL at 09:36

## 2022-10-21 RX ADMIN — OXYCODONE 5 MG: 5 TABLET ORAL at 12:50

## 2022-10-21 RX ADMIN — TAMSULOSIN HYDROCHLORIDE 0.4 MG: 0.4 CAPSULE ORAL at 09:41

## 2022-10-21 ASSESSMENT — PAIN DESCRIPTION - LOCATION
LOCATION: BACK

## 2022-10-21 ASSESSMENT — PAIN DESCRIPTION - DESCRIPTORS
DESCRIPTORS: ACHING

## 2022-10-21 ASSESSMENT — PAIN DESCRIPTION - ORIENTATION
ORIENTATION: MID
ORIENTATION: LOWER

## 2022-10-21 ASSESSMENT — PAIN SCALES - GENERAL
PAINLEVEL_OUTOF10: 0
PAINLEVEL_OUTOF10: 10
PAINLEVEL_OUTOF10: 8
PAINLEVEL_OUTOF10: 10
PAINLEVEL_OUTOF10: 6
PAINLEVEL_OUTOF10: 10
PAINLEVEL_OUTOF10: 0
PAINLEVEL_OUTOF10: 10
PAINLEVEL_OUTOF10: 10
PAINLEVEL_OUTOF10: 0
PAINLEVEL_OUTOF10: 10

## 2022-10-21 ASSESSMENT — PAIN DESCRIPTION - FREQUENCY
FREQUENCY: CONTINUOUS
FREQUENCY: CONTINUOUS

## 2022-10-21 ASSESSMENT — PAIN SCALES - WONG BAKER: WONGBAKER_NUMERICALRESPONSE: 0

## 2022-10-21 ASSESSMENT — PAIN DESCRIPTION - PAIN TYPE
TYPE: CHRONIC PAIN;ACUTE PAIN
TYPE: ACUTE PAIN

## 2022-10-21 ASSESSMENT — PAIN DESCRIPTION - ONSET
ONSET: PROGRESSIVE
ONSET: PROGRESSIVE

## 2022-10-21 NOTE — PROGRESS NOTES
Urology Attending Progress Note      Subjective: pt resting without complaints    Vitals:  /67   Pulse 92   Temp 98.2 °F (36.8 °C) (Oral)   Resp 18   Wt 103 lb 6.3 oz (46.9 kg)   SpO2 95%   BMI 16.69 kg/m²   Temp  Av.5 °F (36.9 °C)  Min: 97.9 °F (36.6 °C)  Max: 99.1 °F (37.3 °C)    Intake/Output Summary (Last 24 hours) at 10/21/2022 1057  Last data filed at 10/21/2022 0803  Gross per 24 hour   Intake 200 ml   Output 3650 ml   Net -3450 ml       Exam: urine in mc sravanthi    Labs:  WBC:    Lab Results   Component Value Date/Time    WBC 3.5 10/21/2022 04:36 AM     Hemoglobin/Hematocrit:    Lab Results   Component Value Date/Time    HGB 8.0 10/21/2022 04:36 AM    HCT 24.7 10/21/2022 04:36 AM     BMP:    Lab Results   Component Value Date/Time     10/21/2022 04:36 AM    K 3.8 10/21/2022 04:36 AM    K 3.5 10/16/2022 04:13 AM    CL 98 10/21/2022 04:36 AM    CO2 28 10/21/2022 04:36 AM    BUN 9 10/21/2022 04:36 AM    LABALBU 2.9 10/17/2022 04:23 AM    CREATININE 0.7 10/21/2022 04:36 AM    CALCIUM 7.1 10/21/2022 04:36 AM    GFRAA >60 10/17/2022 04:23 AM    LABGLOM >60 10/21/2022 04:36 AM     PT/INR:    Lab Results   Component Value Date/Time    PROTIME 14.9 10/17/2022 04:23 AM    INR 1.17 10/17/2022 04:23 AM     PTT:  No results found for: APTT[APTT    Impression/Plan: bladder cancer, hematuria  Urine has been clear and can start anticoagulation for DVT  Would continue mc and to monitor hematuria if anticoagulation started  Will follow    Shahbaz Roth MD

## 2022-10-21 NOTE — PROGRESS NOTES
Hospitalist Progress Note      PCP: Natali Cervantes APRN - CNP    Date of Admission: 10/15/2022    Chief Complaint: Sepsis    Hospital Course:     Admitted from [facility], initially hospitalized 10/13. He had seen a urologist for a year of progressive urinary symptoms. The urologist ordered an abdominopelvic CT scan. The scan showed a very distended bladder and a bladder mass. He was initially hypothermic and hypotensive. A mc catheter was placed (3L urine drained initially) and empiric antibiotics (cefepime, vancomycin) were started with a working diagnosis of septic shock due to UTI. He was also found to have an MATHEUS w/ presenting SCr of 4.8. He was also found to have an extensive left lower extremity DVT. IVC filter was placed. Patient with low grade papillary urothelial carcinoma s/p cysto TURBT on 10.16 with resection     Subjective:   Patient with back pain but is improving and resting comfortably.  He is considerably weak and deconditioned   Denies any chest pain, shortness of breath, numbness, tingling, fever and/or chills        Medications:  Reviewed    Infusion Medications    sodium chloride      sodium chloride      sodium chloride       Scheduled Medications    lidocaine  1 patch TransDERmal Daily    tamsulosin  0.4 mg Oral Daily    midodrine  10 mg Oral TID WC    nicotine  1 patch TransDERmal Daily     PRN Meds: sodium chloride, oxyCODONE **OR** oxyCODONE, HYDROmorphone, phenol, sodium chloride, sodium chloride, acetaminophen **OR** acetaminophen, ondansetron **OR** ondansetron, polyethylene glycol, sodium chloride flush, melatonin, calcium carbonate, ipratropium-albuterol      Intake/Output Summary (Last 24 hours) at 10/21/2022 1327  Last data filed at 10/21/2022 1317  Gross per 24 hour   Intake 200 ml   Output 3700 ml   Net -3500 ml         Physical Exam Performed:    /68   Pulse 75   Temp 98.2 °F (36.8 °C) (Oral)   Resp 18   Wt 103 lb 6.3 oz (46.9 kg)   SpO2 92% BMI 16.69 kg/m²     General appearance: No apparent distress, appears older than stated age and cooperative. Thin  HEENT: Pupils equal, round, and reactive to light. Conjunctivae/corneas clear. Throat clear  Neck: Supple, with full range of motion. No jugular venous distention. Trachea midline. Respiratory:  Normal respiratory effort. Mild wheezing throughout   Cardiovascular: Regular rate and rhythm with normal S1/S2 without murmurs, rubs or gallops. Abdomen: Soft,suprapubic tenderness, non-distended with normal bowel sounds. Musculoskeletal: No clubbing, cyanosis or edema bilaterally. Full range of motion without deformity. Skin: Skin color, texture, turgor normal.  No rashes or lesions. Neurologic:  Neurovascularly intact without any focal sensory/motor deficits. Cranial nerves: II-XII intact, grossly non-focal. Except very poor hearing   : Alexander in place. CBI   Psychiatric: Alert and oriented, thought content appropriate, normal insight  Capillary Refill: Brisk, 3 seconds, normal   Peripheral Pulses: +2 palpable, equal bilaterally       Labs:   Recent Labs     10/19/22  0413 10/20/22  0436 10/21/22  0436   WBC 4.4 4.4 3.5*   HGB 8.2* 8.6* 8.0*   HCT 25.4* 26.5* 24.7*    156 148       Recent Labs     10/19/22  0413 10/20/22  0436 10/21/22  0436 10/21/22  1111   * 133* 132*  --    K 4.3 4.1 3.8  --    CL 96* 97* 98*  --    CO2 26 30 28  --    BUN 9 9 9  --    CREATININE 0.7* 0.7* 0.7*  --    CALCIUM 7.1* 7.3* 7.1*  --    PHOS  --   --   --  2.6       No results for input(s): AST, ALT, BILIDIR, BILITOT, ALKPHOS in the last 72 hours. No results for input(s): INR in the last 72 hours. No results for input(s): Lisa Fuse in the last 72 hours.     Urinalysis:      Lab Results   Component Value Date/Time    NITRU Negative 10/13/2022 12:44 PM    WBCUA >100 10/13/2022 12:44 PM    RBCUA see below 10/13/2022 12:44 PM    BLOODU MODERATE 10/13/2022 12:44 PM    SPECGRAV 1.015 10/13/2022 12:44 PM    GLUCOSEU Negative 10/13/2022 12:44 PM       Radiology:  CT ABDOMEN PELVIS W IV CONTRAST Additional Contrast? None   Final Result   Decompressed urinary bladder, with Alexander catheter. Bladder wall appears   thickened, which is nonspecific, related to nondistention and possible   postoperative changes or cystitis. There is hemorrhage or blood clot in the   non dependent bladder, source of bleeding unknown. There is no hydronephrosis. 3.3 cm infrarenal abdominal aortic aneurysm. 3 year follow-up imaging   recommended. 3rd spacing of fluid, small bilateral pleural effusions and anasarca. RECOMMENDATIONS:   Pathology: 3.2 cm infrarenal abdominal aortic aneurysm. Recommend follow-up every 3 years. Reference: J Am Jacky Radiol 2684;48:[id number]. Assessment/Plan:    Active Hospital Problems    Diagnosis     Mass of urinary bladder [N32.89]      Priority: Medium    Tobacco abuse [Z72.0]      Priority: Medium    Acute deep vein thrombosis (DVT) of iliac vein of left lower extremity (HCC) [I82.422]      Priority: Medium    Acute deep vein thrombosis (DVT) of calf muscle vein of left lower extremity (HCC) [I82.462]      Priority: Medium    Urinary retention [R33.9]      Priority: Medium    MATHEUS (acute kidney injury) ([address] [N17.9]      Priority: Medium    Acute cystitis with hematuria [N30.01]      Priority: Medium    Hematuria [R31.9]      Priority: Medium    Microcytic anemia [D50.9]      Priority: Medium     Bladder mass and extreme bladder distension noted on CT which has now been decompressed with Alexander catheter. Presumed malignant s/p cystoscopy transurethral resection bladder (10/16, Dr Monique Durham). Bladder tumor was almost resected completely. Alexander to remain in place. Continue flomax. Urology okay with [facility] for DVT.      Low grade papillary urothelial carcinoma       Acute on chronic Microcytic anemia: Post op blood loss anemia Hemoglobin decreased from 9.4 gm/dL on admission to 6.7 gm/dL. 2 u pRBC given this admission. Continue to monitor with Hgb checks. Hypocalcemia, mild, chronic - Give 500 ca carbonate TID     Extensive left iliofemoral deep venous thrombosis with contraindication to anticoagulation due anemia, hematuria and positive FOBT s/p IVC filter placement (10/15, Dr Orly Hannon), recommend starting anticoagulation when/if able and [facility] and tolerates, then filter should be removed. Acute kidney injury with anion gap metabolic acidosis considered Post-renal and resolved with resolution of bladder outlet obstruction. Nephrology signed off     Sore throat - no erythema or thrush. Oral spray prn      Pyuria -   No bacteriuria. in the context of gross hematuria. Blood and Urine cx 10/13 are NGTD. Was on cefepime and vancomycin for 5 days. COPD, Not in exacerbation. on supplemental oxygen to maintain O2sat 90-94%. IS encouraged. Hypokalemia:  Replaced with IV potassium chloride to goal K+ > 4.0 mmol/L. Hypomagnesemia:  Replaced with IV magnesium sulfate to goal Mg > 2.0 mg/dL. Lab values reviewed    Hypovolemic hyponatremia responding to volume replacement. Severe protein Malnutrition POA:  Regular diet with dietary supplements    DVT Prophylaxis: SCDs  Diet: ADULT DIET; Regular  ADULT ORAL NUTRITION SUPPLEMENT; AM Snack, PM Snack; Standard High Calorie/High Protein Oral Supplement  Code Status: Full Code  PT/OT Eval Status: Recommend subacute/SNF    Dispo -  Will resume AC today. Urine has been clear. Okay to discharge tomorrow.  Urology will monitor hematuria and continue mc     Appropriate for A1 Discharge Unit: Yes      Tres Guy DO

## 2022-10-21 NOTE — CARE COORDINATION
The family wanted referral to [facility] and they cannot take anyone until 10/23 due to no beds. Their second choice was [facility] and third choice was [facility]. [facility] accepted. CM will submit Eduardo cert for [facility] today . Should get back today. Lives alone and needs rehab. Awaiting approval from insurance. 02-Feb-2021

## 2022-10-21 NOTE — CARE COORDINATION
CMA started [employer] for patient to go to [facility]. Cert is back and approved. Donavon Goodrich ID: [id number]  Service SNF  Cert is Good for 61/62/5546-96/14/2526  Status Approved  Next Review Date 10/25/2022    CM is aware.

## 2022-10-21 NOTE — PLAN OF CARE
Problem: Discharge Planning  Goal: Discharge to home or other facility with appropriate resources  10/21/2022 0015 by Ranulfo Thomas RN  Outcome: Progressing  10/20/2022 1020 by Randal Tripp RN  Outcome: Progressing  Flowsheets (Taken 10/20/2022 0830)  Discharge to home or other facility with appropriate resources:   Identify barriers to discharge with patient and caregiver   Arrange for needed discharge resources and transportation as appropriate     Problem: ABCDS Injury Assessment  Goal: Absence of physical injury  10/21/2022 0015 by Ranulfo Thomas RN  Outcome: Progressing  10/20/2022 1020 by Randal Tripp RN  Outcome: Progressing     Problem: Anxiety  Goal: Will report anxiety at manageable levels  Description: INTERVENTIONS:  1. Administer medication as ordered  2. Teach and rehearse alternative coping skills  3. Provide emotional support with 1:1 interaction with staff  10/21/2022 0015 by Ranulfo Thomas RN  Outcome: Progressing  10/20/2022 1020 by Randal Tripp RN  Outcome: Progressing     Problem: Coping  Goal: Pt/Family able to verbalize concerns and demonstrate effective coping strategies  Description: INTERVENTIONS:  1. Assist patient/family to identify coping skills, available support systems and cultural and spiritual values  2. Provide emotional support, including active listening and acknowledgement of concerns of patient and caregivers  3. Reduce environmental stimuli, as able  4. Instruct patient/family in relaxation techniques, as appropriate  5.  Assess for spiritual pain/suffering and initiate Spiritual Care, Psychosocial Clinical Specialist consults as needed  10/21/2022 0015 by Ranulfo Thomas RN  Outcome: Progressing  10/20/2022 1020 by Randal Tripp RN  Outcome: Progressing

## 2022-10-21 NOTE — PROGRESS NOTES
Comprehensive Nutrition Assessment    Type and Reason for Visit:  RD Nutrition Re-Screen/LOS    Nutrition Recommendations/Plan:   Continue regular diet  Add Ensure enlive and Magic cups BID  Encourage PO intakes, assist with meals  Monitor nutrition adequacy, pertinent labs, bowel habits, wt changes, and clinical progress     Malnutrition Assessment:  Malnutrition Status:  Insufficient data (JING wt hx or perform NFPE) (10/21/22 1437)    Context:  Acute Illness     Findings of the 6 clinical characteristics of malnutrition:  Energy Intake:  50% or less of estimated energy requirements for 5 or more days    Nutrition Assessment:    LOS assessment. Pt transferred from [facility] for evaluation by vascular surgery and possible thrombectomy. Pt initially admitted for dysuria, found to have a very distended bladder and a bladder mass. Currently on regular diet with 1-25% of 1 meal documented today per EMR. Upgraded from minced and moist on 10/16 per SLP. Pt unavailable during multiple attempts today. Noted low BMI, limited wt hx per EMR. Will add Ensure enlive and Magic cups BID to promote nutrition. Continue to monitor. Nutrition Related Findings:    Na 132. Phos 1.8. LBM 10/15. +2 pitting LLE edema. Wound Type: None       Current Nutrition Intake & Therapies:    Average Meal Intake: 1-25%  Average Supplements Intake: None Ordered  ADULT DIET; Regular  ADULT ORAL NUTRITION SUPPLEMENT; AM Snack, PM Snack; Standard High Calorie/High Protein Oral Supplement    Anthropometric Measures:  Height: 5' 6\" (167.6 cm)  Ideal Body Weight (IBW): 142 lbs (65 kg)       Current Body Weight: 103 lb (46.7 kg), 72.5 % IBW.  Weight Source: Bed Scale  Current BMI (kg/m2): 16.6                          BMI Categories: Underweight (BMI less than 22) age over 72    Estimated Daily Nutrient Needs:  Energy Requirements Based On: Kcal/kg (28-33)  Weight Used for Energy Requirements: Ideal  Energy (kcal/day): 9944-3019  Weight Used for Protein Requirements: Ideal (1.2-1.4)  Protein (g/day): 78-91  Method Used for Fluid Requirements: 1 ml/kcal  Fluid (ml/day): 2000-5302    Nutrition Diagnosis:   Inadequate oral intake related to inadequate protein-energy intake as evidenced by intake 0-25%, BMI    Nutrition Interventions:   Food and/or Nutrient Delivery: Continue Current Diet, Start Oral Nutrition Supplement  Nutrition Education/Counseling: No recommendation at this time  Coordination of Nutrition Care: Continue to monitor while inpatient       Goals:     Goals: PO intake 50% or greater, prior to discharge       Nutrition Monitoring and Evaluation:      Food/Nutrient Intake Outcomes: Supplement Intake, Food and Nutrient Intake  Physical Signs/Symptoms Outcomes: Biochemical Data, Nutrition Focused Physical Findings, Weight, Chewing or Swallowing, Constipation    Discharge Planning:     Too soon to determine     Kartik Riddle, [address]  Contact: [phone]

## 2022-10-21 NOTE — CARE COORDINATION
[address] [place] [address] accepted and they are starting pre-cert today with [facility] Fontenot in admissions. Will likely remain in house through weekend until pre-cert is received unless patient refuses to go however there are no other options for facilities in network with Kari that will take subutex and and accept patient with IV drug hx and psych hx. Pre-cert pending.

## 2022-10-22 VITALS
TEMPERATURE: 98.7 F | HEIGHT: 66 IN | DIASTOLIC BLOOD PRESSURE: 69 MMHG | RESPIRATION RATE: 18 BRPM | WEIGHT: 105.82 LBS | HEART RATE: 86 BPM | BODY MASS INDEX: 17.01 KG/M2 | SYSTOLIC BLOOD PRESSURE: 113 MMHG | OXYGEN SATURATION: 91 %

## 2022-10-22 LAB
ANION GAP SERPL CALCULATED.3IONS-SCNC: 6 MMOL/L (ref 3–16)
BASOPHILS ABSOLUTE: 0 K/UL (ref 0–0.2)
BASOPHILS RELATIVE PERCENT: 1.3 %
BUN BLDV-MCNC: 10 MG/DL (ref 7–20)
CALCIUM SERPL-MCNC: 7.3 MG/DL (ref 8.3–10.6)
CHLORIDE BLD-SCNC: 97 MMOL/L (ref 99–110)
CO2: 29 MMOL/L (ref 21–32)
CREAT SERPL-MCNC: 0.8 MG/DL (ref 0.8–1.3)
EOSINOPHILS ABSOLUTE: 0.1 K/UL (ref 0–0.6)
EOSINOPHILS RELATIVE PERCENT: 1.6 %
GFR SERPL CREATININE-BSD FRML MDRD: >60 ML/MIN/{1.73_M2}
GLUCOSE BLD-MCNC: 88 MG/DL (ref 70–99)
HCT VFR BLD CALC: 25.4 % (ref 40.5–52.5)
HEMOGLOBIN: 8 G/DL (ref 13.5–17.5)
LYMPHOCYTES ABSOLUTE: 0.4 K/UL (ref 1–5.1)
LYMPHOCYTES RELATIVE PERCENT: 11.8 %
MCH RBC QN AUTO: 25.9 PG (ref 26–34)
MCHC RBC AUTO-ENTMCNC: 31.4 G/DL (ref 31–36)
MCV RBC AUTO: 82.3 FL (ref 80–100)
MONOCYTES ABSOLUTE: 0.2 K/UL (ref 0–1.3)
MONOCYTES RELATIVE PERCENT: 6.6 %
NEUTROPHILS ABSOLUTE: 2.9 K/UL (ref 1.7–7.7)
NEUTROPHILS RELATIVE PERCENT: 78.7 %
PDW BLD-RTO: 20 % (ref 12.4–15.4)
PLATELET # BLD: 169 K/UL (ref 135–450)
PMV BLD AUTO: 6.7 FL (ref 5–10.5)
POTASSIUM SERPL-SCNC: 4.2 MMOL/L (ref 3.5–5.1)
RBC # BLD: 3.09 M/UL (ref 4.2–5.9)
SODIUM BLD-SCNC: 132 MMOL/L (ref 136–145)
WBC # BLD: 3.6 K/UL (ref 4–11)

## 2022-10-22 PROCEDURE — 6370000000 HC RX 637 (ALT 250 FOR IP): Performed by: INTERNAL MEDICINE

## 2022-10-22 PROCEDURE — 80048 BASIC METABOLIC PNL TOTAL CA: CPT

## 2022-10-22 PROCEDURE — 92526 ORAL FUNCTION THERAPY: CPT

## 2022-10-22 PROCEDURE — 36415 COLL VENOUS BLD VENIPUNCTURE: CPT

## 2022-10-22 PROCEDURE — 85025 COMPLETE CBC W/AUTO DIFF WBC: CPT

## 2022-10-22 RX ORDER — LANOLIN ALCOHOL/MO/W.PET/CERES
3 CREAM (GRAM) TOPICAL NIGHTLY PRN
Qty: 3 TABLET | Refills: 3 | DISCHARGE
Start: 2022-10-22

## 2022-10-22 RX ORDER — CALCIUM CARBONATE 200(500)MG
500 TABLET,CHEWABLE ORAL 3 TIMES DAILY
Qty: 90 TABLET | Refills: 0 | DISCHARGE
Start: 2022-10-22 | End: 2022-11-21

## 2022-10-22 RX ORDER — NICOTINE 21 MG/24HR
1 PATCH, TRANSDERMAL 24 HOURS TRANSDERMAL DAILY
Qty: 30 PATCH | Refills: 3 | DISCHARGE
Start: 2022-10-23

## 2022-10-22 RX ORDER — LIDOCAINE 4 G/G
1 PATCH TOPICAL DAILY
DISCHARGE
Start: 2022-10-23

## 2022-10-22 RX ORDER — GUAIFENESIN 600 MG/1
600 TABLET, EXTENDED RELEASE ORAL 2 TIMES DAILY
DISCHARGE
Start: 2022-10-22

## 2022-10-22 RX ORDER — POLYETHYLENE GLYCOL 3350 17 G/17G
17 POWDER, FOR SOLUTION ORAL DAILY PRN
Qty: 527 G | Refills: 1 | DISCHARGE
Start: 2022-10-22 | End: 2022-11-21

## 2022-10-22 RX ORDER — OXYCODONE HYDROCHLORIDE 5 MG/1
5 TABLET ORAL EVERY 4 HOURS PRN
Qty: 3 TABLET | Refills: 0 | Status: SHIPPED | OUTPATIENT
Start: 2022-10-22 | End: 2022-10-25

## 2022-10-22 RX ORDER — MIDODRINE HYDROCHLORIDE 10 MG/1
10 TABLET ORAL
Qty: 90 TABLET | Refills: 3 | DISCHARGE
Start: 2022-10-22

## 2022-10-22 RX ORDER — IPRATROPIUM BROMIDE AND ALBUTEROL SULFATE 2.5; .5 MG/3ML; MG/3ML
3 SOLUTION RESPIRATORY (INHALATION) 4 TIMES DAILY PRN
Qty: 360 ML | DISCHARGE
Start: 2022-10-22

## 2022-10-22 RX ORDER — TAMSULOSIN HYDROCHLORIDE 0.4 MG/1
0.4 CAPSULE ORAL DAILY
Qty: 30 CAPSULE | Refills: 3 | DISCHARGE
Start: 2022-10-23

## 2022-10-22 RX ADMIN — TAMSULOSIN HYDROCHLORIDE 0.4 MG: 0.4 CAPSULE ORAL at 08:39

## 2022-10-22 RX ADMIN — MIDODRINE HYDROCHLORIDE 10 MG: 5 TABLET ORAL at 08:39

## 2022-10-22 RX ADMIN — ANTACID TABLETS 500 MG: 500 TABLET, CHEWABLE ORAL at 16:04

## 2022-10-22 RX ADMIN — MIDODRINE HYDROCHLORIDE 10 MG: 5 TABLET ORAL at 12:19

## 2022-10-22 RX ADMIN — OXYCODONE 10 MG: 5 TABLET ORAL at 08:39

## 2022-10-22 RX ADMIN — OXYCODONE 10 MG: 5 TABLET ORAL at 12:18

## 2022-10-22 RX ADMIN — MIDODRINE HYDROCHLORIDE 10 MG: 5 TABLET ORAL at 16:04

## 2022-10-22 RX ADMIN — GUAIFENESIN 600 MG: 600 TABLET, EXTENDED RELEASE ORAL at 08:39

## 2022-10-22 RX ADMIN — Medication 1 SPRAY: at 12:19

## 2022-10-22 RX ADMIN — OXYCODONE 10 MG: 5 TABLET ORAL at 16:03

## 2022-10-22 RX ADMIN — APIXABAN 5 MG: 5 TABLET, FILM COATED ORAL at 12:18

## 2022-10-22 RX ADMIN — ANTACID TABLETS 500 MG: 500 TABLET, CHEWABLE ORAL at 08:38

## 2022-10-22 ASSESSMENT — PAIN DESCRIPTION - LOCATION
LOCATION: BACK
LOCATION: BACK

## 2022-10-22 ASSESSMENT — PAIN SCALES - GENERAL
PAINLEVEL_OUTOF10: 9
PAINLEVEL_OUTOF10: 10
PAINLEVEL_OUTOF10: 5

## 2022-10-22 ASSESSMENT — PAIN DESCRIPTION - ORIENTATION: ORIENTATION: LOWER

## 2022-10-22 NOTE — CARE COORDINATION
CASE MANAGEMENT DISCHARGE SUMMARY      Discharge to: SNF @ [address] [place] [address] completed: yes  Hospital Exemption Notification (HENS) completed: yes      Transportation:    Family/car: no   Medical Transport explained to pt/family. Pt/family voice no agency preference. Agency used: [facility] up time: 1630   Ambulance form completed: Yes    Confirmed discharge plan with:     Patient: yes per RN     Family:  yes dtr [facility] [phone]     Facility/Agency, name:  [facility]   Phone number for report to facility: [phone], name: Lucina Ennis RN    Note: Discharging nurse to complete MEME, reconcile AVS, and place final copy with patient's discharge packet. RN to ensure that written prescriptions for  Level II medications are sent with patient to the facility as per protocol.

## 2022-10-22 NOTE — PROGRESS NOTES
Pt discharged. Pt tele and IVs discontinued, CMU made aware. Pt sent with 3 way mc per Urology. Pt sent with personal belongings. Pt transported with EMS via stretcher. Report attempted to [facility] @3995.

## 2022-10-22 NOTE — PROGRESS NOTES
Urology Progress Note      POD6 cysto TURBT    Subjective: Nicholas Warren denies complaints. No issues with mc, but urine is clear today     Asking about going home this afternoon    Vitals:  /69   Pulse 86   Temp 98.7 °F (37.1 °C) (Oral)   Resp 18   Ht 5' 6\" (1.676 m)   Wt 105 lb 13.1 oz (48 kg)   SpO2 91%   BMI 17.08 kg/m²   Temp  Av.5 °F (36.9 °C)  Min: 97.2 °F (36.2 °C)  Max: 99.1 °F (37.3 °C)    Intake/Output Summary (Last 24 hours) at 10/22/2022 1348  Last data filed at 10/22/2022 0556  Gross per 24 hour   Intake 400 ml   Output 400 ml   Net 0 ml       Exam:   Physical:  Cachectic male who appears older than stated age  Mood indicates no abnormalities. Pt doesnt appear depressed. Very hard of hearing  Orientated to time and place  Resp unlabored;    Legs supple         Male :  Mc in place with clear urine. Off CBI    Labs:  WBC:    Lab Results   Component Value Date/Time    WBC 3.6 10/22/2022 04:27 AM     Hemoglobin/Hematocrit:    Lab Results   Component Value Date/Time    HGB 8.0 10/22/2022 04:27 AM    HCT 25.4 10/22/2022 04:27 AM     BMP:    Lab Results   Component Value Date/Time     10/22/2022 04:27 AM    K 4.2 10/22/2022 04:27 AM    K 3.5 10/16/2022 04:13 AM    CL 97 10/22/2022 04:27 AM    CO2 29 10/22/2022 04:27 AM    BUN 10 10/22/2022 04:27 AM    LABALBU 2.9 10/17/2022 04:23 AM    CREATININE 0.8 10/22/2022 04:27 AM    CALCIUM 7.3 10/22/2022 04:27 AM    GFRAA >60 10/17/2022 04:23 AM    LABGLOM >60 10/22/2022 04:27 AM     PT/INR:    Lab Results   Component Value Date/Time    PROTIME 14.9 10/17/2022 04:23 AM    INR 1.17 10/17/2022 04:23 AM     PTT:  No results found for: APTT[APTT    Urinalysis:     Latest Reference Range & Units 10/13/22 12:44   Color, UA Straw/Yellow  Yellow   Clarity, UA Clear  CLOUDY !    Glucose, UA Negative mg/dL Negative   Bilirubin, Urine Negative  Negative   Ketones, Urine Negative mg/dL Negative   Specific Gravity, UA 1.005 - 1.030  1.015 Blood, Urine Negative  MODERATE !   pH, UA 5.0 - 8.0  6.0   Protein, UA Negative mg/dL 100 ! Urobilinogen, Urine <2.0 E.U./dL 0.2   Nitrite, Urine Negative  Negative   Leukocyte Esterase, Urine Negative  SMALL ! Urine Type   NotGiven   Urinalysis Comments   see below   Urine Reflex to Culture   Yes   WBC, UA 0 - 5 /HPF >100 ! RBC, UA 0 - 4 /HPF see below ! Microscopic Examination   YES   !: Data is abnormal     Urine Culture: NG     Imaging     CT AP 10/11/22  FINDINGS:   Lower Chest: Emphysematous changes. Lung bases otherwise clear. Organs: The unenhanced liver, gallbladder, spleen, pancreas and adrenal   glands are grossly unremarkable. There is moderate right   hydroureteronephrosis. No left hydronephrosis. Probable 3 mm left   nephrolithiasis. GI/Bowel: No dilated loops of bowel or bowel wall thickening. No free air. Moderate amount stool in the colon. Pelvis: The bladder is markedly distended measuring up to 20 cm in the   craniocaudal dimension. There are multifocal areas of nodular wall   thickening throughout the bladder, for example on the left lateral aspect   measuring up to 9 x 3 cm (image 111). Within the base of the bladder, there   is also more focal masslike soft tissue measuring 4 x 3 cm. Prostate gland   is grossly unremarkable. No definite pathologically enlarged adenopathy or   free fluid. Peritoneum/Retroperitoneum: The aorta is tortuous. There is a 3.3 cm   abdominal aortic aneurysm. Severe atherosclerosis is noted. No   pathologically enlarged adenopathy or free fluid. Bones/Soft Tissues: Diffuse osteopenia. Impression   1. Markedly dilated bladder measuring up to 20 cm in the craniocaudal   dimension. Multifocal areas of nodular wall thickening, the largest   measuring up to 9 x 3 cm along the left lateral aspect suspicious for   neoplasm. 2. Moderate right hydroureteronephrosis. 3. 3.3 cm abdominal aortic aneurysm.    Results were discussed with Dr. Giovany Rodriguez on 10/11/2022 at 19:54. RECOMMENDATIONS:   3.3 cm abdominal aortic aneurysm. Recommend follow-up every 3 years. Impression/Plan:     Bladder mass   Hematuria    Urinary retention  Right hydronephrosis   Enlarged prostate   MATHEUS - resolved   Acute DVT with IVC filter in place      - suspect enlarged prostate leading to retention and right hydro. On flomax- please continue on discharge   - sp cysto TURBT on 10/16 with resection of large tumor - 12 cm.   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

## 2022-10-22 NOTE — PROGRESS NOTES
Speech Language Pathology  Facility/Department: [facility]  Dysphagia Daily Treatment Note    NAME: Huey Mercado  : 1955  MRN: [id number]    Patient Diagnosis(es):   Patient Active Problem List    Diagnosis Date Noted    Mass of urinary bladder 10/15/2022    Tobacco abuse 10/15/2022    Acute deep vein thrombosis (DVT) of iliac vein of left lower extremity ([address] 10/15/2022    Acute deep vein thrombosis (DVT) of calf muscle vein of left lower extremity ([address] 10/15/2022    Acute cystitis with hematuria 10/14/2022    Hyponatremia 10/14/2022    MATHEUS (acute kidney injury) ([address] 10/14/2022    COPD (chronic obstructive pulmonary disease) ([address] 10/14/2022    Microcytic anemia 10/14/2022    Hematuria 10/14/2022    Urinary retention 10/14/2022    Severe protein-calorie malnutrition ([address] 10/14/2022    Sepsis ([address] 10/13/2022    Right carpal tunnel syndrome 2016     Allergies: Allergies   Allergen Reactions    Bee Venom Itching and Swelling     Subjective: 75year old male admitted with \"Acute DVT of left lower extremity. \"    Pain: denies    Current Diet: ADULT DIET; Regular  ADULT ORAL NUTRITION SUPPLEMENT; Breakfast, Dinner; Standard High Calorie/High Protein Oral Supplement  ADULT ORAL NUTRITION SUPPLEMENT; Lunch, Dinner, Breakfast; Frozen Oral Supplement    Diet Tolerance:  Patient tolerating current diet level without signs/symptoms of penetration / aspiration. P.O. Trials: Thin   x Spoon x 2, cup x10, straw sip x 4   Puree   x X 8   Solid   x X 8     Dysphagia Treatment and Impressions:  RN okays SLP entry into pt's room. Pt is alert and oriented. Pt has intermittent coughing prior to po trials. Ex-wife at bedside reports that he coughs like this frequently as he is a very heavy smoker prior to admission (2 packs per day). RR 20/min prior to po trials with O2 sat of 87-89% on RA. This was relayed to RN. Oral phase of swallow grossly appears WNL.   No oral phase deficits noted during evaluation. Pharyngeal phase of swallow appears grossly WNL. No pharyngeal deficits noted during evaluation. Laryngeal elevation appears WFL based upon palpation of anterior neck during swallow. Vocal quality dry before and after po trials. RR 20/min prior to and following po intake. O2 sat was at 90% at end of follow-up. SLP reviewed aspiration precautions with pt and RN, and ex-wife at bedside. Dysphagia Goals:  Goals:  Short Term Goals:  Timeframe for Short Term Goals: (5 days 10/23/2022)  Goal 1: The patient will tolerate recommended diet with no clinical s/s of aspiration 5/5 Today, 10/22: Progressing, ongoing. Goal 2: The patient/caregiver will demonstrate understanding of compensatory swallow strategies, for improved swallow safety. Today, 10/22: Progressing, ongoing. Goal 3: The patient will tolerate instrumental assessment when able , if appropriate. Not indicated this date     Long Term Goals:   Timeframe for Long Term Goals: (7 days 10/25/2022)  Goal 1: The patient will tolerate least restrictive diet with no clinical s/s of aspiration or worsening respiratory/pulmonary status. Today, 10/22: Progressing, ongoing. Recommendations:  Solid Consistency: continue REGULAR  Liquid Consistency: continue THIN  Medication: whole with water    Patient/Family/Caregiver Education: reviewed aspiration precautions, SLP plan of care    Compensatory Strategies: Sit up for all meals and thereafter for 30 minutes, Eat with small bites (1/2 tsp; 1 tsp), and Drink from a cup only with small sips    Plan:    Continued Dysphagia treatment with goals per plan of care. Discharge Recommendations: defer to PT/OT    If pt discharges from hospital prior to Speech/Swallowing discharge, this note serves as tx and discharge summary. Total Treatment Time / Charges     Time in Time out Total Time / units   Cognitive Tx         Speech Tx      Dysphagia Tx  1150 1206  16 min / 1 unit     Signature:    Jennifer PAINTER Severino Rogel, [id number]  Speech-Language Pathologist

## 2022-10-22 NOTE — DISCHARGE INSTR - DIET
Good nutrition is important when healing from an illness, injury, or surgery. Follow any nutrition recommendations given to you during your hospital stay. If you were given an oral nutrition supplement while in the hospital, continue to take this supplement at home. You can take it with meals, in-between meals, and/or before bedtime. These supplements can be purchased at most local grocery stores, pharmacies, and chain [employer]ores. If you have any questions about your diet or nutrition, call the hospital and ask for the dietitian.   Adult regular diet with oral nutrition supplement

## 2022-10-24 NOTE — DISCHARGE SUMMARY
Hospital Medicine Discharge Summary    Patient ID: Que Savage      Patient's PCP: Mignon Medley, APRN - CNP    Admit Date: 10/15/2022     Discharge Date: 10/22/2022      Admitting Provider: Matias Kitchen MD     Discharge Provider: Nena Gimenez DO     Discharge Diagnoses: Active Hospital Problems    Diagnosis     Mass of urinary bladder [N32.89]      Priority: Medium    Tobacco abuse [Z72.0]      Priority: Medium    Acute deep vein thrombosis (DVT) of iliac vein of left lower extremity (HCC) [I82.422]      Priority: Medium    Acute deep vein thrombosis (DVT) of calf muscle vein of left lower extremity (HCC) [I82.462]      Priority: Medium    Urinary retention [R33.9]      Priority: Medium    MATHEUS (acute kidney injury) ([address] [N17.9]      Priority: Medium    Acute cystitis with hematuria [N30.01]      Priority: Medium    Hematuria [R31.9]      Priority: Medium    Microcytic anemia [D50.9]      Priority: Medium       The patient was seen and examined on day of discharge and this discharge summary is in conjunction with any daily progress note from day of discharge. Hospital Course:     Admitted from [facility], initially hospitalized 10/13. He had seen a urologist for a year of progressive urinary symptoms. The urologist ordered an abdominopelvic CT scan. The scan showed a very distended bladder and a bladder mass. He was initially hypothermic and hypotensive. A mc catheter was placed (3L urine drained initially) and empiric antibiotics (cefepime, vancomycin) were started with a working diagnosis of septic shock due to UTI. He was also found to have an MATHEUS w/ presenting SCr of 4.8. He was also found to have an extensive left lower extremity DVT. IVC filter was placed. Patient with low grade papillary urothelial carcinoma s/p cysto TURBT on 10.16 with resection. Enlarged prostate lead to rentention and right hydro. He is on flomax.  Pathology showed low grade papillary urothelial carcinoma. He had hematuria but urine cleared. Urology felt it was safe to start [facility]. Mc is to remain in place with Follow up with Marky Gomez re: bladder cancer fu and voiding trial in 1-2 weeks    Patient agreed to started [facility] as he will have mc in place to monitor for recurrence of hematuria and have close f/u outpatient     Patient hypocalcemic and given 500 Ca carbonate TID. Recommend repeat labs for resolution and discontinuation of Ca when indicated     Extensive left iliofemoral deep venous thrombosis with contraindication to anticoagulation due anemia, hematuria and positive FOBT s/p IVC filter placement (10/15, Dr Parisa Hobbs), recommend starting anticoagulation when/if able and if on [facility] and tolerates, then filter should be removed. F/u if patient continues to tolerate [facility]     Physical Exam Performed:     /69   Pulse 86   Temp 98.7 °F (37.1 °C) (Oral)   Resp 18   Ht 5' 6\" (1.676 m)   Wt 105 lb 13.1 oz (48 kg)   SpO2 91%   BMI 17.08 kg/m²       General appearance: No apparent distress, appears older than stated age and cooperative. Thin  HEENT: Pupils equal, round, and reactive to light. Conjunctivae/corneas clear. Throat clear  Neck: Supple, with full range of motion. No jugular venous distention. Trachea midline. Respiratory:  Normal respiratory effort. Mild wheezing throughout   Cardiovascular: Regular rate and rhythm with normal S1/S2 without murmurs, rubs or gallops. Abdomen: Soft,suprapubic tenderness, non-distended with normal bowel sounds. Musculoskeletal: No clubbing, cyanosis or edema bilaterally. Full range of motion without deformity. Skin: Skin color, texture, turgor normal.  No rashes or lesions. Neurologic:  Neurovascularly intact without any focal sensory/motor deficits. Cranial nerves: II-XII intact, grossly non-focal. Except very poor hearing   : Mc in place.  CBI   Psychiatric: Alert and oriented, thought content appropriate, normal insight  Capillary Refill: Brisk, 3 seconds, normal   Peripheral Pulses: +2 palpable, equal bilaterally        Labs: For convenience and continuity at follow-up the following most recent labs are provided:      CBC:    Lab Results   Component Value Date/Time    WBC 3.6 10/22/2022 04:27 AM    HGB 8.0 10/22/2022 04:27 AM    HCT 25.4 10/22/2022 04:27 AM     10/22/2022 04:27 AM       Renal:    Lab Results   Component Value Date/Time     10/22/2022 04:27 AM    K 4.2 10/22/2022 04:27 AM    K 3.5 10/16/2022 04:13 AM    CL 97 10/22/2022 04:27 AM    CO2 29 10/22/2022 04:27 AM    BUN 10 10/22/2022 04:27 AM    CREATININE 0.8 10/22/2022 04:27 AM    CALCIUM 7.3 10/22/2022 04:27 AM    PHOS 2.6 10/21/2022 11:11 AM         Significant Diagnostic Studies    Radiology:   CT ABDOMEN PELVIS W IV CONTRAST Additional Contrast? None   Final Result   Decompressed urinary bladder, with Alexander catheter. Bladder wall appears   thickened, which is nonspecific, related to nondistention and possible   postoperative changes or cystitis. There is hemorrhage or blood clot in the   non dependent bladder, source of bleeding unknown. There is no hydronephrosis. 3.3 cm infrarenal abdominal aortic aneurysm. 3 year follow-up imaging   recommended. 3rd spacing of fluid, small bilateral pleural effusions and anasarca. RECOMMENDATIONS:   Pathology: 3.2 cm infrarenal abdominal aortic aneurysm. Recommend follow-up every 3 years. Reference: J Am Jacky Radiol 1992;90:788-994.                 Consults:     IP CONSULT TO VASCULAR SURGERY  IP CONSULT TO UROLOGY  IP CONSULT TO NEPHROLOGY  IP CONSULT TO PHARMACY  IP CONSULT TO PALLIATIVE CARE    Disposition:  SNF     Condition at Discharge: Stable    Discharge Instructions/Follow-up:  f/u as above    Code Status:  Prior     Activity: activity as tolerated    Diet: regular diet      Discharge Medications:     Discharge Medication List as of 10/22/2022  4:13 PM             Details   oxyCODONE (ROXICODONE) 5 MG immediate release tablet Take 1 tablet by mouth every 4 hours as needed for Pain for up to 3 days. , Disp-3 tablet, R-0Print      calcium carbonate (TUMS) 500 MG chewable tablet Take 1 tablet by mouth in the morning, at noon, and at bedtime, Disp-90 tablet, R-0DC to SNF      ipratropium-albuterol (DUONEB) 0.5-2.5 (3) MG/3ML SOLN nebulizer solution Inhale 3 mLs into the lungs 4 times daily as needed for Shortness of Breath, Disp-360 mLDC to SNF      apixaban (ELIQUIS) 5 MG TABS tablet Take 1 tablet by mouth 2 times daily, Disp-60 tabletDC to SNF      guaiFENesin (MUCINEX) 600 MG extended release tablet Take 1 tablet by mouth 2 times dailyDC to SNF      lidocaine 4 % external patch Place 1 patch onto the skin daily, TransDERmal, DAILY Starting Sun 10/23/2022, DC to SNF      polyethylene glycol (GLYCOLAX) 17 g packet Take 17 g by mouth daily as needed for Constipation, Disp-527 g, R-1DC to SNF      melatonin 3 MG TABS tablet Take 1 tablet by mouth nightly as needed (insomnia), Disp-3 tablet, R-3DC to SNF      nicotine (NICODERM CQ) 14 MG/24HR Place 1 patch onto the skin daily, Disp-30 patch, R-3DC to SNF      phenol 1.4 % LIQD mouth spray Take 1 spray by mouth every 2 hours as needed for Sore Throat, Disp-1 mL, R-0DC to SNF      midodrine (PROAMATINE) 10 MG tablet Take 1 tablet by mouth 3 times daily (with meals), Disp-90 tablet, R-3DC to SNF                Details   tamsulosin (FLOMAX) 0.4 MG capsule Take 1 capsule by mouth daily, Disp-30 capsule, R-3DC to SNF             Time Spent on discharge is more than 45 minutes in the examination, evaluation, counseling and review of medications and discharge plan. Signed:    Parmjit Jolley DO   10/24/2022      Thank you Floyd Ormond, APRN - CNP for the opportunity to be involved in this patient's care. If you have any questions or concerns, please feel free to contact me at [phone].

## 2022-11-29 ENCOUNTER — ANESTHESIA EVENT (OUTPATIENT)
Dept: OPERATING ROOM | Age: 67
End: 2022-11-29
Payer: MEDICARE

## 2022-12-01 ENCOUNTER — HOSPITAL ENCOUNTER (OUTPATIENT)
Dept: GENERAL RADIOLOGY | Age: 67
Discharge: HOME | End: 2022-12-01
Payer: MEDICARE

## 2022-12-01 ENCOUNTER — HOSPITAL ENCOUNTER (OUTPATIENT)
Age: 67
Discharge: HOME | End: 2022-12-01
Payer: MEDICARE

## 2022-12-01 DIAGNOSIS — R05.3 CHRONIC COUGH: ICD-10-CM

## 2022-12-01 PROCEDURE — 71046 X-RAY EXAM CHEST 2 VIEWS: CPT

## 2022-12-08 ENCOUNTER — HOSPITAL ENCOUNTER (INPATIENT)
Age: 67
LOS: 7 days | Discharge: HOME HEALTH | DRG: 662 | End: 2022-12-15
Attending: UROLOGY | Admitting: UROLOGY
Payer: MEDICARE

## 2022-12-08 ENCOUNTER — ANESTHESIA (OUTPATIENT)
Dept: OPERATING ROOM | Age: 67
End: 2022-12-08
Payer: MEDICARE

## 2022-12-08 DIAGNOSIS — C67.9 MALIGNANT NEOPLASM OF URINARY BLADDER, UNSPECIFIED SITE (HCC): ICD-10-CM

## 2022-12-08 PROBLEM — R31.0 CLOT HEMATURIA: Status: ACTIVE | Noted: 2022-12-08

## 2022-12-08 PROBLEM — R00.0 TACHYCARDIA: Status: ACTIVE | Noted: 2022-12-08

## 2022-12-08 LAB
ABO/RH: NORMAL
ANION GAP SERPL CALCULATED.3IONS-SCNC: 13 MMOL/L (ref 3–16)
ANTIBODY SCREEN: NORMAL
BUN BLDV-MCNC: 10 MG/DL (ref 7–20)
CALCIUM SERPL-MCNC: 9.5 MG/DL (ref 8.3–10.6)
CHLORIDE BLD-SCNC: 100 MMOL/L (ref 99–110)
CO2: 23 MMOL/L (ref 21–32)
CREAT SERPL-MCNC: 0.9 MG/DL (ref 0.8–1.3)
GFR SERPL CREATININE-BSD FRML MDRD: >60 ML/MIN/{1.73_M2}
GLUCOSE BLD-MCNC: 94 MG/DL (ref 70–99)
HCT VFR BLD CALC: 31.8 % (ref 40.5–52.5)
HEMOGLOBIN: 10.2 G/DL (ref 13.5–17.5)
MCH RBC QN AUTO: 27.9 PG (ref 26–34)
MCHC RBC AUTO-ENTMCNC: 32.1 G/DL (ref 31–36)
MCV RBC AUTO: 86.8 FL (ref 80–100)
PDW BLD-RTO: 21.9 % (ref 12.4–15.4)
PLATELET # BLD: 590 K/UL (ref 135–450)
PMV BLD AUTO: 5.8 FL (ref 5–10.5)
POTASSIUM REFLEX MAGNESIUM: 5.5 MMOL/L (ref 3.5–5.1)
RBC # BLD: 3.67 M/UL (ref 4.2–5.9)
SODIUM BLD-SCNC: 136 MMOL/L (ref 136–145)
WBC # BLD: 5.1 K/UL (ref 4–11)

## 2022-12-08 PROCEDURE — 2580000003 HC RX 258: Performed by: NURSE ANESTHETIST, CERTIFIED REGISTERED

## 2022-12-08 PROCEDURE — 87186 SC STD MICRODIL/AGAR DIL: CPT

## 2022-12-08 PROCEDURE — 86901 BLOOD TYPING SEROLOGIC RH(D): CPT

## 2022-12-08 PROCEDURE — 3700000001 HC ADD 15 MINUTES (ANESTHESIA): Performed by: UROLOGY

## 2022-12-08 PROCEDURE — 0V508ZZ DESTRUCTION OF PROSTATE, VIA NATURAL OR ARTIFICIAL OPENING ENDOSCOPIC: ICD-10-PCS | Performed by: UROLOGY

## 2022-12-08 PROCEDURE — A4217 STERILE WATER/SALINE, 500 ML: HCPCS | Performed by: UROLOGY

## 2022-12-08 PROCEDURE — 99222 1ST HOSP IP/OBS MODERATE 55: CPT | Performed by: NURSE PRACTITIONER

## 2022-12-08 PROCEDURE — 36415 COLL VENOUS BLD VENIPUNCTURE: CPT

## 2022-12-08 PROCEDURE — 0TBB8ZZ EXCISION OF BLADDER, VIA NATURAL OR ARTIFICIAL OPENING ENDOSCOPIC: ICD-10-PCS | Performed by: UROLOGY

## 2022-12-08 PROCEDURE — 7100000010 HC PHASE II RECOVERY - FIRST 15 MIN: Performed by: UROLOGY

## 2022-12-08 PROCEDURE — 3600000004 HC SURGERY LEVEL 4 BASE: Performed by: UROLOGY

## 2022-12-08 PROCEDURE — 7100000000 HC PACU RECOVERY - FIRST 15 MIN: Performed by: UROLOGY

## 2022-12-08 PROCEDURE — 2500000003 HC RX 250 WO HCPCS: Performed by: ANESTHESIOLOGY

## 2022-12-08 PROCEDURE — 3600000014 HC SURGERY LEVEL 4 ADDTL 15MIN: Performed by: UROLOGY

## 2022-12-08 PROCEDURE — 6360000002 HC RX W HCPCS: Performed by: NURSE ANESTHETIST, CERTIFIED REGISTERED

## 2022-12-08 PROCEDURE — 2500000003 HC RX 250 WO HCPCS: Performed by: NURSE ANESTHETIST, CERTIFIED REGISTERED

## 2022-12-08 PROCEDURE — 2709999900 HC NON-CHARGEABLE SUPPLY: Performed by: UROLOGY

## 2022-12-08 PROCEDURE — 7100000011 HC PHASE II RECOVERY - ADDTL 15 MIN: Performed by: UROLOGY

## 2022-12-08 PROCEDURE — 0W3R8ZZ CONTROL BLEEDING IN GENITOURINARY TRACT, VIA NATURAL OR ARTIFICIAL OPENING ENDOSCOPIC: ICD-10-PCS | Performed by: UROLOGY

## 2022-12-08 PROCEDURE — 80048 BASIC METABOLIC PNL TOTAL CA: CPT

## 2022-12-08 PROCEDURE — 2060000000 HC ICU INTERMEDIATE R&B

## 2022-12-08 PROCEDURE — 88307 TISSUE EXAM BY PATHOLOGIST: CPT

## 2022-12-08 PROCEDURE — 2580000003 HC RX 258: Performed by: ANESTHESIOLOGY

## 2022-12-08 PROCEDURE — 87086 URINE CULTURE/COLONY COUNT: CPT

## 2022-12-08 PROCEDURE — 85027 COMPLETE CBC AUTOMATED: CPT

## 2022-12-08 PROCEDURE — 86900 BLOOD TYPING SEROLOGIC ABO: CPT

## 2022-12-08 PROCEDURE — 2580000003 HC RX 258: Performed by: UROLOGY

## 2022-12-08 PROCEDURE — 6360000002 HC RX W HCPCS: Performed by: UROLOGY

## 2022-12-08 PROCEDURE — 0TCB8ZZ EXTIRPATION OF MATTER FROM BLADDER, VIA NATURAL OR ARTIFICIAL OPENING ENDOSCOPIC: ICD-10-PCS | Performed by: UROLOGY

## 2022-12-08 PROCEDURE — 7100000001 HC PACU RECOVERY - ADDTL 15 MIN: Performed by: UROLOGY

## 2022-12-08 PROCEDURE — 94150 VITAL CAPACITY TEST: CPT

## 2022-12-08 PROCEDURE — 86850 RBC ANTIBODY SCREEN: CPT

## 2022-12-08 PROCEDURE — 87077 CULTURE AEROBIC IDENTIFY: CPT

## 2022-12-08 PROCEDURE — 6370000000 HC RX 637 (ALT 250 FOR IP): Performed by: UROLOGY

## 2022-12-08 PROCEDURE — 3700000000 HC ANESTHESIA ATTENDED CARE: Performed by: UROLOGY

## 2022-12-08 RX ORDER — FAMOTIDINE 10 MG/ML
20 INJECTION, SOLUTION INTRAVENOUS ONCE
Status: COMPLETED | OUTPATIENT
Start: 2022-12-08 | End: 2022-12-08

## 2022-12-08 RX ORDER — SODIUM CHLORIDE 9 MG/ML
INJECTION, SOLUTION INTRAVENOUS CONTINUOUS
Status: DISCONTINUED | OUTPATIENT
Start: 2022-12-08 | End: 2022-12-15 | Stop reason: HOSPADM

## 2022-12-08 RX ORDER — SODIUM CHLORIDE 0.9 % (FLUSH) 0.9 %
5-40 SYRINGE (ML) INJECTION EVERY 12 HOURS SCHEDULED
Status: DISCONTINUED | OUTPATIENT
Start: 2022-12-08 | End: 2022-12-08 | Stop reason: HOSPADM

## 2022-12-08 RX ORDER — ONDANSETRON 2 MG/ML
4 INJECTION INTRAMUSCULAR; INTRAVENOUS EVERY 6 HOURS PRN
Status: DISCONTINUED | OUTPATIENT
Start: 2022-12-08 | End: 2022-12-15 | Stop reason: HOSPADM

## 2022-12-08 RX ORDER — ONDANSETRON 4 MG/1
4 TABLET, ORALLY DISINTEGRATING ORAL EVERY 8 HOURS PRN
Status: DISCONTINUED | OUTPATIENT
Start: 2022-12-08 | End: 2022-12-15 | Stop reason: HOSPADM

## 2022-12-08 RX ORDER — OXYCODONE HYDROCHLORIDE 5 MG/1
5 TABLET ORAL EVERY 4 HOURS PRN
Status: DISCONTINUED | OUTPATIENT
Start: 2022-12-08 | End: 2022-12-15 | Stop reason: HOSPADM

## 2022-12-08 RX ORDER — MAGNESIUM HYDROXIDE 1200 MG/15ML
LIQUID ORAL CONTINUOUS PRN
Status: DISCONTINUED | OUTPATIENT
Start: 2022-12-08 | End: 2022-12-08 | Stop reason: HOSPADM

## 2022-12-08 RX ORDER — TAMSULOSIN HYDROCHLORIDE 0.4 MG/1
0.4 CAPSULE ORAL DAILY
Status: DISCONTINUED | OUTPATIENT
Start: 2022-12-08 | End: 2022-12-15 | Stop reason: HOSPADM

## 2022-12-08 RX ORDER — DIPHENHYDRAMINE HYDROCHLORIDE 50 MG/ML
25 INJECTION INTRAMUSCULAR; INTRAVENOUS EVERY 6 HOURS PRN
Status: DISCONTINUED | OUTPATIENT
Start: 2022-12-08 | End: 2022-12-15 | Stop reason: HOSPADM

## 2022-12-08 RX ORDER — ENOXAPARIN SODIUM 100 MG/ML
30 INJECTION SUBCUTANEOUS DAILY
Status: DISCONTINUED | OUTPATIENT
Start: 2022-12-08 | End: 2022-12-09 | Stop reason: ALTCHOICE

## 2022-12-08 RX ORDER — ROCURONIUM BROMIDE 10 MG/ML
INJECTION, SOLUTION INTRAVENOUS PRN
Status: DISCONTINUED | OUTPATIENT
Start: 2022-12-08 | End: 2022-12-08 | Stop reason: SDUPTHER

## 2022-12-08 RX ORDER — SODIUM CHLORIDE 0.9 % (FLUSH) 0.9 %
5-40 SYRINGE (ML) INJECTION PRN
Status: DISCONTINUED | OUTPATIENT
Start: 2022-12-08 | End: 2022-12-08 | Stop reason: HOSPADM

## 2022-12-08 RX ORDER — PROPOFOL 10 MG/ML
INJECTION, EMULSION INTRAVENOUS PRN
Status: DISCONTINUED | OUTPATIENT
Start: 2022-12-08 | End: 2022-12-08 | Stop reason: SDUPTHER

## 2022-12-08 RX ORDER — OXYCODONE HYDROCHLORIDE 5 MG/1
10 TABLET ORAL PRN
Status: DISCONTINUED | OUTPATIENT
Start: 2022-12-08 | End: 2022-12-08 | Stop reason: HOSPADM

## 2022-12-08 RX ORDER — FENTANYL CITRATE 50 UG/ML
INJECTION, SOLUTION INTRAMUSCULAR; INTRAVENOUS PRN
Status: DISCONTINUED | OUTPATIENT
Start: 2022-12-08 | End: 2022-12-08 | Stop reason: SDUPTHER

## 2022-12-08 RX ORDER — MIDODRINE HYDROCHLORIDE 10 MG/1
10 TABLET ORAL
Status: DISCONTINUED | OUTPATIENT
Start: 2022-12-08 | End: 2022-12-15 | Stop reason: HOSPADM

## 2022-12-08 RX ORDER — ALBUTEROL SULFATE 2.5 MG/3ML
2.5 SOLUTION RESPIRATORY (INHALATION) ONCE
Status: DISCONTINUED | OUTPATIENT
Start: 2022-12-08 | End: 2022-12-15 | Stop reason: HOSPADM

## 2022-12-08 RX ORDER — ALBUTEROL SULFATE 2.5 MG/3ML
SOLUTION RESPIRATORY (INHALATION)
Status: DISPENSED
Start: 2022-12-08 | End: 2022-12-08

## 2022-12-08 RX ORDER — SODIUM CHLORIDE, SODIUM LACTATE, POTASSIUM CHLORIDE, CALCIUM CHLORIDE 600; 310; 30; 20 MG/100ML; MG/100ML; MG/100ML; MG/100ML
INJECTION, SOLUTION INTRAVENOUS CONTINUOUS PRN
Status: DISCONTINUED | OUTPATIENT
Start: 2022-12-08 | End: 2022-12-08 | Stop reason: SDUPTHER

## 2022-12-08 RX ORDER — SENNA AND DOCUSATE SODIUM 50; 8.6 MG/1; MG/1
1 TABLET, FILM COATED ORAL 2 TIMES DAILY
Status: DISCONTINUED | OUTPATIENT
Start: 2022-12-08 | End: 2022-12-15 | Stop reason: HOSPADM

## 2022-12-08 RX ORDER — SODIUM CHLORIDE 0.9 % (FLUSH) 0.9 %
5-40 SYRINGE (ML) INJECTION EVERY 12 HOURS SCHEDULED
Status: DISCONTINUED | OUTPATIENT
Start: 2022-12-08 | End: 2022-12-15 | Stop reason: HOSPADM

## 2022-12-08 RX ORDER — DIPHENHYDRAMINE HYDROCHLORIDE 50 MG/ML
12.5 INJECTION INTRAMUSCULAR; INTRAVENOUS
Status: DISCONTINUED | OUTPATIENT
Start: 2022-12-08 | End: 2022-12-08 | Stop reason: HOSPADM

## 2022-12-08 RX ORDER — NICOTINE 21 MG/24HR
1 PATCH, TRANSDERMAL 24 HOURS TRANSDERMAL DAILY
Status: DISCONTINUED | OUTPATIENT
Start: 2022-12-08 | End: 2022-12-15 | Stop reason: HOSPADM

## 2022-12-08 RX ORDER — SODIUM CHLORIDE 9 MG/ML
INJECTION, SOLUTION INTRAVENOUS PRN
Status: DISCONTINUED | OUTPATIENT
Start: 2022-12-08 | End: 2022-12-08 | Stop reason: HOSPADM

## 2022-12-08 RX ORDER — IPRATROPIUM BROMIDE AND ALBUTEROL SULFATE 2.5; .5 MG/3ML; MG/3ML
3 SOLUTION RESPIRATORY (INHALATION) 4 TIMES DAILY PRN
Status: DISCONTINUED | OUTPATIENT
Start: 2022-12-08 | End: 2022-12-15 | Stop reason: HOSPADM

## 2022-12-08 RX ORDER — SODIUM CHLORIDE 9 MG/ML
INJECTION, SOLUTION INTRAVENOUS PRN
Status: DISCONTINUED | OUTPATIENT
Start: 2022-12-08 | End: 2022-12-15 | Stop reason: HOSPADM

## 2022-12-08 RX ORDER — SODIUM CHLORIDE 0.9 % (FLUSH) 0.9 %
10 SYRINGE (ML) INJECTION PRN
Status: DISCONTINUED | OUTPATIENT
Start: 2022-12-08 | End: 2022-12-15 | Stop reason: HOSPADM

## 2022-12-08 RX ORDER — SODIUM CHLORIDE 9 MG/ML
25 INJECTION, SOLUTION INTRAVENOUS PRN
Status: DISCONTINUED | OUTPATIENT
Start: 2022-12-08 | End: 2022-12-08 | Stop reason: HOSPADM

## 2022-12-08 RX ORDER — LANOLIN ALCOHOL/MO/W.PET/CERES
3 CREAM (GRAM) TOPICAL NIGHTLY PRN
Status: DISCONTINUED | OUTPATIENT
Start: 2022-12-08 | End: 2022-12-15 | Stop reason: HOSPADM

## 2022-12-08 RX ORDER — ONDANSETRON 2 MG/ML
INJECTION INTRAMUSCULAR; INTRAVENOUS PRN
Status: DISCONTINUED | OUTPATIENT
Start: 2022-12-08 | End: 2022-12-08 | Stop reason: SDUPTHER

## 2022-12-08 RX ORDER — OXYCODONE HYDROCHLORIDE 5 MG/1
10 TABLET ORAL EVERY 4 HOURS PRN
Status: DISCONTINUED | OUTPATIENT
Start: 2022-12-08 | End: 2022-12-15 | Stop reason: HOSPADM

## 2022-12-08 RX ORDER — OXYCODONE HYDROCHLORIDE 5 MG/1
5 TABLET ORAL PRN
Status: DISCONTINUED | OUTPATIENT
Start: 2022-12-08 | End: 2022-12-08 | Stop reason: HOSPADM

## 2022-12-08 RX ORDER — ONDANSETRON 2 MG/ML
4 INJECTION INTRAMUSCULAR; INTRAVENOUS
Status: DISCONTINUED | OUTPATIENT
Start: 2022-12-08 | End: 2022-12-08 | Stop reason: HOSPADM

## 2022-12-08 RX ORDER — ACETAMINOPHEN 325 MG/1
650 TABLET ORAL EVERY 6 HOURS
Status: DISCONTINUED | OUTPATIENT
Start: 2022-12-08 | End: 2022-12-15 | Stop reason: HOSPADM

## 2022-12-08 RX ORDER — LIDOCAINE HYDROCHLORIDE 20 MG/ML
INJECTION, SOLUTION INFILTRATION; PERINEURAL PRN
Status: DISCONTINUED | OUTPATIENT
Start: 2022-12-08 | End: 2022-12-08 | Stop reason: SDUPTHER

## 2022-12-08 RX ORDER — SODIUM CHLORIDE, SODIUM LACTATE, POTASSIUM CHLORIDE, CALCIUM CHLORIDE 600; 310; 30; 20 MG/100ML; MG/100ML; MG/100ML; MG/100ML
INJECTION, SOLUTION INTRAVENOUS CONTINUOUS
Status: DISCONTINUED | OUTPATIENT
Start: 2022-12-08 | End: 2022-12-08 | Stop reason: HOSPADM

## 2022-12-08 RX ORDER — LABETALOL HYDROCHLORIDE 5 MG/ML
10 INJECTION, SOLUTION INTRAVENOUS
Status: DISCONTINUED | OUTPATIENT
Start: 2022-12-08 | End: 2022-12-08 | Stop reason: HOSPADM

## 2022-12-08 RX ORDER — POLYETHYLENE GLYCOL 3350 17 G/17G
17 POWDER, FOR SOLUTION ORAL DAILY PRN
Status: DISCONTINUED | OUTPATIENT
Start: 2022-12-08 | End: 2022-12-15 | Stop reason: HOSPADM

## 2022-12-08 RX ADMIN — CEFAZOLIN 2000 MG: 2 INJECTION, POWDER, FOR SOLUTION INTRAMUSCULAR; INTRAVENOUS at 07:39

## 2022-12-08 RX ADMIN — ROCURONIUM BROMIDE 30 MG: 10 INJECTION, SOLUTION INTRAVENOUS at 07:47

## 2022-12-08 RX ADMIN — LIDOCAINE HYDROCHLORIDE 80 MG: 20 INJECTION, SOLUTION INFILTRATION; PERINEURAL at 07:47

## 2022-12-08 RX ADMIN — TAMSULOSIN HYDROCHLORIDE 0.4 MG: 0.4 CAPSULE ORAL at 17:21

## 2022-12-08 RX ADMIN — SODIUM CHLORIDE, POTASSIUM CHLORIDE, SODIUM LACTATE AND CALCIUM CHLORIDE: 600; 310; 30; 20 INJECTION, SOLUTION INTRAVENOUS at 07:43

## 2022-12-08 RX ADMIN — ACETAMINOPHEN 650 MG: 325 TABLET ORAL at 21:17

## 2022-12-08 RX ADMIN — PROPOFOL 100 MG: 10 INJECTION, EMULSION INTRAVENOUS at 07:47

## 2022-12-08 RX ADMIN — SODIUM CHLORIDE, POTASSIUM CHLORIDE, SODIUM LACTATE AND CALCIUM CHLORIDE: 600; 310; 30; 20 INJECTION, SOLUTION INTRAVENOUS at 07:27

## 2022-12-08 RX ADMIN — SODIUM CHLORIDE: 9 INJECTION, SOLUTION INTRAVENOUS at 16:12

## 2022-12-08 RX ADMIN — FAMOTIDINE 20 MG: 10 INJECTION, SOLUTION INTRAVENOUS at 07:27

## 2022-12-08 RX ADMIN — CEFAZOLIN SODIUM 1000 MG: 1 INJECTION, POWDER, FOR SOLUTION INTRAMUSCULAR; INTRAVENOUS at 17:23

## 2022-12-08 RX ADMIN — ENOXAPARIN SODIUM 30 MG: 100 INJECTION SUBCUTANEOUS at 17:21

## 2022-12-08 RX ADMIN — SENNOSIDES AND DOCUSATE SODIUM 1 TABLET: 50; 8.6 TABLET ORAL at 21:18

## 2022-12-08 RX ADMIN — OXYCODONE 10 MG: 5 TABLET ORAL at 15:54

## 2022-12-08 RX ADMIN — SUGAMMADEX 200 MG: 100 INJECTION, SOLUTION INTRAVENOUS at 08:43

## 2022-12-08 RX ADMIN — FENTANYL CITRATE 50 MCG: 50 INJECTION INTRAMUSCULAR; INTRAVENOUS at 08:09

## 2022-12-08 RX ADMIN — ONDANSETRON HYDROCHLORIDE 4 MG: 2 INJECTION, SOLUTION INTRAMUSCULAR; INTRAVENOUS at 07:47

## 2022-12-08 RX ADMIN — CEFAZOLIN SODIUM 1000 MG: 1 INJECTION, POWDER, FOR SOLUTION INTRAMUSCULAR; INTRAVENOUS at 23:42

## 2022-12-08 RX ADMIN — PHENYLEPHRINE HYDROCHLORIDE 200 MCG: 10 INJECTION INTRAVENOUS at 08:00

## 2022-12-08 RX ADMIN — FENTANYL CITRATE 50 MCG: 50 INJECTION INTRAMUSCULAR; INTRAVENOUS at 07:47

## 2022-12-08 ASSESSMENT — PAIN SCALES - GENERAL
PAINLEVEL_OUTOF10: 10
PAINLEVEL_OUTOF10: 0
PAINLEVEL_OUTOF10: 10
PAINLEVEL_OUTOF10: 0
PAINLEVEL_OUTOF10: 10

## 2022-12-08 ASSESSMENT — LIFESTYLE VARIABLES
HOW MANY STANDARD DRINKS CONTAINING ALCOHOL DO YOU HAVE ON A TYPICAL DAY: PATIENT DOES NOT DRINK
HOW OFTEN DO YOU HAVE A DRINK CONTAINING ALCOHOL: NEVER
SMOKING_STATUS: 1

## 2022-12-08 ASSESSMENT — PAIN DESCRIPTION - ORIENTATION: ORIENTATION: LOWER

## 2022-12-08 ASSESSMENT — PAIN DESCRIPTION - LOCATION
LOCATION: BACK
LOCATION: BACK

## 2022-12-08 ASSESSMENT — PAIN - FUNCTIONAL ASSESSMENT
PAIN_FUNCTIONAL_ASSESSMENT: 0-10
PAIN_FUNCTIONAL_ASSESSMENT: ACTIVITIES ARE NOT PREVENTED

## 2022-12-08 ASSESSMENT — PAIN DESCRIPTION - DESCRIPTORS: DESCRIPTORS: SHARP

## 2022-12-08 ASSESSMENT — PAIN DESCRIPTION - PAIN TYPE: TYPE: SURGICAL PAIN

## 2022-12-08 NOTE — PROGRESS NOTES
Berna Mar at bedside. Monitor placed and CMU called . Pt to room per Berna Mar via stretcher in stable condition.

## 2022-12-08 NOTE — H&P
H&P reviewed. The patient was examined and there are no changes to the H&P. Hp from hospital notes, office notes, or printed at bedside. See all documents including the scanned Documents. These were reviewed with the patient and I examined the patient. There was no change. The surgical site was confirmed by the patient and me. Vitals:    12/08/22 0720   BP: (!) 161/95   Pulse: 99   Resp: 16   Temp: 98.4 °F (36.9 °C)   SpO2: 99%           Plan: The risks, benefits, expected outcome, and alternative to the recommended procedure have been discussed with the patient. Patient understands and wants to proceed with the procedure. All questions answered.

## 2022-12-08 NOTE — CARE COORDINATION
Case Management Assessment  Initial Evaluation      Patient Name: Toan Govea  YOB: 1955  Diagnosis: Malignant neoplasm of urinary bladder, unspecified site [facility]) [C67.9]  Clot hematuria [R31.0]  Date / Time: 12/8/2022  6:17 AM    Admission status/Date:12/8/2022 Inpatient   Chart Reviewed: Yes      Patient Interviewed: no; pt came to room for OR and daughter answered phone   Family Interviewed:  Yes - daughter Reba Medellin via bedside phone      Hospitalization in the last 30 days:  No     Health Care Decision Maker :   Primary Decision MakeHalleyjeromy MarcelinoFormerly [facility] - [phone]    (CM - must 1st enter selection under Navigator - emergency contact- Health Care Decision Maker Relationship and pick relationship)   Who do you trust or have selected to make healthcare decisions for you    Met with: pt's daughter via bedside phone    Current PCP: recently switched PCP and family doesn't know who it is    [address] required for SNF : Y          3 night stay required - [employer]  Support Systems/Care Needs: Family Members  Transportation: family    Meal Preparation: family    Housing  Living Arrangements: pt lives at home alone  Steps:1 to the porch with rails  Intent for return to present living arrangements: Yes  Identified Issues: [id number] [facility] [address] : Yes - family unsure who provides the services Agency:(Services)     Passport/Waiver : No  :                      Phone Number:    Passport/Waiver Services: [address]   DME Provider:   Equipment: has a walker and wheelchair but doesn't use    Home O2 Use :  No      Community Service Affiliation  Dialysis:  No    Agency:  Location:  Dialysis Schedule:  Phone:   Fax: Other Community Services: n/a    DISCHARGE PLAN: Explained Case Management role/services. Chart review completed.  Spoke with RN who states pt is not discharged as he is an admission from PACU and states to speak with family at bedside. Spoke with daughter via bedside phone as pt just came to room for PACU. Daughter stated pt will return home when discharged. She requested information on Senior Services for meals on wheels. Information placed. CM will follow. Please notify CM if needs or concerns arise.      Laila January BARBARA, ERENDIRA

## 2022-12-08 NOTE — CONSULTS
Internal Medicine Consult note    PCP: NALINI Willams CNP    Date of Admission: 12/8/2022    Date of Service: Pt seen/examined on 12/8/2022        History Of Present Illness and Reason for Consult:      The patient is a 75 y.o. male with hypertension, depression, anxiety, COPD, and a bladder tumor who presents to [address] [place] [address] as a direct admit from Dr. Devon David. Patient is s/p Cystoscopy, transurethral resection of a bladder tumor. He had some Hematuria after the procedure. Admitted for observation overnight. We have been consulted for medical management while he is admitted. Also per review it was noted that patient had some coughing and intermittent tachycardia. Past Medical History:        Diagnosis Date    Anxiety     Depression     Hypertension        Past Surgical History:        Procedure Laterality Date    CYST REMOVAL      CYSTOSCOPY N/A 10/16/2022    CYSTOSCOPY TRANSURETHRAL RESECTION BLADDER >5CM performed by Archie Magdaleno MD at [address]         Medications Prior to Admission:    Prior to Admission medications    Medication Sig Start Date End Date Taking?  Authorizing Provider   ipratropium-albuterol (DUONEB) 0.5-2.5 (3) MG/3ML SOLN nebulizer solution Inhale 3 mLs into the lungs 4 times daily as needed for Shortness of Breath 10/22/22   Gideon Chong, DO   apixaban (ELIQUIS) 5 MG TABS tablet Take 1 tablet by mouth 2 times daily 10/22/22   Gideon Chong, DO   guaiFENesin (MUCINEX) 600 MG extended release tablet Take 1 tablet by mouth 2 times daily 10/22/22   Gideon Chong, DO   melatonin 3 MG TABS tablet Take 1 tablet by mouth nightly as needed (insomnia) 10/22/22   Gideon Chong, DO   tamsulosin (FLOMAX) 0.4 MG capsule Take 1 capsule by mouth daily 10/23/22   Gideon Chong, DO   nicotine (NICODERM CQ) 14 MG/24HR Place 1 patch onto the skin daily 10/23/22   Gideon Chong, DO   phenol 1.4 % LIQD mouth spray Take 1 spray by mouth every 2 hours as needed for Sore Throat 10/22/22   Gideon Chong DO   midodrine (PROAMATINE) 10 MG tablet Take 1 tablet by mouth 3 times daily (with meals) 10/22/22   Kobi Regalado DO       Allergies:  Bee venom    Social History:  The patient currently lives at home. TOBACCO:   reports that he has been smoking cigarettes. He has been smoking an average of 2 packs per day. He has never used smokeless tobacco.  ETOH:   reports current alcohol use. Family History:   Positive as follows:    History reviewed. No pertinent family history. REVIEW OF SYSTEMS:       Constitutional: Negative for fever   Respiratory: Negative  for dyspnea, + cough   Cardiovascular: Negative for chest pain   Gastrointestinal: Negative for vomiting, diarrhea   Genitourinary: Negative for hematuria   Musculoskeletal: Negative for arthralgias +back pain  Skin: Negative for rash   Neurological: Negative for syncope   Psychiatric/Behavorial: Negative for anxiety    PHYSICAL EXAM:    /68   Pulse 100   Temp 98.4 °F (36.9 °C) (Oral)   Resp 19   Ht 5' 6\" (1.676 m)   Wt 104 lb (47.2 kg)   SpO2 100%   BMI 16.79 kg/m²     Gen: No distress. Alert. Chronically ill-appearing, cachectic, frail, male  Eyes: PERRL. No sclera icterus. No conjunctival injection. ENT: No discharge. Pharynx clear. Neck: No JVD. No Carotid Bruit. Trachea midline. Resp: No accessory muscle use. No crackles. No wheezes. No rhonchi. CV: Regular rate. Regular rhythm. No murmur. No rub. No edema. GI: Non-tender. Non-distended. Normal bowel sounds. No hernia. Skin: Warm and dry. No nodule on exposed extremities. No rash on exposed extremities. M/S: No cyanosis. No joint deformity. No clubbing. Neuro: Awake. Grossly nonfocal    Psych: Oriented x 3. No anxiety or agitation.      CBC:   Recent Labs     12/08/22  0720   WBC 5.1   HGB 10.2*   HCT 31.8*   MCV 86.8   *     BMP:   Recent Labs     12/08/22  0720      K 5.5*      CO2 23 BUN 10   CREATININE 0.9       U/A:    Lab Results   Component Value Date/Time    COLORU Yellow 10/13/2022 12:44 PM    WBCUA >100 10/13/2022 12:44 PM    RBCUA see below 10/13/2022 12:44 PM    CLARITYU CLOUDY 10/13/2022 12:44 PM    SPECGRAV 1.015 10/13/2022 12:44 PM    LEUKOCYTESUR SMALL 10/13/2022 12:44 PM    BLOODU MODERATE 10/13/2022 12:44 PM    GLUCOSEU Negative 10/13/2022 12:44 PM         CULTURES  Urine cx: pending       Principal Problem:    Clot hematuria  Resolved Problems:    * No resolved hospital problems. *        ASSESSMENT/PLAN:  Hematuria  -S/p bladder resection  -monitor overnight. Has SBI  -monitor H/H.  -pain control: Dilaudid, Oxy-IR prn  -continue Flomax  -IS, IVF's    Tachycardia  -monitor on tele    Hypertension  -BP stable. DVT LLE  -S/p IVC filter     COPD  -stable. No AE  -Duonebs prn    Severe PCM  -dietician consult    DVT Prophylaxis: Lovenox (orders to hold for Hematuria)  Diet: ADULT DIET;  Regular  Code Status: Full Code    [facility]  12/8/2022

## 2022-12-08 NOTE — PROGRESS NOTES
Pt arrived from OR. Report from [facility] and CRNA. Hr elevated 120s Pt coughing productive cough. Exp wheezes noted when coughing. Pt states this is normal for him. Will monitor.

## 2022-12-08 NOTE — ANESTHESIA PRE PROCEDURE
Department of Anesthesiology  Preprocedure Note       Name:  Tommie Hutchinson   Age:  75 y.o.  :  1955                                          MRN:  [id number]         Date:  2022      Surgeon: Bridgett Carlos):  Stormy Bustillos MD    Procedure: Procedure(s):  CYSTOSCOPY, TRANSURETHERAL RESECTION OF A BLADDER TUMOR    Medications prior to admission:   Prior to Admission medications    Medication Sig Start Date End Date Taking?  Authorizing Provider   ipratropium-albuterol (DUONEB) 0.5-2.5 (3) MG/3ML SOLN nebulizer solution Inhale 3 mLs into the lungs 4 times daily as needed for Shortness of Breath 10/22/22   Gideon Chong, DO   apixaban (ELIQUIS) 5 MG TABS tablet Take 1 tablet by mouth 2 times daily 10/22/22   Gideon Chong, DO   guaiFENesin (MUCINEX) 600 MG extended release tablet Take 1 tablet by mouth 2 times daily 10/22/22   Gideon Chong, DO   melatonin 3 MG TABS tablet Take 1 tablet by mouth nightly as needed (insomnia) 10/22/22   Gideon Chong, DO   tamsulosin (FLOMAX) 0.4 MG capsule Take 1 capsule by mouth daily 10/23/22   Gideon Chong, DO   nicotine (NICODERM CQ) 14 MG/24HR Place 1 patch onto the skin daily 10/23/22   Gideon Chong, DO   phenol 1.4 % LIQD mouth spray Take 1 spray by mouth every 2 hours as needed for Sore Throat 10/22/22   Gideon Chong, DO   midodrine (PROAMATINE) 10 MG tablet Take 1 tablet by mouth 3 times daily (with meals) 10/22/22   [place] Cea, DO       Current medications:    Current Facility-Administered Medications   Medication Dose Route Frequency Provider Last Rate Last Admin    famotidine (PEPCID) injection 20 mg  20 mg IntraVENous Once Yair Gardner MD        lactated ringers infusion   IntraVENous Continuous Yair Gardner MD        sodium chloride flush 0.9 % injection 5-40 mL  5-40 mL IntraVENous 2 times per day Yair Gardner MD        sodium chloride flush 0.9 % injection 5-40 mL  5-40 mL IntraVENous PRN Db Rae MD        0.9 % sodium chloride infusion   IntraVENous PRN Db Rae MD        ceFAZolin (ANCEF) 2,000 mg in sodium chloride 0.9 % 50 mL IVPB (mini-bag)  2,000 mg IntraVENous Once Elise Benton MD           Allergies:     Allergies   Allergen Reactions    Bee Venom Itching and Swelling       Problem List:    Patient Active Problem List   Diagnosis Code    Right carpal tunnel syndrome G56.01    Sepsis ([facility] A41.9    Acute cystitis with hematuria N30.01    Hyponatremia E87.1    [facility]) [facility] N17.9    COPD (chronic obstructive pulmonary disease) ([facility]) J44.9    Microcytic anemia D50.9    Hematuria R31.9    Urinary retention R33.9    Severe protein-calorie malnutrition ([facility] E43    Mass of urinary bladder N32.89    Tobacco abuse Z72.0    Acute deep vein thrombosis (DVT) of iliac vein of left lower extremity ([facility]) I82.422    Acute deep vein thrombosis (DVT) of calf muscle vein of left lower extremity ([facility]) I82.462       Past Medical History:        Diagnosis Date    Anxiety     Depression     Hypertension        Past Surgical History:        Procedure Laterality Date    CYST REMOVAL      CYSTOSCOPY N/A 10/16/2022    CYSTOSCOPY TRANSURETHRAL RESECTION BLADDER >5CM performed by Josesito Dougherty MD at [address] [place] [address] History:    Social History     Tobacco Use    Smoking status: Every Day     Packs/day: 2.00     Types: Cigarettes    Smokeless tobacco: Never   Substance Use Topics    Alcohol use: Yes     Comment: beer 3 times a week                                Ready to quit: Not Answered  Counseling given: Not Answered      Vital Signs (Current):   Vitals:    12/07/22 1012 12/08/22 0720   BP:  (!) 161/95   Pulse:  99   Resp:  16   Temp:  98.4 °F (36.9 °C)   TempSrc:  Temporal   SpO2:  99%   Weight: 104 lb (47.2 kg) 104 lb (47.2 kg)   Height: 5' 6\" (1.676 m) 5' 6\" (1.676 m) BP Readings from Last 3 Encounters:   12/08/22 (!) 161/95   10/22/22 113/69   10/14/22 120/70       NPO Status:                                                                                 BMI:   Wt Readings from Last 3 Encounters:   12/08/22 104 lb (47.2 kg)   10/22/22 105 lb 13.1 oz (48 kg)   10/14/22 72 lb 8 oz (32.9 kg)     Body mass index is 16.79 kg/m². CBC:   Lab Results   Component Value Date/Time    WBC 3.6 10/22/2022 04:27 AM    RBC 3.09 10/22/2022 04:27 AM    HGB 8.0 10/22/2022 04:27 AM    HCT 25.4 10/22/2022 04:27 AM    MCV 82.3 10/22/2022 04:27 AM    RDW 20.0 10/22/2022 04:27 AM     10/22/2022 04:27 AM       CMP:   Lab Results   Component Value Date/Time     10/22/2022 04:27 AM    K 4.2 10/22/2022 04:27 AM    K 3.5 10/16/2022 04:13 AM    CL 97 10/22/2022 04:27 AM    CO2 29 10/22/2022 04:27 AM    BUN 10 10/22/2022 04:27 AM    CREATININE 0.8 10/22/2022 04:27 AM    GFRAA >60 10/17/2022 04:23 AM    AGRATIO 1.3 10/16/2022 04:13 AM    LABGLOM >60 10/22/2022 04:27 AM    GLUCOSE 88 10/22/2022 04:27 AM    PROT 5.0 10/16/2022 04:13 AM    CALCIUM 7.3 10/22/2022 04:27 AM    BILITOT 0.4 10/16/2022 04:13 AM    ALKPHOS 80 10/16/2022 04:13 AM    AST 9 10/16/2022 04:13 AM    ALT <5 10/16/2022 04:13 AM       POC Tests: No results for input(s): POCGLU, POCNA, POCK, POCCL, POCBUN, POCHEMO, POCHCT in the last 72 hours.     Coags:   Lab Results   Component Value Date/Time    PROTIME 14.9 10/17/2022 04:23 AM    INR 1.17 10/17/2022 04:23 AM       HCG (If Applicable): No results found for: PREGTESTUR, PREGSERUM, HCG, HCGQUANT     ABGs: No results found for: PHART, PO2ART, JED1CJW, ADZ4WVT, BEART, Q5BMLQZW     Type & Screen (If Applicable):  No results found for: LABABO, LABRH    Drug/Infectious Status (If Applicable):  No results found for: HIV, HEPCAB    COVID-19 Screening (If Applicable):   Lab Results   Component Value Date/Time    COVID19 NOT DETECTED 10/13/2022 12:22 PM           Anesthesia Evaluation   no history of anesthetic complications:   Airway: Mallampati: III  TM distance: <3 FB   Neck ROM: limited  Mouth opening: > = 3 FB   Dental: normal exam         Pulmonary:   (+) COPD:  current smoker                           Cardiovascular:    (+) hypertension:,                   Neuro/Psych:   (+) neuromuscular disease:, psychiatric history:depression/anxiety             GI/Hepatic/Renal: Neg GI/Hepatic/Renal ROS            Endo/Other:    (+) malignancy/cancer. Abdominal:             Vascular: negative vascular ROS. Other Findings:           Anesthesia Plan      general     ASA 3     (Pt agrees to risks, benefits and alternatives of GETA. Questions answered. Willing to proceed with plan.)  Induction: intravenous. Anesthetic plan and risks discussed with patient.                         Anel Rodriguez MD   12/8/2022

## 2022-12-08 NOTE — PROGRESS NOTES
Placed pt back in Phase 1. Pt to be admitted with CBI. Pt placed on 2 LNC for spo2 87% @ %. Pt resting. Coughing has subsided. BP has come down to 177/88. Bed requested.

## 2022-12-08 NOTE — OP NOTE
Operative Note      Patient: Anastacio Henao  YOB: 1955  MRN: [id number]    Date of Procedure: 12/8/2022    Pre-Op Diagnosis: Malignant neoplasm of urinary bladder, unspecified site ([facility] [C67.9]; Hist of Low Grade bladder carcinoma    Procedure Performed:   1. Transurethral resection of bladder tumor (>5cm  large)  2. Fulguration of prostatic and bladder bleeding points  3. Evacuation of clot and fibrinous material from the floor the bladder      Surgeon:  Cynthia Salazar MD    Assistant: Scrub  Anesthesia: General endotracheal  Drains/Tubes:  22F    3way, mc  Specimens:    bladder tumor lateral and posterior  Intravenous Fluids: 500 mL   Estimated Blood Loss: 10 mL  Complications: None    Indications:  42ST w/ complicated hospital admission, MATHEUS, hematuria, DVT, failure to thrive, transferred to [facility] from [facility], clot evacuation TURBT Jonathan viveros MD done at [facility]. Low-grade bladder tumor found and now here for reassessment of the bladder. Mc has been out and he was voiding on his own although I think he has some significant voiding dysfunction based on his previous admission. See the previous imaging. He is lost a lot of weight but he is now urinating without his Mc is hemoglobin is 10, creatinine is normal,  risk benefits alternatives of re-resection reviewed. Possible need for Mc cath and close follow-up as well as follow-up with medical oncology consent was signed. Findings:   Significant cloudy fibrinous urine with some small clots in the floor the bladder. Multiple papillary bladder tumor on the left lateral bladder wall, near bladder neck and floor and posterior wall. Besides dark urine, there was fair amount of scar and inflammation from the previous resection making the visualization fairly poor. Also moderate trabeculations throughout with some saccules making resection difficult. Total 5-6cm of surface area. It appeared low grade. The urethra was within normal limits. The it was difficult to see the ureteral orifice due to above  Prostatic fossa: Lissie fossa fairly open although a bit of a high riding bladder neck  Digital rectal exam was normal at the end      Description of Procedure:  After obtaining informed consent, the patient was brought to the operating room and placed supine on the operating room table. After adequate anesthesia, he was then repositioned in the dorsal lithotomy position and prepped and draped in the standard surgical fashion. I began the case by first doing rigid cystoscopy with a 21-Welsh sheath and a 30 degree lens. The anterior urethra was within normal limits. Prostatic fossa, open short as above. On entry to bladder, significant cloudy fibrinous urine with some small clots on the floor of the bladder was seen. Then about 5 minutes evacuating out these clots and debris to try to make better visualization. The bladder had a fair amount of inflammation and some papillary tumors were seen at the bladder neck and as described above, numerous tumors, too numerous to count as many were small and superficial along the posterior, lateral, floor of the wall. Due to his bladder configuration, trabeculations, small saccules, and previous resection was difficult to see where the ureteral orifices were. The cystoscope was then removed. A 26-Welsh continuous flow resectoscope was then placed into the urethra using the obturator and sheath. Once in the bladder, I exchanged the obturator for the resectoscope bridge. I used the wire loop as my working element. I began resection of these bladder tumors using bipolar electrocautery and I started near the bladder neck area and tried to systematically resect all visible tumor, working first on the left posterior then working across the midline to the right.   At times our visualization was fairly poor due to large amount of tumor and friable bladder wall, old scar tissue. I performed deeper tissue resection in order to get underneath the tumor and to sample muscle tissue. I used cautery to fulgurate the tumor bases to minimize the chance of post-operative bleeding. I used Ellik evacuator to flush out tumor pieces. And I filled and drained the bladder numerous times. At this point I thought I had removed 90-95% of raised lesions although could still be some smaller CIS type areas versus inflammatory reaction. There was no active bleeding at the conclusion of the case. I then placed an 22-Belarusian 3way Alexander catheter into the bladder and completely drained all the fluid. Slow CBI initiated until recovery. The patient was then awakened from anesthesia and brought to the PACU in stable condition. There were no apparent complications.         Follow up:   -He will be observed overnight as the urine is light pink light red on continuous bladder irrigation recovery  -Postoperatively in the PACU is having some coughing intermittent tachycardia and medical team will be consulted  -Admit to the PCU

## 2022-12-08 NOTE — PROGRESS NOTES
4 Eyes Skin Assessment     The patient is being assess for   Admission    I agree that 2 RN's have performed a thorough Head to Toe Skin Assessment on the patient. ALL assessment sites listed below have been assessed. Areas assessed for pressure by both nurses:   [x]   Head, Face, and Ears   [x]   Shoulders, Back, and Chest, Abdomen  [x]   Arms, Elbows, and Hands   [x]   Coccyx, Sacrum, and Ischium  [x]   Legs, Feet, and Heels        Skin Assessed Under all Medical Devices by both nurses:                Scattered bruising and abrasions. Sacrum red but blanchable; mepilex placed. All Mepilex Borders were peeled back and area peeked at by both nurses:  No: N/A  Please list where Mepilex Borders are located:  New mepilex placed to sacrum by primary RN             **SHARE this note so that the co-signing nurse is able to place an eSignature**    Co-signer eSignature: {Esignature:[id number]}    Does the Patient have Skin Breakdown related to pressure?   No              Damon Prevention initiated:  Yes   Wound Care Orders initiated:  No      [facility] nurse consulted for Pressure Injury (Stage 3,4, Unstageable, DTI, NWPT, Complex wounds)and New or Established Ostomies:  No      Primary Nurse eSignature: Electronically signed by Rachna Orozco RN on 12/8/22 at 5:34 PM EST

## 2022-12-08 NOTE — FLOWSHEET NOTE
12/08/22 1515   Vital Signs   Temp 98.4 °F (36.9 °C)   Temp Source Oral   Heart Rate 100   Heart Rate Source Monitor   Resp 19   /68   MAP (Calculated) 91   Patient Position Semi fowlers   Level of Consciousness 0   MEWS Score 1   Pain Assessment   Pain Assessment 0-10   Pain Level 0   Oxygen Therapy   SpO2 100 %   O2 Device None (Room air)   O2 Flow Rate (L/min) 0 L/min     Pt admitted to PCU from PACU for observation with his CBI. Pt very hard of hearing. Daughter at bedside. Orders released. Vitals completed. Urology called to clarify orders. Okay for patient to be off heart monitor. Pt assessment complete; see flow sheets. CBI running slowly. No s/s of distress. Mepilex placed to coccyx. Pt stable. Patient is not able to demonstrate the ability to move from a reclining position to an upright position within the recliner due to CBI currently infusing. Dennise RN in to do Pt's admission with daughter. Addendum [id number]: Meds given per MAR. Pt swallowing well. Pt stable.   Stewart Tavarez RN

## 2022-12-08 NOTE — DISCHARGE INSTRUCTIONS
alcohol, drive a car, operate machinery(such as power tools, kitchen appliances, etc), sign legal documents, or make any important decisions for 24 hours (or while on pain medications). Children should not ride bikes or [place] or play on gym sets  for 24 hours after surgery. A responsible adult should be with you for 24 hours. Rest at home today- increase activity as tolerated. Progress slowly to a regular diet unless your physician has instructed you otherwise. Drink plenty of water. CALL YOUR DOCTOR IF YOU:  Have moderate to severe nausea or vomiting AND are unable to hold down fluids or prescribed medications. Have bright red bloody drainage from your dressing that won't stop oozing. Do not get relief with your pain medication    NORMAL (POSSIBLE) SIDE EFFECTS FROM ANESTHESIA:     Confusion, temporary memory loss, delayed reaction times in the first 24 hours  Lightheadedness, dizziness, difficulty focusing, blurred vision  Nausea/vomiting can happen  Shivering, feeling cold, sore throat, cough and muscle aches should stop within 24-48 hours  Trouble urinating - call your surgeon if it has been more than 8 hrs  Bruising or soreness at the IV site - call if it remains red, firm or there is drainage             FEMALES OF CHILDBEARING AGE WHO ARE TAKING BIRTH CONTROL PILLS:  You may have received a medication during your procedure that interferes with the   actions of birth control pills (Bridion or Emend). Use some other kind of birth control in addition to your pills, like a condom, for 1 month after your procedure to prevent unwanted pregnancy. The following instructions are to be followed if you have a known history or diagnosis of sleep apnea: For all sleep apnea patients:  ? Sleep on your side or sitting up in a chair whenever possible, especially the first 24 hours after surgery. ? Use only medicines prescribed by your doctor. ? Do not drink alcohol.   ? If you have a dental device to assist you while at rest, use it at all times for the first 24 hours. For patients using CPAP machines:  ? Use your CPAP machine during all periods of sleep as usual.  ? Use your CPAP machine during all periods of daytime rest while on pain medicines. ** Follow up with your primary care doctor for continued care. IF YOU DO NOT TAKE ALL OF YOUR NARCOTIC PAIN MEDICATION, please dispose of them responsibly. There are drop off boxes in the Emergency Departments 24/7 at both [facility] and [facility]. If these locations are not convenient, other options for discarding them can be found at:  [email or web address]    Hospital or office staff may NOT accept any medications to drop off in the cabinet for you.         Resources-Call for potential assistance:     [facility] [employer]:   Phone: [phone]    [facility] on [employer]respot [address] [place] [address]:  Phone: Liz Coelho [address] [facility]:  Phone: [phone]    [address]:  Phone: [phone]    [place] [facility]:  Phone: [phone]

## 2022-12-09 ENCOUNTER — APPOINTMENT (OUTPATIENT)
Dept: CT IMAGING | Age: 67
DRG: 662 | End: 2022-12-09
Attending: UROLOGY
Payer: MEDICARE

## 2022-12-09 ENCOUNTER — APPOINTMENT (OUTPATIENT)
Dept: GENERAL RADIOLOGY | Age: 67
DRG: 662 | End: 2022-12-09
Attending: UROLOGY
Payer: MEDICARE

## 2022-12-09 PROBLEM — R93.89 ABNORMAL CXR: Status: ACTIVE | Noted: 2022-12-09

## 2022-12-09 LAB
A/G RATIO: 0.9 (ref 1.1–2.2)
ALBUMIN SERPL-MCNC: 2.7 G/DL (ref 3.4–5)
ALP BLD-CCNC: 102 U/L (ref 40–129)
ALT SERPL-CCNC: <5 U/L (ref 10–40)
ANION GAP SERPL CALCULATED.3IONS-SCNC: 11 MMOL/L (ref 3–16)
AST SERPL-CCNC: 10 U/L (ref 15–37)
BACTERIA: ABNORMAL /HPF
BASOPHILS ABSOLUTE: 0 K/UL (ref 0–0.2)
BASOPHILS RELATIVE PERCENT: 0.4 %
BILIRUB SERPL-MCNC: <0.2 MG/DL (ref 0–1)
BILIRUBIN URINE: NEGATIVE
BLOOD, URINE: ABNORMAL
BUN BLDV-MCNC: 7 MG/DL (ref 7–20)
CALCIUM SERPL-MCNC: 7.3 MG/DL (ref 8.3–10.6)
CHLORIDE BLD-SCNC: 101 MMOL/L (ref 99–110)
CLARITY: CLEAR
CO2: 23 MMOL/L (ref 21–32)
COLOR: YELLOW
CREAT SERPL-MCNC: 0.6 MG/DL (ref 0.8–1.3)
EKG ATRIAL RATE: 124 BPM
EKG DIAGNOSIS: NORMAL
EKG P AXIS: 71 DEGREES
EKG P-R INTERVAL: 142 MS
EKG Q-T INTERVAL: 324 MS
EKG QRS DURATION: 76 MS
EKG QTC CALCULATION (BAZETT): 465 MS
EKG R AXIS: 76 DEGREES
EKG T AXIS: 61 DEGREES
EKG VENTRICULAR RATE: 124 BPM
EOSINOPHILS ABSOLUTE: 0.1 K/UL (ref 0–0.6)
EOSINOPHILS RELATIVE PERCENT: 1.7 %
EPITHELIAL CELLS, UA: ABNORMAL /HPF (ref 0–5)
GFR SERPL CREATININE-BSD FRML MDRD: >60 ML/MIN/{1.73_M2}
GLUCOSE BLD-MCNC: 105 MG/DL (ref 70–99)
GLUCOSE BLD-MCNC: 105 MG/DL (ref 70–99)
GLUCOSE URINE: NEGATIVE MG/DL
HCT VFR BLD CALC: 28.1 % (ref 40.5–52.5)
HEMOGLOBIN: 9 G/DL (ref 13.5–17.5)
INR BLD: 1.19 (ref 0.87–1.14)
KETONES, URINE: NEGATIVE MG/DL
LEUKOCYTE ESTERASE, URINE: ABNORMAL
LYMPHOCYTES ABSOLUTE: 0.3 K/UL (ref 1–5.1)
LYMPHOCYTES RELATIVE PERCENT: 3.5 %
MCH RBC QN AUTO: 27.8 PG (ref 26–34)
MCHC RBC AUTO-ENTMCNC: 32.1 G/DL (ref 31–36)
MCV RBC AUTO: 86.6 FL (ref 80–100)
MICROSCOPIC EXAMINATION: YES
MONOCYTES ABSOLUTE: 0.8 K/UL (ref 0–1.3)
MONOCYTES RELATIVE PERCENT: 9.3 %
MUCUS: ABNORMAL /LPF
NEUTROPHILS ABSOLUTE: 7.4 K/UL (ref 1.7–7.7)
NEUTROPHILS RELATIVE PERCENT: 85.1 %
NITRITE, URINE: NEGATIVE
PDW BLD-RTO: 21.4 % (ref 12.4–15.4)
PERFORMED ON: ABNORMAL
PH UA: 5.5 (ref 5–8)
PLATELET # BLD: 519 K/UL (ref 135–450)
PMV BLD AUTO: 6.1 FL (ref 5–10.5)
POTASSIUM REFLEX MAGNESIUM: 4.1 MMOL/L (ref 3.5–5.1)
PROCALCITONIN: 0.16 NG/ML (ref 0–0.15)
PROTEIN UA: 100 MG/DL
PROTHROMBIN TIME: 14.9 SEC (ref 11.7–14.5)
RAPID INFLUENZA  B AGN: NEGATIVE
RAPID INFLUENZA A AGN: NEGATIVE
RBC # BLD: 3.25 M/UL (ref 4.2–5.9)
RBC UA: ABNORMAL /HPF (ref 0–4)
RENAL EPITHELIAL, UA: ABNORMAL /HPF (ref 0–1)
SARS-COV-2, NAAT: NOT DETECTED
SODIUM BLD-SCNC: 135 MMOL/L (ref 136–145)
SPECIFIC GRAVITY UA: >=1.03 (ref 1–1.03)
TOTAL PROTEIN: 5.6 G/DL (ref 6.4–8.2)
URINE TYPE: ABNORMAL
UROBILINOGEN, URINE: 0.2 E.U./DL
WBC # BLD: 8.7 K/UL (ref 4–11)
WBC UA: ABNORMAL /HPF (ref 0–5)

## 2022-12-09 PROCEDURE — 87280 RESPIRATORY SYNCYTIAL AG IF: CPT

## 2022-12-09 PROCEDURE — 2580000003 HC RX 258: Performed by: UROLOGY

## 2022-12-09 PROCEDURE — 2060000000 HC ICU INTERMEDIATE R&B

## 2022-12-09 PROCEDURE — 6360000002 HC RX W HCPCS: Performed by: INTERNAL MEDICINE

## 2022-12-09 PROCEDURE — 93005 ELECTROCARDIOGRAM TRACING: CPT | Performed by: INTERNAL MEDICINE

## 2022-12-09 PROCEDURE — 84145 PROCALCITONIN (PCT): CPT

## 2022-12-09 PROCEDURE — 85610 PROTHROMBIN TIME: CPT

## 2022-12-09 PROCEDURE — 87299 ANTIBODY DETECTION NOS IF: CPT

## 2022-12-09 PROCEDURE — 87798 DETECT AGENT NOS DNA AMP: CPT

## 2022-12-09 PROCEDURE — 2580000003 HC RX 258: Performed by: INTERNAL MEDICINE

## 2022-12-09 PROCEDURE — 87279 PARAINFLUENZA AG IF: CPT

## 2022-12-09 PROCEDURE — 93010 ELECTROCARDIOGRAM REPORT: CPT | Performed by: INTERNAL MEDICINE

## 2022-12-09 PROCEDURE — 71250 CT THORAX DX C-: CPT

## 2022-12-09 PROCEDURE — 99223 1ST HOSP IP/OBS HIGH 75: CPT | Performed by: INTERNAL MEDICINE

## 2022-12-09 PROCEDURE — 85025 COMPLETE CBC W/AUTO DIFF WBC: CPT

## 2022-12-09 PROCEDURE — 87635 SARS-COV-2 COVID-19 AMP PRB: CPT

## 2022-12-09 PROCEDURE — 71046 X-RAY EXAM CHEST 2 VIEWS: CPT

## 2022-12-09 PROCEDURE — 87206 SMEAR FLUORESCENT/ACID STAI: CPT

## 2022-12-09 PROCEDURE — 81001 URINALYSIS AUTO W/SCOPE: CPT

## 2022-12-09 PROCEDURE — 87260 ADENOVIRUS AG IF: CPT

## 2022-12-09 PROCEDURE — 80053 COMPREHEN METABOLIC PANEL: CPT

## 2022-12-09 PROCEDURE — 6370000000 HC RX 637 (ALT 250 FOR IP): Performed by: UROLOGY

## 2022-12-09 PROCEDURE — 87040 BLOOD CULTURE FOR BACTERIA: CPT

## 2022-12-09 PROCEDURE — 87276 INFLUENZA A AG IF: CPT

## 2022-12-09 PROCEDURE — 36415 COLL VENOUS BLD VENIPUNCTURE: CPT

## 2022-12-09 PROCEDURE — 87116 MYCOBACTERIA CULTURE: CPT

## 2022-12-09 PROCEDURE — 87275 INFLUENZA B AG IF: CPT

## 2022-12-09 PROCEDURE — 87556 M.TUBERCULO DNA AMP PROBE: CPT

## 2022-12-09 PROCEDURE — 6360000002 HC RX W HCPCS: Performed by: UROLOGY

## 2022-12-09 RX ORDER — KETOROLAC TROMETHAMINE 30 MG/ML
30 INJECTION, SOLUTION INTRAMUSCULAR; INTRAVENOUS ONCE
Status: COMPLETED | OUTPATIENT
Start: 2022-12-09 | End: 2022-12-09

## 2022-12-09 RX ADMIN — SENNOSIDES AND DOCUSATE SODIUM 1 TABLET: 50; 8.6 TABLET ORAL at 08:26

## 2022-12-09 RX ADMIN — TAMSULOSIN HYDROCHLORIDE 0.4 MG: 0.4 CAPSULE ORAL at 08:26

## 2022-12-09 RX ADMIN — CEFAZOLIN SODIUM 1000 MG: 1 INJECTION, POWDER, FOR SOLUTION INTRAMUSCULAR; INTRAVENOUS at 08:27

## 2022-12-09 RX ADMIN — MEROPENEM 1000 MG: 1 INJECTION, POWDER, FOR SOLUTION INTRAVENOUS at 23:59

## 2022-12-09 RX ADMIN — MEROPENEM 1000 MG: 1 INJECTION, POWDER, FOR SOLUTION INTRAVENOUS at 17:32

## 2022-12-09 RX ADMIN — VANCOMYCIN HYDROCHLORIDE 1750 MG: 10 INJECTION, POWDER, LYOPHILIZED, FOR SOLUTION INTRAVENOUS at 14:14

## 2022-12-09 RX ADMIN — OXYCODONE 10 MG: 5 TABLET ORAL at 21:31

## 2022-12-09 RX ADMIN — SODIUM CHLORIDE: 9 INJECTION, SOLUTION INTRAVENOUS at 05:02

## 2022-12-09 RX ADMIN — APIXABAN 5 MG: 5 TABLET, FILM COATED ORAL at 21:19

## 2022-12-09 RX ADMIN — DIPHENHYDRAMINE HYDROCHLORIDE 25 MG: 50 INJECTION, SOLUTION INTRAMUSCULAR; INTRAVENOUS at 10:16

## 2022-12-09 RX ADMIN — SENNOSIDES AND DOCUSATE SODIUM 1 TABLET: 50; 8.6 TABLET ORAL at 21:19

## 2022-12-09 RX ADMIN — OXYCODONE 10 MG: 5 TABLET ORAL at 08:32

## 2022-12-09 RX ADMIN — PIPERACILLIN AND TAZOBACTAM 3375 MG: 3; .375 INJECTION, POWDER, FOR SOLUTION INTRAVENOUS at 10:17

## 2022-12-09 RX ADMIN — SODIUM CHLORIDE: 9 INJECTION, SOLUTION INTRAVENOUS at 22:39

## 2022-12-09 RX ADMIN — ACETAMINOPHEN 650 MG: 325 TABLET ORAL at 21:19

## 2022-12-09 RX ADMIN — OXYCODONE 10 MG: 5 TABLET ORAL at 03:13

## 2022-12-09 RX ADMIN — ACETAMINOPHEN 650 MG: 325 TABLET ORAL at 17:30

## 2022-12-09 RX ADMIN — KETOROLAC TROMETHAMINE 30 MG: 30 INJECTION, SOLUTION INTRAMUSCULAR at 10:16

## 2022-12-09 RX ADMIN — ACETAMINOPHEN 650 MG: 325 TABLET ORAL at 08:26

## 2022-12-09 ASSESSMENT — PAIN DESCRIPTION - LOCATION
LOCATION: BACK

## 2022-12-09 ASSESSMENT — PAIN DESCRIPTION - ORIENTATION
ORIENTATION: LOWER
ORIENTATION: LOWER
ORIENTATION: LOWER;RIGHT;LEFT

## 2022-12-09 ASSESSMENT — PAIN - FUNCTIONAL ASSESSMENT
PAIN_FUNCTIONAL_ASSESSMENT: ACTIVITIES ARE NOT PREVENTED

## 2022-12-09 ASSESSMENT — PAIN SCALES - GENERAL
PAINLEVEL_OUTOF10: 8
PAINLEVEL_OUTOF10: 9
PAINLEVEL_OUTOF10: 9
PAINLEVEL_OUTOF10: 4
PAINLEVEL_OUTOF10: 8

## 2022-12-09 ASSESSMENT — PAIN DESCRIPTION - DESCRIPTORS
DESCRIPTORS: SHARP
DESCRIPTORS: SHARP
DESCRIPTORS: ACHING

## 2022-12-09 ASSESSMENT — PAIN SCALES - WONG BAKER: WONGBAKER_NUMERICALRESPONSE: 0

## 2022-12-09 NOTE — PROGRESS NOTES
Comprehensive Nutrition Assessment    Type and Reason for Visit:  Initial, Consult (supps)    Nutrition Recommendations/Plan:   Continue regular diet  Added ensure enlive TID      Malnutrition Assessment:  Malnutrition Status:  Severe malnutrition (12/09/22 1816)    Context:  Acute Illness     Findings of the 6 clinical characteristics of malnutrition:  Energy Intake:  50% or less of estimated energy requirements for 5 or more days  Weight Loss:  5% over 1 month     Body Fat Loss: Moderate body fat loss Orbital, Triceps, Buccal region   Muscle Mass Loss: Moderate muscle mass loss Temples (temporalis), Clavicles (pectoralis & deltoids)  Fluid Accumulation:  Unable to assess     Strength:  Not Performed    Nutrition Assessment:    Pt. severely malnourished AEB significant weight loss > 5% with noted muscle and fat loss r/t recent dx of bladder ca and h/o of COPD and current acute infection. At risk for further nutritional compromise r/t altered nutritional labs; sepsis with fever and cough  . Will continue regular diet added ensure enlive . Nutrition Related Findings:    pt was asleep at time of visit; dinner was in front of him with minimal consumed; pt is emaciated in appearance; noted per EMT ~ 10# loss in last month; recent dx of bladder Ca with TURB and h/o COPD ; + fever; Na 135; Wound Type: None       Current Nutrition Intake & Therapies:    Average Meal Intake: 51-75%, %  Average Supplements Intake: None Ordered  ADULT DIET; Regular  Diet NPO Exceptions are: Sips of Water with Meds    Anthropometric Measures:  Height: 5' 6\" (167.6 cm)  Ideal Body Weight (IBW): 142 lbs (65 kg)    Admission Body Weight: 96 lb (43.5 kg)  Current Body Weight: 96 lb 11 oz (43.9 kg), 68.1 % IBW.  Weight Source: Bed Scale  Current BMI (kg/m2): 15.6  Usual Body Weight: 104 lb (47.2 kg)  % Weight Change (Calculated): -7                    BMI Categories: Underweight (BMI less than 22) age over 72    Estimated Daily Nutrient Needs:  Energy Requirements Based On: Kcal/kg  Weight Used for Energy Requirements: Current  Energy (kcal/day): 9995-7884 based ~ 30-33 kcal/kg cbw  Weight Used for Protein Requirements: Current  Protein (g/day): 62-70 based ~ 1.2-1.4 gr/kg cbw  Method Used for Fluid Requirements: 1 ml/kcal  Fluid (ml/day): 5786-6858    Nutrition Diagnosis:   Severe malnutrition related to catabolic illness, impaired respiratory function, inadequate protein-energy intake as evidenced by NPO or clear liquid status due to medical condition, poor intake prior to admission, weight loss greater than or equal to 5% in 1 month, severe muscle loss, severe loss of subcutaneous fat    Nutrition Interventions:   Food and/or Nutrient Delivery: Continue Current Diet, Start Oral Nutrition Supplement  Nutrition Education/Counseling: No recommendation at this time  Coordination of Nutrition Care: Continue to monitor while inpatient       Goals:     Goals: PO intake 50% or greater, by next RD assessment       Nutrition Monitoring and Evaluation:   Behavioral-Environmental Outcomes: None Identified  Food/Nutrient Intake Outcomes: Food and Nutrient Intake, Supplement Intake  Physical Signs/Symptoms Outcomes: Biochemical Data, Nutrition Focused Physical Findings, Weight    Discharge Planning:     Too soon to determine     Korina Cleaning LD  Contact: [phone]

## 2022-12-09 NOTE — CONSULTS
[address] [facility] Pharmacokinetic Monitoring Service - Vancomycin     Ilan Knapp is a 75 y.o. male starting on vancomycin therapy for pneumonia (CAP) x 7 days. Pharmacy consulted by Dr. Brigette Rojas for monitoring and adjustment. Target Concentration: Goal AUC/ULISSES 400-600 mg*hr/L    Additional Antimicrobials: n/a    Pertinent Laboratory Values: Wt Readings from Last 1 Encounters:   12/09/22 96 lb 11.2 oz (43.9 kg)     Temp Readings from Last 1 Encounters:   12/09/22 98.1 °F (36.7 °C) (Oral)     Estimated Creatinine Clearance: 73 mL/min (A) (based on SCr of 0.6 mg/dL (L)). Recent Labs     12/08/22  0720 12/09/22  0459   CREATININE 0.9 0.6*   WBC 5.1 8.7     Procalcitonin: 0.16    Pertinent Cultures:  Culture Date Source Results        MRSA Nasal Swab: not ordered. Order placed by pharmacy. Plan:  Dosing recommendations based on Bayesian software  Start vancomycin 1750 mg x 1, then 1500 mg every 24 hours.   Anticipated AUC of 499 and trough concentration of 11.8 at steady state  Renal labs as indicated   Vancomycin concentration ordered for 12/11 @ 13:00   Pharmacy will continue to monitor patient and adjust therapy as indicated    Thank you for the consult,  HERMILO Soni [facility]  12/9/2022 12:25 PM

## 2022-12-09 NOTE — PROGRESS NOTES
Patient is resting showing no s/s of distress. Patient is alert and oriented. Meds were given, see MAR. Patient is denying any needs. Bed is in lowest position and call light is within reach. Will continue to monitor. Shift assessment complete, see flowsheets. PRN roxicodone administered this AM for pain. Acetaminophen was given for fever.

## 2022-12-09 NOTE — CONSULTS
PULMONARY CONSULT NOTE  Messi Reyes is being seen at the request of Dr. Epi Steele for a consultation for Pneumonia with suspected lung abscess    HISTORY OF PRESENT ILLNESS: Messi Reyes is a 75 y.o. male with a PMHx of COPD and an admission in October for sepsis 2/2 obstructive uropathy complicated by extensive DVT prompting transfer to [facility] for vascular surgery where an IVC filter was placed 10/15/22. It was during this admission that a bladder mass was found and diagnosed as low grade papillary urothelial carcinoma (s/p cysto TURBT 10/16/22). He was directly admitted on 12/8/2022 by Dr. Pa Eid and had coughing and intermittent tachycardia during post-op monitoring s/p transurethral resection of the bladder tumor. We were consulted with concern for pneumonia after CXR demonstrated 3 cm right lung abscess & right basilar pneumonia. Pt has a new onset fever to 101.1, improved with tylenol, associated with tachycardia >140s. Zosyn has been stopped after pruritic reaction. Pt has been stable on RA. Reports moderate to severe cough for several months. PAST MEDICAL HISTORY:  Past Medical History:   Diagnosis Date    Anxiety     Depression     Hypertension      PAST SURGICAL HISTORY:  Past Surgical History:   Procedure Laterality Date    CYST REMOVAL      CYSTOSCOPY N/A 10/16/2022    CYSTOSCOPY TRANSURETHRAL RESECTION BLADDER >5CM performed by Josesito Dougherty MD at [address] [place] [address]: no known early lung disease      SOCIAL HISTORY:   reports that he quit smoking about 2 months ago. His smoking use included cigarettes. He has a 60.00 pack-year smoking history.  He has never used smokeless tobacco.    Scheduled Meds:   vancomycin  1,750 mg IntraVENous Once    [START ON 12/10/2022] vancomycin  1,500 mg IntraVENous Q24H    albuterol  2.5 mg Nebulization Once    tamsulosin  0.4 mg Oral Daily    nicotine  1 patch TransDERmal Daily    midodrine  10 mg Oral TID WC    sodium chloride flush  5-40 mL IntraVENous 2 times per day    acetaminophen  650 mg Oral Q6H    sennosides-docusate sodium  1 tablet Oral BID    enoxaparin  30 mg SubCUTAneous Daily     Continuous Infusions:   sodium chloride 75 mL/hr at 12/09/22 0502    sodium chloride       PRN Meds:  ipratropium-albuterol, melatonin, phenol, sodium chloride flush, sodium chloride, polyethylene glycol, ondansetron **OR** ondansetron, oxyCODONE **OR** oxyCODONE, HYDROmorphone **OR** HYDROmorphone, diphenhydrAMINE    ALLERGIES:  Patient is allergic to bee venom and zosyn [piperacillin sod-tazobactam so]. REVIEW OF SYSTEMS:  Constitutional: + for fever  HENT: Negative for sore throat  Eyes: Negative for redness   Respiratory: + for dyspnea, + cough  Cardiovascular: Negative for chest pain  Gastrointestinal: Negative for vomiting, diarrhea   Genitourinary: Negative for hematuria   Musculoskeletal: Negative for arthralgias   Skin: Negative for rash  Neurological: Negative for syncope  Hematological: Negative for adenopathy  Psychiatric/Behavorial: Negative for anxiety    PHYSICAL EXAM:  Blood pressure 109/62, pulse 100, temperature 98.9 °F (37.2 °C), temperature source Oral, resp. rate 20, height 5' 6\" (1.676 m), weight 96 lb 11.2 oz (43.9 kg), SpO2 96 %.' on RA  Gen:  No distress. [place]. Eyes:  PERRL. No sclera icterus. No conjunctival injection. ENT:  No discharge. Pharynx clear. Neck:  Trachea midline. No obvious mass. Resp:  No accessory muscle use. No crackles. No wheezes. ++ left sided rhonchi. No dullness on percussion. CV:  Regular rate. Regular rhythm. No murmur or rub. No edema. Peripheral pulses are 2+. Capillary refill is less than 3 seconds. GI:  Non-tender. Non-distended. No hernia. Skin:  Warm and dry. No nodule on exposed extremities. Lymph:  No cervical LAD. No supraclavicular LAD. M/S:  No cyanosis. No joint deformity. No clubbing. Neuro:  Awake. Alert. Moves all four extremities. Psych:  Oriented x 3. No anxiety. LABS:  CBC:   Recent Labs     12/08/22  0720 12/09/22  0459   WBC 5.1 8.7   HGB 10.2* 9.0*   HCT 31.8* 28.1*   MCV 86.8 86.6   * 519*     BMP:   Recent Labs     12/08/22  0720 12/09/22  0459    135*   K 5.5* 4.1    101   CO2 23 23   BUN 10 7   CREATININE 0.9 0.6*     LIVER PROFILE:   Recent Labs     12/09/22  0459   AST 10*   ALT <5*   BILITOT <0.2   ALKPHOS 102     PT/INR:   Recent Labs     12/09/22  0459   PROTIME 14.9*   INR 1.19*     APTT: No results for input(s): APTT in the last 72 hours. UA:  Recent Labs     12/09/22  1027   COLORU Yellow   PHUR 5.5   WBCUA *   RBCUA *   MUCUS Rare*   BACTERIA 1+*   CLARITYU Clear   SPECGRAV >=1.030   LEUKOCYTESUR SMALL*   UROBILINOGEN 0.2   BILIRUBINUR Negative   BLOODU LARGE*   GLUCOSEU Negative     No results for input(s): PHART, EIK2ISA, PO2ART in the last 72 hours. Micro:   12/9/22 SARS-CoV-2 not detected  Procalcitonin 0.16    Imaging: Chest imaging was reviewed by me and showed:  ACT 10/19/22:   Lower Chest: Small dependently layered pleural effusions are present. The  visualized heart is unremarkable. CXR 12/1/22: Stable advanced COPD. Chronic pleuroparenchymal scarring and volume loss are again noted throughout  the right upper lobe. Similar changes are noted to a lesser degree within  the left lung apex. There is no acute consolidation, pneumothorax or  effusion. CXR 12/9/2022  Extensive fibrocavitary changes in volume loss are again demonstrated within the right upper lung. There has been interval development of a small air-fluid interface within the posteroinferior aspect of the cavity along with segmental airspace disease in the right lung base. Pleuroparenchymal scarring within the left lung apex is stable. Heart size and vascularity are unchanged. There is no pleural effusion. Impression:  Developing small 3 cm right mid lung abscess and right basilar pneumonia.      ASSESSMENT:  Abnormal CXR with concern for possible possible 3 cm right lung abscess    Advanced emphysema/COPD  Post op bladder tumor s/p resection 12/8/22 - low grade papillary urothelial carcinoma (cysto TURBT 10/16/22)  Extensive LLE DVT s/p IVC filter 10/15/22   Tobacco abuse, 60 pack-yrs    PLAN:  Inhaled bronchodilators   Vanc/Merrem D#1 (Zosyn d/c'd 2/2 pruritus)   Monitoring of vancomycin levels to prevent toxicity. On IVF 75, caution with advanced lung disease   CT Chest no IV dye now. I spent a total of 20 minutes on this encounter personally obtaining the patient history, reviewing labs, imaging and other data. I independently performed the above physical exam and updated this encounter note, assessment and plan as needed. Portia Prakash MD on 12/9/22 at 3:25 PM EST    I spent a total of 15 minutes on this encounter on chart review, history taking and review of data. I updated this encounter note as needed.    LITA Valenzuela on 12/9/22 at 12:46 PM EST

## 2022-12-09 NOTE — FLOWSHEET NOTE
12/09/22 0734   Vital Signs   Temp (!) 101.1 °F (38.4 °C)   Temp Source Oral   Heart Rate (!) 132   Heart Rate Source Monitor   Resp 18   BP (!) 150/98   MAP (Calculated) 115   BP Location Left upper arm   BP Method Automatic   Level of Consciousness 0   MEWS Score 4   Oxygen Therapy   SpO2 94 %   Pulse Oximeter Device Mode Intermittent   Pulse Oximeter Device Location Finger   O2 Device None (Room air)   Perfectserve sent to MD about elevated HR

## 2022-12-09 NOTE — PROGRESS NOTES
Pt awake in bed. Assessment completed and medications given per MAR. VS as charted in flowsheet. A&Ox4. Pt states pain as a 10 out of 10 in the back sometime. Scheduled tylenol given at this time. Pt denies any further needs at this time. Call light in reach and bed in lowest position.

## 2022-12-09 NOTE — FLOWSHEET NOTE
12/09/22 1727   Vital Signs   Temp 99.3 °F (37.4 °C)   Temp Source Oral   Heart Rate (!) 120   Heart Rate Source Monitor   Resp 22   BP (!) 158/96   MAP (Calculated) 117   BP Location Left upper arm   BP Method Automatic   Patient Position Semi fowlers   Level of Consciousness 0   MEWS Score 4   Oxygen Therapy   SpO2 97 %   Pulse Oximetry Type Intermittent   O2 Device None (Room air)   Patient was able to provide sputum culture.  Patient was updated on plan of care and that he will be NPO after MN

## 2022-12-09 NOTE — PROGRESS NOTES
Bedside report and transfer of care given to [facility]. Pt currently resting in bed with the call light within reach. Pt denies any other care needs at this time. Pt stable at this time.     Jennifer Burch RN

## 2022-12-09 NOTE — PROGRESS NOTES
PRN benadryl given at this time for increased itchiness. RN updated MD. HR maintains in the 140s. Urinalysis sent from mc. RN spoke with MD about this.

## 2022-12-09 NOTE — PROGRESS NOTES
Hospitalist Progress Note      PCP: Sloan Gray, APRN - CNP    Date of Admission: 12/8/2022    Subjective: pt spiked a fever of 101 this am, c/o cough    Medications:  Reviewed    Infusion Medications    sodium chloride 75 mL/hr at 12/09/22 0502    sodium chloride       Scheduled Medications    vancomycin  1,750 mg IntraVENous Once    [START ON 12/10/2022] vancomycin  1,500 mg IntraVENous Q24H    meropenem  1,000 mg IntraVENous Q8H    albuterol  2.5 mg Nebulization Once    tamsulosin  0.4 mg Oral Daily    nicotine  1 patch TransDERmal Daily    midodrine  10 mg Oral TID WC    sodium chloride flush  5-40 mL IntraVENous 2 times per day    acetaminophen  650 mg Oral Q6H    sennosides-docusate sodium  1 tablet Oral BID    enoxaparin  30 mg SubCUTAneous Daily     PRN Meds: ipratropium-albuterol, melatonin, phenol, sodium chloride flush, sodium chloride, polyethylene glycol, ondansetron **OR** ondansetron, oxyCODONE **OR** oxyCODONE, HYDROmorphone **OR** HYDROmorphone, diphenhydrAMINE      Intake/Output Summary (Last 24 hours) at 12/9/2022 1604  Last data filed at 12/9/2022 1217  Gross per 24 hour   Intake 240 ml   Output 1050 ml   Net -810 ml       Physical Exam Performed:    /62   Pulse 100   Temp 98.9 °F (37.2 °C) (Oral)   Resp 20   Ht 5' 6\" (1.676 m)   Wt 96 lb 11.2 oz (43.9 kg)   SpO2 96%   BMI 15.61 kg/m²     Gen: No distress. Alert. Chronically ill-appearing, cachectic, frail, male  Eyes: PERRL. No sclera icterus. No conjunctival injection. ENT: No discharge. Pharynx clear. Neck: No JVD. No Carotid Bruit. Trachea midline. Resp: No accessory muscle use. No crackles. No wheezes. No rhonchi. CV: tachy, Regular rhythm. No murmur. No rub. No edema. GI: Non-tender. Non-distended. Normal bowel sounds. No hernia. Skin: Warm and dry. No nodule on exposed extremities. No rash on exposed extremities. M/S: No cyanosis. No joint deformity. No clubbing. Neuro: Awake.  Grossly nonfocal Psych: Oriented x 3. No anxiety or agitation. Labs:   Recent Labs     12/08/22  0720 12/09/22 0459   WBC 5.1 8.7   HGB 10.2* 9.0*   HCT 31.8* 28.1*   * 519*     Recent Labs     12/08/22  0720 12/09/22 0459    135*   K 5.5* 4.1    101   CO2 23 23   BUN 10 7   CREATININE 0.9 0.6*   CALCIUM 9.5 7.3*     Recent Labs     12/09/22 0459   AST 10*   ALT <5*   BILITOT <0.2   ALKPHOS 102     Recent Labs     12/09/22 0459   INR 1.19*     No results for input(s): Mian Bond in the last 72 hours. Urinalysis:      Lab Results   Component Value Date/Time    NITRU Negative 12/09/2022 10:27 AM    WBCUA  12/09/2022 10:27 AM    BACTERIA 1+ 12/09/2022 10:27 AM    RBCUA  12/09/2022 10:27 AM    BLOODU LARGE 12/09/2022 10:27 AM    SPECGRAV >=1.030 12/09/2022 10:27 AM    GLUCOSEU Negative 12/09/2022 10:27 AM       Radiology:  XR CHEST (2 VW)   Final Result   Developing small 3 cm right mid lung abscess and right basilar pneumonia. CT CHEST WO CONTRAST    (Results Pending)       IP CONSULT TO INTERNAL MEDICINE  IP CONSULT TO DIETITIAN  PHARMACY TO DOSE VANCOMYCIN  IP CONSULT TO PULMONOLOGY    Assessment/Plan:    Active Hospital Problems    Diagnosis     Abnormal CXR [R93.89]      Priority: Medium    Clot hematuria [R31.0]      Priority: Medium    Malignant neoplasm of urinary bladder (HCC) [C67.9]      Priority: Medium    Tachycardia [R00.0]      Priority: Medium         Hematuria  -S/p bladder resection  -monitor overnight. Has SBI  -monitor H/H.  -pain control: Dilaudid, Oxy-IR prn  -continue Flomax  -IS, IVF's  Check UA    PNA   - pt spiked a fever 12/9, CXR with PNA and poss lung abscess  Switch abx to merrem/vanco, check CT chest  Pulm consulted     Tachycardia  -monitor on tele  - suspect 2/2 PNA     Hypertension  -BP stable. DVT LLE  -S/p IVC filter      COPD  -stable.  No AE  -Duonebs prn     Severe PCM  -dietician consult       DVT Prophylaxis: Lovenox  Diet: ADULT DIET; Regular  Code Status: Full Code  PT/OT Eval Status: ordered    Malick Trivedi MD

## 2022-12-09 NOTE — ANESTHESIA POSTPROCEDURE EVALUATION
Department of Anesthesiology  Postprocedure Note    Patient: Kishan Bee  MRN: [id number]  YOB: 1955  Date of evaluation: 12/8/2022      Procedure Summary     Date: 12/08/22 Room / Location: [address] [place] [address] / [facility]    Anesthesia Start: 9419 Anesthesia Stop: 3362    Procedure: CYSTOSCOPY, TRANSURETHERAL RESECTION OF A BLADDER TUMOR (Bladder) Diagnosis:       [facility]      (Malignant neoplasm of urinary bladder, unspecified site [facility]) [C67.9])    Surgeons: Gayla Cardozo MD Responsible Provider: Nusrat Maddox MD    Anesthesia Type: general ASA Status: 3          Anesthesia Type: No value filed. Saida Phase I: Saida Score: 9    Saida Phase II: Saida Score: 10      Anesthesia Post Evaluation    Patient location during evaluation: PACU  Patient participation: complete - patient participated  Level of consciousness: awake and alert  Airway patency: patent  Nausea & Vomiting: no nausea and no vomiting  Complications: no  Cardiovascular status: blood pressure returned to baseline  Respiratory status: acceptable  Hydration status: euvolemic  Comments: VSS on transfer to phase 2 recovery. No anesthetic complications.

## 2022-12-09 NOTE — PLAN OF CARE
Problem: Discharge Planning  Goal: Discharge to home or other facility with appropriate resources  Outcome: Progressing  Flowsheets (Taken 12/8/2022 2051 by Marcel Cook RN)  Discharge to home or other facility with appropriate resources:   Identify barriers to discharge with patient and caregiver   Arrange for needed discharge resources and transportation as appropriate   Identify discharge learning needs (meds, wound care, etc)   Arrange for interpreters to assist at discharge as needed   Refer to discharge planning if patient needs post-hospital services based on physician order or complex needs related to functional status, cognitive ability or social support system     Problem: Pain  Goal: Verbalizes/displays adequate comfort level or baseline comfort level  Outcome: Progressing

## 2022-12-09 NOTE — PROGRESS NOTES
Patient:  Radha Bhandari  YOB: 1955   Date of Service:  12/09/22      Urology Attending Daily Progress Note    Previous HPI: Complex admission, COPD sepsis in October 2020 to [facility] ICU, DVT prompting transfer to [facility] vascular surgery did IVC filter October 15, he was on Eliquis, he had a large resection of a bladder tumor for hematuria Gilmar Moreira. Eventually followed up in the urology office. Then represented this week for repeat assessment of his bladder. He had held his Eliquis a couple days prior to the surgery for bleeding was.   He has had failure to thrive even prior to his admission in October      HPI: admitted postop with hematuria, cough tachycardia  Chief complaint: \"I am okay\"  ROS:   Significant cough and fevers overnight, pulmonary work-up ongoing with CT chest     Past Medical History:   Diagnosis Date    Anxiety     Depression     Hypertension      Current Facility-Administered Medications   Medication Dose Route Frequency Provider Last Rate Last Admin    [START ON 12/10/2022] vancomycin 1500 mg in dextrose 5% 300 mL IVPB  1,500 mg IntraVENous Q24H Franci Flores MD        meropenem (MERREM) 1,000 mg in sodium chloride 0.9 % 100 mL IVPB (mini-bag)  1,000 mg IntraVENous Q8H Sheldon Schofield  mL/hr at 12/09/22 1732 1,000 mg at 12/09/22 1732    albuterol (PROVENTIL) nebulizer solution 2.5 mg  2.5 mg Nebulization Once Chip MD Dung        ipratropium-albuterol (DUONEB) nebulizer solution 3 mL  3 mL Inhalation 4x Daily PRN Manda Tuttle MD        melatonin tablet 3 mg  3 mg Oral Nightly PRN Manda Tuttle MD        tamsulosin (FLOMAX) capsule 0.4 mg  0.4 mg Oral Daily Manda Tuttle MD   0.4 mg at 12/09/22 0826    phenol 1.4 % mouth spray 1 spray  1 spray Mouth/Throat Q2H PRN Manda Tuttle MD        nicotine (NICODERM CQ) 14 MG/24HR 1 patch  1 patch TransDERmal Daily Manda Tuttle MD   1 patch at 12/09/22 0826    midodrine (PROAMATINE) tablet 10 mg  10 mg Oral TID  Mk Betancur MD        0.9 % sodium chloride infusion   IntraVENous Continuous Mk Betancur MD 75 mL/hr at 12/09/22 0502 New Bag at 12/09/22 0502    sodium chloride flush 0.9 % injection 5-40 mL  5-40 mL IntraVENous 2 times per day Mk Betancur MD        sodium chloride flush 0.9 % injection 10 mL  10 mL IntraVENous PRN Mk Betancur MD        0.9 % sodium chloride infusion   IntraVENous PRN Mk Betancur MD        acetaminophen (TYLENOL) tablet 650 mg  650 mg Oral Q6H Mk Betancur MD   650 mg at 12/09/22 1730    sennosides-docusate sodium (SENOKOT-S) 8.6-50 MG tablet 1 tablet  1 tablet Oral BID Mk Betancur MD   1 tablet at 12/09/22 0826    polyethylene glycol (GLYCOLAX) packet 17 g  17 g Oral Daily PRN Mk Betancur MD        ondansetron (ZOFRAN-ODT) disintegrating tablet 4 mg  4 mg Oral Q8H PRN Mk Betancur MD        Or    ondansetron [facility]) injection 4 mg  4 mg IntraVENous Q6H PRN Mk Betancur MD        enoxaparin Sodium (LOVENOX) injection 30 mg  30 mg SubCUTAneous Daily Euna Flair, APRN - CNP   30 mg at 12/08/22 1721    oxyCODONE (ROXICODONE) immediate release tablet 5 mg  5 mg Oral Q4H PRN Mk Betancur MD        Or    oxyCODONE (ROXICODONE) immediate release tablet 10 mg  10 mg Oral Q4H PRN Mk Betancur MD   10 mg at 12/09/22 3091    HYDROmorphone (DILAUDID) injection 0.25 mg  0.25 mg IntraVENous Q3H PRN Mk Betancur MD        Or    HYDROmorphone (DILAUDID) injection 0.5 mg  0.5 mg IntraVENous Q3H PRN Mk Betancur MD        diphenhydrAMINE (BENADRYL) injection 25 mg  25 mg IntraVENous Q6H PRN Mk Betancur MD   25 mg at 12/09/22 1016     Allergies   Allergen Reactions    Bee Venom Itching and Swelling    Zosyn [Piperacillin Sod-Tazobactam So] Itching       Objective:    Patient Vitals for the past 8 hrs:   BP Temp Temp src Pulse Resp SpO2 Height   12/09/22 1808 -- -- -- -- -- -- 5' 6\" (1.676 m) 12/09/22 1727 (!) 158/96 99.3 °F (37.4 °C) Oral (!) 120 22 97 % --   12/09/22 1225 109/62 98.9 °F (37.2 °C) Oral 100 20 96 % --     I/O last 3 completed shifts: In: 540 [P.O.:240;  I.V.:300]  Out: -1725      Physical Exam:   Constitutional: pleasant patient in no acute distress, normal body habitus  Musculoskeletal: no digital cyanosis, head normocephalic  Psych: normal mood and affect, appropriately answers questions  Skin: exposed skin, stable, intact  Abdomen: soft, nondistended, no guarding, nontender to palpation  Genitourinary: stable bladder, urethral catheter stable with clear urine    Labs:       Lab Results   Component Value Date    PSA 2.71 10/03/2022       Lab Results   Component Value Date    PSA 2.71 10/03/2022     Recent Labs     12/09/22  0459 12/08/22  0720   WBC 8.7 5.1   HGB 9.0* 10.2*   HCT 28.1* 31.8*   MCV 86.6 86.8   * 590*     No results found for: LABA1C  Lab Results   Component Value Date    LABMICR YES 12/09/2022    CREATININE 0.6 (L) 12/09/2022     Lab Results   Component Value Date     (L) 12/09/2022    K 4.1 12/09/2022     12/09/2022    CO2 23 12/09/2022    BUN 7 12/09/2022    CREATININE 0.6 (L) 12/09/2022    GLUCOSE 105 (H) 12/09/2022    CALCIUM 7.3 (L) 12/09/2022    PROT 5.6 (L) 12/09/2022    LABALBU 2.7 (L) 12/09/2022    BILITOT <0.2 12/09/2022    ALKPHOS 102 12/09/2022    AST 10 (L) 12/09/2022    ALT <5 (L) 12/09/2022    LABGLOM >60 12/09/2022    GFRAA >60 10/17/2022    AGRATIO 0.9 (L) 12/09/2022     Lab Results   Component Value Date    CREATININE 0.6 (L) 12/09/2022    CREATININE 0.9 12/08/2022    CREATININE 0.8 10/22/2022       Lab Results   Component Value Date/Time    COLORU Yellow 12/09/2022 10:27 AM    NITRU Negative 12/09/2022 10:27 AM    GLUCOSEU Negative 12/09/2022 10:27 AM    KETUA Negative 12/09/2022 10:27 AM    UROBILINOGEN 0.2 12/09/2022 10:27 AM    BILIRUBINUR Negative 12/09/2022 10:27 AM     Pulmonary Tuberculosis (Rule Out)      Radiology: \"Imaging was independently reviewed by myself and I agree with the radiology interpretation  Ct chest ; cavitary lesion, right side. Fluid on both sides of my review of the chest.    Assessment:  75 y.o. male who is admitted with gross hematuria after transurethral resection of bladder tumor.   Cough and tachycardia were found postop  Urinary tract infection present prior to admission  Low-grade urothelial carcinoma the bladder    Plan:  -Continue broad-spectrum IV antibiotics  -Continue Alxeander catheter  -Appreciate medicine, no pulmonary Recs, consider infectious disease, consult,  -resume elliquis   -dvt prophylaxis, sequential compression devices for DVT prophylaxis  -prn oral narcotic medications for pain control  -stool softeners to minimize post-operative constipation      Anna Ambrocio MD  Office: [phone]

## 2022-12-09 NOTE — CARE COORDINATION
INTERDISCIPLINARY PLAN OF CARE CONFERENCE    Date/Time: 12/9/2022 10:28 AM  Completed by: BARBARA Dao Student Case Management      Patient Name:  Tavon Concepcion  YOB: 1955  Admitting Diagnosis: Malignant neoplasm of urinary bladder, unspecified site [facility]) [C67.9]  Clot hematuria [R31.0]     Admit Date/Time:  12/8/2022  6:17 AM    Chart reviewed. Interdisciplinary team contacted or reviewed plan related to patient progress and discharge plans. Disciplines included Case Management, Nursing, and Dietitian. Current Status:ongoing  PT/OT recommendation for discharge plan of care: n/a    Expected D/C Disposition:  Home  Confirmed plan with patient and/or family Yes confirmed with: (name) pt  Met with:pt at bedside    Discharge Plan Comments: Chart review completed. CM met with pt at bedside. Pt is extremely hard of hearing. Pt was scratching himself excessively during conversation, [facility] RN came in during conversation to provide medicine to relieve. CM asked pt about home health care. Pt stated he had someone coming to his home once a week to do OT and it was called \"[facility]\". CM showed pt [facility] listing but pt does not have glasses in hospital and could not see listing. SAMIA spoke with [facility] [place] at [facility] and she confirmed pt is active for SN/PT/OT and can return to their services upon d/c. CM will continue to follow for needs.      Home O2 in place on admit: No

## 2022-12-09 NOTE — FLOWSHEET NOTE
12/09/22 1225   Vital Signs   Temp 98.9 °F (37.2 °C)   Temp Source Oral   Heart Rate 100   Heart Rate Source Monitor   Resp 20   /62   MAP (Calculated) 78   BP Location Left upper arm   BP Method Automatic   Patient Position Semi fowlers   Level of Consciousness 0   MEWS Score 1   Oxygen Therapy   SpO2 96 %   Pulse Oximetry Type Intermittent   O2 Device None (Room air)   Patient was noted to be itching again and in his sleep. RN stopped abx, informed MD. RN updated daughter at this time.  Zosyn was added to allergy list. Patient was placed on 2L, while sleeping 87%

## 2022-12-09 NOTE — FLOWSHEET NOTE
12/09/22 0306   Vital Signs   Temp 97.9 °F (36.6 °C)   Temp Source Oral   Heart Rate (!) 125   Heart Rate Source Monitor   Resp 18   BP (!) 168/83   MAP (Calculated) 111   BP Location Left upper arm   Level of Consciousness 0   MEWS Score 3   Oxygen Therapy   SpO2 93 %   Pulse Oximeter Device Mode Intermittent   Pulse Oximeter Device Location Finger   O2 Device None (Room air)   Height and Weight   Weight 96 lb 11.2 oz (43.9 kg)   Weight Method Bed scale   BMI (Calculated) 15.6     Pt awake in bed. VS as shown above. Pt complains of a pain of 9 out of 10. PRN oxycodone given at this time. Call light in reach and bed in lowest position.

## 2022-12-10 PROBLEM — J98.4 CAVITARY LUNG DISEASE: Status: ACTIVE | Noted: 2022-12-10

## 2022-12-10 LAB
ANION GAP SERPL CALCULATED.3IONS-SCNC: 7 MMOL/L (ref 3–16)
BASOPHILS ABSOLUTE: 0.1 K/UL (ref 0–0.2)
BASOPHILS RELATIVE PERCENT: 1.3 %
BUN BLDV-MCNC: 7 MG/DL (ref 7–20)
CALCIUM SERPL-MCNC: 7.6 MG/DL (ref 8.3–10.6)
CHLORIDE BLD-SCNC: 100 MMOL/L (ref 99–110)
CO2: 25 MMOL/L (ref 21–32)
CREAT SERPL-MCNC: 0.7 MG/DL (ref 0.8–1.3)
EOSINOPHILS ABSOLUTE: 0.2 K/UL (ref 0–0.6)
EOSINOPHILS RELATIVE PERCENT: 2 %
GFR SERPL CREATININE-BSD FRML MDRD: >60 ML/MIN/{1.73_M2}
GLUCOSE BLD-MCNC: 106 MG/DL (ref 70–99)
GLUCOSE BLD-MCNC: 149 MG/DL (ref 70–99)
GLUCOSE BLD-MCNC: 92 MG/DL (ref 70–99)
HCT VFR BLD CALC: 26.5 % (ref 40.5–52.5)
HEMOGLOBIN: 8.3 G/DL (ref 13.5–17.5)
LYMPHOCYTES ABSOLUTE: 0.3 K/UL (ref 1–5.1)
LYMPHOCYTES RELATIVE PERCENT: 3 %
MCH RBC QN AUTO: 27.7 PG (ref 26–34)
MCHC RBC AUTO-ENTMCNC: 31.5 G/DL (ref 31–36)
MCV RBC AUTO: 87.9 FL (ref 80–100)
MONOCYTES ABSOLUTE: 0.8 K/UL (ref 0–1.3)
MONOCYTES RELATIVE PERCENT: 7.4 %
NEUTROPHILS ABSOLUTE: 9.1 K/UL (ref 1.7–7.7)
NEUTROPHILS RELATIVE PERCENT: 86.3 %
ORGANISM: ABNORMAL
PDW BLD-RTO: 20.4 % (ref 12.4–15.4)
PERFORMED ON: ABNORMAL
PERFORMED ON: NORMAL
PLATELET # BLD: 425 K/UL (ref 135–450)
PMV BLD AUTO: 6.1 FL (ref 5–10.5)
POTASSIUM REFLEX MAGNESIUM: 3.6 MMOL/L (ref 3.5–5.1)
RBC # BLD: 3.01 M/UL (ref 4.2–5.9)
SODIUM BLD-SCNC: 132 MMOL/L (ref 136–145)
URINE CULTURE, ROUTINE: ABNORMAL
WBC # BLD: 10.6 K/UL (ref 4–11)

## 2022-12-10 PROCEDURE — 2580000003 HC RX 258: Performed by: UROLOGY

## 2022-12-10 PROCEDURE — 87116 MYCOBACTERIA CULTURE: CPT

## 2022-12-10 PROCEDURE — 2580000003 HC RX 258: Performed by: INTERNAL MEDICINE

## 2022-12-10 PROCEDURE — 87205 SMEAR GRAM STAIN: CPT

## 2022-12-10 PROCEDURE — 87798 DETECT AGENT NOS DNA AMP: CPT

## 2022-12-10 PROCEDURE — 6370000000 HC RX 637 (ALT 250 FOR IP)

## 2022-12-10 PROCEDURE — 2060000000 HC ICU INTERMEDIATE R&B

## 2022-12-10 PROCEDURE — 87070 CULTURE OTHR SPECIMN AEROBIC: CPT

## 2022-12-10 PROCEDURE — 87206 SMEAR FLUORESCENT/ACID STAI: CPT

## 2022-12-10 PROCEDURE — 86480 TB TEST CELL IMMUN MEASURE: CPT

## 2022-12-10 PROCEDURE — 6370000000 HC RX 637 (ALT 250 FOR IP): Performed by: UROLOGY

## 2022-12-10 PROCEDURE — 87556 M.TUBERCULO DNA AMP PROBE: CPT

## 2022-12-10 PROCEDURE — 2700000000 HC OXYGEN THERAPY PER DAY

## 2022-12-10 PROCEDURE — 99233 SBSQ HOSP IP/OBS HIGH 50: CPT | Performed by: INTERNAL MEDICINE

## 2022-12-10 PROCEDURE — 6360000002 HC RX W HCPCS: Performed by: INTERNAL MEDICINE

## 2022-12-10 PROCEDURE — 94761 N-INVAS EAR/PLS OXIMETRY MLT: CPT

## 2022-12-10 PROCEDURE — 80048 BASIC METABOLIC PNL TOTAL CA: CPT

## 2022-12-10 PROCEDURE — 94640 AIRWAY INHALATION TREATMENT: CPT

## 2022-12-10 PROCEDURE — 36415 COLL VENOUS BLD VENIPUNCTURE: CPT

## 2022-12-10 PROCEDURE — 85025 COMPLETE CBC W/AUTO DIFF WBC: CPT

## 2022-12-10 RX ADMIN — MEROPENEM 1000 MG: 1 INJECTION, POWDER, FOR SOLUTION INTRAVENOUS at 18:19

## 2022-12-10 RX ADMIN — ACETAMINOPHEN 650 MG: 325 TABLET ORAL at 21:06

## 2022-12-10 RX ADMIN — SODIUM CHLORIDE, PRESERVATIVE FREE 10 ML: 5 INJECTION INTRAVENOUS at 21:08

## 2022-12-10 RX ADMIN — IPRATROPIUM BROMIDE AND ALBUTEROL 1 PUFF: 20; 100 SPRAY, METERED RESPIRATORY (INHALATION) at 16:00

## 2022-12-10 RX ADMIN — MEROPENEM 1000 MG: 1 INJECTION, POWDER, FOR SOLUTION INTRAVENOUS at 10:11

## 2022-12-10 RX ADMIN — IPRATROPIUM BROMIDE AND ALBUTEROL 1 PUFF: 20; 100 SPRAY, METERED RESPIRATORY (INHALATION) at 11:27

## 2022-12-10 RX ADMIN — MIDODRINE HYDROCHLORIDE 10 MG: 10 TABLET ORAL at 09:16

## 2022-12-10 RX ADMIN — MIDODRINE HYDROCHLORIDE 10 MG: 10 TABLET ORAL at 18:19

## 2022-12-10 RX ADMIN — Medication 1500 MG: at 15:11

## 2022-12-10 RX ADMIN — MIDODRINE HYDROCHLORIDE 10 MG: 10 TABLET ORAL at 15:08

## 2022-12-10 RX ADMIN — SENNOSIDES AND DOCUSATE SODIUM 1 TABLET: 50; 8.6 TABLET ORAL at 21:07

## 2022-12-10 RX ADMIN — ACETAMINOPHEN 650 MG: 325 TABLET ORAL at 18:19

## 2022-12-10 RX ADMIN — TAMSULOSIN HYDROCHLORIDE 0.4 MG: 0.4 CAPSULE ORAL at 09:16

## 2022-12-10 RX ADMIN — SENNOSIDES AND DOCUSATE SODIUM 1 TABLET: 50; 8.6 TABLET ORAL at 09:16

## 2022-12-10 RX ADMIN — ACETAMINOPHEN 650 MG: 325 TABLET ORAL at 03:10

## 2022-12-10 RX ADMIN — ACETAMINOPHEN 650 MG: 325 TABLET ORAL at 10:08

## 2022-12-10 RX ADMIN — SODIUM CHLORIDE, PRESERVATIVE FREE 10 ML: 5 INJECTION INTRAVENOUS at 09:18

## 2022-12-10 RX ADMIN — IPRATROPIUM BROMIDE AND ALBUTEROL 1 PUFF: 20; 100 SPRAY, METERED RESPIRATORY (INHALATION) at 20:05

## 2022-12-10 ASSESSMENT — PAIN SCALES - GENERAL: PAINLEVEL_OUTOF10: 5

## 2022-12-10 NOTE — FLOWSHEET NOTE
Am vitals and assessment complete. Meds given. Sputum collected.     12/10/22 0900   Vital Signs   Temp 98.6 °F (37 °C)   Temp Source Oral   Heart Rate (!) 111   Heart Rate Source Monitor   Resp 18   BP (!) 149/85   MAP (Calculated) 106   BP Location Left upper arm   BP Method Automatic   Patient Position Semi fowlers   Level of Consciousness 0   MEWS Score 3   Pain Assessment   Pain Assessment 0-10   Oxygen Therapy   SpO2 99 %   Pulse Oximeter Device Mode Continuous   Pulse Oximeter Device Location Finger   O2 Device Nasal cannula   O2 Flow Rate (L/min) 2 L/min

## 2022-12-10 NOTE — PROGRESS NOTES
PULMONARY PROGRESS NOTE  CC: Abnormal CXR, Pneumonia with suspected lung abscess    Subjective: Ongoing fevers, Tmax 101.8, tachycardia. Feeling worse today, breathing and cough are worse. Productive cough ongoing for several months. IV line: Peripheral    PHYSICAL EXAM:   BP (!) 164/89   Pulse (!) 118   Temp 98.2 °F (36.8 °C) (Oral)   Resp 20   Ht 5' 6\" (1.676 m)   Wt 98 lb 11.2 oz (44.8 kg)   SpO2 100%   BMI 15.93 kg/m² ' on RA  Gen:  No distress. Extremely [place]. Acute on chronically ill-appearing. Cachectic. Eyes:  PERRL. No sclera icterus. No conjunctival injection. ENT:  No discharge. Pharynx clear. Neck:  Trachea midline. No obvious mass. Resp:  No accessory muscle use. No crackles. No wheezes. + L ronchi. No dullness on percussion. CV:  Regular rate. Regular rhythm. No murmur or rub. No edema. Peripheral pulses are 2+. Capillary refill is less than 3 seconds. GI:  Non-tender. Non-distended. No hernia. Skin:  Warm and dry. No nodule on exposed extremities. Lymph:  No cervical LAD. No supraclavicular LAD. M/S:  No cyanosis. No joint deformity. No clubbing. Neuro:  Awake. Alert. Moves all four extremities. Psych:  Oriented x 3. No anxiety.      Scheduled Meds:   vancomycin  1,500 mg IntraVENous Q24H    meropenem  1,000 mg IntraVENous Q8H    apixaban  5 mg Oral BID    albuterol  2.5 mg Nebulization Once    tamsulosin  0.4 mg Oral Daily    nicotine  1 patch TransDERmal Daily    midodrine  10 mg Oral TID     sodium chloride flush  5-40 mL IntraVENous 2 times per day    acetaminophen  650 mg Oral Q6H    sennosides-docusate sodium  1 tablet Oral BID     Continuous Infusions:   sodium chloride 75 mL/hr at 12/09/22 2239    sodium chloride       PRN Meds:  ipratropium-albuterol, melatonin, phenol, sodium chloride flush, sodium chloride, polyethylene glycol, ondansetron **OR** ondansetron, oxyCODONE **OR** oxyCODONE, HYDROmorphone **OR** HYDROmorphone, diphenhydrAMINE    Labs:  CBC: Recent Labs     12/08/22  0720 12/09/22  0459 12/10/22  0459   WBC 5.1 8.7 10.6   HGB 10.2* 9.0* 8.3*   HCT 31.8* 28.1* 26.5*   MCV 86.8 86.6 87.9   * 519* 425     BMP:   Recent Labs     12/08/22  0720 12/09/22  0459 12/10/22  0459    135* 132*   K 5.5* 4.1 3.6    101 100   CO2 23 23 25   BUN 10 7 7   CREATININE 0.9 0.6* 0.7*     Micro:   12/8/22 Urine cx Proteus penneri  12/9/22 SARS-CoV-2 not detected  12/9/22 BC sent  12/9/22 AFB sputum cx & smear pending  Procalcitonin 0.16    Imaging: Chest imaging was reviewed by me and showed:  ACT 10/19/22:   Lower Chest: Small dependently layered pleural effusions are present. The  visualized heart is unremarkable. CXR 12/1/22: Stable advanced COPD. Chronic pleuroparenchymal scarring and volume loss are again noted throughout  the right upper lobe. Similar changes are noted to a lesser degree within  the left lung apex. There is no acute consolidation, pneumothorax or  effusion. CXR 12/9/2022  Extensive fibrocavitary changes in volume loss are again demonstrated within the right upper lung. There has been interval development of a small air-fluid interface within the posteroinferior aspect of the cavity along with segmental airspace disease in the right lung base. Pleuroparenchymal scarring within the left lung apex is stable. Heart size and vascularity are unchanged. There is no pleural effusion. Impression:  Developing small 3 cm right mid lung abscess and right basilar pneumonia. CT Chest 12/10/22  Posterior mediastinal structures appear unremarkable. No mediastinal or hilar adenopathy. Lungs/pleura: 7 x 8 cm apparently thick walled lung cyst versus loculated   pneumothorax demonstrates air-fluid level its more inferior aspect. Extensive fibrosis with areas of bronchiectasis at the medial right upper   lobe and extensive scarring right lower lobe. Sequela from smoking bilateral   lungs including sequela of emphysema. Another thick walled cavitary lesion   or cyst is 2 cm at the superior aspect left upper lobe without definite   air-fluid level. There is extensive left apical pleuroparenchymal thickening   and scarring. Impression   1. Indeterminate 7 x 8 cm thick walled cyst, mass lesion or collection in the   right pleural space with gas and fluid concerning for possible empyema,   superinfected necrotic mass or pulmonary abscess. Suboptimally characterized   on noncontrast chest CT. Bronchoscopic correlation may be useful. Given the   other findings of sequela from old granulomatous disease tuberculosis is a   consideration. Patient may have developed bronchopleural fistula. 2. Similar appearing 2 cm necrotic thick walled mass versus thick walled   infectious or post infectious pulmonary cyst.  Bronchiolitis is a   consideration. 3. Extensive pulmonary fibrotic changes bilaterally predominate in the upper   lungs commonly seen with sequela of old granulomatous disease. 4.  Pulmonary sequela typical of that seen with smoking, including COPD. ASSESSMENT:  Cavitary lung disease - bilateral upper lobe cavitation with extensive apical fibrotic changes  Advanced emphysema/COPD   Anemia & hematuria, post op bladder tumor s/p resection 12/8/22 - low grade papillary urothelial carcinoma (cysto TURBT 10/16/22)  UTI PTA  Extensive LLE DVT s/p IVC filter 10/15/22   Severe PCM  Tobacco abuse, 60 pack-yrs    PLAN:  Inhaled bronchodilators - Combivent QID  Vanc/Merrem D#2 (Zosyn d/c'd 2/2 pruritus)   Monitoring of vancomycin levels to prevent toxicity. On IVF 76 D#3, caution with advanced lung disease   AFB smear & culture x3 consecutive days. Sputum gram stain & culture. Quantiferon. If AFB smears are negative, will consider fiberoptic bronchoscopy. D/W radiologist.   OK for diet. Hold Eliquis, received dose last night 12/9/22. Tentative plan for bronchoscopy on Monday.       I spent a total of 19 minutes on this encounter personally obtaining the patient history, reviewing labs, imaging and other data. I independently performed the above physical exam and updated this encounter note, assessment and plan as needed. Maria Isabel Mccarthy MD on 12/10/22 at 11:33 AM EST    I spent a total of 15 minutes on this encounter on chart review, history taking and review of data. I independently performed a physical exam and updated this encounter note as needed.    Mike Evans PA-C on 12/10/22 at 6:44 AM EST

## 2022-12-10 NOTE — PROGRESS NOTES
Pt calcium was 7.3 this AM from 9.5 yesterday and was never replaced. MD notified at this time. No new orders at this time.

## 2022-12-10 NOTE — PROGRESS NOTES
Pt awake in bed. Assessment completed and medications given per MAR. VS as charted in flowsheet. Pt complaining of pain as a 9 out of 10. PRN oxycodone given at this time. Pt provided with snack and drink and reminded of NPO order after midnight. Pt denies any further needs at this time. Call light in reach and bed in lowest position.

## 2022-12-10 NOTE — PROGRESS NOTES
Bedside report given to Barbara Maza RN and Chikis Tenorio RN. Care transferred. Patient was moved to [facility] and placed in airborne isolation.  RN updated  about low calcium

## 2022-12-10 NOTE — FLOWSHEET NOTE
12/10/22 0040   Vital Signs   Temp 100.2 °F (37.9 °C)   Temp Source Oral   Heart Rate (!) 118   Heart Rate Source Monitor   Resp 20   BP (!) 164/89   MAP (Calculated) 114   BP Location Left upper arm   Level of Consciousness 0   MEWS Score 3   Oxygen Therapy   SpO2 100 %   Pulse Oximeter Device Mode Intermittent   Pulse Oximeter Device Location Finger   O2 Device Nasal cannula   O2 Flow Rate (L/min) 2 L/min   Height and Weight   Weight 98 lb 11.2 oz (44.8 kg)   Weight Method Bed scale   BMI (Calculated) 16     Pt awake in bed. VS as shown above. Pt denies any further needs at this time. Call light in reach and bed in lowest position.

## 2022-12-10 NOTE — PROGRESS NOTES
Patient:  Talon Doe  YOB: 1955   Date of Service:  12/10/22      Urology Attending Daily Progress Note    Previous HPI: Complex admission, COPD sepsis in October 2020 to [facility] ICU, DVT prompting transfer to [facility] vascular surgery did IVC filter October 15, he was on Eliquis, he had a large resection of a bladder tumor for hematuria Blondell Ala. Eventually followed up in the urology office. Then represented this week for repeat assessment of his bladder. He had held his Eliquis a couple days prior to the surgery for bleeding was. He has had failure to thrive even prior to his admission in October      HPI: admitted postop with hematuria, cough tachycardia -transferred to the PCU for negative pressure room rule out tuberculosis    Chief complaint: \"Still coughing a lot\"    ROS:   Significant cough, temperatures 101.8 fevers overnight,   pulmonary work-up ongoing    Denies catheter issues        Past Medical History:   Diagnosis Date    Anxiety     Depression     Hypertension        Allergies   Allergen Reactions    Bee Venom Itching and Swelling    Zosyn [Piperacillin Sod-Tazobactam So] Itching       Objective:    Patient Vitals for the past 8 hrs:   BP Temp Temp src Pulse Resp SpO2 Weight   12/10/22 0415 -- 98.2 °F (36.8 °C) Oral -- -- -- --   12/10/22 0309 -- (!) 101.8 °F (38.8 °C) Oral -- -- -- --   12/10/22 0040 (!) 164/89 100.2 °F (37.9 °C) Oral (!) 118 20 100 % 98 lb 11.2 oz (44.8 kg)     I/O last 3 completed shifts:   In: 3004.9 [P.O.:577; I.V.:1625.2; IV Piggyback:802.7]  Out: 2125 [Urine:2125]     Physical Exam:   Constitutional: pleasant patient in no acute distress, normal body habitus  Musculoskeletal: no digital cyanosis, head normocephalic  Psych: normal mood and affect, appropriately answers questions  Skin: exposed skin, stable, intact  Abdomen: soft, nondistended, no guarding, nontender to palpation  Genitourinary: stable bladder, urethral catheter stable with clear urine    Labs:       Lab Results   Component Value Date    PSA 2.71 10/03/2022       Lab Results   Component Value Date    PSA 2.71 10/03/2022     Recent Labs     12/10/22  0459 12/09/22  0459 12/08/22  0720   WBC 10.6 8.7 5.1   HGB 8.3* 9.0* 10.2*   HCT 26.5* 28.1* 31.8*   MCV 87.9 86.6 86.8    519* 590*     No results found for: LABA1C  Lab Results   Component Value Date    LABMICR YES 12/09/2022    CREATININE 0.7 (L) 12/10/2022     Lab Results   Component Value Date     (L) 12/10/2022    K 3.6 12/10/2022     12/10/2022    CO2 25 12/10/2022    BUN 7 12/10/2022    CREATININE 0.7 (L) 12/10/2022    GLUCOSE 106 (H) 12/10/2022    CALCIUM 7.6 (L) 12/10/2022    PROT 5.6 (L) 12/09/2022    LABALBU 2.7 (L) 12/09/2022    BILITOT <0.2 12/09/2022    ALKPHOS 102 12/09/2022    AST 10 (L) 12/09/2022    ALT <5 (L) 12/09/2022    LABGLOM >60 12/10/2022    GFRAA >60 10/17/2022    AGRATIO 0.9 (L) 12/09/2022     Lab Results   Component Value Date    CREATININE 0.7 (L) 12/10/2022    CREATININE 0.6 (L) 12/09/2022    CREATININE 0.9 12/08/2022       Lab Results   Component Value Date/Time    COLORU Yellow 12/09/2022 10:27 AM    NITRU Negative 12/09/2022 10:27 AM    GLUCOSEU Negative 12/09/2022 10:27 AM    KETUA Negative 12/09/2022 10:27 AM    UROBILINOGEN 0.2 12/09/2022 10:27 AM    BILIRUBINUR Negative 12/09/2022 10:27 AM     Pulmonary Tuberculosis (Rule Out)      Radiology: \"Imaging was independently reviewed by myself and I agree with the radiology interpretation  Ct chest ; cavitary lesion, right side. Fluid on both sides of my review of the chest.    Assessment:  75 y.o. male who is admitted with gross hematuria after transurethral resection of bladder tumor.   Cough and tachycardia were found postop  Urinary tract infection present prior to admission  Low-grade urothelial carcinoma the bladder -repeat resection, pathology still pending  Cavitary lung lesion, pulmonology following    Plan:  -Continue broad-spectrum IV antibiotics  -Continue Alexander catheter  -Appreciate medicine, no pulmonary Recs, consider infectious disease, consult,  -resumed elliquis   -dvt prophylaxis, sequential compression devices for DVT prophylaxis  -prn oral narcotic medications for pain control  -stool softeners to minimize post-operative constipation      Mari Woodall MD  Office: [phone]

## 2022-12-10 NOTE — PROGRESS NOTES
Vancomycin Day: 2  Current Regimen: 1500 mg IV every 24 hours    Patient's labs, cultures, vitals, and vancomycin regimen reviewed. SCr increased slightly to 0.7 from 0.6  U/O   [employer] updated    Plan:   No change today. Trough ordered for tomorrow.

## 2022-12-11 LAB
ADENOVIRUS, DFA: NEGATIVE
AFB SMEAR: NORMAL
ANION GAP SERPL CALCULATED.3IONS-SCNC: 10 MMOL/L (ref 3–16)
BASOPHILS ABSOLUTE: 0.1 K/UL (ref 0–0.2)
BASOPHILS RELATIVE PERCENT: 0.8 %
BUN BLDV-MCNC: 7 MG/DL (ref 7–20)
CALCIUM SERPL-MCNC: 7.6 MG/DL (ref 8.3–10.6)
CHLORIDE BLD-SCNC: 98 MMOL/L (ref 99–110)
CO2: 24 MMOL/L (ref 21–32)
CREAT SERPL-MCNC: 0.6 MG/DL (ref 0.8–1.3)
EOSINOPHILS ABSOLUTE: 0.1 K/UL (ref 0–0.6)
EOSINOPHILS RELATIVE PERCENT: 0.4 %
GFR SERPL CREATININE-BSD FRML MDRD: >60 ML/MIN/{1.73_M2}
GLUCOSE BLD-MCNC: 109 MG/DL (ref 70–99)
HCT VFR BLD CALC: 26.6 % (ref 40.5–52.5)
HEMOGLOBIN: 8.7 G/DL (ref 13.5–17.5)
INFLUENZA A,DFA: NEGATIVE
INFLUENZA B,DFA: NEGATIVE
LYMPHOCYTES ABSOLUTE: 0.2 K/UL (ref 1–5.1)
LYMPHOCYTES RELATIVE PERCENT: 2.1 %
MCH RBC QN AUTO: 27.9 PG (ref 26–34)
MCHC RBC AUTO-ENTMCNC: 32.5 G/DL (ref 31–36)
MCV RBC AUTO: 85.7 FL (ref 80–100)
METAPNEUMOVIRUS, DFA: NEGATIVE
MONOCYTES ABSOLUTE: 0.7 K/UL (ref 0–1.3)
MONOCYTES RELATIVE PERCENT: 6 %
MTB COMPLEX PCR: NORMAL
NEUTROPHILS ABSOLUTE: 10.9 K/UL (ref 1.7–7.7)
NEUTROPHILS RELATIVE PERCENT: 90.7 %
PARAINFLUENZA 1 DFA STAIN: NEGATIVE
PARAINFLUENZA 2 DFA STAIN: NEGATIVE
PARAINFLUENZA 3: NEGATIVE
PDW BLD-RTO: 20.5 % (ref 12.4–15.4)
PLATELET # BLD: 435 K/UL (ref 135–450)
PMV BLD AUTO: 6.5 FL (ref 5–10.5)
POTASSIUM REFLEX MAGNESIUM: 3.8 MMOL/L (ref 3.5–5.1)
RBC # BLD: 3.11 M/UL (ref 4.2–5.9)
RESPIRATORY SYNCYTIAL VIRUS  (RSV) DFA: NEGATIVE
RSPFA SOURCE: NORMAL
SODIUM BLD-SCNC: 132 MMOL/L (ref 136–145)
WBC # BLD: 12.1 K/UL (ref 4–11)

## 2022-12-11 PROCEDURE — 80048 BASIC METABOLIC PNL TOTAL CA: CPT

## 2022-12-11 PROCEDURE — 2060000000 HC ICU INTERMEDIATE R&B

## 2022-12-11 PROCEDURE — 6370000000 HC RX 637 (ALT 250 FOR IP): Performed by: UROLOGY

## 2022-12-11 PROCEDURE — 94640 AIRWAY INHALATION TREATMENT: CPT

## 2022-12-11 PROCEDURE — 2580000003 HC RX 258: Performed by: INTERNAL MEDICINE

## 2022-12-11 PROCEDURE — 2700000000 HC OXYGEN THERAPY PER DAY

## 2022-12-11 PROCEDURE — 87556 M.TUBERCULO DNA AMP PROBE: CPT

## 2022-12-11 PROCEDURE — 87798 DETECT AGENT NOS DNA AMP: CPT

## 2022-12-11 PROCEDURE — 6360000002 HC RX W HCPCS: Performed by: INTERNAL MEDICINE

## 2022-12-11 PROCEDURE — 99233 SBSQ HOSP IP/OBS HIGH 50: CPT | Performed by: INTERNAL MEDICINE

## 2022-12-11 PROCEDURE — 36415 COLL VENOUS BLD VENIPUNCTURE: CPT

## 2022-12-11 PROCEDURE — 94761 N-INVAS EAR/PLS OXIMETRY MLT: CPT

## 2022-12-11 PROCEDURE — 87116 MYCOBACTERIA CULTURE: CPT

## 2022-12-11 PROCEDURE — 85025 COMPLETE CBC W/AUTO DIFF WBC: CPT

## 2022-12-11 PROCEDURE — 87206 SMEAR FLUORESCENT/ACID STAI: CPT

## 2022-12-11 RX ADMIN — ACETAMINOPHEN 650 MG: 325 TABLET ORAL at 04:32

## 2022-12-11 RX ADMIN — TAMSULOSIN HYDROCHLORIDE 0.4 MG: 0.4 CAPSULE ORAL at 10:10

## 2022-12-11 RX ADMIN — MEROPENEM 1000 MG: 1 INJECTION, POWDER, FOR SOLUTION INTRAVENOUS at 10:16

## 2022-12-11 RX ADMIN — MIDODRINE HYDROCHLORIDE 10 MG: 10 TABLET ORAL at 10:10

## 2022-12-11 RX ADMIN — ACETAMINOPHEN 650 MG: 325 TABLET ORAL at 10:10

## 2022-12-11 RX ADMIN — IPRATROPIUM BROMIDE AND ALBUTEROL 1 PUFF: 20; 100 SPRAY, METERED RESPIRATORY (INHALATION) at 11:49

## 2022-12-11 RX ADMIN — MIDODRINE HYDROCHLORIDE 10 MG: 10 TABLET ORAL at 17:11

## 2022-12-11 RX ADMIN — MEROPENEM 1000 MG: 1 INJECTION, POWDER, FOR SOLUTION INTRAVENOUS at 23:43

## 2022-12-11 RX ADMIN — IPRATROPIUM BROMIDE AND ALBUTEROL 1 PUFF: 20; 100 SPRAY, METERED RESPIRATORY (INHALATION) at 19:39

## 2022-12-11 RX ADMIN — IPRATROPIUM BROMIDE AND ALBUTEROL 1 PUFF: 20; 100 SPRAY, METERED RESPIRATORY (INHALATION) at 15:36

## 2022-12-11 RX ADMIN — MEROPENEM 1000 MG: 1 INJECTION, POWDER, FOR SOLUTION INTRAVENOUS at 17:17

## 2022-12-11 RX ADMIN — Medication 1500 MG: at 14:39

## 2022-12-11 RX ADMIN — ACETAMINOPHEN 650 MG: 325 TABLET ORAL at 21:59

## 2022-12-11 RX ADMIN — MIDODRINE HYDROCHLORIDE 10 MG: 10 TABLET ORAL at 14:31

## 2022-12-11 RX ADMIN — ACETAMINOPHEN 650 MG: 325 TABLET ORAL at 17:11

## 2022-12-11 RX ADMIN — SENNOSIDES AND DOCUSATE SODIUM 1 TABLET: 50; 8.6 TABLET ORAL at 10:10

## 2022-12-11 RX ADMIN — SENNOSIDES AND DOCUSATE SODIUM 1 TABLET: 50; 8.6 TABLET ORAL at 21:59

## 2022-12-11 RX ADMIN — MEROPENEM 1000 MG: 1 INJECTION, POWDER, FOR SOLUTION INTRAVENOUS at 00:56

## 2022-12-11 ASSESSMENT — PAIN DESCRIPTION - DESCRIPTORS: DESCRIPTORS: SHARP

## 2022-12-11 ASSESSMENT — PAIN SCALES - GENERAL
PAINLEVEL_OUTOF10: 9
PAINLEVEL_OUTOF10: 8

## 2022-12-11 ASSESSMENT — PAIN - FUNCTIONAL ASSESSMENT: PAIN_FUNCTIONAL_ASSESSMENT: ACTIVITIES ARE NOT PREVENTED

## 2022-12-11 ASSESSMENT — PAIN DESCRIPTION - ORIENTATION: ORIENTATION: MID;LOWER

## 2022-12-11 ASSESSMENT — PAIN DESCRIPTION - LOCATION: LOCATION: BACK

## 2022-12-11 NOTE — PROGRESS NOTES
PULMONARY PROGRESS NOTE  CC: Abnormal CXR, Pneumonia with suspected lung abscess    Subjective:   Ongoing fevers & tachycardia. IV line: Peripheral    PHYSICAL EXAM:   BP (!) 143/72   Pulse 86   Temp 98.8 °F (37.1 °C)   Resp 16   Ht 5' 6\" (1.676 m)   Wt 99 lb 3.2 oz (45 kg)   SpO2 100%   BMI 16.01 kg/m² ' on 2 L  Gen:  No distress. Acute on chronically ill-appearing. Cachectic. Eyes:  PERRL. No sclera icterus. No conjunctival injection. ENT:  No discharge. Pharynx clear. Neck:  Trachea midline. No obvious mass. Resp:  No accessory muscle use. No crackles. No wheezes. + L ronchi. No dullness on percussion. CV:  Regular rate. Regular rhythm. No murmur or rub. No edema. GI:  Non-tender. Non-distended. No hernia. Skin:  Warm and dry. No nodule on exposed extremities. Lymph:  No cervical LAD. No supraclavicular LAD. M/S:  No cyanosis. No joint deformity. No clubbing. Neuro:  [place]. Awake. Alert. Moves all four extremities. Psych:  Oriented x 3. No anxiety.      Scheduled Meds:   albuterol-ipratropium  1 puff Inhalation 4x daily    vancomycin  1,500 mg IntraVENous Q24H    meropenem  1,000 mg IntraVENous Q8H    [Held by provider] apixaban  5 mg Oral BID    albuterol  2.5 mg Nebulization Once    tamsulosin  0.4 mg Oral Daily    nicotine  1 patch TransDERmal Daily    midodrine  10 mg Oral TID     sodium chloride flush  5-40 mL IntraVENous 2 times per day    acetaminophen  650 mg Oral Q6H    sennosides-docusate sodium  1 tablet Oral BID     Continuous Infusions:   sodium chloride 75 mL/hr at 12/09/22 2239    sodium chloride       PRN Meds:  ipratropium-albuterol, melatonin, phenol, sodium chloride flush, sodium chloride, polyethylene glycol, ondansetron **OR** ondansetron, oxyCODONE **OR** oxyCODONE, HYDROmorphone **OR** HYDROmorphone, diphenhydrAMINE    Labs:  CBC:   Recent Labs     12/09/22  0459 12/10/22  0459 12/11/22  0502   WBC 8.7 10.6 12.1*   HGB 9.0* 8.3* 8.7*   HCT 28.1* 26.5* 26.6*   MCV 86.6 87.9 85.7   * 425 435     BMP:   Recent Labs     12/09/22  0459 12/10/22  0459 12/11/22  0502   * 132* 132*   K 4.1 3.6 3.8    100 98*   CO2 23 25 24   BUN 7 7 7   CREATININE 0.6* 0.7* 0.6*     Micro:   12/8/22 Urine cx Proteus penneri  12/9/22 SARS-CoV-2 not detected  12/9/22 BC NGTD  12/9/22 AFB smear & cx: No AFB observed by Fluorescent stain  12/10/22 AFB smear & cx: sent  12/10/22 Sputum cx: pending  12/11/22 AFB smear & cx:       Procalcitonin 0.16    Imaging: Chest imaging was reviewed by me and showed:  ACT 10/19/22:   Lower Chest: Small dependently layered pleural effusions are present. The  visualized heart is unremarkable. CXR 12/1/22: Stable advanced COPD. Chronic pleuroparenchymal scarring and volume loss are again noted throughout  the right upper lobe. Similar changes are noted to a lesser degree within  the left lung apex. There is no acute consolidation, pneumothorax or  effusion. CXR 12/9/2022  Extensive fibrocavitary changes in volume loss are again demonstrated within the right upper lung. There has been interval development of a small air-fluid interface within the posteroinferior aspect of the cavity along with segmental airspace disease in the right lung base. Pleuroparenchymal scarring within the left lung apex is stable. Heart size and vascularity are unchanged. There is no pleural effusion. Impression:  Developing small 3 cm right mid lung abscess and right basilar pneumonia. CT Chest 12/10/22  Posterior mediastinal structures appear unremarkable. No mediastinal or hilar adenopathy. Lungs/pleura: 7 x 8 cm apparently thick walled lung cyst versus loculated   pneumothorax demonstrates air-fluid level its more inferior aspect. Extensive fibrosis with areas of bronchiectasis at the medial right upper   lobe and extensive scarring right lower lobe. Sequela from smoking bilateral   lungs including sequela of emphysema.   Another thick walled cavitary lesion   or cyst is 2 cm at the superior aspect left upper lobe without definite   air-fluid level. There is extensive left apical pleuroparenchymal thickening   and scarring. Impression   1. Indeterminate 7 x 8 cm thick walled cyst, mass lesion or collection in the   right pleural space with gas and fluid concerning for possible empyema,   superinfected necrotic mass or pulmonary abscess. Suboptimally characterized   on noncontrast chest CT. Bronchoscopic correlation may be useful. Given the   other findings of sequela from old granulomatous disease tuberculosis is a   consideration. Patient may have developed bronchopleural fistula. 2. Similar appearing 2 cm necrotic thick walled mass versus thick walled   infectious or post infectious pulmonary cyst.  Bronchiolitis is a   consideration. 3. Extensive pulmonary fibrotic changes bilaterally predominate in the upper   lungs commonly seen with sequela of old granulomatous disease. 4.  Pulmonary sequela typical of that seen with smoking, including COPD. ASSESSMENT:  Cavitary lung disease - bilateral upper lobe cavitation with extensive apical fibrotic changes  Advanced emphysema/COPD   Anemia & hematuria, post op bladder tumor s/p resection 12/8/22 - low grade papillary urothelial carcinoma (cysto TURBT 10/16/22)  UTI PTA  Extensive LLE DVT s/p IVC filter 10/15/22   Severe PCM  Tobacco abuse, 60 pack-yrs    PLAN:  Inhaled bronchodilators - Combivent QID  Vanc/Merrem D#3 (Zosyn d/c'd 2/2 pruritus)   Monitoring of vancomycin levels to prevent toxicity. AFB smear & culture x 3 consecutive days; 1st smear is negative. Quantiferon assay pending   If AFB smears are negative, will consider fiberoptic bronchoscopy. Sputum gram stain & culture. Holding Eliquis, last dose was 12/9/22 evening.     NPO for possible bronchoscopy tomorrow    I spent a total of 15 minutes on this encounter personally obtaining the patient history, reviewing labs, imaging and other data. I independently performed the above physical exam and updated this encounter note, assessment and plan as needed. Yari Moreland MD on 12/11/22 at 3:38 PM EST    I spent a total of 6 minutes on this encounter on chart review, history taking and review of data. I updated this encounter note as needed.    Ceasar Aldana PA-C on 12/11/22 at 8:23 AM EST

## 2022-12-11 NOTE — PROGRESS NOTES
Pt awake in bed. Assessment completed and medications given per MAR. VS as charted in flowsheet. A&Ox4. Pt currently on 2L of oxygen. Alexander in place and draining. Urine Output recorded in flowsheet. Pt denies any further needs at this time. Call light in reach and bed in lowest position.

## 2022-12-11 NOTE — FLOWSHEET NOTE
12/11/22 0057   Vital Signs   Temp 97.6 °F (36.4 °C)   Temp Source Oral   Heart Rate 86   Heart Rate Source Monitor   Resp 16   BP (!) 143/72   MAP (Calculated) 96   BP Location Left upper arm   Level of Consciousness 0   MEWS Score 1   Oxygen Therapy   SpO2 100 %   Pulse Oximeter Device Mode Intermittent   Pulse Oximeter Device Location Finger   O2 Device Nasal cannula   O2 Flow Rate (L/min) 2 L/min     Pt awake in bed. VS as shown above. Pt denies any further needs at this time. Call light in reach and bed in lowest position.

## 2022-12-11 NOTE — PROGRESS NOTES
Call from pharmacy, vanc trought was not completed today.   Order discontinued and re-ordered for trough level tomorrow

## 2022-12-11 NOTE — PROGRESS NOTES
Hospitalist Progress Note      PCP: Arian Espinosa, APRN - CNP    Date of Admission: 12/8/2022    Subjective: in isolation for tb, +cough    Medications:  Reviewed    Infusion Medications    sodium chloride 75 mL/hr at 12/09/22 2239    sodium chloride       Scheduled Medications    albuterol-ipratropium  1 puff Inhalation 4x daily    vancomycin  1,500 mg IntraVENous Q24H    meropenem  1,000 mg IntraVENous Q8H    [Held by provider] apixaban  5 mg Oral BID    albuterol  2.5 mg Nebulization Once    tamsulosin  0.4 mg Oral Daily    nicotine  1 patch TransDERmal Daily    midodrine  10 mg Oral TID WC    sodium chloride flush  5-40 mL IntraVENous 2 times per day    acetaminophen  650 mg Oral Q6H    sennosides-docusate sodium  1 tablet Oral BID     PRN Meds: ipratropium-albuterol, melatonin, phenol, sodium chloride flush, sodium chloride, polyethylene glycol, ondansetron **OR** ondansetron, oxyCODONE **OR** oxyCODONE, HYDROmorphone **OR** HYDROmorphone, diphenhydrAMINE      Intake/Output Summary (Last 24 hours) at 12/10/2022 1920  Last data filed at 12/10/2022 1819  Gross per 24 hour   Intake 343 ml   Output 650 ml   Net -307 ml       Physical Exam Performed:    BP (!) 149/85   Pulse (!) 108   Temp 98.6 °F (37 °C) (Oral)   Resp 16   Ht 5' 6\" (1.676 m)   Wt 98 lb 11.2 oz (44.8 kg)   SpO2 100%   BMI 15.93 kg/m²        Gen: No distress. Alert. Chronically ill-appearing, cachectic, frail, male  Eyes: PERRL. No sclera icterus. No conjunctival injection. ENT: No discharge. Pharynx clear. Neck: No JVD. No Carotid Bruit. Trachea midline. Resp: No accessory muscle use. No crackles. No wheezes. No rhonchi. CV: tachy, Regular rhythm. No murmur. No rub. No edema. GI: Non-tender. Non-distended. Normal bowel sounds. No hernia. Skin: Warm and dry. No nodule on exposed extremities. No rash on exposed extremities. M/S: No cyanosis. No joint deformity. No clubbing. Neuro: Awake.  Grossly nonfocal    Psych: Oriented x 3. No anxiety or agitation. Labs:   Recent Labs     12/08/22  0720 12/09/22  0459 12/10/22  0459   WBC 5.1 8.7 10.6   HGB 10.2* 9.0* 8.3*   HCT 31.8* 28.1* 26.5*   * 519* 425     Recent Labs     12/08/22  0720 12/09/22  0459 12/10/22  0459    135* 132*   K 5.5* 4.1 3.6    101 100   CO2 23 23 25   BUN 10 7 7   CREATININE 0.9 0.6* 0.7*   CALCIUM 9.5 7.3* 7.6*     Recent Labs     12/09/22 0459   AST 10*   ALT <5*   BILITOT <0.2   ALKPHOS 102     Recent Labs     12/09/22 0459   INR 1.19*     No results for input(s): Aleene Sauger in the last 72 hours. Urinalysis:      Lab Results   Component Value Date/Time    NITRU Negative 12/09/2022 10:27 AM    WBCUA  12/09/2022 10:27 AM    BACTERIA 1+ 12/09/2022 10:27 AM    RBCUA  12/09/2022 10:27 AM    BLOODU LARGE 12/09/2022 10:27 AM    SPECGRAV >=1.030 12/09/2022 10:27 AM    GLUCOSEU Negative 12/09/2022 10:27 AM       Radiology:  CT CHEST WO CONTRAST   Final Result   1. Indeterminate 7 x 8 cm thick walled cyst, mass lesion or collection in the   right pleural space with gas and fluid concerning for possible empyema,   superinfected necrotic mass or pulmonary abscess. Suboptimally characterized   on noncontrast chest CT. Bronchoscopic correlation may be useful. Given the   other findings of sequela from old granulomatous disease tuberculosis is a   consideration. Patient may have developed bronchopleural fistula. 2. Similar appearing 2 cm necrotic thick walled mass versus thick walled   infectious or post infectious pulmonary cyst.  Bronchiolitis is a   consideration. 3. Extensive pulmonary fibrotic changes bilaterally predominate in the upper   lungs commonly seen with sequela of old granulomatous disease. 4.  Pulmonary sequela typical of that seen with smoking, including COPD.   5. Mild calcific atherosclerosis coronary arteries.    Critical results were called by Dr. Leena Pete to Erica Parker on   12/9/2022 at 16:17. XR CHEST (2 VW)   Final Result   Developing small 3 cm right mid lung abscess and right basilar pneumonia. IP CONSULT TO INTERNAL MEDICINE  IP CONSULT TO DIETITIAN  PHARMACY TO DOSE VANCOMYCIN  IP CONSULT TO PULMONOLOGY    Assessment/Plan:    Active Hospital Problems    Diagnosis     Cavitary lung disease [J98.4]      Priority: Medium    Abnormal CXR [R93.89]      Priority: Medium    Clot hematuria [R31.0]      Priority: Medium    Malignant neoplasm of urinary bladder (HCC) [C67.9]      Priority: Medium    Tachycardia [R00.0]      Priority: Medium         Hematuria  -S/p bladder resection  -monitor overnight. Has SBI  -monitor H/H.  -pain control: Dilaudid, Oxy-IR prn  -continue Flomax  -IS, IVF's  Check UA     PNA   - pt spiked a fever 12/9, CXR with PNA and poss lung abscess  Switch abx to merrem/vanco, check CT chest reviewed  Pulm consulted, awaiting sputum cx for AFB, if not bronch on monday     Tachycardia  -monitor on tele  - suspect 2/2 PNA     Hypertension  -BP stable. DVT LLE  -S/p IVC filter      COPD  -stable. No AE  -Duonebs prn     Severe PCM  -dietician consult        DVT Prophylaxis: Lovenox  Diet: ADULT DIET;  Regular  Code Status: Full Code  PT/OT Eval Status: ordered    Royce Mcburney, MD

## 2022-12-11 NOTE — PROGRESS NOTES
Patient:  Tommie Hutchinson  YOB: 1955   Date of Service:  12/11/22      Urology Attending Daily Progress Note    Previous HPI: Complex admission, COPD sepsis in October 2020 to [facility] ICU, DVT prompting transfer to [facility] vascular surgery did IVC filter October 15, he was on Eliquis, he had a large resection of a bladder tumor for hematuria Lisette Iqbal. Eventually followed up in the urology office. Then represented this week for repeat assessment of his bladder. He had held his Eliquis a couple days prior to the surgery for bleeding was. He has had failure to thrive even prior to his admission in October      HPI: admitted postop with hematuria, cough tachycardia -transferred to the PCU for negative pressure room rule out tuberculosis    Chief complaint: \"no complaint, resting in bed\"    ROS:   Significant productive cough still,   temperatures  100.5 last night. Better than the day prior but wbc is up.     pulmonary work-up ongoing    No catheter issues        Past Medical History:   Diagnosis Date    Anxiety     Depression     Hypertension        Allergies   Allergen Reactions    Bee Venom Itching and Swelling    Zosyn [Piperacillin Sod-Tazobactam So] Itching       Objective:      Vitals:    12/10/22 2101 12/11/22 0057 12/11/22 0445 12/11/22 1149   BP: 131/62 (!) 143/72     Pulse: (!) 110 86  (!) 105   Resp: 16 16  16   Temp: (!) 100.5 °F (38.1 °C) 97.6 °F (36.4 °C) 98.8 °F (37.1 °C)    TempSrc: Oral Oral     SpO2: 98% 100%  95%   Weight:   99 lb 3.2 oz (45 kg)    Height:             I/O last 3 completed shifts: In: 2770.9 [P.O.:333;  I.V.:1635.2; IV Piggyback:802.7]  Out: 1700 [Urine:1700]     Physical Exam:   Constitutional: pleasant patient in no acute distress, normal body habitus  Musculoskeletal: no digital cyanosis, head normocephalic  Psych: normal mood and affect   Skin: exposed skin, stable, intact  Abdomen: soft, nondistended, no guarding, nontender to palpation  Genitourinary: stable bladder, urethral catheter stable with clear urine    Labs:       Lab Results   Component Value Date    PSA 2.71 10/03/2022       Lab Results   Component Value Date    PSA 2.71 10/03/2022     Recent Labs     12/11/22  0502 12/10/22  0459 12/09/22  0459   WBC 12.1* 10.6 8.7   HGB 8.7* 8.3* 9.0*   HCT 26.6* 26.5* 28.1*   MCV 85.7 87.9 86.6    425 519*     No results found for: LABA1C  Lab Results   Component Value Date    LABMICR YES 12/09/2022    CREATININE 0.6 (L) 12/11/2022     Lab Results   Component Value Date     (L) 12/11/2022    K 3.8 12/11/2022    CL 98 (L) 12/11/2022    CO2 24 12/11/2022    BUN 7 12/11/2022    CREATININE 0.6 (L) 12/11/2022    GLUCOSE 109 (H) 12/11/2022    CALCIUM 7.6 (L) 12/11/2022    PROT 5.6 (L) 12/09/2022    LABALBU 2.7 (L) 12/09/2022    BILITOT <0.2 12/09/2022    ALKPHOS 102 12/09/2022    AST 10 (L) 12/09/2022    ALT <5 (L) 12/09/2022    LABGLOM >60 12/11/2022    GFRAA >60 10/17/2022    AGRATIO 0.9 (L) 12/09/2022     Lab Results   Component Value Date    CREATININE 0.6 (L) 12/11/2022    CREATININE 0.7 (L) 12/10/2022    CREATININE 0.6 (L) 12/09/2022       Lab Results   Component Value Date/Time    COLORU Yellow 12/09/2022 10:27 AM    NITRU Negative 12/09/2022 10:27 AM    GLUCOSEU Negative 12/09/2022 10:27 AM    KETUA Negative 12/09/2022 10:27 AM    UROBILINOGEN 0.2 12/09/2022 10:27 AM    BILIRUBINUR Negative 12/09/2022 10:27 AM     Pulmonary Tuberculosis (Rule Out)      Radiology: \"Imaging was independently reviewed by myself and I agree with the radiology interpretation  Ct chest ; cavitary lesion, right side. Fluid on both sides of my review of the chest.    Assessment:  75 y.o. male who is admitted with gross hematuria after transurethral resection of bladder tumor.   Cough and tachycardia were found postop  Urinary tract infection present prior to admission  Low-grade urothelial carcinoma the bladder -repeat resection, pathology still pending  Cavitary lung lesion, pulmonology following    Plan:  -Continue broad-spectrum IV antibiotics  -Continue Alexander catheter  -Appreciate medicine, no pulmonary Recs, consider infectious disease, consult,  -HOLDING elliquis - possible bronch procedure    -dvt prophylaxis, sequential compression devices for DVT prophylaxis  -prn oral narcotic medications for pain control  -stool softeners to minimize post-operative constipation    Awaiting final pathology from tumor resection    Royce Apodaca MD  Office: [phone]

## 2022-12-11 NOTE — PROGRESS NOTES
Bedside report and transfer of care given to [facility]. Pt currently resting in bed with the call light within reach. Pt denies any other care needs at this time. Pt stable at this time.

## 2022-12-11 NOTE — PROGRESS NOTES
Call to Patient daughter, Juan F Naik, with updates on plan of care. Daughter wishes to visit. Per pulmonology, must wait until Monday to consult with Infectious Disease for permission to visit family under contact. Will discuss with PM nurse. No other questions from family at this time.

## 2022-12-11 NOTE — PROGRESS NOTES
Pt had 10 beats of PAT HR went up to 157 however went back down to low 100's. Pt asymptomatic. Call light in reach and bed in lowest position.

## 2022-12-11 NOTE — PROGRESS NOTES
Evening meds administered and dinner delivered to patient. Patient continues to have tremors of legs and arms. Patient states that he \"feels fine\" and did eat approximately 25% of dinner tray. CBI tubing removed per earlier conversation with urology. Alexander cath continues to drain with no issues. Patient NPO after midnight for bronchoscopy on Monday.

## 2022-12-12 LAB
AFB SMEAR: NORMAL
AFB SMEAR: NORMAL
ANION GAP SERPL CALCULATED.3IONS-SCNC: 9 MMOL/L (ref 3–16)
BUN BLDV-MCNC: 6 MG/DL (ref 7–20)
CALCIUM SERPL-MCNC: 7.6 MG/DL (ref 8.3–10.6)
CHLORIDE BLD-SCNC: 95 MMOL/L (ref 99–110)
CO2: 24 MMOL/L (ref 21–32)
CREAT SERPL-MCNC: <0.5 MG/DL (ref 0.8–1.3)
CULTURE, RESPIRATORY: ABNORMAL
CULTURE, RESPIRATORY: ABNORMAL
GFR SERPL CREATININE-BSD FRML MDRD: >60 ML/MIN/{1.73_M2}
GLUCOSE BLD-MCNC: 102 MG/DL (ref 70–99)
GRAM STAIN RESULT: ABNORMAL
MTB COMPLEX PCR: NORMAL
MTB COMPLEX PCR: NORMAL
ORGANISM: ABNORMAL
POTASSIUM REFLEX MAGNESIUM: 3.8 MMOL/L (ref 3.5–5.1)
SODIUM BLD-SCNC: 128 MMOL/L (ref 136–145)
VANCOMYCIN TROUGH: 8.1 UG/ML (ref 10–20)

## 2022-12-12 PROCEDURE — 94640 AIRWAY INHALATION TREATMENT: CPT

## 2022-12-12 PROCEDURE — 6360000002 HC RX W HCPCS: Performed by: INTERNAL MEDICINE

## 2022-12-12 PROCEDURE — 3609010800 HC BRONCHOSCOPY ALVEOLAR LAVAGE: Performed by: INTERNAL MEDICINE

## 2022-12-12 PROCEDURE — 94761 N-INVAS EAR/PLS OXIMETRY MLT: CPT

## 2022-12-12 PROCEDURE — 31645 BRNCHSC W/THER ASPIR 1ST: CPT | Performed by: INTERNAL MEDICINE

## 2022-12-12 PROCEDURE — 6370000000 HC RX 637 (ALT 250 FOR IP): Performed by: INTERNAL MEDICINE

## 2022-12-12 PROCEDURE — 31624 DX BRONCHOSCOPE/LAVAGE: CPT | Performed by: INTERNAL MEDICINE

## 2022-12-12 PROCEDURE — 93005 ELECTROCARDIOGRAM TRACING: CPT | Performed by: INTERNAL MEDICINE

## 2022-12-12 PROCEDURE — 99152 MOD SED SAME PHYS/QHP 5/>YRS: CPT | Performed by: INTERNAL MEDICINE

## 2022-12-12 PROCEDURE — 7100000011 HC PHASE II RECOVERY - ADDTL 15 MIN: Performed by: INTERNAL MEDICINE

## 2022-12-12 PROCEDURE — 36415 COLL VENOUS BLD VENIPUNCTURE: CPT

## 2022-12-12 PROCEDURE — 3609010900 HC BRONCHOSCOPY THERAPUTIC ASPIRATION INITIAL: Performed by: INTERNAL MEDICINE

## 2022-12-12 PROCEDURE — 88112 CYTOPATH CELL ENHANCE TECH: CPT

## 2022-12-12 PROCEDURE — 2580000003 HC RX 258: Performed by: UROLOGY

## 2022-12-12 PROCEDURE — 80048 BASIC METABOLIC PNL TOTAL CA: CPT

## 2022-12-12 PROCEDURE — 0B9C8ZX DRAINAGE OF RIGHT UPPER LUNG LOBE, VIA NATURAL OR ARTIFICIAL OPENING ENDOSCOPIC, DIAGNOSTIC: ICD-10-PCS | Performed by: INTERNAL MEDICINE

## 2022-12-12 PROCEDURE — 99233 SBSQ HOSP IP/OBS HIGH 50: CPT | Performed by: INTERNAL MEDICINE

## 2022-12-12 PROCEDURE — 2709999900 HC NON-CHARGEABLE SUPPLY: Performed by: INTERNAL MEDICINE

## 2022-12-12 PROCEDURE — 99232 SBSQ HOSP IP/OBS MODERATE 35: CPT | Performed by: INTERNAL MEDICINE

## 2022-12-12 PROCEDURE — 2580000003 HC RX 258: Performed by: INTERNAL MEDICINE

## 2022-12-12 PROCEDURE — 7100000010 HC PHASE II RECOVERY - FIRST 15 MIN: Performed by: INTERNAL MEDICINE

## 2022-12-12 PROCEDURE — 88305 TISSUE EXAM BY PATHOLOGIST: CPT

## 2022-12-12 PROCEDURE — 80202 ASSAY OF VANCOMYCIN: CPT

## 2022-12-12 PROCEDURE — 2060000000 HC ICU INTERMEDIATE R&B

## 2022-12-12 PROCEDURE — 6370000000 HC RX 637 (ALT 250 FOR IP): Performed by: UROLOGY

## 2022-12-12 RX ORDER — MIDAZOLAM HYDROCHLORIDE 5 MG/ML
INJECTION INTRAMUSCULAR; INTRAVENOUS PRN
Status: DISCONTINUED | OUTPATIENT
Start: 2022-12-12 | End: 2022-12-12 | Stop reason: ALTCHOICE

## 2022-12-12 RX ORDER — FENTANYL CITRATE 50 UG/ML
INJECTION, SOLUTION INTRAMUSCULAR; INTRAVENOUS PRN
Status: DISCONTINUED | OUTPATIENT
Start: 2022-12-12 | End: 2022-12-12 | Stop reason: ALTCHOICE

## 2022-12-12 RX ORDER — CLONIDINE HYDROCHLORIDE 0.1 MG/1
0.1 TABLET ORAL ONCE
Status: COMPLETED | OUTPATIENT
Start: 2022-12-12 | End: 2022-12-12

## 2022-12-12 RX ADMIN — IPRATROPIUM BROMIDE AND ALBUTEROL 1 PUFF: 20; 100 SPRAY, METERED RESPIRATORY (INHALATION) at 20:58

## 2022-12-12 RX ADMIN — TAMSULOSIN HYDROCHLORIDE 0.4 MG: 0.4 CAPSULE ORAL at 08:59

## 2022-12-12 RX ADMIN — ACETAMINOPHEN 650 MG: 325 TABLET ORAL at 04:49

## 2022-12-12 RX ADMIN — CLONIDINE HYDROCHLORIDE 0.1 MG: 0.1 TABLET ORAL at 04:49

## 2022-12-12 RX ADMIN — SENNOSIDES AND DOCUSATE SODIUM 1 TABLET: 50; 8.6 TABLET ORAL at 08:59

## 2022-12-12 RX ADMIN — ACETAMINOPHEN 650 MG: 325 TABLET ORAL at 08:59

## 2022-12-12 RX ADMIN — MEROPENEM 1000 MG: 1 INJECTION, POWDER, FOR SOLUTION INTRAVENOUS at 23:56

## 2022-12-12 RX ADMIN — Medication 1500 MG: at 15:02

## 2022-12-12 RX ADMIN — MEROPENEM 1000 MG: 1 INJECTION, POWDER, FOR SOLUTION INTRAVENOUS at 17:36

## 2022-12-12 RX ADMIN — IPRATROPIUM BROMIDE AND ALBUTEROL 1 PUFF: 20; 100 SPRAY, METERED RESPIRATORY (INHALATION) at 15:25

## 2022-12-12 RX ADMIN — SODIUM CHLORIDE: 9 INJECTION, SOLUTION INTRAVENOUS at 04:27

## 2022-12-12 RX ADMIN — MEROPENEM 1000 MG: 1 INJECTION, POWDER, FOR SOLUTION INTRAVENOUS at 08:57

## 2022-12-12 RX ADMIN — IPRATROPIUM BROMIDE AND ALBUTEROL 1 PUFF: 20; 100 SPRAY, METERED RESPIRATORY (INHALATION) at 08:11

## 2022-12-12 RX ADMIN — ACETAMINOPHEN 650 MG: 325 TABLET ORAL at 17:35

## 2022-12-12 ASSESSMENT — PAIN - FUNCTIONAL ASSESSMENT: PAIN_FUNCTIONAL_ASSESSMENT: ACTIVITIES ARE NOT PREVENTED

## 2022-12-12 ASSESSMENT — PAIN DESCRIPTION - ORIENTATION: ORIENTATION: LOWER

## 2022-12-12 ASSESSMENT — PAIN DESCRIPTION - DESCRIPTORS: DESCRIPTORS: DISCOMFORT

## 2022-12-12 ASSESSMENT — PAIN SCALES - GENERAL: PAINLEVEL_OUTOF10: 8

## 2022-12-12 ASSESSMENT — PAIN DESCRIPTION - LOCATION: LOCATION: BACK

## 2022-12-12 NOTE — PROGRESS NOTES
Hospitalist Progress Note      PCP: NALINI Peguero - CNP    Date of Admission: 12/8/2022    Subjective: cont to have fevers    Medications:  Reviewed    Infusion Medications    sodium chloride 75 mL/hr at 12/09/22 2239    sodium chloride       Scheduled Medications    albuterol-ipratropium  1 puff Inhalation 4x daily    vancomycin  1,500 mg IntraVENous Q24H    meropenem  1,000 mg IntraVENous Q8H    [Held by provider] apixaban  5 mg Oral BID    albuterol  2.5 mg Nebulization Once    tamsulosin  0.4 mg Oral Daily    nicotine  1 patch TransDERmal Daily    midodrine  10 mg Oral TID WC    sodium chloride flush  5-40 mL IntraVENous 2 times per day    acetaminophen  650 mg Oral Q6H    sennosides-docusate sodium  1 tablet Oral BID     PRN Meds: ipratropium-albuterol, melatonin, phenol, sodium chloride flush, sodium chloride, polyethylene glycol, ondansetron **OR** ondansetron, oxyCODONE **OR** oxyCODONE, HYDROmorphone **OR** HYDROmorphone, diphenhydrAMINE      Intake/Output Summary (Last 24 hours) at 12/12/2022 0050  Last data filed at 12/11/2022 2210  Gross per 24 hour   Intake 660 ml   Output 825 ml   Net -165 ml       Physical Exam Performed:    BP (!) 163/89   Pulse 90   Temp 97.9 °F (36.6 °C) (Oral)   Resp 18   Ht 5' 6\" (1.676 m)   Wt 99 lb 3.2 oz (45 kg)   SpO2 95%   BMI 16.01 kg/m²        Gen: No distress. Alert. Chronically ill-appearing, cachectic, frail, male  Eyes: PERRL. No sclera icterus. No conjunctival injection. ENT: No discharge. Pharynx clear. Neck: No JVD. No Carotid Bruit. Trachea midline. Resp: No accessory muscle use. No crackles. No wheezes. No rhonchi. CV: tachy, Regular rhythm. No murmur. No rub. No edema. GI: Non-tender. Non-distended. Normal bowel sounds. No hernia. Skin: Warm and dry. No nodule on exposed extremities. No rash on exposed extremities. M/S: No cyanosis. No joint deformity. No clubbing. Neuro: Awake. Grossly nonfocal    Psych: Oriented x 3.  No anxiety or agitation. Labs:   Recent Labs     12/09/22  0459 12/10/22  0459 12/11/22  0502   WBC 8.7 10.6 12.1*   HGB 9.0* 8.3* 8.7*   HCT 28.1* 26.5* 26.6*   * 425 435     Recent Labs     12/09/22  0459 12/10/22  0459 12/11/22  0502   * 132* 132*   K 4.1 3.6 3.8    100 98*   CO2 23 25 24   BUN 7 7 7   CREATININE 0.6* 0.7* 0.6*   CALCIUM 7.3* 7.6* 7.6*     Recent Labs     12/09/22 0459   AST 10*   ALT <5*   BILITOT <0.2   ALKPHOS 102     Recent Labs     12/09/22 0459   INR 1.19*     No results for input(s): CKTOTAL, TROPONINI in the last 72 hours. Urinalysis:      Lab Results   Component Value Date/Time    NITRU Negative 12/09/2022 10:27 AM    WBCUA  12/09/2022 10:27 AM    BACTERIA 1+ 12/09/2022 10:27 AM    RBCUA  12/09/2022 10:27 AM    BLOODU LARGE 12/09/2022 10:27 AM    SPECGRAV >=1.030 12/09/2022 10:27 AM    GLUCOSEU Negative 12/09/2022 10:27 AM       Radiology:  CT CHEST WO CONTRAST   Final Result   1. Indeterminate 7 x 8 cm thick walled cyst, mass lesion or collection in the   right pleural space with gas and fluid concerning for possible empyema,   superinfected necrotic mass or pulmonary abscess. Suboptimally characterized   on noncontrast chest CT. Bronchoscopic correlation may be useful. Given the   other findings of sequela from old granulomatous disease tuberculosis is a   consideration. Patient may have developed bronchopleural fistula. 2. Similar appearing 2 cm necrotic thick walled mass versus thick walled   infectious or post infectious pulmonary cyst.  Bronchiolitis is a   consideration. 3. Extensive pulmonary fibrotic changes bilaterally predominate in the upper   lungs commonly seen with sequela of old granulomatous disease. 4.  Pulmonary sequela typical of that seen with smoking, including COPD.   5. Mild calcific atherosclerosis coronary arteries. Critical results were called by Dr. Naina Hodge to Primus Moder on   12/9/2022 at 16:17. XR CHEST (2 VW)   Final Result   Developing small 3 cm right mid lung abscess and right basilar pneumonia. IP CONSULT TO INTERNAL MEDICINE  IP CONSULT TO DIETITIAN  PHARMACY TO DOSE VANCOMYCIN  IP CONSULT TO PULMONOLOGY    Assessment/Plan:    Active Hospital Problems    Diagnosis     Cavitary lung disease [J98.4]      Priority: Medium    Abnormal CXR [R93.89]      Priority: Medium    Clot hematuria [R31.0]      Priority: Medium    Malignant neoplasm of urinary bladder (HCC) [C67.9]      Priority: Medium    Tachycardia [R00.0]      Priority: Medium         Hematuria  UTI  -S/p bladder resection  -monitor overnight. Has SBI  -monitor H/H.  -pain control: Dilaudid, Oxy-IR prn  -continue Flomax       Cavitary lung disease/suspected PNA   - pt spiked a fever 12/9, CXR with PNA and poss lung abscess  Switched abx to merrem/vanco, check CT chest reviewed  Pulm consulted, awaiting sputum cx for AFB, if not bronch on Monday  Cont to spike fever, ? ID consult    Advanced emphysema/COPD - pulm following     Tachycardia  -monitor on tele  - suspect 2/2 PNA     Hypertension  -BP stable. DVT LLE  -S/p IVC filter      COPD  -stable.  No AE  -Duonebs prn     Severe PCM  -dietician consult        DVT Prophylaxis: eliquis (held 2/2 poss bronch in am 0  Diet: Diet NPO Exceptions are: Sips of Water with Meds  Code Status: Full Code  PT/OT Eval Status: ordered    Dispo - cont care, Reagan Knight in am      Mo Mueller MD

## 2022-12-12 NOTE — FLOWSHEET NOTE
12/12/22 1233   Vital Signs   Temp 97.6 °F (36.4 °C)   Temp Source Oral   Heart Rate 82   Heart Rate Source Monitor   Resp 16   /70   MAP (Calculated) 92   BP Location Left upper arm   BP Method Automatic   Patient Position Semi fowlers   Level of Consciousness 0   MEWS Score 1   Oxygen Therapy   SpO2 94 %   Pulse Oximetry Type Intermittent   O2 Device None (Room air)   Patient back from bronchoscopy. Will feed in about 1 hour. RN updated daughter [facility].  states patient's airborne iso can be discontinued if OK with .  Narayan Munoz states it is ok to DC

## 2022-12-12 NOTE — PROGRESS NOTES
Pharmacy Vancomycin Consult     Vancomycin Day: 4  Current Dosinmg q24h  Current indication: CAP    Temp max:      Recent Labs     12/10/22  0459 22  0502 22  0532   BUN 7 7 6*   CREATININE 0.7* 0.6* <0.5*   WBC 10.6 12.1*  --        Intake/Output Summary (Last 24 hours) at 2022 1515  Last data filed at 2022 8247  Gross per 24 hour   Intake 180 ml   Output 651 ml   Net -471 ml     Culture Date      Source                       Results  12/10                 Resp                           Normal    Ht Readings from Last 1 Encounters:   22 5' 6\" (1.676 m)        Wt Readings from Last 1 Encounters:   22 99 lb 9.6 oz (45.2 kg)       Body mass index is 16.08 kg/m². Estimated Creatinine Clearance: 90 mL/min (based on SCr of 0.5 mg/dL).     Trough: 8.1 ()    Assessment/Plan:  Will increase interval to q18h, next level on  at [address] [place] [address] D :17 PM  .

## 2022-12-12 NOTE — FLOWSHEET NOTE
12/12/22 0425   Vital Signs   Temp 98.4 °F (36.9 °C)   Temp Source Oral   Heart Rate (!) 105   Heart Rate Source Monitor   Resp 18   BP (!) 177/91   MAP (Calculated) 120   BP Location Left upper arm   Level of Consciousness 0   MEWS Score 2   Oxygen Therapy   SpO2 92 %   Pulse Oximeter Device Mode Intermittent   Pulse Oximeter Device Location Finger   O2 Device None (Room air)     Pt awake in bed. VS as shown above. MD notified. New orders received.

## 2022-12-12 NOTE — FLOWSHEET NOTE
12/12/22 0015   Vital Signs   Temp 97.9 °F (36.6 °C)   Temp Source Oral   Heart Rate 90   Heart Rate Source Monitor   Resp 18   BP (!) 163/89   MAP (Calculated) 114   BP Location Left upper arm   Level of Consciousness 0   MEWS Score 1   Oxygen Therapy   SpO2 95 %   Pulse Oximeter Device Mode Intermittent   Pulse Oximeter Device Location Finger   O2 Device None (Room air)     Pt awake in bed. Food and drinks removed from pt room per NPO at midnight order. VS as charted above. Pt denies any further needs at this time. Call light in reach and bed in lowest position.

## 2022-12-12 NOTE — PROGRESS NOTES
Pt awake in bed. Assessment completed and medications given per MAR. VS as charted in flowsheet. A&Ox4. Pt reminded on NPO order after midnight and provided a snacks and drinks. Alexander in place, secure and draining. Pt denies any further needs at this time. Call light in reach and bed in lowest position.

## 2022-12-12 NOTE — PROCEDURES
PROCEDURE:  BRONCHOSCOPY   Pre and Post Diagnosis: cavitary lung disease     The risks and benefits as well as alternatives to the procedure have been discussed with the patient. The patient understands and agrees to proceed. Consent signed. DESCRIPTION OF PROCEDURE: A time out was taken. Conscious sedation with 4 mg versed and 100 mcg fentanyl. 0ml 1% lidocaine through bronchoscope. Type of sedation used: Moderate Sedation performed by Dr. Wilfredo Chavez  Total moderate sedation time in minutes: 16 minutes  Patient was monitored continuously 1:1 throughout the entire procedure while sedation was administered. The scope was passed with ease via the mouth. A complete airway inspection was performed. Normal anatomy. No endobronchial lesion was identified. Mucosa appeared normal. There were thick purulent yellow secretions scattered throughout the airways. Therapeutic aspiration completed. Washings were obtained throughout the airways. A Bronchoalveolar lavage was obtained from the RUL apical and then posterior subsegments with good return. The patient tolerated the procedure well. EBL none. Recovery will be per endoscopy protocol. Will discharge home or return to same level of care per recovery protocol.     FOLLOW UP:  Cultures and cytology and afb smear

## 2022-12-12 NOTE — PROGRESS NOTES
Pt taken via stretcher by transport at this time back to room. Pt A&O, non productive cough, SpO2  95% on RA.

## 2022-12-12 NOTE — PROGRESS NOTES
PULMONARY PROGRESS NOTE  CC: Abnormal CXR, Pneumonia with suspected lung abscess    Subjective:   afebrile      PHYSICAL EXAM:   /75   Pulse 96   Temp 99.4 °F (37.4 °C) (Oral)   Resp 18   Ht 5' 6\" (1.676 m)   Wt 99 lb 9.6 oz (45.2 kg)   SpO2 95%   BMI 16.08 kg/m² ' on RA  Gen:  No distress. Acute on chronically ill-appearing. Cachectic. Eyes:  PERRL. No sclera icterus. No conjunctival injection. ENT:  No discharge. Pharynx clear. Neck:  Trachea midline. No obvious mass. Resp:  No accessory muscle use. No crackles. No wheezes. + L ronchi. CV:  Regular rate. Regular rhythm. No murmur or rub. No edema. GI:  Non-tender. Non-distended. M/S:  No cyanosis. No joint deformity. No clubbing. Neuro:  Samish. Awake. Alert. Moves all four extremities. Psych:  Oriented x 3. No anxiety.      Scheduled Meds:   albuterol-ipratropium  1 puff Inhalation 4x daily    vancomycin  1,500 mg IntraVENous Q24H    meropenem  1,000 mg IntraVENous Q8H    [Held by provider] apixaban  5 mg Oral BID    albuterol  2.5 mg Nebulization Once    tamsulosin  0.4 mg Oral Daily    nicotine  1 patch TransDERmal Daily    [Held by provider] midodrine  10 mg Oral TID WC    sodium chloride flush  5-40 mL IntraVENous 2 times per day    acetaminophen  650 mg Oral Q6H    sennosides-docusate sodium  1 tablet Oral BID     Continuous Infusions:   sodium chloride 75 mL/hr at 12/12/22 0427    sodium chloride       PRN Meds:  ipratropium-albuterol, melatonin, phenol, sodium chloride flush, sodium chloride, polyethylene glycol, ondansetron **OR** ondansetron, oxyCODONE **OR** oxyCODONE, HYDROmorphone **OR** HYDROmorphone, diphenhydrAMINE    Labs:  CBC:   Recent Labs     12/10/22  0459 12/11/22  0502   WBC 10.6 12.1*   HGB 8.3* 8.7*   HCT 26.5* 26.6*   MCV 87.9 85.7    435       BMP:   Recent Labs     12/10/22  0459 12/11/22  0502 12/12/22  0532   * 132* 128*   K 3.6 3.8 3.8    98* 95*   CO2 25 24 24   BUN 7 7 6* CREATININE 0.7* 0.6* <0.5*       Micro:   12/8/22 Urine cx Proteus penneri  12/9/22 SARS-CoV-2 not detected  12/9/22 BC NGTD  12/9/22 AFB smear negative & culture ngtd  12/10/22 AFB smear negative and culture ngtd  12/10/22 Sputum cx: pending  12/11/22 AFB smear & cx: pending    12/9/22 Procalcitonin 0.16    Imaging: Chest imaging was reviewed by me and showed:  ACT 10/19/22:   Lower Chest: Small dependently layered pleural effusions are present. The  visualized heart is unremarkable. CXR 12/1/22: Stable advanced COPD. Chronic pleuroparenchymal scarring and volume loss are again noted throughout  the right upper lobe. Similar changes are noted to a lesser degree within  the left lung apex. There is no acute consolidation, pneumothorax or  effusion. CXR 12/9/2022  Extensive fibrocavitary changes in volume loss are again demonstrated within the right upper lung. There has been interval development of a small air-fluid interface within the posteroinferior aspect of the cavity along with segmental airspace disease in the right lung base. Pleuroparenchymal scarring within the left lung apex is stable. Heart size and vascularity are unchanged. There is no pleural effusion. Impression:  Developing small 3 cm right mid lung abscess and right basilar pneumonia. CT Chest 12/10/22  Posterior mediastinal structures appear unremarkable. No mediastinal or hilar adenopathy. Lungs/pleura: 7 x 8 cm apparently thick walled lung cyst versus loculated   pneumothorax demonstrates air-fluid level its more inferior aspect. Extensive fibrosis with areas of bronchiectasis at the medial right upper   lobe and extensive scarring right lower lobe. Sequela from smoking bilateral   lungs including sequela of emphysema. Another thick walled cavitary lesion   or cyst is 2 cm at the superior aspect left upper lobe without definite   air-fluid level.   There is extensive left apical pleuroparenchymal thickening   and scarring. Impression   1. Indeterminate 7 x 8 cm thick walled cyst, mass lesion or collection in the   right pleural space with gas and fluid concerning for possible empyema,   superinfected necrotic mass or pulmonary abscess. Suboptimally characterized   on noncontrast chest CT. Bronchoscopic correlation may be useful. Given the   other findings of sequela from old granulomatous disease tuberculosis is a   consideration. Patient may have developed bronchopleural fistula. 2. Similar appearing 2 cm necrotic thick walled mass versus thick walled   infectious or post infectious pulmonary cyst.  Bronchiolitis is a   consideration. 3. Extensive pulmonary fibrotic changes bilaterally predominate in the upper   lungs commonly seen with sequela of old granulomatous disease. 4.  Pulmonary sequela typical of that seen with smoking, including COPD. ASSESSMENT:  Cavitary lung disease - bilateral upper lobe cavitation with extensive apical fibrotic changes  Advanced emphysema/COPD   Anemia & hematuria, post op bladder tumor s/p resection 12/8/22 - low grade papillary urothelial carcinoma (cysto TURBT 10/16/22)  UTI PTA  Extensive LLE DVT s/p IVC filter 10/15/22   Severe PCM  Tobacco abuse, 60 pack-yrs    PLAN:  Inhaled bronchodilators - Combivent QID  Vanc/Merrem D#3 (Zosyn d/c'd 2/2 pruritus)   Monitoring of vancomycin levels to prevent toxicity. Holding Eliquis, last dose was 12/9/22 evening. NPO for bronchoscopy today  Risks and benefits of bronchoscopy were discussed with patient including complications (collapse lung, bleed, mechanical ventilation and cardiopulmonary arrest).  The patient is agreeable   No objection to restarting anticoagulation today

## 2022-12-12 NOTE — FLOWSHEET NOTE
12/12/22 0736   Vital Signs   Temp 99.4 °F (37.4 °C)   Temp Source Oral   Heart Rate 96   Heart Rate Source Monitor   Resp 18   /75   MAP (Calculated) 93   BP Location Left upper arm   Level of Consciousness 0   MEWS Score 1   Oxygen Therapy   SpO2 95 %   O2 Device None (Room air)   Patient is resting showing no s/s of distress. Patient is alert and oriented. Meds were given, see MAR. Patient is denying any needs. Bed is in lowest position and call light is within reach. Will continue to monitor. Shift assessment complete, see flowsheets. RN updated daughter [place] at this time.

## 2022-12-12 NOTE — PROGRESS NOTES
Hospitalist Progress Note      PCP: NALINI Boyle - CNP    Date of Admission: 12/8/2022    Subjective: cont to have fevers    Medications:  Reviewed    Infusion Medications    sodium chloride 75 mL/hr at 12/12/22 0427    sodium chloride       Scheduled Medications    albuterol-ipratropium  1 puff Inhalation 4x daily    vancomycin  1,500 mg IntraVENous Q24H    meropenem  1,000 mg IntraVENous Q8H    [Held by provider] apixaban  5 mg Oral BID    albuterol  2.5 mg Nebulization Once    tamsulosin  0.4 mg Oral Daily    nicotine  1 patch TransDERmal Daily    [Held by provider] midodrine  10 mg Oral TID WC    sodium chloride flush  5-40 mL IntraVENous 2 times per day    acetaminophen  650 mg Oral Q6H    sennosides-docusate sodium  1 tablet Oral BID     PRN Meds: ipratropium-albuterol, melatonin, phenol, sodium chloride flush, sodium chloride, polyethylene glycol, ondansetron **OR** ondansetron, oxyCODONE **OR** oxyCODONE, HYDROmorphone **OR** HYDROmorphone, diphenhydrAMINE      Intake/Output Summary (Last 24 hours) at 12/12/2022 0941  Last data filed at 12/12/2022 2758  Gross per 24 hour   Intake 420 ml   Output 951 ml   Net -531 ml       Physical Exam Performed:    /75   Pulse 96   Temp 99.4 °F (37.4 °C) (Oral)   Resp 18   Ht 5' 6\" (1.676 m)   Wt 99 lb 9.6 oz (45.2 kg)   SpO2 95%   BMI 16.08 kg/m²        Gen: No distress. Alert. Chronically ill-appearing, cachectic, frail, male  Eyes: PERRL. No sclera icterus. No conjunctival injection. ENT: No discharge. Pharynx clear. Neck: No JVD. No Carotid Bruit. Trachea midline. Resp: No accessory muscle use. No crackles. No wheezes. No rhonchi. CV: tachy, Regular rhythm. No murmur. No rub. No edema. GI: Non-tender. Non-distended. Normal bowel sounds. No hernia. Skin: Warm and dry. No nodule on exposed extremities. No rash on exposed extremities. M/S: No cyanosis. No joint deformity. No clubbing. Neuro: Awake.  Grossly nonfocal    Psych: Oriented x 3. No anxiety or agitation. Josh Easton MD have reviewed the chart on [facility] and personally interviewed and examined patient, reviewed the data (labs and imaging) and after discussion with my PA formulated the plan. Agree with note with the following edits. HPI:     I reviewed the patient's Past Medical History, Past Surgical History, Medications, and Allergies. S.p bronch with BAL today   No fevers, remains on RA   Cough post bronch     General: elderly male,    Awake, alert and oriented. Appears to be not in any distress  Mucous Membranes:  Pink , anicteric  Neck: No JVD, no carotid bruit, no thyromegaly  Chest: scattered crackles bilateral   Cardiovascular:  RRR S1S2 heard, no murmurs or gallops  Abdomen:  Soft, undistended, non tender, no organomegaly, BS present  Extremities: No edema or cyanosis. Distal pulses well felt  Alexander in place  Neurological : grossly normal                 Labs:   Recent Labs     12/10/22  0459 12/11/22  0502   WBC 10.6 12.1*   HGB 8.3* 8.7*   HCT 26.5* 26.6*    435     Recent Labs     12/10/22  0459 12/11/22  0502 12/12/22  0532   * 132* 128*   K 3.6 3.8 3.8    98* 95*   CO2 25 24 24   BUN 7 7 6*   CREATININE 0.7* 0.6* <0.5*   CALCIUM 7.6* 7.6* 7.6*     No results for input(s): AST, ALT, BILIDIR, BILITOT, ALKPHOS in the last 72 hours. No results for input(s): INR in the last 72 hours. No results for input(s): [place] in the last 72 hours.     Urinalysis:      Lab Results   Component Value Date/Time    NITRU Negative 12/09/2022 10:27 AM    WBCUA  12/09/2022 10:27 AM    BACTERIA 1+ 12/09/2022 10:27 AM    RBCUA  12/09/2022 10:27 AM    BLOODU LARGE 12/09/2022 10:27 AM    SPECGRAV >=1.030 12/09/2022 10:27 AM    GLUCOSEU Negative 12/09/2022 10:27 AM     Cultures:  No AFB  Mycobacterium negative  Respiratory panel: negative  Sputum: pending  Urine: Proteus penneri  Blood: NGTD  COVID/flu: negative    Radiology:  CT CHEST WO CONTRAST   Final Result   1. Indeterminate 7 x 8 cm thick walled cyst, mass lesion or collection in the   right pleural space with gas and fluid concerning for possible empyema,   superinfected necrotic mass or pulmonary abscess. Suboptimally characterized   on noncontrast chest CT. Bronchoscopic correlation may be useful. Given the   other findings of sequela from old granulomatous disease tuberculosis is a   consideration. Patient may have developed bronchopleural fistula. 2. Similar appearing 2 cm necrotic thick walled mass versus thick walled   infectious or post infectious pulmonary cyst.  Bronchiolitis is a   consideration. 3. Extensive pulmonary fibrotic changes bilaterally predominate in the upper   lungs commonly seen with sequela of old granulomatous disease. 4.  Pulmonary sequela typical of that seen with smoking, including COPD.   5. Mild calcific atherosclerosis coronary arteries. Critical results were called by Dr. David Bland to Wang Chavez on   12/9/2022 at 16:17. XR CHEST (2 VW)   Final Result   Developing small 3 cm right mid lung abscess and right basilar pneumonia.              IP CONSULT TO INTERNAL MEDICINE  IP CONSULT TO DIETITIAN  PHARMACY TO DOSE VANCOMYCIN  IP CONSULT TO PULMONOLOGY    Assessment/Plan:    Active Hospital Problems    Diagnosis     Cavitary lung disease [J98.4]      Priority: Medium    Abnormal CXR [R93.89]      Priority: Medium    Clot hematuria [R31.0]      Priority: Medium    Malignant neoplasm of urinary bladder ([address] [C67.9]      Priority: Medium    Tachycardia [R00.0]      Priority: Medium     We are consulted for medical mx       Hematuria  UTI   -with hx of bladder cancer and s/p bladder tumor resection, admitted for post op mx .   -started on bladder irrigation , improved   -monitored H/H.  -pain control: Oxy-IR prn  -continue Flomax  - can remove mc if ok by urology     Cavitary lung disease/suspected PNA - pt spiked a fever 12/9, CXR with PNA and poss lung abscess  Switched abx to merrem/vanco, check CT chest reviewed  Pulm consulted, had bronch today ,  awaiting sputum cx for AFB  Cont to spike fever, ? ID consulted    Advanced emphysema/COPD - pulm following  -Duonebs prn      Tachycardia  -monitor on tele  - suspect 2/2 PNA     Hypertension  -BP stable.       DVT LLE  -S/p IVC filter         Severe PCM  -dietician consulted     DVT Prophylaxis: eliquis (held 2/2 poss bronch in am 0  Diet: Diet NPO Exceptions are: Sips of Water with Meds  Code Status: Full Code  PT/OT Eval Status: ordered    Dispo - cont care, poss bronch in am    NALINI Richardson - CNP   12/12/2022    Agree with above  Changes made to note    Jose Barraza MD,12/12/2022 1:32 PM

## 2022-12-12 NOTE — CARE COORDINATION
INTERDISCIPLINARY PLAN OF CARE CONFERENCE    Date/Time: 12/12/2022 3:46 PM  Completed by: Kenna Contreras RN, Case Management      Patient Name:  Messi Reyes  YOB: 1955  Admitting Diagnosis: Malignant neoplasm of urinary bladder, unspecified site [facility]) [C67.9]  Clot hematuria [R31.0]     Admit Date/Time:  12/8/2022  6:17 AM    Chart reviewed. Interdisciplinary team contacted or reviewed plan related to patient progress and discharge plans. Disciplines included Case Management, Nursing, and Dietitian. Current Status:12/06/2022  PT/OT recommendation for discharge plan of care: NA    Expected D/C Disposition:  Home    Discharge Plan Comments: Chart review. Plan: Bronch today- cx and cytology PENDING. AFB Smear PENDING. Await AFB r/o TB - Cavitary lung dx. AC restarted after bronch today. CM will follow for home care and DME needs.

## 2022-12-12 NOTE — PROGRESS NOTES
BP has consistently been elevated since admission. Currently 177/91 w/ . Will place midodrine on hold for now. Clonidine 0.1 x1.

## 2022-12-12 NOTE — H&P
See History and Physical from 12/9/22 for HPI additional findings:    Past Medical History:   Diagnosis Date    Anxiety     Depression     Hypertension      History reviewed. No pertinent family history. Past Surgical History:   Procedure Laterality Date    CYST REMOVAL      CYSTOSCOPY N/A 10/16/2022    CYSTOSCOPY TRANSURETHRAL RESECTION BLADDER >5CM performed by Anne-Marie Landry MD at [address] [facility],[address] N/A 12/8/2022    CYSTOSCOPY, TRANSURETHERAL RESECTION OF A BLADDER TUMOR performed by Simon Tenorio MD at [address] [facility]         Allergies   Allergen Reactions    Bee Venom Itching and Swelling    Zosyn [Piperacillin Sod-Tazobactam So] Itching     No current facility-administered medications on file prior to encounter. Current Outpatient Medications on File Prior to Encounter   Medication Sig Dispense Refill    ipratropium-albuterol (DUONEB) 0.5-2.5 (3) MG/3ML SOLN nebulizer solution Inhale 3 mLs into the lungs 4 times daily as needed for Shortness of Breath 360 mL     apixaban (ELIQUIS) 5 MG TABS tablet Take 1 tablet by mouth 2 times daily 60 tablet     guaiFENesin (MUCINEX) 600 MG extended release tablet Take 1 tablet by mouth 2 times daily      melatonin 3 MG TABS tablet Take 1 tablet by mouth nightly as needed (insomnia) 3 tablet 3    tamsulosin (FLOMAX) 0.4 MG capsule Take 1 capsule by mouth daily 30 capsule 3    nicotine (NICODERM CQ) 14 MG/24HR Place 1 patch onto the skin daily 30 patch 3    phenol 1.4 % LIQD mouth spray Take 1 spray by mouth every 2 hours as needed for Sore Throat 1 mL 0    midodrine (PROAMATINE) 10 MG tablet Take 1 tablet by mouth 3 times daily (with meals) 90 tablet 3       HENT: Airway patent and reviewed. Mallampati 2  Cardiovascular: Normal rate, regular rhythm, normal heart sounds. Pulmonary/Chest: No wheezes. No rhonchi. No rales. Abdominal: Soft. Bowel sounds are normal. No distension.       ASA Class: Class 3 - A patient with severe systemic disease that limits activity but is not incapacitating  Level of Sedation Plan: Moderate sedation    Post Procedure Plan   Discharge home or return to same level of care per post-procedure recovery protocol   ______________________     The risks and benefits as well as alternatives to the procedure have been discussed with the patient and or family. The patient and or next of kin understands and agrees to proceed. Signed Consent in chart.

## 2022-12-13 LAB
BLOOD CULTURE, ROUTINE: NORMAL
CULTURE, BLOOD 2: NORMAL
EKG ATRIAL RATE: 112 BPM
EKG DIAGNOSIS: NORMAL
EKG P AXIS: 60 DEGREES
EKG P-R INTERVAL: 140 MS
EKG Q-T INTERVAL: 340 MS
EKG QRS DURATION: 82 MS
EKG QTC CALCULATION (BAZETT): 464 MS
EKG R AXIS: 74 DEGREES
EKG T AXIS: 63 DEGREES
EKG VENTRICULAR RATE: 112 BPM

## 2022-12-13 PROCEDURE — 94640 AIRWAY INHALATION TREATMENT: CPT

## 2022-12-13 PROCEDURE — 93010 ELECTROCARDIOGRAM REPORT: CPT | Performed by: INTERNAL MEDICINE

## 2022-12-13 PROCEDURE — 2060000000 HC ICU INTERMEDIATE R&B

## 2022-12-13 PROCEDURE — 99233 SBSQ HOSP IP/OBS HIGH 50: CPT | Performed by: INTERNAL MEDICINE

## 2022-12-13 PROCEDURE — 2580000003 HC RX 258: Performed by: UROLOGY

## 2022-12-13 PROCEDURE — 6360000002 HC RX W HCPCS: Performed by: INTERNAL MEDICINE

## 2022-12-13 PROCEDURE — 51798 US URINE CAPACITY MEASURE: CPT

## 2022-12-13 PROCEDURE — 2580000003 HC RX 258: Performed by: INTERNAL MEDICINE

## 2022-12-13 PROCEDURE — 6370000000 HC RX 637 (ALT 250 FOR IP): Performed by: UROLOGY

## 2022-12-13 PROCEDURE — 99232 SBSQ HOSP IP/OBS MODERATE 35: CPT | Performed by: INTERNAL MEDICINE

## 2022-12-13 RX ADMIN — SENNOSIDES AND DOCUSATE SODIUM 1 TABLET: 50; 8.6 TABLET ORAL at 20:52

## 2022-12-13 RX ADMIN — IPRATROPIUM BROMIDE AND ALBUTEROL 1 PUFF: 20; 100 SPRAY, METERED RESPIRATORY (INHALATION) at 08:18

## 2022-12-13 RX ADMIN — ACETAMINOPHEN 650 MG: 325 TABLET ORAL at 20:52

## 2022-12-13 RX ADMIN — ACETAMINOPHEN 650 MG: 325 TABLET ORAL at 09:15

## 2022-12-13 RX ADMIN — SODIUM CHLORIDE: 9 INJECTION, SOLUTION INTRAVENOUS at 00:04

## 2022-12-13 RX ADMIN — Medication 3 MG: at 20:52

## 2022-12-13 RX ADMIN — MEROPENEM 1000 MG: 1 INJECTION, POWDER, FOR SOLUTION INTRAVENOUS at 09:16

## 2022-12-13 RX ADMIN — ACETAMINOPHEN 650 MG: 325 TABLET ORAL at 05:27

## 2022-12-13 RX ADMIN — ACETAMINOPHEN 650 MG: 325 TABLET ORAL at 16:32

## 2022-12-13 RX ADMIN — IPRATROPIUM BROMIDE AND ALBUTEROL 1 PUFF: 20; 100 SPRAY, METERED RESPIRATORY (INHALATION) at 20:40

## 2022-12-13 RX ADMIN — SODIUM CHLORIDE: 9 INJECTION, SOLUTION INTRAVENOUS at 21:01

## 2022-12-13 RX ADMIN — IPRATROPIUM BROMIDE AND ALBUTEROL 1 PUFF: 20; 100 SPRAY, METERED RESPIRATORY (INHALATION) at 11:10

## 2022-12-13 RX ADMIN — VANCOMYCIN HYDROCHLORIDE 1500 MG: 10 INJECTION, POWDER, LYOPHILIZED, FOR SOLUTION INTRAVENOUS at 11:56

## 2022-12-13 RX ADMIN — MEROPENEM 1000 MG: 1 INJECTION, POWDER, FOR SOLUTION INTRAVENOUS at 16:33

## 2022-12-13 RX ADMIN — TAMSULOSIN HYDROCHLORIDE 0.4 MG: 0.4 CAPSULE ORAL at 09:15

## 2022-12-13 RX ADMIN — APIXABAN 5 MG: 5 TABLET, FILM COATED ORAL at 20:52

## 2022-12-13 RX ADMIN — IPRATROPIUM BROMIDE AND ALBUTEROL 1 PUFF: 20; 100 SPRAY, METERED RESPIRATORY (INHALATION) at 15:04

## 2022-12-13 ASSESSMENT — PAIN SCALES - GENERAL
PAINLEVEL_OUTOF10: 5
PAINLEVEL_OUTOF10: 0

## 2022-12-13 ASSESSMENT — PAIN DESCRIPTION - DESCRIPTORS: DESCRIPTORS: ACHING

## 2022-12-13 ASSESSMENT — PAIN DESCRIPTION - LOCATION: LOCATION: BACK

## 2022-12-13 NOTE — PROGRESS NOTES
SLP orders placed. Patient coughing relentlessly with food intake and coughing food back into  mouth. Patient also pocketing food in right  side of mouth.

## 2022-12-13 NOTE — PROGRESS NOTES
Hospitalist Progress Note      PCP: NALINI Enciso - CNP    Date of Admission: 12/8/2022    Subjective:     Sp bronch with BAL  , AFB neg . Off airborne isolation    Hematuria resolvd. Off Alexander     Pt seen up in bed, still with severe cough but remains on RA  Low grade fevers noted  Denies any sob      Medications:  Reviewed    Infusion Medications    sodium chloride 75 mL/hr at 12/13/22 0004    sodium chloride       Scheduled Medications    meropenem  1,000 mg IntraVENous Q8H    vancomycin  1,500 mg IntraVENous Q18H    albuterol-ipratropium  1 puff Inhalation 4x daily    [Held by provider] apixaban  5 mg Oral BID    albuterol  2.5 mg Nebulization Once    tamsulosin  0.4 mg Oral Daily    nicotine  1 patch TransDERmal Daily    [Held by provider] midodrine  10 mg Oral TID WC    sodium chloride flush  5-40 mL IntraVENous 2 times per day    acetaminophen  650 mg Oral Q6H    sennosides-docusate sodium  1 tablet Oral BID     PRN Meds: ipratropium-albuterol, melatonin, phenol, sodium chloride flush, sodium chloride, polyethylene glycol, ondansetron **OR** ondansetron, oxyCODONE **OR** oxyCODONE, diphenhydrAMINE      Intake/Output Summary (Last 24 hours) at 12/13/2022 1322  Last data filed at 12/13/2022 5818  Gross per 24 hour   Intake 4211.27 ml   Output 1851 ml   Net 2360.27 ml         Physical Exam Performed:    BP (!) 140/80   Pulse (!) 112   Temp 100.2 °F (37.9 °C) (Oral)   Resp 18   Ht 5' 6\" (1.676 m)   Wt 111 lb 8 oz (50.6 kg)   SpO2 93%   BMI 18.00 kg/m²            General: elderly male, [place]   Awake, alert and oriented. Appears to be not in any distress  Mucous Membranes:  Pink , anicteric  Neck: No JVD, no carotid bruit, no thyromegaly  Chest: scattered crackles bilateral frequent cough noted  Cardiovascular:  RRR S1S2 heard, no murmurs or gallops  Abdomen:  Soft, undistended, non tender, no organomegaly, BS present  Extremities: No edema or cyanosis.  Distal pulses well felt  Neurological : grossly normal                 Labs:   Recent Labs     12/11/22  0502   WBC 12.1*   HGB 8.7*   HCT 26.6*          Recent Labs     12/11/22  0502 12/12/22  0532   * 128*   K 3.8 3.8   CL 98* 95*   CO2 24 24   BUN 7 6*   CREATININE 0.6* <0.5*   CALCIUM 7.6* 7.6*       No results for input(s): AST, ALT, BILIDIR, BILITOT, ALKPHOS in the last 72 hours. No results for input(s): INR in the last 72 hours. No results for input(s): Amanda Coombes in the last 72 hours. Urinalysis:      Lab Results   Component Value Date/Time    NITRU Negative 12/09/2022 10:27 AM    WBCUA  12/09/2022 10:27 AM    BACTERIA 1+ 12/09/2022 10:27 AM    RBCUA  12/09/2022 10:27 AM    BLOODU LARGE 12/09/2022 10:27 AM    SPECGRAV >=1.030 12/09/2022 10:27 AM    GLUCOSEU Negative 12/09/2022 10:27 AM     Cultures:    No AFB  Mycobacterium negative    Respiratory panel: negative    BAL  pending    Urine: Proteus penneri  Blood: NGTD  COVID/flu: negative    Radiology:  CT CHEST WO CONTRAST   Final Result   1. Indeterminate 7 x 8 cm thick walled cyst, mass lesion or collection in the   right pleural space with gas and fluid concerning for possible empyema,   superinfected necrotic mass or pulmonary abscess. Suboptimally characterized   on noncontrast chest CT. Bronchoscopic correlation may be useful. Given the   other findings of sequela from old granulomatous disease tuberculosis is a   consideration. Patient may have developed bronchopleural fistula. 2. Similar appearing 2 cm necrotic thick walled mass versus thick walled   infectious or post infectious pulmonary cyst.  Bronchiolitis is a   consideration. 3. Extensive pulmonary fibrotic changes bilaterally predominate in the upper   lungs commonly seen with sequela of old granulomatous disease. 4.  Pulmonary sequela typical of that seen with smoking, including COPD.   5. Mild calcific atherosclerosis coronary arteries.    Critical results were called by  Geno Lines to Ana Rosen on   12/9/2022 at 16:17. XR CHEST (2 VW)   Final Result   Developing small 3 cm right mid lung abscess and right basilar pneumonia. IP CONSULT TO INTERNAL MEDICINE  IP CONSULT TO DIETITIAN  PHARMACY TO DOSE VANCOMYCIN  IP CONSULT TO PULMONOLOGY    Assessment/Plan:    Active Hospital Problems    Diagnosis     Cavitary lung disease [J98.4]      Priority: Medium    Abnormal CXR [R93.89]      Priority: Medium    Clot hematuria [R31.0]      Priority: Medium    Malignant neoplasm of urinary bladder ([address] [C67.9]      Priority: Medium    Tachycardia [R00.0]      Priority: Medium     We are consulted for medical mx       Hematuria  UTI   -with hx of bladder cancer and s/p bladder tumor resection, admitted for post op mx .   -started on bladder irrigation , improved   -monitored H/H.  -pain control: Oxy-IR prn  -continue Flomax  - can remove mc if ok by urology and check post voidal     Cavitary lung disease/suspected PNA   - pt spiked a fever 12/9, CXR with PNA and poss lung abscess  Switched abx to merrem/vanco,  CT chest reviewed  AFB neg   Pulm consulted, s.p bronch  ,  BAL pending   Remains on RA    Advanced emphysema/COPD - pulm following  -Duonebs prn      Tachycardia  -monitor on tele  - suspect 2/2 PNA- improved     Hypertension  -BP stable. DVT LLE  -S/p IVC filter         Severe PCM  -dietician consulted     DVT Prophylaxis: eliquis can be resumed when ok by urology    Diet: ADULT DIET;  Regular  Code Status: Full Code    PT/OT Eval Status: ordered    Geovanna Soriano MD,12/13/2022 1:22 PM

## 2022-12-13 NOTE — PLAN OF CARE
Problem: Discharge Planning  Goal: Discharge to home or other facility with appropriate resources  Outcome: Progressing     Problem: Pain  Goal: Verbalizes/displays adequate comfort level or baseline comfort level  Flowsheets (Taken 12/13/2022 4549)  Verbalizes/displays adequate comfort level or baseline comfort level:   Encourage patient to monitor pain and request assistance   Administer analgesics based on type and severity of pain and evaluate response   Assess pain using appropriate pain scale

## 2022-12-13 NOTE — PROGRESS NOTES
Urology Progress Note    Subjective: I am ok. HPI: Patient has been admitted for pulmonary issues after a bladder tumor resection. P/E  /70   Pulse 82   Temp 97.6 °F (36.4 °C) (Oral)   Resp 16   Ht 5' 6\" (1.676 m)   Wt 99 lb 9.6 oz (45.2 kg)   SpO2 95%   BMI 16.08 kg/m²   Skin: Intact  Respiratory: Breathing without difficulty, stable. GI: No acute changes, stable po intake. : Alexander in place, clear. MSK: No acute changes, stable. Neuro: No acute changes, stable. Labs  Lab Results   Component Value Date/Time    WBC 12.1 12/11/2022 05:02 AM    HGB 8.7 12/11/2022 05:02 AM    HCT 26.6 12/11/2022 05:02 AM    MCV 85.7 12/11/2022 05:02 AM     12/11/2022 05:02 AM     Lab Results   Component Value Date/Time     12/12/2022 05:32 AM    K 3.8 12/12/2022 05:32 AM    CL 95 12/12/2022 05:32 AM    CO2 24 12/12/2022 05:32 AM    BUN 6 12/12/2022 05:32 AM    CREATININE <0.5 12/12/2022 05:32 AM    CALCIUM 7.6 12/12/2022 05:32 AM       Assessment/Plan  Stable. Had bronchoscopy today. Voiding trial in the am.  If medical issues predominate his care at this point I would like to transfer his care to the medical service. .    Electronically signed by Faye Blanchard MD on 12/12/2022 at 7:54 PM

## 2022-12-13 NOTE — PROGRESS NOTES
Patient:  Nilam Castellano  YOB: 1955   Date of Service:  12/13/22      Urology Attending Daily Progress Note    Previous HPI: Complex admission, COPD sepsis in October 2020 to [id number] [facility] ICU, DVT prompting transfer to [facility] vascular surgery did IVC filter October 15, he was on Eliquis, he had a large resection of a bladder tumor for hematuria Sarah Cisneros. Eventually followed up in the urology office. Then represented this week for repeat assessment of his bladder. He had held his Eliquis a couple days prior to the surgery for bleeding was. He has had failure to thrive even prior to his admission in October      HPI: admitted postop with hematuria, cough tachycardia -transferred to the PCU for negative pressure room rule out tuberculosis    12/13:   Chief complaint: \"they removed catheter this a.m. \"    ROS:   Significant cough still,     Medicine, pulmonary work-up ongoing    Voiding trial ongoing. Past Medical History:   Diagnosis Date    Anxiety     Depression     Hypertension        Allergies   Allergen Reactions    Bee Venom Itching and Swelling    Zosyn [Piperacillin Sod-Tazobactam So] Itching       Objective:      Vitals:    12/13/22 0818 12/13/22 1111 12/13/22 1502 12/13/22 1509   BP:       Pulse:       Resp:       Temp:       TempSrc:       SpO2: 92% 93%  93%   Weight:       Height:   5' 6\" (1.676 m)          I/O last 3 completed shifts: In: 4211.3 [P.O.:222; I.V.:2374.8; IV Piggyback:1614.5]  Out: 2502 [Urine:2500; Stool:2]     Physical Exam:   Constitutional: pleasant patient in no acute distress, normal body habitus  Musculoskeletal: no digital cyanosis, head normocephalic  Psych: normal mood and affect   Skin: exposed skin, stable, intact  Abdomen: soft, nondistended, no guarding, nontender to palpation  Genitourinary: stable bladder, urethral catheter removed today.        Labs:       Lab Results   Component Value Date    PSA 2.71 10/03/2022       Lab Results   Component Value Date    PSA 2.71 10/03/2022     Recent Labs     12/11/22  0502 12/10/22  0459 12/09/22  0459   WBC 12.1* 10.6 8.7   HGB 8.7* 8.3* 9.0*   HCT 26.6* 26.5* 28.1*   MCV 85.7 87.9 86.6    425 519*     No results found for: LABA1C  Lab Results   Component Value Date    LABMICR YES 12/09/2022    CREATININE <0.5 (L) 12/12/2022     Lab Results   Component Value Date     (L) 12/12/2022    K 3.8 12/12/2022    CL 95 (L) 12/12/2022    CO2 24 12/12/2022    BUN 6 (L) 12/12/2022    CREATININE <0.5 (L) 12/12/2022    GLUCOSE 102 (H) 12/12/2022    CALCIUM 7.6 (L) 12/12/2022    PROT 5.6 (L) 12/09/2022    LABALBU 2.7 (L) 12/09/2022    BILITOT <0.2 12/09/2022    ALKPHOS 102 12/09/2022    AST 10 (L) 12/09/2022    ALT <5 (L) 12/09/2022    LABGLOM >60 12/12/2022    GFRAA >60 10/17/2022    AGRATIO 0.9 (L) 12/09/2022     Lab Results   Component Value Date    CREATININE <0.5 (L) 12/12/2022    CREATININE 0.6 (L) 12/11/2022    CREATININE 0.7 (L) 12/10/2022       Lab Results   Component Value Date/Time    COLORU Yellow 12/09/2022 10:27 AM    NITRU Negative 12/09/2022 10:27 AM    GLUCOSEU Negative 12/09/2022 10:27 AM    KETUA Negative 12/09/2022 10:27 AM    UROBILINOGEN 0.2 12/09/2022 10:27 AM    BILIRUBINUR Negative 12/09/2022 10:27 AM     Pulmonary Tuberculosis (Rule Out), MDRO (multi-drug resistant organism)      Radiology: \"Imaging was independently reviewed by myself and I agree with the radiology interpretation  Ct chest ; cavitary lesion, right side. Fluid on both sides of my review of the chest.    Assessment:  75 y.o. male who is admitted with gross hematuria after transurethral resection of bladder tumor. Cough and tachycardia were found postop  Urinary tract infection present prior to admission  Cavitary lung lesion, pulmonology following    Low-grade urothelial carcinoma the bladder -  Muscle present and uninvolved on the final pathology from the most recent, repeat resection.   I gave patient final pathology and spoke to family    Plan:  -We will plan outpatient intravesical therapy, BCG to bladder to treat bladder ca  -Monitor post void residual to ensure he's not in urinary retention  -okay to resume elliquis    -Appreciate medicine, pulmonary recs   -Continue broad-spectrum IV antibiotics per medicine/pulm  -dvt prophylaxis, sequential compression devices for DVT prophylaxis        Elise Benton MD  Office: [phone]

## 2022-12-13 NOTE — PROGRESS NOTES
RT Inhaler-Nebulizer Bronchodilator Protocol Note    There is a bronchodilator order in the chart from a provider indicating to follow the RT Bronchodilator Protocol and there is an Initiate RT Inhaler-Nebulizer Bronchodilator Protocol order as well (see protocol at bottom of note). CXR Findings:  No results found. The findings from the last RT Protocol Assessment were as follows:   History Pulmonary Disease: (P) Chronic pulmonary disease  Respiratory Pattern: (P) Regular pattern and RR 12-20 bpm  Breath Sounds: (P) Slightly diminished and/or crackles  Cough: (P) Strong, spontaneous, non-productive  Indication for Bronchodilator Therapy: (P) Decreased or absent breath sounds  Bronchodilator Assessment Score: (P) 4    Aerosolized bronchodilator medication orders have been revised according to the RT Inhaler-Nebulizer Bronchodilator Protocol below. Respiratory Therapist to perform RT Therapy Protocol Assessment initially then follow the protocol. Repeat RT Therapy Protocol Assessment PRN for score 0-3 or on second treatment, BID, and PRN for scores above 3. No Indications - adjust the frequency to every 6 hours PRN wheezing or bronchospasm, if no treatments needed after 48 hours then discontinue using Per Protocol order mode. If indication present, adjust the RT bronchodilator orders based on the Bronchodilator Assessment Score as indicated below. Use Inhaler orders unless patient has one or more of the following: on home nebulizer, not able to hold breath for 10 seconds, is not alert and oriented, cannot activate and use MDI correctly, or respiratory rate 25 breaths per minute or more, then use the equivalent nebulizer order(s) with same Frequency and PRN reasons based on the score. If a patient is on this medication at home then do not decrease Frequency below that used at home.     0-3 - enter or revise RT bronchodilator order(s) to equivalent RT Bronchodilator order with Frequency of every 4 hours PRN for wheezing or increased work of breathing using Per Protocol order mode. 4-6 - enter or revise RT Bronchodilator order(s) to two equivalent RT bronchodilator orders with one order with BID Frequency and one order with Frequency of every 4 hours PRN wheezing or increased work of breathing using Per Protocol order mode. 7-10 - enter or revise RT Bronchodilator order(s) to two equivalent RT bronchodilator orders with one order with TID Frequency and one order with Frequency of every 4 hours PRN wheezing or increased work of breathing using Per Protocol order mode. 11-13 - enter or revise RT Bronchodilator order(s) to one equivalent RT bronchodilator order with QID Frequency and an Albuterol order with Frequency of every 4 hours PRN wheezing or increased work of breathing using Per Protocol order mode. Greater than 13 - enter or revise RT Bronchodilator order(s) to one equivalent RT bronchodilator order with every 4 hours Frequency and an Albuterol order with Frequency of every 2 hours PRN wheezing or increased work of breathing using Per Protocol order mode.          Electronically signed by Evelyn Kimble RCP on 12/13/2022 at 8:20 AM

## 2022-12-13 NOTE — PROGRESS NOTES
883mL urine noted in bladder scan. Encouraging patient to urinate. Patient states he doesn't have to at all. Got patient up to bathroom to urinate. Patient currently trying to urinate.      Patient was able to urinate 450ml clear yellow urine

## 2022-12-13 NOTE — PROGRESS NOTES
Vancomycin Day: 5/7  Current Regimen: 1500 mg IV every 18 hours    Patient's labs, cultures, vitals, and vancomycin regimen reviewed.    SCr stable  U/O not measured/well documented  [employer] updated    Plan:   Order level for 12/14 at 1401 [address] D 12/13/202210:47 AM  .

## 2022-12-13 NOTE — FLOWSHEET NOTE
12/12/22 2200   Assessment   Charting Type Shift assessment   Psychosocial   Psychosocial (WDL) WDL   Neurological   Neuro (WDL) WDL   Level of Consciousness 0   [place] Coma Scale   Eye Opening 4   Best Verbal Response 5   Best Motor Response 6   [place] Coma Scale Score 15   HEENT (Head, Ears, Eyes, Nose, & Throat)   HEENT (WDL) X   Right Ear Impaired hearing   Left Ear Lacerations   Teeth Missing teeth   Respiratory   Respiratory (WDL) X   Respiratory Pattern Regular   Respiratory Depth Normal   Respiratory Quality/Effort Unlabored   Chest Assessment Chest expansion symmetrical   L Breath Sounds Rhonchi   R Breath Sounds Rhonchi   Subcutaneous Air/Crepitus None   Breath Sounds   Right Upper Lobe Clear   Right Middle Lobe Rhonchi   Right Lower Lobe Rhonchi;Diminished   Left Upper Lobe Expiratory Wheezes   Left Lower Lobe Expiratory Wheezes; Diminished   Cough/Sputum   Sputum How Obtained Spontaneous cough   Cough Strong;Moist;Non-productive   Cardiac   Cardiac (WDL) X   Cardiac Regularity Regular   Heart Sounds S1, S2   Cardiac Rhythm Sinus tachy;Sinus rhythm   Cardiac Monitor   Telemetry Box Number PCU   Telemetry Monitor Alarm Parameters PCU   Gastrointestinal   Abdominal (WDL) WDL   Genitourinary   Genitourinary (WDL) X  (VALERIO)   Urine Assessment   Urinary Status Draining   Urine Color Yellow/straw   Peripheral Vascular   Peripheral Vascular (WDL) WDL   Edema None   Skin Integumentary    Skin Integumentary (WDL) WDL   Musculoskeletal   Musculoskeletal (WDL) X  (GEN WEAKNESS)   Urinary Catheter 12/08/22 3 Way   Placement Date/Time: 12/08/22 9943   Present on Admission/Arrival: No  Inserted by: dr Netta Isabel  Insertion attempts: 1  Catheter Type: 3 Way  Catheter Size: 22 FR  Catheter Balloon Size: 30 mL  Urine Returned: Yes   Catheter Indications Perioperative use for selected surgical procedures   Site Assessment Pink   Urine Color Sonia   Urine Appearance Clear   Collection Container Standard   Securement Method Securing device (Describe)   Catheter Best Practices  Drainage tube clipped to bed;Catheter secured to thigh; Tamper seal intact; Bag below bladder;Bag not on floor; Lack of dependent loop in tubing;Drainage bag less than half full   Status Draining       Assessment done. Patient is alert and oriented. Refused PO meds. Call light within reach.

## 2022-12-13 NOTE — PROGRESS NOTES
PULMONARY PROGRESS NOTE    CC: Abnormal CXR, Pneumonia with suspected lung abscess    Subjective: Tolerated bronchoscopy well. Thick purulent secretions suctioned and sent for culture. Observed to cough while attempting to eat today. PHYSICAL EXAM:   BP (!) 140/80   Pulse (!) 112   Temp 100.2 °F (37.9 °C) (Oral)   Resp 18   Ht 5' 6\" (1.676 m)   Wt 111 lb 8 oz (50.6 kg)   SpO2 93%   BMI 18.00 kg/m²  on RA  Gen:  No distress. Acute on chronically ill-appearing. Cachectic. Eyes:  PERRL. No sclera icterus. No conjunctival injection. ENT:  No discharge. Pharynx clear. Neck:  Trachea midline. No obvious mass. Resp:  No accessory muscle use. No crackles. No wheezes. + L ronchi. CV:  Regular rate. Regular rhythm. No murmur or rub. No edema. GI:  Non-tender. Non-distended. M/S:  No cyanosis. No joint deformity. No clubbing. Neuro:  [place]. Awake. Alert. Moves all four extremities. Psych:  Oriented x 3. No anxiety.      Scheduled Meds:   meropenem  1,000 mg IntraVENous Q8H    vancomycin  1,500 mg IntraVENous Q18H    albuterol-ipratropium  1 puff Inhalation 4x daily    [Held by provider] apixaban  5 mg Oral BID    albuterol  2.5 mg Nebulization Once    tamsulosin  0.4 mg Oral Daily    nicotine  1 patch TransDERmal Daily    [Held by provider] midodrine  10 mg Oral TID     sodium chloride flush  5-40 mL IntraVENous 2 times per day    acetaminophen  650 mg Oral Q6H    sennosides-docusate sodium  1 tablet Oral BID     Continuous Infusions:   sodium chloride 75 mL/hr at 12/13/22 0004    sodium chloride       PRN Meds:  ipratropium-albuterol, melatonin, phenol, sodium chloride flush, sodium chloride, polyethylene glycol, ondansetron **OR** ondansetron, oxyCODONE **OR** oxyCODONE, diphenhydrAMINE    Labs:  CBC:   Recent Labs     12/11/22  0502   WBC 12.1*   HGB 8.7*   HCT 26.6*   MCV 85.7          BMP:   Recent Labs     12/11/22  0502 12/12/22  0532   * 128*   K 3.8 3.8   CL 98* 95*   CO2 24 24   BUN 7 6*   CREATININE 0.6* <0.5*       Micro:   12/8/22 Urine cx Proteus penneri  12/9/22 SARS-CoV-2 not detected  12/9/22 BC NGTD  12/9/22 AFB smear negative & culture ngtd  12/10/22 AFB smear negative and culture ngtd  12/10/22 Sputum cx: light growth candida  12/11/22 AFB smear & cx: pending    12/9/22 Procalcitonin 0.16    Imaging: Chest imaging was reviewed by me and showed:  ACT 10/19/22:   Lower Chest: Small dependently layered pleural effusions are present. The  visualized heart is unremarkable. CXR 12/1/22: Stable advanced COPD. Chronic pleuroparenchymal scarring and volume loss are again noted throughout  the right upper lobe. Similar changes are noted to a lesser degree within  the left lung apex. There is no acute consolidation, pneumothorax or  effusion. CXR 12/9/2022  Extensive fibrocavitary changes in volume loss are again demonstrated within the right upper lung. There has been interval development of a small air-fluid interface within the posteroinferior aspect of the cavity along with segmental airspace disease in the right lung base. Pleuroparenchymal scarring within the left lung apex is stable. Heart size and vascularity are unchanged. There is no pleural effusion. Impression:  Developing small 3 cm right mid lung abscess and right basilar pneumonia. CT Chest 12/10/22  Posterior mediastinal structures appear unremarkable. No mediastinal or hilar adenopathy. Lungs/pleura: 7 x 8 cm apparently thick walled lung cyst versus loculated   pneumothorax demonstrates air-fluid level its more inferior aspect. Extensive fibrosis with areas of bronchiectasis at the medial right upper   lobe and extensive scarring right lower lobe. Sequela from smoking bilateral   lungs including sequela of emphysema. Another thick walled cavitary lesion   or cyst is 2 cm at the superior aspect left upper lobe without definite   air-fluid level.   There is extensive left apical pleuroparenchymal thickening   and scarring. Impression   1. Indeterminate 7 x 8 cm thick walled cyst, mass lesion or collection in the   right pleural space with gas and fluid concerning for possible empyema,   superinfected necrotic mass or pulmonary abscess. Suboptimally characterized   on noncontrast chest CT. Bronchoscopic correlation may be useful. Given the   other findings of sequela from old granulomatous disease tuberculosis is a   consideration. Patient may have developed bronchopleural fistula. 2. Similar appearing 2 cm necrotic thick walled mass versus thick walled   infectious or post infectious pulmonary cyst.  Bronchiolitis is a   consideration. 3. Extensive pulmonary fibrotic changes bilaterally predominate in the upper   lungs commonly seen with sequela of old granulomatous disease. 4.  Pulmonary sequela typical of that seen with smoking, including COPD. ASSESSMENT:  Cavitary lung disease - bilateral upper lobe cavitation with extensive apical fibrotic changes  Advanced emphysema/COPD   Anemia & hematuria, post op bladder tumor s/p resection 12/8/22 - low grade papillary urothelial carcinoma (cysto TURBT 10/16/22)  UTI PTA  Extensive LLE DVT s/p IVC filter 10/15/22   Severe PCM  Tobacco abuse, 60 pack-yrs    PLAN:  Inhaled bronchodilators - Combivent QID  Vanc/Merrem D#4 (Zosyn d/c'd 2/2 pruritus)   Monitoring of vancomycin levels to prevent toxicity. Eliquis held for cysto  SLP evaluation  Duonebs  F/u bronchoscopy cultures  No pulmonary objections to having a cystoscopy tomorrow.

## 2022-12-13 NOTE — PROGRESS NOTES
Removed mc catheter as ordered. No bleeding. Noted clear yellowish urine at urine bag. Perineal care done.

## 2022-12-13 NOTE — PROGRESS NOTES
Comprehensive Nutrition Assessment    Type and Reason for Visit:  Reassess    Nutrition Recommendations/Plan:   Continue ADULT DIET; Regular diet order until patient is NPO at midnight tonight. Added Ensure Enlive with meals - please add this after NPO status as well. Monitor appetite, meal intake, and acceptance/intake of ONS. Monitor nutrition-related labs, bowel function, and weight trends. Malnutrition Assessment:  Malnutrition Status:  Severe malnutrition (12/13/22 1512)    Context:  Acute Illness     Findings of the 6 clinical characteristics of malnutrition:  Energy Intake:  50% or less of estimated energy requirements for 5 or more days  Weight Loss:  Unable to assess     Body Fat Loss: Moderate body fat loss Orbital, Triceps, Buccal region   Muscle Mass Loss: Moderate muscle mass loss Clavicles (pectoralis & deltoids), Temples (temporalis)  Fluid Accumulation:  No significant fluid accumulation     Strength:  Not Performed    Nutrition Assessment:    patient has slightly improved from a nutritional standpoint AEB appetite and po intake have slightly improved, however, patient remains at risk for further compromise d/t severe coughing and coughing food back into mouth + pocketing food in side of mouth, altered nutrition-related labs, and most meals are < 75% consumed during this admission; will continue ADULT DIET;  Regular diet order until patient is NPO at midnight or if SLP recommends NPO status after evaluation    Nutrition Related Findings:    patient is A & O; patient consumed 51-75% of dinner on 12/12/22; most meals are < 75% consumed during this admission; patient is supposed to be NPO at midnight; SLP consulted since patient has severe coughing and is coughing food back into his mouth + pocketing his food at this time, per RN notes; + BM on 12/13/22; NS at 75 ml/hr infusing at this time Wound Type: None       Current Nutrition Intake & Therapies:    Average Meal Intake: 1-25%, 51-75%, %  Average Supplements Intake: None Ordered  ADULT DIET; Regular  Diet NPO Exceptions are: Sips of Water with Meds  ADULT ORAL NUTRITION SUPPLEMENT; Breakfast, Lunch, Dinner; Standard High Calorie/High Protein Oral Supplement    Anthropometric Measures:  Height: 5' 6\" (167.6 cm)  Ideal Body Weight (IBW): 142 lbs (65 kg)    Admission Body Weight: 96 lb 11.2 oz (43.9 kg) (obtained on 12/9/22; actual weight)  Current Body Weight: 111 lb 8 oz (50.6 kg) (obtained on 12/13/22; actual weight), 78.5 % IBW.  Weight Source: Bed Scale  Current BMI (kg/m2): 18  Usual Body Weight: 96 lb 11.2 oz (43.9 kg) (obtained on 12/9/22; actual weight)  % Weight Change (Calculated): 15.3  Weight Adjustment For: No Adjustment                 BMI Categories: Underweight (BMI less than 22) age over 72    Estimated Daily Nutrient Needs:  Energy Requirements Based On: Kcal/kg  Weight Used for Energy Requirements: Current  Energy (kcal/day): 1650 - 1750 kcals based on 33-35 kcals/kg/CBW  Weight Used for Protein Requirements: Current  Protein (g/day): 75 - 80 g protein based on 1.5-1.6 g/kg/CBW  Method Used for Fluid Requirements: 1 ml/kcal  Fluid (ml/day): 1650 - 1750 ml    Nutrition Diagnosis:   Severe malnutrition related to catabolic illness, impaired respiratory function, inadequate protein-energy intake as evidenced by poor intake prior to admission, BMI, intake 0-25%, intake 26-50%, intake 51-75%, lab values, moderate loss of subcutaneous fat, moderate muscle loss    Nutrition Interventions:   Food and/or Nutrient Delivery: Continue Current Diet, Start Oral Nutrition Supplement  Nutrition Education/Counseling: No recommendation at this time  Coordination of Nutrition Care: Continue to monitor while inpatient, Coordination of Care, Speech Therapy, Swallow Evaluation       Goals:  Previous Goal Met: Progressing toward Goal(s)  Goals: PO intake 50% or greater, by next RD assessment       Nutrition Monitoring and Evaluation:

## 2022-12-14 ENCOUNTER — APPOINTMENT (OUTPATIENT)
Dept: GENERAL RADIOLOGY | Age: 67
DRG: 662 | End: 2022-12-14
Attending: UROLOGY
Payer: MEDICARE

## 2022-12-14 PROBLEM — N39.0 URINARY TRACT INFECTION WITH HEMATURIA: Status: ACTIVE | Noted: 2022-12-14

## 2022-12-14 PROBLEM — J44.9 ADVANCED COPD (HCC): Status: ACTIVE | Noted: 2022-12-14

## 2022-12-14 PROBLEM — R31.9 URINARY TRACT INFECTION WITH HEMATURIA: Status: ACTIVE | Noted: 2022-12-14

## 2022-12-14 LAB
ANION GAP SERPL CALCULATED.3IONS-SCNC: 7 MMOL/L (ref 3–16)
BASOPHILS ABSOLUTE: 0.1 K/UL (ref 0–0.2)
BASOPHILS RELATIVE PERCENT: 0.8 %
BUN BLDV-MCNC: 6 MG/DL (ref 7–20)
CALCIUM SERPL-MCNC: 7.5 MG/DL (ref 8.3–10.6)
CHLORIDE BLD-SCNC: 99 MMOL/L (ref 99–110)
CO2: 26 MMOL/L (ref 21–32)
CREAT SERPL-MCNC: <0.5 MG/DL (ref 0.8–1.3)
EOSINOPHILS ABSOLUTE: 0.1 K/UL (ref 0–0.6)
EOSINOPHILS RELATIVE PERCENT: 1.5 %
GFR SERPL CREATININE-BSD FRML MDRD: >60 ML/MIN/{1.73_M2}
GLUCOSE BLD-MCNC: 103 MG/DL (ref 70–99)
HCT VFR BLD CALC: 28 % (ref 40.5–52.5)
HEMOGLOBIN: 9 G/DL (ref 13.5–17.5)
LYMPHOCYTES ABSOLUTE: 0.4 K/UL (ref 1–5.1)
LYMPHOCYTES RELATIVE PERCENT: 7 %
MCH RBC QN AUTO: 27.8 PG (ref 26–34)
MCHC RBC AUTO-ENTMCNC: 32 G/DL (ref 31–36)
MCV RBC AUTO: 86.8 FL (ref 80–100)
MONOCYTES ABSOLUTE: 0.3 K/UL (ref 0–1.3)
MONOCYTES RELATIVE PERCENT: 4.2 %
NEUTROPHILS ABSOLUTE: 5.4 K/UL (ref 1.7–7.7)
NEUTROPHILS RELATIVE PERCENT: 86.5 %
PDW BLD-RTO: 20.6 % (ref 12.4–15.4)
PLATELET # BLD: 361 K/UL (ref 135–450)
PMV BLD AUTO: 6.9 FL (ref 5–10.5)
POTASSIUM SERPL-SCNC: 3.4 MMOL/L (ref 3.5–5.1)
QUANTI TB GOLD PLUS: NEGATIVE
QUANTI TB1 MINUS NIL: 0.23 IU/ML (ref 0–0.34)
QUANTI TB2 MINUS NIL: 0 IU/ML (ref 0–0.34)
QUANTIFERON MITOGEN: 3.37 IU/ML
QUANTIFERON NIL: 1.33 IU/ML
RBC # BLD: 3.23 M/UL (ref 4.2–5.9)
SODIUM BLD-SCNC: 132 MMOL/L (ref 136–145)
VANCOMYCIN TROUGH: 14.5 UG/ML (ref 10–20)
WBC # BLD: 6.3 K/UL (ref 4–11)

## 2022-12-14 PROCEDURE — 99233 SBSQ HOSP IP/OBS HIGH 50: CPT | Performed by: INTERNAL MEDICINE

## 2022-12-14 PROCEDURE — 74230 X-RAY XM SWLNG FUNCJ C+: CPT

## 2022-12-14 PROCEDURE — 94640 AIRWAY INHALATION TREATMENT: CPT

## 2022-12-14 PROCEDURE — 99232 SBSQ HOSP IP/OBS MODERATE 35: CPT | Performed by: INTERNAL MEDICINE

## 2022-12-14 PROCEDURE — 2580000003 HC RX 258: Performed by: INTERNAL MEDICINE

## 2022-12-14 PROCEDURE — 92526 ORAL FUNCTION THERAPY: CPT

## 2022-12-14 PROCEDURE — 6370000000 HC RX 637 (ALT 250 FOR IP): Performed by: UROLOGY

## 2022-12-14 PROCEDURE — 2060000000 HC ICU INTERMEDIATE R&B

## 2022-12-14 PROCEDURE — 94761 N-INVAS EAR/PLS OXIMETRY MLT: CPT

## 2022-12-14 PROCEDURE — 92611 MOTION FLUOROSCOPY/SWALLOW: CPT

## 2022-12-14 PROCEDURE — 6360000002 HC RX W HCPCS: Performed by: INTERNAL MEDICINE

## 2022-12-14 PROCEDURE — 36415 COLL VENOUS BLD VENIPUNCTURE: CPT

## 2022-12-14 PROCEDURE — 92610 EVALUATE SWALLOWING FUNCTION: CPT

## 2022-12-14 PROCEDURE — 80202 ASSAY OF VANCOMYCIN: CPT

## 2022-12-14 PROCEDURE — 85025 COMPLETE CBC W/AUTO DIFF WBC: CPT

## 2022-12-14 PROCEDURE — 2700000000 HC OXYGEN THERAPY PER DAY

## 2022-12-14 PROCEDURE — 80048 BASIC METABOLIC PNL TOTAL CA: CPT

## 2022-12-14 RX ADMIN — APIXABAN 5 MG: 5 TABLET, FILM COATED ORAL at 22:23

## 2022-12-14 RX ADMIN — ACETAMINOPHEN 650 MG: 325 TABLET ORAL at 04:21

## 2022-12-14 RX ADMIN — MEROPENEM 1000 MG: 1 INJECTION, POWDER, FOR SOLUTION INTRAVENOUS at 09:29

## 2022-12-14 RX ADMIN — MEROPENEM 1000 MG: 1 INJECTION, POWDER, FOR SOLUTION INTRAVENOUS at 15:28

## 2022-12-14 RX ADMIN — APIXABAN 5 MG: 5 TABLET, FILM COATED ORAL at 15:29

## 2022-12-14 RX ADMIN — TAMSULOSIN HYDROCHLORIDE 0.4 MG: 0.4 CAPSULE ORAL at 15:29

## 2022-12-14 RX ADMIN — IPRATROPIUM BROMIDE AND ALBUTEROL 1 PUFF: 20; 100 SPRAY, METERED RESPIRATORY (INHALATION) at 08:06

## 2022-12-14 RX ADMIN — VANCOMYCIN HYDROCHLORIDE 1500 MG: 10 INJECTION, POWDER, LYOPHILIZED, FOR SOLUTION INTRAVENOUS at 04:20

## 2022-12-14 RX ADMIN — IPRATROPIUM BROMIDE AND ALBUTEROL 1 PUFF: 20; 100 SPRAY, METERED RESPIRATORY (INHALATION) at 15:24

## 2022-12-14 RX ADMIN — MEROPENEM 1000 MG: 1 INJECTION, POWDER, FOR SOLUTION INTRAVENOUS at 00:43

## 2022-12-14 RX ADMIN — VANCOMYCIN HYDROCHLORIDE 1500 MG: 10 INJECTION, POWDER, LYOPHILIZED, FOR SOLUTION INTRAVENOUS at 22:24

## 2022-12-14 RX ADMIN — IPRATROPIUM BROMIDE AND ALBUTEROL 1 PUFF: 20; 100 SPRAY, METERED RESPIRATORY (INHALATION) at 20:38

## 2022-12-14 RX ADMIN — ACETAMINOPHEN 650 MG: 325 TABLET ORAL at 15:29

## 2022-12-14 RX ADMIN — ACETAMINOPHEN 650 MG: 325 TABLET ORAL at 22:23

## 2022-12-14 RX ADMIN — IPRATROPIUM BROMIDE AND ALBUTEROL 1 PUFF: 20; 100 SPRAY, METERED RESPIRATORY (INHALATION) at 11:45

## 2022-12-14 ASSESSMENT — PAIN SCALES - GENERAL: PAINLEVEL_OUTOF10: 3

## 2022-12-14 NOTE — PROGRESS NOTES
Hospitalist Progress Note      PCP: NALINI Ohara - CNP    Date of Admission: 12/8/2022    Subjective:     Sp bronch with BAL  , AFB neg . Off airborne isolation    Hematuria resolvd. Off Alexander     Pt seen lying in bed  still with moderate  cough but remains on RA  Low grade fevers resolved  For MBS today   Denies any sob      Medications:  Reviewed    Infusion Medications    sodium chloride 75 mL/hr at 12/13/22 2101    sodium chloride       Scheduled Medications    meropenem  1,000 mg IntraVENous Q8H    vancomycin  1,500 mg IntraVENous Q18H    albuterol-ipratropium  1 puff Inhalation 4x daily    apixaban  5 mg Oral BID    albuterol  2.5 mg Nebulization Once    tamsulosin  0.4 mg Oral Daily    nicotine  1 patch TransDERmal Daily    [Held by provider] midodrine  10 mg Oral TID WC    sodium chloride flush  5-40 mL IntraVENous 2 times per day    acetaminophen  650 mg Oral Q6H    sennosides-docusate sodium  1 tablet Oral BID     PRN Meds: ipratropium-albuterol, melatonin, phenol, sodium chloride flush, sodium chloride, polyethylene glycol, ondansetron **OR** ondansetron, oxyCODONE **OR** oxyCODONE, diphenhydrAMINE    No intake or output data in the 24 hours ending 12/14/22 1447      Physical Exam Performed:    BP (!) 145/87   Pulse 95   Temp 98.2 °F (36.8 °C) (Axillary)   Resp 18   Ht 5' 6\" (1.676 m)   Wt 112 lb 6.4 oz (51 kg)   SpO2 95%   BMI 18.14 kg/m²            General: elderly male, [place] ill appearing fatigued   Awake, alert and oriented. Appears to be not in any distress  Mucous Membranes:  Pink , anicteric  Neck: No JVD, no carotid bruit, no thyromegaly  Chest: scattered crackles bilateral  improving air entry  Cardiovascular:  RRR S1S2 heard, no murmurs or gallops  Abdomen:  Soft, undistended, non tender, no organomegaly, BS present  Extremities: No edema or cyanosis.  Distal pulses well felt  Neurological : grossly normal                 Labs:   Recent Labs     12/14/22  0206   WBC 6.3   HGB 9.0*   HCT 28.0*          Recent Labs     12/12/22  0532 12/14/22  0206   * 132*   K 3.8 3.4*   CL 95* 99   CO2 24 26   BUN 6* 6*   CREATININE <0.5* <0.5*   CALCIUM 7.6* 7.5*       No results for input(s): AST, ALT, BILIDIR, BILITOT, ALKPHOS in the last 72 hours. No results for input(s): INR in the last 72 hours. No results for input(s): Cristiana Susu in the last 72 hours. Urinalysis:      Lab Results   Component Value Date/Time    NITRU Negative 12/09/2022 10:27 AM    WBCUA  12/09/2022 10:27 AM    BACTERIA 1+ 12/09/2022 10:27 AM    RBCUA  12/09/2022 10:27 AM    BLOODU LARGE 12/09/2022 10:27 AM    SPECGRAV >=1.030 12/09/2022 10:27 AM    GLUCOSEU Negative 12/09/2022 10:27 AM     Cultures:    No AFB  Mycobacterium negative    Respiratory panel: negative    BAL  pending    Urine: Proteus penneri  Blood: NGTD  COVID/flu: negative    Radiology:  CT CHEST WO CONTRAST   Final Result   1. Indeterminate 7 x 8 cm thick walled cyst, mass lesion or collection in the   right pleural space with gas and fluid concerning for possible empyema,   superinfected necrotic mass or pulmonary abscess. Suboptimally characterized   on noncontrast chest CT. Bronchoscopic correlation may be useful. Given the   other findings of sequela from old granulomatous disease tuberculosis is a   consideration. Patient may have developed bronchopleural fistula. 2. Similar appearing 2 cm necrotic thick walled mass versus thick walled   infectious or post infectious pulmonary cyst.  Bronchiolitis is a   consideration. 3. Extensive pulmonary fibrotic changes bilaterally predominate in the upper   lungs commonly seen with sequela of old granulomatous disease. 4.  Pulmonary sequela typical of that seen with smoking, including COPD.   5. Mild calcific atherosclerosis coronary arteries. Critical results were called by Dr. Roy Monday to [facility] on   12/9/2022 at 16:17.          XR CHEST (2 VW)   Final Result   Developing small 3 cm right mid lung abscess and right basilar pneumonia. FL MODIFIED BARIUM SWALLOW W VIDEO    (Results Pending)       IP CONSULT TO INTERNAL MEDICINE  IP CONSULT TO DIETITIAN  PHARMACY TO DOSE VANCOMYCIN  IP CONSULT TO PULMONOLOGY  IP CONSULT TO HOME CARE NEEDS    Assessment/Plan:    Active Hospital Problems    Diagnosis     Urinary tract infection with hematuria [N39.0, R31.9]      Priority: Medium    Advanced COPD ([address] [J44.9]      Priority: Medium    Cavitary lung disease [J98.4]      Priority: Medium    Abnormal CXR [R93.89]      Priority: Medium    Clot hematuria [R31.0]      Priority: Medium    Malignant neoplasm of urinary bladder ([facility] [address] [C67.9]      Priority: Medium    Tachycardia [R00.0]      Priority: Medium       Hematuria  UTI   -with hx of bladder cancer and s/p bladder tumor resection, admitted for post op mx .   -started on bladder irrigation , improved   -monitored H/H.  -pain control: Oxy-IR prn  -continue Flomax  -  removed mc and voiding     Cavitary lung disease/suspected PNA   - pt spiked a fever 12/9, CXR with PNA and poss lung abscess  Switched abx to merrem/vanco,  CT chest reviewed  AFB neg   Pulm consulted, s.p bronch  ,  BAL remains neg   Remains on RA  Suspect chronic recurrent aspiration as etiology MBS pending    Advanced emphysema/COPD - pulm following  -Duonebs prn  - stable on RA    Tachycardia  -monitor on tele  - suspect 2/2 PNA- improved     Hypertension  -BP stable. DVT LLE  -S/p IVC filter         Severe PCM  -dietician consulted     DVT Prophylaxis: eliquis can be resumed     Updated brother in room    Diet: ADULT DIET;  Regular  ADULT ORAL NUTRITION SUPPLEMENT; Breakfast, Lunch, Dinner; Standard High Calorie/High Protein Oral Supplement  Code Status: Full Code    PT/OT Eval Status: ordered    Angella Rivas MD,12/14/2022 2:47 PM

## 2022-12-14 NOTE — FLOWSHEET NOTE
At bedside to start antibiotic.   Patient sleeping, breathing much more comfortable, repositioned in bed, call light within reach if needed

## 2022-12-14 NOTE — PLAN OF CARE
Bedside swallow evaluation completed this date.     Daniel Judge M.S. Jenny Santana  Speech-language pathologist  [phone]

## 2022-12-14 NOTE — PROGRESS NOTES
RT Inhaler-Nebulizer Bronchodilator Protocol Note    There is a bronchodilator order in the chart from a provider indicating to follow the RT Bronchodilator Protocol and there is an Initiate RT Inhaler-Nebulizer Bronchodilator Protocol order as well (see protocol at bottom of note). CXR Findings:  No results found. The findings from the last RT Protocol Assessment were as follows:   History Pulmonary Disease: Chronic pulmonary disease  Respiratory Pattern: Regular pattern and RR 12-20 bpm  Breath Sounds: Slightly diminished and/or crackles  Cough: Strong, spontaneous, non-productive  Indication for Bronchodilator Therapy: Decreased or absent breath sounds  Bronchodilator Assessment Score: 4    Aerosolized bronchodilator medication orders have been revised according to the RT Inhaler-Nebulizer Bronchodilator Protocol below. Respiratory Therapist to perform RT Therapy Protocol Assessment initially then follow the protocol. Repeat RT Therapy Protocol Assessment PRN for score 0-3 or on second treatment, BID, and PRN for scores above 3. No Indications - adjust the frequency to every 6 hours PRN wheezing or bronchospasm, if no treatments needed after 48 hours then discontinue using Per Protocol order mode. If indication present, adjust the RT bronchodilator orders based on the Bronchodilator Assessment Score as indicated below. Use Inhaler orders unless patient has one or more of the following: on home nebulizer, not able to hold breath for 10 seconds, is not alert and oriented, cannot activate and use MDI correctly, or respiratory rate 25 breaths per minute or more, then use the equivalent nebulizer order(s) with same Frequency and PRN reasons based on the score. If a patient is on this medication at home then do not decrease Frequency below that used at home.     0-3 - enter or revise RT bronchodilator order(s) to equivalent RT Bronchodilator order with Frequency of every 4 hours PRN for wheezing or increased work of breathing using Per Protocol order mode. 4-6 - enter or revise RT Bronchodilator order(s) to two equivalent RT bronchodilator orders with one order with BID Frequency and one order with Frequency of every 4 hours PRN wheezing or increased work of breathing using Per Protocol order mode. 7-10 - enter or revise RT Bronchodilator order(s) to two equivalent RT bronchodilator orders with one order with TID Frequency and one order with Frequency of every 4 hours PRN wheezing or increased work of breathing using Per Protocol order mode. 11-13 - enter or revise RT Bronchodilator order(s) to one equivalent RT bronchodilator order with QID Frequency and an Albuterol order with Frequency of every 4 hours PRN wheezing or increased work of breathing using Per Protocol order mode. Greater than 13 - enter or revise RT Bronchodilator order(s) to one equivalent RT bronchodilator order with every 4 hours Frequency and an Albuterol order with Frequency of every 2 hours PRN wheezing or increased work of breathing using Per Protocol order mode.        Electronically signed by Ashley Majano RCP on 12/13/2022 at 8:42 PM

## 2022-12-14 NOTE — PROGRESS NOTES
RT Inhaler-Nebulizer Bronchodilator Protocol Note    There is a bronchodilator order in the chart from a provider indicating to follow the RT Bronchodilator Protocol and there is an Initiate RT Inhaler-Nebulizer Bronchodilator Protocol order as well (see protocol at bottom of note). CXR Findings:  No results found. The findings from the last RT Protocol Assessment were as follows:   History Pulmonary Disease: (P) Chronic pulmonary disease  Respiratory Pattern: (P) Regular pattern and RR 12-20 bpm  Breath Sounds: (P) Slightly diminished and/or crackles  Cough: (P) Strong, spontaneous, non-productive  Indication for Bronchodilator Therapy: (P) Decreased or absent breath sounds  Bronchodilator Assessment Score: (P) 4    Aerosolized bronchodilator medication orders have been revised according to the RT Inhaler-Nebulizer Bronchodilator Protocol below. Respiratory Therapist to perform RT Therapy Protocol Assessment initially then follow the protocol. Repeat RT Therapy Protocol Assessment PRN for score 0-3 or on second treatment, BID, and PRN for scores above 3. No Indications - adjust the frequency to every 6 hours PRN wheezing or bronchospasm, if no treatments needed after 48 hours then discontinue using Per Protocol order mode. If indication present, adjust the RT bronchodilator orders based on the Bronchodilator Assessment Score as indicated below. Use Inhaler orders unless patient has one or more of the following: on home nebulizer, not able to hold breath for 10 seconds, is not alert and oriented, cannot activate and use MDI correctly, or respiratory rate 25 breaths per minute or more, then use the equivalent nebulizer order(s) with same Frequency and PRN reasons based on the score. If a patient is on this medication at home then do not decrease Frequency below that used at home.     0-3 - enter or revise RT bronchodilator order(s) to equivalent RT Bronchodilator order with Frequency of every 4 hours PRN for wheezing or increased work of breathing using Per Protocol order mode. 4-6 - enter or revise RT Bronchodilator order(s) to two equivalent RT bronchodilator orders with one order with BID Frequency and one order with Frequency of every 4 hours PRN wheezing or increased work of breathing using Per Protocol order mode. 7-10 - enter or revise RT Bronchodilator order(s) to two equivalent RT bronchodilator orders with one order with TID Frequency and one order with Frequency of every 4 hours PRN wheezing or increased work of breathing using Per Protocol order mode. 11-13 - enter or revise RT Bronchodilator order(s) to one equivalent RT bronchodilator order with QID Frequency and an Albuterol order with Frequency of every 4 hours PRN wheezing or increased work of breathing using Per Protocol order mode. Greater than 13 - enter or revise RT Bronchodilator order(s) to one equivalent RT bronchodilator order with every 4 hours Frequency and an Albuterol order with Frequency of every 2 hours PRN wheezing or increased work of breathing using Per Protocol order mode. RT to enter RT Home Evaluation for COPD & MDI Assessment order using Per Protocol order mode.     Electronically signed by Qiana Manuel RCP on 12/14/2022 at 8:15 AM

## 2022-12-14 NOTE — PROGRESS NOTES
PULMONARY PROGRESS NOTE    CC: Abnormal CXR, Pneumonia with suspected lung abscess    Subjective:   Room air  Appears comfortable        PHYSICAL EXAM:   BP (!) 145/87   Pulse 90   Temp 98.2 °F (36.8 °C) (Axillary)   Resp 18   Ht 5' 6\" (1.676 m)   Wt 112 lb 6.4 oz (51 kg)   SpO2 94%   BMI 18.14 kg/m²  on RA  Gen:  No distress. Acute on chronically ill-appearing. Cachectic. Eyes:  PERRL. No sclera icterus. No conjunctival injection. ENT:  No discharge. Pharynx clear. Neck:  Trachea midline. No obvious mass. Resp:  No accessory muscle use. No crackles. No wheezes. Few left ronchi. CV:  Regular rate. Regular rhythm. No murmur or rub. No edema. GI:  Non-tender. Non-distended. M/S:  No cyanosis. No joint deformity. No clubbing. Neuro:  Ione. Awake. Alert. Moves all four extremities. Hard hearing  Psych:  Oriented x 3. No anxiety.      Scheduled Meds:   meropenem  1,000 mg IntraVENous Q8H    vancomycin  1,500 mg IntraVENous Q18H    albuterol-ipratropium  1 puff Inhalation 4x daily    apixaban  5 mg Oral BID    albuterol  2.5 mg Nebulization Once    tamsulosin  0.4 mg Oral Daily    nicotine  1 patch TransDERmal Daily    [Held by provider] midodrine  10 mg Oral TID WC    sodium chloride flush  5-40 mL IntraVENous 2 times per day    acetaminophen  650 mg Oral Q6H    sennosides-docusate sodium  1 tablet Oral BID     Continuous Infusions:   sodium chloride 75 mL/hr at 12/13/22 2101    sodium chloride       PRN Meds:  ipratropium-albuterol, melatonin, phenol, sodium chloride flush, sodium chloride, polyethylene glycol, ondansetron **OR** ondansetron, oxyCODONE **OR** oxyCODONE, diphenhydrAMINE    Labs:  CBC:   Recent Labs     12/14/22  0206   WBC 6.3   HGB 9.0*   HCT 28.0*   MCV 86.8          BMP:   Recent Labs     12/12/22  0532 12/14/22  0206   * 132*   K 3.8 3.4*   CL 95* 99   CO2 24 26   BUN 6* 6*   CREATININE <0.5* <0.5*       Micro:   12/8/22 Urine cx Proteus penneri  12/9/22 SARS-CoV-2 not detected  12/9/22 BC NGTD  12/9/22 AFB smear negative & culture ngtd  12/10/22 AFB smear negative and culture ngtd  12/10/22 Sputum cx: light growth candida  12/11/22 AFB smear & cx: pending    12/9/22 Procalcitonin 0.16    Imaging:   CT chest 12/9 imaging reviewed by me and showed  1. Indeterminate 7 x 8 cm thick walled cyst, mass lesion or collection in the   right pleural space with gas and fluid concerning for possible empyema,   superinfected necrotic mass or pulmonary abscess. Suboptimally characterized   on noncontrast chest CT. Bronchoscopic correlation may be useful. Given the   other findings of sequela from old granulomatous disease tuberculosis is a   consideration. Patient may have developed bronchopleural fistula. 2. Similar appearing 2 cm necrotic thick walled mass versus thick walled   infectious or post infectious pulmonary cyst.  Bronchiolitis is a   consideration. 3. Extensive pulmonary fibrotic changes bilaterally predominate in the upper   lungs commonly seen with sequela of old granulomatous disease. 4.  Pulmonary sequela typical of that seen with smoking, including COPD.   5. Mild calcific atherosclerosis coronary arteries. Bronchoscopy 12/11/2022  A. Lung, Right Upper Lobe, Bronchial Alveolar Lavage:   - No malignant cells identified. B.  Lung, Bronchial Washings:   - No malignant cells identified. BUCCA/BUCCA       ASSESSMENT:  Cavitary lung disease - bilateral upper lobe cavitation with extensive apical fibrotic changes. Post bronchoscopy 12/11/2022  Advanced emphysema/COPD   Anemia & hematuria, post op bladder tumor s/p resection 12/8/22 - low grade papillary urothelial carcinoma (cysto TURBT 10/16/22)  UTI PTA  Extensive LLE DVT s/p IVC filter 10/15/22   Severe PCM  Tobacco abuse, 60 pack-yrs    PLAN:  Inhaled bronchodilators - Combivent QID  Continue vanc/Merrem D#5 (Zosyn d/c'd 2/2 pruritus)   Monitoring of vancomycin levels to prevent toxicity. Eliquis held for cysto  Inhaled bronchodilators SLP evaluation  Duonebs  F/u bronchoscopy cultures  No pulmonary objections to having a cystoscopy

## 2022-12-14 NOTE — PROGRESS NOTES
Patient incontinent of urine and was soaked through brief and bed pad. He was unaware he was wet. Made PCA and other staff aware we will need to check and change patient every 2 hours and PRN.

## 2022-12-14 NOTE — PROGRESS NOTES
[address] [facility] Pharmacokinetic Monitoring Service - Vancomycin    Consulting Provider: Taina Brand   Indication: CAP  Target Concentration: Goal AUC/ULISSES 400-600 mg*hr/L  Day of Therapy: 6 of 7  Additional Antimicrobials: N/A    Pertinent Laboratory Values: Wt Readings from Last 1 Encounters:   12/13/22 111 lb 8 oz (50.6 kg)     Temp Readings from Last 1 Encounters:   12/14/22 98.2 °F (36.8 °C) (Axillary)     Estimated Creatinine Clearance: 101 mL/min (based on SCr of 0.5 mg/dL).   Recent Labs     12/11/22  0502 12/12/22  0532 12/14/22  0206   CREATININE 0.6* <0.5* <0.5*   WBC 12.1*  --  6.3       MRSA Nasal Swab:  never drawn    Recent vancomycin administrations                     vancomycin 1500 mg in dextrose 5% 300 mL IVPB (mg) 1,500 mg New Bag 12/13/22 1156    vancomycin 1500 mg in dextrose 5% 300 mL IVPB (mg) 1,500 mg New Bag 12/12/22 1502     1,500 mg New Bag 12/11/22 1439                    Assessment:  Date/Time Current Dose Concentration Timing of Concentration (h) AUC   12/14 @ 0206 1500 mg q18h 14.5  506   Note: Serum concentrations collected for AUC dosing may appear elevated if collected in close proximity to the dose administered, this is not necessarily an indication of toxicity    Plan:  Current dosing regimen is therapeutic  Continue current dose  Repeat vancomycin concentration ordered for N/A  Pharmacy will continue to monitor patient and adjust therapy as indicated    Thank you for the consult,  Kristen Mcginnis, [address] [facility]  12/14/2022 3:49 AM

## 2022-12-14 NOTE — PROGRESS NOTES
At bedside, patient with expiratory wheezing and continuous coughing, Tylenol given for pain.   Patient states he does not need to use the urinal and doesn't have an urge to urinate

## 2022-12-14 NOTE — PROGRESS NOTES
Urology Progress Note    Subjective: I am tired. HPI: Patient has been admitted for pulmonary issues after a TURBT. Alexander has been removed. .    P/E  BP (!) 145/87   Pulse 95   Temp 98.2 °F (36.8 °C) (Axillary)   Resp 18   Ht 5' 6\" (1.676 m)   Wt 112 lb 6.4 oz (51 kg)   SpO2 95%   BMI 18.14 kg/m²   Skin: Intact  Respiratory: Breathing without difficulty, stable. GI: No acute changes, stable po intake. : Voiding. MSK: No acute changes, stable. Neuro: No acute changes, stable. Labs  Lab Results   Component Value Date/Time    WBC 6.3 12/14/2022 02:06 AM    HGB 9.0 12/14/2022 02:06 AM    HCT 28.0 12/14/2022 02:06 AM    MCV 86.8 12/14/2022 02:06 AM     12/14/2022 02:06 AM     Lab Results   Component Value Date/Time     12/14/2022 02:06 AM    K 3.4 12/14/2022 02:06 AM    K 3.8 12/12/2022 05:32 AM    CL 99 12/14/2022 02:06 AM    CO2 26 12/14/2022 02:06 AM    BUN 6 12/14/2022 02:06 AM    CREATININE <0.5 12/14/2022 02:06 AM    CALCIUM 7.5 12/14/2022 02:06 AM       Assessment/Plan  Stable from . Possibly home tomorrow pending pulmonary issues. He will need to f/u with Dr. Nancy Martínez post discharge for BCG therapy and repeat cystoscopy.     Electronically signed by Luis Dias MD on 12/14/2022 at 3:07 PM

## 2022-12-14 NOTE — PLAN OF CARE
Care Plan, Education, Fall Risk, Damon Scale, and Lift Assessment complete. Patient is resting and reports no needs at this time.      Problem: Discharge Planning  Goal: Discharge to home or other facility with appropriate resources  Outcome: Progressing     Problem: Pain  Goal: Verbalizes/displays adequate comfort level or baseline comfort level  Outcome: Progressing     Problem: Nutrition Deficit:  Goal: Optimize nutritional status  Outcome: Progressing

## 2022-12-14 NOTE — PROGRESS NOTES
Shift assessment complete. See flow sheet. Patients head-toe complete, VS are logged, and active bowel sound noted in all four quadrants. No further needs noted at this time. Call light and bedside table are within reach. The bed is locked and is in the lowest position.

## 2022-12-14 NOTE — CARE COORDINATION
INTERDISCIPLINARY PLAN OF CARE CONFERENCE    Date/Time: 12/14/2022 4:03 PM  Completed by: Jessenia Trujillo RN, Case Management      Patient Name:  Denia Rosales  YOB: 1955  Admitting Diagnosis: Malignant neoplasm of urinary bladder, unspecified site [facility]) [C67.9]  Clot hematuria [R31.0]     Admit Date/Time:  12/8/2022  6:17 AM    Chart reviewed. Interdisciplinary team contacted or reviewed plan related to patient progress and discharge plans. Disciplines included Case Management, Nursing, and Dietitian. Current Status: IP 12/06/2022  PT/OT recommendation for discharge plan of care: NA  Expected D/C Disposition:  Home    Discharge Plan Comments: Chart reviewed. Met with pt at bedside and explained the role of the CM. Plan: TBD pending medical progress. Suspected lung abscess.  AFB Negative  Anticipate DC home tomorrow with [facility].

## 2022-12-14 NOTE — PROGRESS NOTES
Speech Language Pathology    Speech Language Pathology  Clinical Bedside Swallow Assessment  Facility/Department: [address] [facility] PCU TELEMETRY    Recommendations: Instrumentation: Yes. MBSS is warranted to further assess oropharyngeal structures and functions. Verbal OK given by MD-- order for MBSS placed by SLP   Diet recommendation: IDDSI 7 Regular Solids; IDDSI 0 Thin Liquids; Meds PO as tolerated (*pending MBSS results/recs)  Risk management: upright for all intake, stay upright for at least 30 mins after intake, small bites/sips, distant supervision with intake, oral care 2-3x/day to reduce adverse affects in the event of aspiration, alternate bites/sips, slow rate of intake, general GERD precautions, general aspiration precautions, and hold PO and contact SLP if s/s of aspiration or worsening respiratory status develop. NAME:Marcial Davis  : 1955 (76 y.o.)   MRN: [id number]  ROOM: [place] [address]  ADMISSION DATE: 2022  PATIENT DIAGNOSIS(ES): Malignant neoplasm of urinary bladder, unspecified site ([address] [C67.9]  Clot hematuria [R31.0]  No chief complaint on file.     Patient Active Problem List    Diagnosis Date Noted    Cavitary lung disease 12/10/2022    Abnormal CXR 2022    Clot hematuria 2022    Malignant neoplasm of urinary bladder ([address] 2022    Tachycardia 2022    Mass of urinary bladder 10/15/2022    Tobacco abuse 10/15/2022    Acute deep vein thrombosis (DVT) of iliac vein of left lower extremity ([address] 10/15/2022    Acute deep vein thrombosis (DVT) of calf muscle vein of left lower extremity ([address] 10/15/2022    Acute cystitis with hematuria 10/14/2022    Hyponatremia 10/14/2022    MATHEUS (acute kidney injury) ([address] 10/14/2022    COPD (chronic obstructive pulmonary disease) ([address] 10/14/2022    Microcytic anemia 10/14/2022    Hematuria 10/14/2022    Urinary retention 10/14/2022    Severe protein-calorie malnutrition ([address] 10/14/2022    Sepsis ([address] 10/13/2022    Right carpal tunnel syndrome 08/30/2016     Past Medical History:   Diagnosis Date    Anxiety     Depression     Hypertension      Past Surgical History:   Procedure Laterality Date    BRONCHOSCOPY N/A 12/12/2022    BRONCHOSCOPY ALVEOLAR LAVAGE performed by Nikhil Walker MD at [address]  12/12/2022    BRONCHOSCOPY THERAPUTIC ASPIRATION INITIAL performed by Nikhil Walker MD at [address] N/A 10/16/2022    CYSTOSCOPY TRANSURETHRAL RESECTION BLADDER >5CM performed by Jennifer Goodwin MD at [address] N/A 12/8/2022    CYSTOSCOPY, TRANSURETHERAL RESECTION OF A BLADDER TUMOR performed by Gayla Cardozo MD at [facility]       Allergies   Allergen Reactions    Bee Venom Itching and Swelling    Zosyn [Piperacillin Sod-Tazobactam So] Itching       DATE O[place]ET: [place] [facility]    Date of Evaluation: 12/14/2022   Evaluating Therapist: LAMONT Miller    Chart Reviewed: : [x] Yes [] No    Current Diet: ADULT DIET; Regular  ADULT ORAL NUTRITION SUPPLEMENT; Breakfast, Lunch, Dinner; Standard High Calorie/High Protein Oral Supplement    Recent Chest Radiography: [] Chest XR   [x] CT of Chest  Date: 12/9/22  Impressions  Impression   1. Indeterminate 7 x 8 cm thick walled cyst, mass lesion or collection in the   right pleural space with gas and fluid concerning for possible empyema,   superinfected necrotic mass or pulmonary abscess. Suboptimally characterized   on noncontrast chest CT. Bronchoscopic correlation may be useful. Given the   other findings of sequela from old granulomatous disease tuberculosis is a   consideration. Patient may have developed bronchopleural fistula. 2. Similar appearing 2 cm necrotic thick walled mass versus thick walled   infectious or post infectious pulmonary cyst.  Bronchiolitis is a   consideration.    3. Extensive pulmonary fibrotic changes bilaterally predominate in the upper lungs commonly seen with sequela of old granulomatous disease. 4.  Pulmonary sequela typical of that seen with smoking, including COPD.   5. Mild calcific atherosclerosis coronary arteries. Pain: The patient does not complain of pain    Reason for Referral  Ilan Knapp was referred for a bedside swallow evaluation to assess the efficiency of their swallow function, identify signs and symptoms of aspiration and make recommendations regarding safe dietary consistencies, effective compensatory strategies, and safe eating environment. Assessment    Medical record review/interview: Per MD H&P: \"The patient is a 75 y.o. male with hypertension, depression, anxiety, COPD, and a bladder tumor who presents to [facility] as a direct admit from Dr. Alejandrina Beauchamp. Patient is s/p Cystoscopy, transurethral resection of a bladder tumor. He had some Hematuria after the procedure. Admitted for observation overnight. We have been consulted for medical management while he is admitted. Also per review it was noted that patient had some coughing and intermittent tachycardia\". Patient Complaints:  Odynophagia: [] Yes [x] No  Globus Sensation: [x] Yes [] No  SOB with PO intake: [] Yes [x] No  Increased WOB with PO intake: [] Yes [x] No  Reflux Sx's: [] Yes [x] No  Weight loss: [x] Yes [] No  Coughing/Choking with PO intake: [x] Yes [] No  Reduced Appetite: [] Yes [x] No  Trouble with Mastication:  [] Yes [x] No  Avoidance of Certain Foods:  [] Yes [x] No  Premature Satiety:  [] Yes [] No  Recent PNA:  [] Yes [x] No  Recurring PNA:  [] Yes [x] No  Changes in vocal quality: [] Yes [x] No    Baseline Method of Oral Meds:  [x] Whole with liquid    [] Cut with liquid    [] Whole with puree    [] Cut in puree    [] Crushed in puree    [] Crushed in liquid    [] Via TF    Additional Reported Symptoms/Complaints: Pt admitted d/t malignant neoplasm of urinary bladder. Pt s/p bronchoscopy on 12/12/22.   Pt has PMHx of COPD and is a former heavy smoker per chart. Pt seen by  for BSE on 10/18/22 at [facility] with recommendations for a regular solid diet and thin liquids. RN reported frequent coughing with PO intake and pocketing of PO at times. Predisposing dysphagia risk factors: COPD and Hx of smoking  Clinical signs of possible chronic dysphagia: weight loss, reduced PO intake, and hx of malnutrition  Precipitating dysphagia risk factors: reduced physical mobility         Vitals/labs:   Temp: n/a  SpO2: 96%  RR: 27-30/min  BP: 155/86  HR: 90  O2 device: RA      CBC:   Recent Labs     12/14/22  0206   WBC 6.3   HGB 9.0*         BMP:  Recent Labs     12/14/22  0206   *   K 3.4*   CL 99   CO2 26   BUN 6*   CREATININE <0.5*   GLUCOSE 103*          Cranial nerve exam:   CN V (trigeminal): ophthalmic, maxillary, and mandibular facial sensation- WFL  CN VII (facial): WFL  CN IX/X (glossopharyngeal/vagus): MPT: DNT; pitch range: DNT; vocal quality: Adequate; cough: Strong-perceptually, Congested, and Productive  CN XII (hypoglossal): WFL    Laryngeal function exam:   Secretions: n/a  Vocal quality: See CN exam above  MPT: See CN exam above  S/Z ratio: DNT  Pitch range: See CN exam above  Cough: See CN exam above    Oral Care Status:    [] Oral Care [facility]  [x] Poor oral care status  [] Edentulous  [] Upper Dentures  [] Lower Dentures  [x] Missing/Broken Teeth  [] Evidence of dental cavities/carries    PO trials:     IDDSI 0 (thin):   - Cup: no anterior bolus loss , swallow timing subjectively appears timely, coughing after the swallow, and vitals stable  - Straw: no anterior bolus loss , swallow timing subjectively appears timely, coughing after the swallow, and vitals stable. Pt with strong (perceptually) cough that was productive of yellow-tinged phlegm at times.      IDDSI 4 (puree): Pt declined    IDDSI 6 (soft and bite sized): pt declined    IDDSI 7 (regular): no anterior bolus loss , suspect functional A-P bolus transit, swallow timing subjectively appears timely, grossly functional mastication, oral clearance grossly WFL, and no clinical s/s of aspiration    3 oz water: DNT    Impressions:    Clinical signs of oropharyngeal dysphagia likely acute-on-chronic related to reduced physical mobility, respiratory illness, acute infection, fatigue, generalized weakness, Progressive nature of disease/condition, impaired respiratory-swallow coordination, the aging swallow, and co-morbidities. Swallow prognosis is fair. Instrumental swallow study is indicated. Given stable respiratory status, willingness to participate in therapy, and good clearance of penetrated (*suspected) materials (strong, productive cough), pt is safe for oral diet at this time (*pending MBSS results/recs; MBSS to be completed later this date). Recommendations: Instrumentation: Yes. MBSS is warranted to further assess oropharyngeal structures and functions. Order placed at this time. Diet recommendation: IDDSI 7 Regular Solids; IDDSI 0 Thin Liquids; Meds PO as tolerated  Risk management: upright for all intake, stay upright for at least 30 mins after intake, small bites/sips, distant supervision with intake, oral care 2-3x/day to reduce adverse affects in the event of aspiration, alternate bites/sips, slow rate of intake, general GERD precautions, general aspiration precautions, and hold PO and contact SLP if s/s of aspiration or worsening respiratory status develop.     Prognosis: Fair    Recommended Intervention:  [x] Dysphagia tx  [] Videostroboscopy                      [] NPO   [x] MBS       [] Speech/Cog Eval    [] Therapeutic PO Trials     [] Ice Chips   [] Other:  [] FEES                                                 Dysphagia Therapeutic Intervention:  []  Bolus control Exercises  []  Oral Motor Exercises  []  [employer] Protocol  []  Thermal Stimulation  [x]  Oral Care    []  Vital Stim/NMES  []  Laryngeal Exercises  [x]  Patient/Family Education  []  Pharyngeal Exercises  []  Therapeutic PO trials with SLP  [x]  Diet tolerance monitoring  []  Other:     Referrals:  [] ENT    [] PT  [] Pulmonology [] GI  [] Neurology  [] RD  [] OT   []     Goals:  Short Term Goals:  Timeframe for Short Term Goals: (5 days, 12/19/22)  Goal 1: The patient will tolerate recommended diet with no clinical s/s of aspiration 5/5  Goal 2: The patient/caregiver will demonstrate understanding of compensatory swallow strategies, for improved swallow safety  Goal 3: The patient will tolerate instrumental assessment when able     Long Term Goals:   Timeframe for Long Term Goals: (7 days, 12/21/22)  Goal 1: The patient will tolerate least restrictive diet with no clinical s/s of aspiration or worsening respiratory/pulmonary status    Treatment:  Skilled instruction completed with patient re: evidenced based practice regarding recommendations and POC, importance of oral care to reduce adverse affects in the event of aspiration, and instruction of recommended compensatory strategies developed based upon clinical exam. Pt able to recall/demonstrate compensatory strategies with min-mod cues. Pt Education: SLP educated the patient re: Role of SLP, rationale for completion of assessment, results of assessment, recommendations, POC, and rationale for MBS  Pt Education Response: verbalized understanding, would benefit from ongoing education, and RN aware    Duration/Frequency of Tx: TBD pending MBSS results/recs    Individuals Consulted:   [x]  Patient     []  NP         [x]  RN   []  RD                   [x]  MD      []  Family Member                        []  PA    []  Other:      Safety Devices / Report:  [x]  All fall risk precautions in place [x]  Safety handoff completed with RN  [x]  Bed alarm in place  [x]  Left in bed     []  Chair alarm in place  []  Left in chair   [x]  Call light in reach   []  Other:        Total Treatment Time / Charges       Time in Time out Total Time / units   Swallow Eval/Tx Time  [id number] 1850 23 min / 2 units     Signature:  CORRINA Jerez  Speech-language pathologist  [id number]

## 2022-12-14 NOTE — FLOWSHEET NOTE
RN at bedside, assessment complete.   Patient with deep cough, hard of hearing, assessment comlete, IV assessment completed, IV infusing at 75/ml an hour, call light within reach, repositioned to adolfo fowlers in bed

## 2022-12-14 NOTE — PROGRESS NOTES
Per SLP, BBS looked good, but patient still persistently coughing with eating and drinking and coughing food back up into mouth. Strict supervision with intake.

## 2022-12-14 NOTE — PROCEDURES
Speech Language Pathology      Speech Language Pathology  Modified Barium Swallow Study  Facility/Department: [address] [place] [address] PCU TELEMETRY        Recommendations:  Diet recommendation: IDDSI 7 Regular Solids; IDDSI 0 Thin Liquids; Meds PO as tolerated  Risk management: upright for all intake, stay upright for at least 30 mins after intake, small bites/sips, distant supervision with intake, oral care 2-3x/day to reduce adverse affects in the event of aspiration, alternate bites/sips, slow rate of intake, general GERD precautions, general aspiration precautions, and hold PO and contact SLP if s/s of aspiration or worsening respiratory status develop. *Pt with multiple episodes of coughing during completion of MBSS, productive of yellow-tinged sputum at times. Episodes of coughing did not appear to correlate with PO intake with no penetration/aspiration or significant pharyngeal residue noted throughout study / during these instances. NAME:Marcial Davis  : 1955 (76 y.o.)   MRN: [id number]  ROOM: [place] [id number]  ADMISSION DATE: 2022  PATIENT DIAGNOSIS(ES): Malignant neoplasm of urinary bladder, unspecified site ([address] [C67.9]  Clot hematuria [R31.0]  No chief complaint on file.     Patient Active Problem List    Diagnosis Date Noted    Urinary tract infection with hematuria 2022    Advanced COPD ([address] 2022    Cavitary lung disease 12/10/2022    Abnormal CXR 2022    Clot hematuria 2022    Malignant neoplasm of urinary bladder ([address] 2022    Tachycardia 2022    Mass of urinary bladder 10/15/2022    Tobacco abuse 10/15/2022    Acute deep vein thrombosis (DVT) of iliac vein of left lower extremity ([address] 10/15/2022    Acute deep vein thrombosis (DVT) of calf muscle vein of left lower extremity ([address] 10/15/2022    Acute cystitis with hematuria 10/14/2022    Hyponatremia 10/14/2022    MATHEUS (acute kidney injury) ([address] 10/14/2022    COPD (chronic obstructive pulmonary disease) [facility] 10/14/2022    Microcytic anemia 10/14/2022    Hematuria 10/14/2022    Urinary retention 10/14/2022    Severe protein-calorie malnutrition ([facility] 10/14/2022    Sepsis ([facility] 10/13/2022    Right carpal tunnel syndrome 08/30/2016     Past Medical History:   Diagnosis Date    Anxiety     Depression     Hypertension      Past Surgical History:   Procedure Laterality Date    BRONCHOSCOPY N/A 12/12/2022    BRONCHOSCOPY ALVEOLAR LAVAGE performed by Arcadio Garrido MD at [facility]  12/12/2022    BRONCHOSCOPY THERAPUTIC ASPIRATION INITIAL performed by Arcadio Garrido MD at [address] N/A 10/16/2022    CYSTOSCOPY TRANSURETHRAL RESECTION BLADDER >5CM performed by Jerrell Miranda MD at [address] N/A 12/8/2022    CYSTOSCOPY, TRANSURETHERAL RESECTION OF A BLADDER TUMOR performed by Mickey Goodrich MD at [address]       Allergies   Allergen Reactions    Bee Venom Itching and Swelling    Zosyn [Piperacillin Sod-Tazobactam So] Itching         Current Diet Solid Consistency: Regular solid diet  Current Diet Liquid Consistency: Thin liquids    Date of Prior Study: n/a  Type of Study: n/a  Results of Prior Study: n/a    Recent CT Chest WO Contrast (12/9/22): Impression   1. Indeterminate 7 x 8 cm thick walled cyst, mass lesion or collection in the   right pleural space with gas and fluid concerning for possible empyema,   superinfected necrotic mass or pulmonary abscess. Suboptimally characterized   on noncontrast chest CT. Bronchoscopic correlation may be useful. Given the   other findings of sequela from old granulomatous disease tuberculosis is a   consideration. Patient may have developed bronchopleural fistula. 2. Similar appearing 2 cm necrotic thick walled mass versus thick walled   infectious or post infectious pulmonary cyst.  Bronchiolitis is a   consideration.    3. Extensive pulmonary fibrotic changes bilaterally predominate in the upper   lungs commonly seen with sequela of old granulomatous disease. 4.  Pulmonary sequela typical of that seen with smoking, including COPD.   5. Mild calcific atherosclerosis coronary arteries. Patient Complaints/Reason for Referral:  Josh English was referred for a MBS to assess the efficiency of his/her swallow function, assess for aspiration, and to make recommendations regarding safe dietary consistencies, effective compensatory strategies, and safe eating environment. Pain   Patient Currently in Pain: No    General Comments:   Per NP H&P, \"\"The patient is a 75 y.o. male with hypertension, depression, anxiety, COPD, and a bladder tumor who presents to [facility] as a direct admit from Dr. Devon David. Patient is s/p Cystoscopy, transurethral resection of a bladder tumor. He had some Hematuria after the procedure. Admitted for observation overnight. We have been consulted for medical management while he is admitted. Also per review it was noted that patient had some coughing and intermittent tachycardia\". Per BSE earlier this date, \"Pt admitted d/t malignant neoplasm of urinary bladder. Pt s/p bronchoscopy on 12/12/22. Pt has PMHx of COPD and is a former heavy smoker per chart. Pt seen by ST for BSE on 10/18/22 at [facility] with recommendations for a regular solid diet and thin liquids. RN reported frequent coughing with PO intake and pocketing of PO at times\".       Medical record review/interview:   Predisposing dysphagia risk factors: COPD and Hx of smoking  Clinical signs of possible chronic dysphagia: weight loss, reduced PO intake, and hx of malnutrition  Precipitating dysphagia risk factors: reduced physical mobility       Impressions:  Treatment Dx: Oropharyngeal dysphagia  Radiologist: Dr. Bunny Giraldo  Referring MD: Dr. Ana Lentz    Assessment:    Oral Preparation / Oral Phase  Impaired  Oral Phase - Major Contributing Deficits  Lingual Pumping: Regular Decreased Bolus Cohesion: All  Piecemeal Swallowing: Puree, Regular   Premature Bolus Loss to Pharynx: All    Pharyngeal Phase  Impaired  Pharyngeal Phase - Major Contributing Deficits  Delayed Swallow Initiation: All; mildly delayed  Premature Spillage to Valleculae: All  Premature Spillage to Pyriform: Thin straw   Reduced Pharyngeal Peristalsis: All  Reduced Laryngeal Elevation:  All  Reduced Anterior Laryngeal Movement: All  Reduced Thyrohyoid Approximation: All  Reduced Tongue Base Retraction:  All; mildly reduced  Pharyngeal Residue - Valleculae: All; minimal  Pharyngeal Residue - Pyriform: All; minimal  Pharyngeal Residue - PPW: Puree; minimal  Pharyngeal phase comment: Reduced hyolaryngeal excursion, mildly reduced tongue base retraction, and reduced pharyngeal peristalsis results in consistent pharyngeal residue post-swallow with all PO (*minimal; largely at valleculae and pyriforms). Majority of this residue successfully clears after cued second swallow or use of liquid wash. No penetration/aspiration noted with any PO trials during study. Of note, pt with multiple episodes of coughing during completion of MBSS, productive of yellow-tinged sputum at times. Episodes of coughing did not appear to correlate with PO intake with no penetration/aspiration or significant pharyngeal residue noted throughout study.       Esophageal Phase  Appears WFL when viewed at the cervical level throughout the duration of the study      Aspiration Scale   X 1 Material does not enter the airway    2 Material enters the airway, remains above the vocal folds, and is ejected from the airway    3 Material enters the airway, remains above the vocal folds, and is not ejected from the airway    4 Material enters the airway, contacts the vocal folds, an is ejected from the airway    5 Material enters the airway, contacts the vocal folds, and is not ejected from the airway    6 Material enters the airway, passes below the vocal folds and is ejected into the larynx or out of the airway    7 Material enters the airway, passes below the vocal folds, and is not ejected from the trachea despite effort    8 Material enters the airway, passes below the vocal folds, and no effort is made to eject. Compensatory Swallowing Strategies Attempted: small bites/sips, alternate bites/sips, upright positioning with PO  Postural Changes and/or Swallow Maneuvers Trialed: n/a  Patient Position: Lateral and Patient Degrees: 90 degrees, Seated upright in wheelchair  Consistencies Administered: Thin-cup, straw; Puree; Reg Solid      Recommendations:  Diet recommendation: IDDSI 7 Regular Solids; IDDSI 0 Thin Liquids; Meds PO as tolerated  Risk management: upright for all intake, stay upright for at least 30 mins after intake, small bites/sips, distant supervision with intake, oral care 2-3x/day to reduce adverse affects in the event of aspiration, alternate bites/sips, slow rate of intake, general GERD precautions, general aspiration precautions, and hold PO and contact SLP if s/s of aspiration or worsening respiratory status develop. Safe Swallow Protocol:  Supervision: Close  Compensatory Swallowing Strategies: HOB 90* and 30\" after meals; small bites/sips; alternate solids/liquids every 3-5 bites; oral care after every meal      Behavior/Cognition/Vision/Hearing:  Behavior/Cognition: alert, cooperative  Vision: adequate  Hearing: adequate    Recommendations/Treatment  Requires SLP Intervention: Yes  D/C Recommendations: TBD  Postural Changes and/or Swallow Maneuvers: n/a  Referral To: n/a    Recommended Exercises: laryngeal/pharyngeal strengthening exercises  Therapeutic Interventions: diet tolerance monitoring, patient/family education, oral care     Education: Images and recommendations were reviewed with pt following this exam.   Patient Education Response: pt verbalized understanding, would benefit from continued reinforcement.   RN notified of recs.     Prognosis for safe diet advancement: fair  Barriers to reach goals: generalized weakness, COPD  Duration/Frequency of Treatment: 2-4x/week for LOS  Safety Devices in place: Yes  Type of devices: Pt left MBSS in no distress; left with transport      Goals:    Short Term Goals:  Timeframe for Short Term Goals: (5 days, 12/19/22)  Goal 1: The patient will tolerate recommended diet with no clinical s/s of aspiration 5/5  Goal 2: The patient/caregiver will demonstrate understanding of compensatory swallow strategies, for improved swallow safety  Goal 3: The patient will tolerate instrumental assessment when able     Long Term Goals:   Timeframe for Long Term Goals: (7 days, 12/21/22)  Goal 1: The patient will tolerate least restrictive diet with no clinical s/s of aspiration or worsening respiratory/pulmonary status         Therapy Time:   Individual Concurrent Group Co-treatment   Time In 1200         Time Out 1215         Minutes 15           MBSS procedure    Signature:  Abiodun Diaz M.S. [id number] [facility]  Speech-language pathologist  [id number]

## 2022-12-15 ENCOUNTER — TELEPHONE (OUTPATIENT)
Dept: PULMONOLOGY | Age: 67
End: 2022-12-15

## 2022-12-15 VITALS
HEART RATE: 93 BPM | DIASTOLIC BLOOD PRESSURE: 86 MMHG | OXYGEN SATURATION: 94 % | RESPIRATION RATE: 18 BRPM | SYSTOLIC BLOOD PRESSURE: 146 MMHG | HEIGHT: 66 IN | BODY MASS INDEX: 18.23 KG/M2 | WEIGHT: 113.4 LBS | TEMPERATURE: 97.9 F

## 2022-12-15 DIAGNOSIS — R93.89 ABNORMAL CXR: Primary | ICD-10-CM

## 2022-12-15 LAB
ANION GAP SERPL CALCULATED.3IONS-SCNC: 7 MMOL/L (ref 3–16)
BUN BLDV-MCNC: 4 MG/DL (ref 7–20)
CALCIUM SERPL-MCNC: 7.5 MG/DL (ref 8.3–10.6)
CHLORIDE BLD-SCNC: 96 MMOL/L (ref 99–110)
CO2: 29 MMOL/L (ref 21–32)
CREAT SERPL-MCNC: <0.5 MG/DL (ref 0.8–1.3)
GFR SERPL CREATININE-BSD FRML MDRD: >60 ML/MIN/{1.73_M2}
GLUCOSE BLD-MCNC: 96 MG/DL (ref 70–99)
POTASSIUM SERPL-SCNC: 2.9 MMOL/L (ref 3.5–5.1)
POTASSIUM SERPL-SCNC: 3.9 MMOL/L (ref 3.5–5.1)
SODIUM BLD-SCNC: 132 MMOL/L (ref 136–145)

## 2022-12-15 PROCEDURE — 80048 BASIC METABOLIC PNL TOTAL CA: CPT

## 2022-12-15 PROCEDURE — 97116 GAIT TRAINING THERAPY: CPT

## 2022-12-15 PROCEDURE — 97166 OT EVAL MOD COMPLEX 45 MIN: CPT

## 2022-12-15 PROCEDURE — 36415 COLL VENOUS BLD VENIPUNCTURE: CPT

## 2022-12-15 PROCEDURE — 2580000003 HC RX 258: Performed by: INTERNAL MEDICINE

## 2022-12-15 PROCEDURE — 6370000000 HC RX 637 (ALT 250 FOR IP): Performed by: INTERNAL MEDICINE

## 2022-12-15 PROCEDURE — 97535 SELF CARE MNGMENT TRAINING: CPT

## 2022-12-15 PROCEDURE — 6360000002 HC RX W HCPCS: Performed by: INTERNAL MEDICINE

## 2022-12-15 PROCEDURE — 94761 N-INVAS EAR/PLS OXIMETRY MLT: CPT

## 2022-12-15 PROCEDURE — 94640 AIRWAY INHALATION TREATMENT: CPT

## 2022-12-15 PROCEDURE — 97161 PT EVAL LOW COMPLEX 20 MIN: CPT

## 2022-12-15 PROCEDURE — 6370000000 HC RX 637 (ALT 250 FOR IP): Performed by: UROLOGY

## 2022-12-15 PROCEDURE — 84132 ASSAY OF SERUM POTASSIUM: CPT

## 2022-12-15 PROCEDURE — 97530 THERAPEUTIC ACTIVITIES: CPT

## 2022-12-15 PROCEDURE — 99232 SBSQ HOSP IP/OBS MODERATE 35: CPT | Performed by: INTERNAL MEDICINE

## 2022-12-15 RX ORDER — AMOXICILLIN AND CLAVULANATE POTASSIUM 875; 125 MG/1; MG/1
1 TABLET, FILM COATED ORAL 2 TIMES DAILY
Qty: 42 TABLET | Refills: 0 | Status: SHIPPED | OUTPATIENT
Start: 2022-12-15 | End: 2023-01-05

## 2022-12-15 RX ORDER — METOPROLOL SUCCINATE 25 MG/1
25 TABLET, EXTENDED RELEASE ORAL DAILY
Status: DISCONTINUED | OUTPATIENT
Start: 2022-12-15 | End: 2022-12-15 | Stop reason: HOSPADM

## 2022-12-15 RX ORDER — AMOXICILLIN AND CLAVULANATE POTASSIUM 875; 125 MG/1; MG/1
1 TABLET, FILM COATED ORAL ONCE
Status: COMPLETED | OUTPATIENT
Start: 2022-12-15 | End: 2022-12-15

## 2022-12-15 RX ORDER — NICOTINE 21 MG/24HR
1 PATCH, TRANSDERMAL 24 HOURS TRANSDERMAL DAILY
Qty: 30 PATCH | Refills: 0 | Status: SHIPPED | OUTPATIENT
Start: 2022-12-15

## 2022-12-15 RX ORDER — METOPROLOL SUCCINATE 25 MG/1
25 TABLET, EXTENDED RELEASE ORAL DAILY
Qty: 30 TABLET | Refills: 0 | Status: SHIPPED | OUTPATIENT
Start: 2022-12-16

## 2022-12-15 RX ORDER — GUAIFENESIN/DEXTROMETHORPHAN 100-10MG/5
5 SYRUP ORAL EVERY 4 HOURS PRN
Status: DISCONTINUED | OUTPATIENT
Start: 2022-12-15 | End: 2022-12-15 | Stop reason: HOSPADM

## 2022-12-15 RX ADMIN — AMOXICILLIN AND CLAVULANATE POTASSIUM 1 TABLET: 875; 125 TABLET, FILM COATED ORAL at 10:50

## 2022-12-15 RX ADMIN — MEROPENEM 1000 MG: 1 INJECTION, POWDER, FOR SOLUTION INTRAVENOUS at 02:04

## 2022-12-15 RX ADMIN — IPRATROPIUM BROMIDE AND ALBUTEROL 1 PUFF: 20; 100 SPRAY, METERED RESPIRATORY (INHALATION) at 11:30

## 2022-12-15 RX ADMIN — APIXABAN 5 MG: 5 TABLET, FILM COATED ORAL at 08:51

## 2022-12-15 RX ADMIN — MEROPENEM 1000 MG: 1 INJECTION, POWDER, FOR SOLUTION INTRAVENOUS at 08:53

## 2022-12-15 RX ADMIN — METOPROLOL SUCCINATE 25 MG: 25 TABLET, EXTENDED RELEASE ORAL at 10:19

## 2022-12-15 RX ADMIN — POTASSIUM BICARBONATE 40 MEQ: 391 TABLET, EFFERVESCENT ORAL at 10:19

## 2022-12-15 RX ADMIN — TAMSULOSIN HYDROCHLORIDE 0.4 MG: 0.4 CAPSULE ORAL at 08:51

## 2022-12-15 RX ADMIN — IPRATROPIUM BROMIDE AND ALBUTEROL 1 PUFF: 20; 100 SPRAY, METERED RESPIRATORY (INHALATION) at 15:30

## 2022-12-15 RX ADMIN — ACETAMINOPHEN 650 MG: 325 TABLET ORAL at 15:54

## 2022-12-15 RX ADMIN — POTASSIUM BICARBONATE 40 MEQ: 391 TABLET, EFFERVESCENT ORAL at 08:51

## 2022-12-15 RX ADMIN — OXYCODONE 5 MG: 5 TABLET ORAL at 15:55

## 2022-12-15 RX ADMIN — ACETAMINOPHEN 650 MG: 325 TABLET ORAL at 07:45

## 2022-12-15 ASSESSMENT — PAIN DESCRIPTION - DESCRIPTORS
DESCRIPTORS: ACHING;DISCOMFORT
DESCRIPTORS: ACHING

## 2022-12-15 ASSESSMENT — PAIN SCALES - GENERAL
PAINLEVEL_OUTOF10: 8
PAINLEVEL_OUTOF10: 8
PAINLEVEL_OUTOF10: 5
PAINLEVEL_OUTOF10: 4
PAINLEVEL_OUTOF10: 6

## 2022-12-15 ASSESSMENT — PAIN DESCRIPTION - LOCATION
LOCATION: BACK
LOCATION: BACK

## 2022-12-15 ASSESSMENT — PAIN DESCRIPTION - ORIENTATION
ORIENTATION: LOWER
ORIENTATION: LOWER

## 2022-12-15 ASSESSMENT — PAIN SCALES - WONG BAKER: WONGBAKER_NUMERICALRESPONSE: 0

## 2022-12-15 ASSESSMENT — PAIN - FUNCTIONAL ASSESSMENT
PAIN_FUNCTIONAL_ASSESSMENT: PREVENTS OR INTERFERES SOME ACTIVE ACTIVITIES AND ADLS
PAIN_FUNCTIONAL_ASSESSMENT: PREVENTS OR INTERFERES SOME ACTIVE ACTIVITIES AND ADLS

## 2022-12-15 NOTE — PROGRESS NOTES
Patient resting in bed, AM assessment completed. Lungs decreased, rhonchi noted. BS+. INT intact. Patient down to 81% with coughing, O2 at 3L placed on, up to mid 90's. Oxygen off now under chin, 95%. Daughter at bedside. Patient very [address], daughter at bedside visiting. Call light in reach. Will continue to monitor.

## 2022-12-15 NOTE — PROGRESS NOTES
Patient up an ambulated in room, RA sat remained 94% with exertion. Patient walked across room out in hallway and back. No need for any oxygen. Patient's ex-wife here to pick him up. Monitor removed and patient getting dressed at this time.

## 2022-12-15 NOTE — PROGRESS NOTES
PULMONARY PROGRESS NOTE    CC: Abnormal CXR, Pneumonia with suspected lung abscess    Subjective:   Appears comfortable  Room air  No sputum production      PHYSICAL EXAM:   /72   Pulse (!) 108   Temp 98.2 °F (36.8 °C) (Oral)   Resp 26   Ht 5' 6\" (1.676 m)   Wt 113 lb 6.4 oz (51.4 kg)   SpO2 94%   BMI 18.30 kg/m²  on RA  Constitutional:  No acute distress. Morgan Patella HEENT: no scleral icterus  Neck: No tracheal deviation present. Cardiovascular: Normal heart sounds. Pulmonary/Chest: No wheezes. Minimal rhonchi. No rales. No decreased breath sounds. No accessory muscle usage or stridor. Abdominal: Soft. Musculoskeletal: No cyanosis. No clubbing. Skin: Skin is warm and dry.        Scheduled Meds:   meropenem  1,000 mg IntraVENous Q8H    vancomycin  1,500 mg IntraVENous Q18H    albuterol-ipratropium  1 puff Inhalation 4x daily    apixaban  5 mg Oral BID    albuterol  2.5 mg Nebulization Once    tamsulosin  0.4 mg Oral Daily    nicotine  1 patch TransDERmal Daily    [Held by provider] midodrine  10 mg Oral TID WC    sodium chloride flush  5-40 mL IntraVENous 2 times per day    acetaminophen  650 mg Oral Q6H    sennosides-docusate sodium  1 tablet Oral BID     Continuous Infusions:   sodium chloride 75 mL/hr at 12/13/22 2101    sodium chloride       PRN Meds:  ipratropium-albuterol, melatonin, phenol, sodium chloride flush, sodium chloride, polyethylene glycol, ondansetron **OR** ondansetron, oxyCODONE **OR** oxyCODONE, diphenhydrAMINE    Labs:  CBC:   Recent Labs     12/14/22  0206   WBC 6.3   HGB 9.0*   HCT 28.0*   MCV 86.8        BMP:   Recent Labs     12/14/22  0206 12/15/22  0451   * 132*   K 3.4* 2.9*   CL 99 96*   CO2 26 29   BUN 6* 4*   CREATININE <0.5* <0.5*     Micro:   12/8/22 Urine cx Proteus penneri  12/9/22 SARS-CoV-2 not detected  12/9/22 BC NGTD  12/9/22 AFB smear negative & culture ngtd  12/10/22 AFB smear negative and culture ngtd  12/10/22 Sputum cx: light growth candida  12/11/22 AFB smear & cx: pending    12/9/22 Procalcitonin 0.16    Imaging:   CT chest 12/9 imaging reviewed by me and showed  1. Indeterminate 7 x 8 cm thick walled cyst, mass lesion or collection in the   right pleural space with gas and fluid concerning for possible empyema,   superinfected necrotic mass or pulmonary abscess. Suboptimally characterized   on noncontrast chest CT. Bronchoscopic correlation may be useful. Given the   other findings of sequela from old granulomatous disease tuberculosis is a   consideration. Patient may have developed bronchopleural fistula. 2. Similar appearing 2 cm necrotic thick walled mass versus thick walled   infectious or post infectious pulmonary cyst.  Bronchiolitis is a   consideration. 3. Extensive pulmonary fibrotic changes bilaterally predominate in the upper   lungs commonly seen with sequela of old granulomatous disease. 4.  Pulmonary sequela typical of that seen with smoking, including COPD.   5. Mild calcific atherosclerosis coronary arteries. Bronchoscopy 12/11/2022  A. Lung, Right Upper Lobe, Bronchial Alveolar Lavage:   - No malignant cells identified. B.  Lung, Bronchial Washings:   - No malignant cells identified. BUCCA/BUCCA       ASSESSMENT:  Cavitary lung disease - bilateral upper lobe cavitation with extensive apical fibrotic changes. Post bronchoscopy 12/11/2022  Advanced emphysema/COPD   Anemia & hematuria, post op bladder tumor s/p resection 12/8/22 - low grade papillary urothelial carcinoma (cysto TURBT 10/16/22)  UTI PTA  Extensive LLE DVT s/p IVC filter 10/15/22   Severe PCM  Tobacco abuse, 60 pack-yrs    PLAN:  Continue inhaled bronchodilators - Combivent QID  Continue Vanc/Merrem D#6 (Zosyn d/c'd 2/2 pruritus) -Abx at discharge for 4 weeks with repeat CT chest (Office is  notified).   Discussed with internal medicine and does not think patient is allergic to penicillin and he is trying Augmentin p.o. while inpatient before discharging  Monitoring of vancomycin levels to prevent toxicity.       Inhaled bronchodilators  F/u bronchoscopy cultures  Home Eliquis  Discussed with family at the bedside

## 2022-12-15 NOTE — PROGRESS NOTES
RN at bedside patient coughing, coughing with drinking, call light within reach, incontinent, will continue to monitor closely

## 2022-12-15 NOTE — PROGRESS NOTES
Vancomycin Day: 7  Current Regimen: 1500 mg IV every 18 hours    Patient's labs, cultures, vitals, and vancomycin regimen reviewed.    SCr stable  U/O not measured/well documented  [employer] updated    Plan:   No change today, Pt being discharged today  Linn FOX 12/15/963854:17 AM  .

## 2022-12-15 NOTE — PROGRESS NOTES
Patient's ex-wife given discharge instructions at this time, she states understanding. Request placed for transport to pick patient up at this time.

## 2022-12-15 NOTE — PROGRESS NOTES
Hospitalist Progress Note      PCP: NALINI Abel - CNP    Date of Admission: 12/8/2022    Subjective:     Sp bronch with BAL  , AFB neg . Off airborne isolation    Hematuria resolvd. Off [place] and voiding     Pt seen lying in bed  still with moderate  cough but remains on RA  Worked with PT and plans for home  HR high with PT  starting BB  Low grade fevers resolved  Passed MBS  Denies any sob but has cough      Medications:  Reviewed    Infusion Medications    sodium chloride 75 mL/hr at 12/13/22 2101    sodium chloride       Scheduled Medications    metoprolol succinate  25 mg Oral Daily    meropenem  1,000 mg IntraVENous Q8H    vancomycin  1,500 mg IntraVENous Q18H    albuterol-ipratropium  1 puff Inhalation 4x daily    apixaban  5 mg Oral BID    albuterol  2.5 mg Nebulization Once    tamsulosin  0.4 mg Oral Daily    nicotine  1 patch TransDERmal Daily    [Held by provider] midodrine  10 mg Oral TID WC    sodium chloride flush  5-40 mL IntraVENous 2 times per day    acetaminophen  650 mg Oral Q6H    sennosides-docusate sodium  1 tablet Oral BID     PRN Meds: ipratropium-albuterol, melatonin, phenol, sodium chloride flush, sodium chloride, polyethylene glycol, ondansetron **OR** ondansetron, oxyCODONE **OR** oxyCODONE, diphenhydrAMINE      Intake/Output Summary (Last 24 hours) at 12/15/2022 1023  Last data filed at 12/14/2022 1751  Gross per 24 hour   Intake 180 ml   Output --   Net 180 ml         Physical Exam Performed:    BP (!) 154/90   Pulse (!) 107   Temp 97.3 °F (36.3 °C) (Oral)   Resp 22   Ht 5' 6\" (1.676 m)   Wt 113 lb 6.4 oz (51.4 kg)   SpO2 97%   BMI 18.30 kg/m²            General: elderly male, [place] ill appearing   Awake, alert and oriented.  Appears to be not in any distress  Mucous Membranes:  Pink , anicteric  Neck: No JVD, no carotid bruit, no thyromegaly  Chest: minimal crackles bilateral  improving air entry  Cardiovascular:  RRR S1S2 heard, no murmurs or gallops  Abdomen:  Soft, undistended, non tender, no organomegaly, BS present  Extremities: No edema or cyanosis. Distal pulses well felt  Neurological : grossly normal          Labs:   Recent Labs     12/14/22  0206   WBC 6.3   HGB 9.0*   HCT 28.0*          Recent Labs     12/14/22  0206 12/15/22  0451   * 132*   K 3.4* 2.9*   CL 99 96*   CO2 26 29   BUN 6* 4*   CREATININE <0.5* <0.5*   CALCIUM 7.5* 7.5*       No results for input(s): AST, ALT, BILIDIR, BILITOT, ALKPHOS in the last 72 hours. No results for input(s): INR in the last 72 hours. No results for input(s): [place] in the last 72 hours. Urinalysis:      Lab Results   Component Value Date/Time    NITRU Negative 12/09/2022 10:27 AM    WBCUA  12/09/2022 10:27 AM    BACTERIA 1+ 12/09/2022 10:27 AM    RBCUA  12/09/2022 10:27 AM    BLOODU LARGE 12/09/2022 10:27 AM    SPECGRAV >=1.030 12/09/2022 10:27 AM    GLUCOSEU Negative 12/09/2022 10:27 AM     Cultures:    No AFB  Mycobacterium negative    Respiratory panel: negative    BAL  NRF with light growth of candida    Urine: Proteus penneri  Blood: NGTD  COVID/flu: negative    Radiology:  FL MODIFIED BARIUM SWALLOW W VIDEO   Final Result   Swallowing mechanism grossly within normal limits without evidence of   aspiration. Please see separate speech pathology report for full discussion of findings   and recommendations. CT CHEST WO CONTRAST   Final Result   1. Indeterminate 7 x 8 cm thick walled cyst, mass lesion or collection in the   right pleural space with gas and fluid concerning for possible empyema,   superinfected necrotic mass or pulmonary abscess. Suboptimally characterized   on noncontrast chest CT. Bronchoscopic correlation may be useful. Given the   other findings of sequela from old granulomatous disease tuberculosis is a   consideration. Patient may have developed bronchopleural fistula.    2. Similar appearing 2 cm necrotic thick walled mass versus thick walled infectious or post infectious pulmonary cyst.  Bronchiolitis is a   consideration. 3. Extensive pulmonary fibrotic changes bilaterally predominate in the upper   lungs commonly seen with sequela of old granulomatous disease. 4.  Pulmonary sequela typical of that seen with smoking, including COPD.   5. Mild calcific atherosclerosis coronary arteries. Critical results were called by Dr. Gume Gray to Jamin America on   12/9/2022 at 16:17. XR CHEST (2 VW)   Final Result   Developing small 3 cm right mid lung abscess and right basilar pneumonia.              IP CONSULT TO INTERNAL MEDICINE  IP CONSULT TO DIETITIAN  PHARMACY TO DOSE VANCOMYCIN  IP CONSULT TO PULMONOLOGY  IP CONSULT TO HOME CARE NEEDS    Assessment/Plan:    Active Hospital Problems    Diagnosis     Urinary tract infection with hematuria [N39.0, R31.9]      Priority: Medium    Advanced COPD ([address] [J44.9]      Priority: Medium    Cavitary lung disease [J98.4]      Priority: Medium    Abnormal CXR [R93.89]      Priority: Medium    Clot hematuria [R31.0]      Priority: Medium    Malignant neoplasm of urinary bladder ([address] [C67.9]      Priority: Medium    Tachycardia [R00.0]      Priority: Medium       Hematuria  UTI   -with hx of bladder cancer and s/p bladder tumor resection, admitted for post op mx .   -started on bladder irrigation , improved   -monitored H/H.  -pain control: Oxy-IR prn  -continue Flomax  -  removed mc and voiding now  - cx remain neg  - urology plans for intra vesicular BCG tx soon      Cavitary lung disease/PNA   - pt spiked a fever 12/9, CXR with PNA and poss lung abscess  Switched abx to merrem/vanco,  CT chest reviewed  AFB neg   Pulm consulted, s.p bronch  ,  BAL with NRF and light growth of candida   Remains on RA  Suspect chronic recurrent aspiration as etiology MBS however fairly normal  Tx with merrem and vanc, change to augmentin for 3 weeks at dc  F/w pulmonary for repeat imaging  No smoking      Advanced emphysema/COPD - pulm following  -Duonebs prn  - stable on RA    Tachycardia  Sinus tach , added low dose BB  Stop midodrine         DVT LLE  -S/p IVC filter and on eliquis resumed         Severe PCM  -dietician consulted     DVT Prophylaxis: eliquis       Diet: ADULT DIET;  Regular  ADULT ORAL NUTRITION SUPPLEMENT; Breakfast, Lunch, Dinner; Standard High Calorie/High Protein Oral Supplement  Code Status: Full Code    PT/OT Eval Status: ordered  Dc to home      Rani Jules MD,12/15/2022 10:23 AM

## 2022-12-15 NOTE — PROGRESS NOTES
Inpatient Occupational Therapy  Evaluation and Treatment    Unit: PCU  Date:  12/15/2022  Patient Name:    Taurus Lr  Admitting diagnosis:  Malignant neoplasm of urinary bladder, unspecified site [facility]) [C67.9]  Clot hematuria [R31.0]  Admit Date:  12/8/2022  Precautions/Restrictions/WB Status/ Lines/ Wounds/ Oxygen: Fall risk, Bed/chair alarm, Lines -IV, Telemetry, and Continuous pulse oximetry      CYSTOSCOPY, TRANSURETHERAL RESECTION OF A BLADDER TUMOR- 12-8-22    Abnormal CXR, Pneumonia with suspected lung abscess-12-10-22   BRONCHOSCOPY 12-12-22  Treatment Time:  260 -024  Treatment Number: 1   Timed code treatment minutes 40 minutes   Total Treatment minutes:   50   minutes    Patient Goals for Therapy:  \" not stated  \"      Discharge Recommendations: home initial 24/7 sup /assist with home OT   DME needs for discharge: Needs Met  Dtr would like the pt to have meals on wheels      Therapy recommendations for staff:   Assist of 1 with use of No AD for all ambulation to/from bathroom with gait belt     History of Present Illness: per H&P   75 y.o. male with hypertension, depression, anxiety, COPD, and a bladder tumor who presents to [facility] as a direct admit from Dr. Sada Bennett. Patient is s/p Cystoscopy, transurethral resection of a bladder tumor. He had some Hematuria after the procedure. Admitted for observation overnight. We have been consulted for medical management while he is admitted. Also per review it was noted that patient had some coughing and intermittent tachycardia. Home Health S4 Level Recommendation:  Level 1 Standard  AM-PAC Score: 22  Preadmission Environment    Pt. Lives Alone  Home environment:  Two story - pt stays on the first floor   Steps to enter first floor:  one  steps to enter with railings   Steps to second floor: N/A  Bathroom: tub/shower unit and standard height commode, shower chair   Equipment owned: [address] [place] [address] and  (manual)neublulizer .       Preadmission Status:  Pt. Able to drive: No family drives for pt  Pt Fully independent with ADLs: Yes for dressing and sup for showers   Pt. Required assistance from family for: Cleaning and Laundry  Pt will make sandwiches and family brings pt food   Pt. independent for transfers and gait and walked with No Device  History of falls No    Pain  Yes  Ratin/10   Location:back   Pain Medicine Status: No request made      Cognition    A&O not formally assesses - [place]    Able to follow 1 step commands    Subjective  Patient lying supine in bed with family at bedside. Pt agreeable to this OT eval & tx. Upper Extremity ROM:    WFL    Upper Extremity Strength:    WFL      Upper Extremity Sensation    WFL    Upper Extremity Proprioception:  WFL    Coordination and Tone  WFL    Balance  Functional Sitting Balance:  WFL  Functional Standing Balance:WFL    Bed mobility:    Supine to sit: Independent  Sit to supine:   Not Tested  Rolling:    Not Tested  Scooting in sitting:  Independent  Scooting to head of bed:   Not Tested    Bridging:   Not Tested    Transfers:    Sit to stand:  Supervision  Stand to sit:  Supervision  Bed to chair:   Supervision with RW and Sup without   Standard toilet: Supervision  Bed to [facility]:  Not Tested    Dressing:      UE:   Not Tested  LE:    Independent donning pants     Bathing:    UE:  Not Tested  LE:  Not Tested    Eating:   Not Tested    Toileting:  Not Tested    Activity Tolerance   Pt completed therapy session with SOB   Supine Sp02 94    Sitting /93   Standing SOB Sp02 96%  to 130   After ambulation Sp02 96% -114     Positioning Needs:   Pt up in chair, alarm set, positioned in proper neutral alignment and pressure relief provided. Exercise / Activities Initiated:   N/A    Patient/Family Education:   Role of OT    Assessment of Deficits: Pt seen for Occupational therapy evaluation in acute care setting.   Pt demonstrated decreased Activity tolerance, ADLs, Balance , Safety Awareness, and Transfers. Pt functioning below baseline and will likely benefit from skilled occupational therapy services to maximize safety and independence. Goal(s) : To be met in 3 Visits:  1). Bed to toilet/BSC: Independent    To be met in 5 Visits:  1). Supine to/from Sit:  Independent  2). Upper Body Bathing:   Independent  3). Lower Body Bathing:   Supervision  4). Upper Body Dressing:  Independent  5). Lower Body Dressing:  Independent  6). Pt to demonstrate UE exs x 15 reps with minimal cues    Rehabilitation Potential:  Fair for goals listed above. Strengths for achieving goals include: Family Support and Pt cooperative  Barriers to achieving goals include:  Complexity of condition     Plan: To be seen 2-3 x/wk  while in acute care setting for therapeutic exercises, bed mobility, transfers, dressing, bathing, family/patient education, ADL/IADL retraining, energy conservation training.      Jordin Kruger OTR/L [id number]           If patient discharges from this facility prior to next visit, this note will serve as the Discharge Summary

## 2022-12-15 NOTE — PROGRESS NOTES
Urology Progress Note    Subjective: Chauncey Gonzalez denies complaints     HPI: Patient has been admitted for pulmonary issues after a TURBT. Alexander has been removed. .    P/E  BP (!) 154/90   Pulse (!) 107   Temp 97.3 °F (36.3 °C) (Oral)   Resp 22   Ht 5' 6\" (1.676 m)   Wt 113 lb 6.4 oz (51.4 kg)   SpO2 97%   BMI 18.30 kg/m²   Skin: Intact  Respiratory: Breathing without difficulty, stable. : Voiding. MSK: No acute changes, stable. Neuro: No acute changes, stable. Labs  Lab Results   Component Value Date/Time    WBC 6.3 12/14/2022 02:06 AM    HGB 9.0 12/14/2022 02:06 AM    HCT 28.0 12/14/2022 02:06 AM    MCV 86.8 12/14/2022 02:06 AM     12/14/2022 02:06 AM     Lab Results   Component Value Date/Time     12/15/2022 04:51 AM    K 2.9 12/15/2022 04:51 AM    K 3.8 12/12/2022 05:32 AM    CL 96 12/15/2022 04:51 AM    CO2 29 12/15/2022 04:51 AM    BUN 4 12/15/2022 04:51 AM    CREATININE <0.5 12/15/2022 04:51 AM    CALCIUM 7.5 12/15/2022 04:51 AM       Assessment/Plan    Bladder CA   Gross hematuria post TURBT    Stable from . OK with dc home today per medical team.  He will need to f/u with Dr. Ness Tate post discharge for BCG therapy and repeat cystoscopy.     Electronically signed by Yaneli Barnes PA-C on 12/15/2022 at 11:30 AM

## 2022-12-15 NOTE — DISCHARGE SUMMARY
Name:  Smooth Garland  Room:  /3437-19  MRN:    [id number]    Discharge Summary      This discharge summary is in conjunction with a complete physical exam done on the day of discharge. Attending Physician: Dr. Sudhir Bethea  Discharging Physician: Dr. Pham Nim: 12/8/2022  Discharge:  12/15/2022    HPI:  The patient is a 75 y.o. male with hypertension, depression, anxiety, COPD, and a bladder tumor who presents to [facility] as a direct admit from Dr. Ness Tate. Patient is s/p Cystoscopy, transurethral resection of a bladder tumor. He had some Hematuria after the procedure. Admitted for observation overnight. We have been consulted for medical management while he is admitted. Also per review it was noted that patient had some coughing and intermittent tachycardia. Diagnoses this Admission and Hospital Course   Hematuria  UTI   -with hx of bladder cancer and s/p bladder tumor resection, admitted for post op mx .   -started on bladder irrigation , improved   -monitored H/H.  -pain control: Oxy-IR prn  -continue Flomax  -  removed mc and voiding now  - cx remain neg  - urology plans for intra vesicular BCG tx soon      Cavitary lung disease/PNA   - pt spiked a fever 12/9, CXR with PNA and poss lung abscess  Switched abx to merrem/vanco,  CT chest reviewed  AFB neg   Pulm consulted, s.p bronch  ,  BAL with NRF and light growth of candida   Remains on RA  Suspect chronic recurrent aspiration as etiology MBS however fairly normal  Tx with merrem and vanc, change to augmentin for 3 weeks at dc  F/w pulmonary for repeat imaging  No smoking        Advanced emphysema/COPD - pulm following  -Duonebs prn  - stable on RA     Tachycardia  Sinus tach , added low dose BB  Stop midodrine         DVT LLE  -S/p IVC filter and on eliquis resumed         Severe PCM  -dietician consulted     DVT Prophylaxis: eliquis         Diet: ADULT DIET;  Regular    Procedures (Please Review Full Report for Details)  Modified barium swallow  Bronchoscopy    Procedure Performed:   1. Transurethral resection of bladder tumor (>5cm  large)  2. Fulguration of prostatic and bladder bleeding points  3. Evacuation of clot and fibrinous material from the floor the bladder    Consults    Urology  Pulmonology     Physical Exam at Discharge:    BP (!) 154/90   Pulse (!) 107   Temp 97.3 °F (36.3 °C) (Oral)   Resp 22   Ht 5' 6\" (1.676 m)   Wt 113 lb 6.4 oz (51.4 kg)   SpO2 97%   BMI 18.30 kg/m²     See today's progress note    CBC:   Recent Labs     12/14/22  0206   WBC 6.3   HGB 9.0*   HCT 28.0*   MCV 86.8        BMP:   Recent Labs     12/14/22  0206 12/15/22  0451   * 132*   K 3.4* 2.9*   CL 99 96*   CO2 26 29   BUN 6* 4*   CREATININE <0.5* <0.5*       U/A:    Lab Results   Component Value Date/Time    COLORU Yellow 12/09/2022 10:27 AM    WBCUA  12/09/2022 10:27 AM    RBCUA  12/09/2022 10:27 AM    MUCUS Rare 12/09/2022 10:27 AM    BACTERIA 1+ 12/09/2022 10:27 AM    CLARITYU Clear 12/09/2022 10:27 AM    SPECGRAV >=1.030 12/09/2022 10:27 AM    LEUKOCYTESUR SMALL 12/09/2022 10:27 AM    BLOODU LARGE 12/09/2022 10:27 AM    GLUCOSEU Negative 12/09/2022 10:27 AM       CULTURES  TB: negative  Blood: NGTD  No AFB  Sputum: Candida  Negative Mycobacterium tuberculosis  Respiratory panel: negative  Urine: Proteus penneri  COVID/flu: negative    RADIOLOGY  FL MODIFIED BARIUM SWALLOW W VIDEO   Final Result   Swallowing mechanism grossly within normal limits without evidence of   aspiration. Please see separate speech pathology report for full discussion of findings   and recommendations. CT CHEST WO CONTRAST   Final Result   1. Indeterminate 7 x 8 cm thick walled cyst, mass lesion or collection in the   right pleural space with gas and fluid concerning for possible empyema,   superinfected necrotic mass or pulmonary abscess. Suboptimally characterized   on noncontrast chest CT.   Bronchoscopic correlation may be useful. Given the   other findings of sequela from old granulomatous disease tuberculosis is a   consideration. Patient may have developed bronchopleural fistula. 2. Similar appearing 2 cm necrotic thick walled mass versus thick walled   infectious or post infectious pulmonary cyst.  Bronchiolitis is a   consideration. 3. Extensive pulmonary fibrotic changes bilaterally predominate in the upper   lungs commonly seen with sequela of old granulomatous disease. 4.  Pulmonary sequela typical of that seen with smoking, including COPD.   5. Mild calcific atherosclerosis coronary arteries. Critical results were called by Dr. Leena Pete to Erica Parker on   12/9/2022 at 16:17. XR CHEST (2 VW)   Final Result   Developing small 3 cm right mid lung abscess and right basilar pneumonia. Discharge Medications     Medication List        START taking these medications      amoxicillin-clavulanate 875-125 MG per tablet  Commonly known as:  Augmentin  Take 1 tablet by mouth 2 times daily for 21 days     metoprolol succinate 25 MG extended release tablet  Commonly known as: TOPROL XL  Take 1 tablet by mouth daily  Start taking on: December 16, 2022            Rohan Pedroza taking these medications      apixaban 5 MG Tabs tablet  Commonly known as: ELIQUIS  Take 1 tablet by mouth 2 times daily     guaiFENesin 600 MG extended release tablet  Commonly known as: MUCINEX  Take 1 tablet by mouth 2 times daily     ipratropium-albuterol 0.5-2.5 (3) MG/3ML Soln nebulizer solution  Commonly known as: DUONEB  Inhale 3 mLs into the lungs 4 times daily as needed for Shortness of Breath     melatonin 3 MG Tabs tablet  Take 1 tablet by mouth nightly as needed (insomnia)     nicotine 14 MG/24HR  Commonly known as: NICODERM CQ  Place 1 patch onto the skin daily     phenol 1.4 % Liqd mouth spray  Take 1 spray by mouth every 2 hours as needed for Sore Throat     tamsulosin 0.4 MG capsule  Commonly known as: FLOMAX  Take 1 capsule by mouth daily            STOP taking these medications      midodrine 10 MG tablet  Commonly known as: PROAMATINE               Where to Get Your Medications        These medications were sent to [id number] [facility], [address] [place], [place] [address] [address] [address]      Phone: [phone]   amoxicillin-clavulanate 875-125 MG per tablet  metoprolol succinate 25 MG extended release tablet  nicotine 14 MG/24HR           Discharged in stable condition to home. Follow Up: Follow up with PCP and pulmonology.

## 2022-12-15 NOTE — PROGRESS NOTES
Patient back on RA, still has a cough but sats remains mid 90's. No acute distress noted. Will continue to monitor.

## 2022-12-15 NOTE — PROGRESS NOTES
Patient's repeat K+ is 3.9. Dr. Stella Sparks sent a message and okay for patient to still go home today. Patient's ex-wife made aware and will take him home this evening.

## 2022-12-15 NOTE — CARE COORDINATION
DISCHARGE ORDER  Date/Time 12/15/2022 11:28 AM  Completed by: Guillermo Vang RN, Case Management    Patient Name: Taurus Lr      : 1955  Admitting Diagnosis: Malignant neoplasm of urinary bladder, unspecified site ([facility] [address] [C67.9]  Clot hematuria [R31.0]      Admit order Date and Status: IP 2022  (verify MD's last order for status of admission)      Noted discharge order. If applicable PT/OT recommendation at Discharge: [facility] 19  Discharge Recommendations: Home with initial 24/7 assist, progressing to PRN,  and Home PT  DME needs for discharge: Needs Met      Discharge Plan: Chart reviewed. Met with pt and ex-wife  at bedside and explained the role of the CM. Plans to return home. He does not have 24/7 supervision. Cannot got to rehab on Chemo. Pt /family was provided a list of [facility] agencies that provide private pay HHA/ services. Additional info on [place] [facility]ging provided as well. [facility] orders for resumption of care with [facility] ( SN- med management, PT/OT, HHA and  for community resources. Orders and clinicals faxed to [address] [place] [address] OF [facility] at [phone]. Pt will be transported home by ex-wife. RX to be filled by OP pharmacy and delivered to bedside prior to DC. Date of Last IMM Given: 2022      Has Home O2 in place on admit:  No  Informed of need to bring portable home O2 tank on day of discharge for nursing to connect prior to leaving:   No  Verbalized agreement/Understanding:   No  Pt is being d/c'd to home today. Pt's O2 sats are 95% on RA. Discharge timeout done with Bernice Canales RN. All discharge needs and concerns addressed.

## 2022-12-15 NOTE — PROGRESS NOTES
Inpatient Physical Therapy Evaluation and Treatment    Unit: PCU  Date:  12/15/2022  Patient Name:    Liana Damon  Admitting diagnosis:  Malignant neoplasm of urinary bladder, unspecified site [facility]) [C67.9]  Clot hematuria [R31.0]  Admit Date:  12/8/2022  Precautions/Restrictions/WB Status/ Lines/ Wounds/ Oxygen: Fall risk, Bed/chair alarm, Lines -IV, Telemetry, Continuous pulse oximetry, and Isolation Precautions: Contact    Treatment Time:  08:36-09:25  Treatment Number:  1   Timed Code Treatment Minutes: 39 minutes  Total Treatment Minutes:  49  minutes    Patient Goals for Therapy: Not stated. Agreeable to today's activities. Discharge Recommendations: Home with initial 24/7 assist, progressing to PRN,  and Home PT  DME needs for discharge: Needs Met       Therapy recommendation for EMS Transport: can transport by wheelchair    Therapy recommendations for staff:   Stand by assist with use of No AD and gait belt for all transfers and ambulation within room    History of Present Illness:   12/08 Joan: [age] y.o. male with hypertension, depression, anxiety, COPD, and a bladder tumor who presents to [facility] as a direct admit from Dr. Cora Sow. Patient is s/p Cystoscopy, transurethral resection of a bladder tumor. He had some Hematuria after the procedure. Admitted for observation overnight. We have been consulted for medical management while he is admitted. Also per review it was noted that patient had some coughing and intermittent tachycardia. \"  Post-surgically, being treated for UTI, hematuria, PNA, tachycardia. PMHx including not limited to: DVT LLE s/p IVC filter, and the above. Home Health S4 Level Recommendation:  Level 1 Standard  AM-PAC Mobility Score       AM-PAC Inpatient Mobility without Stair Climbing Raw Score : 19    Preadmission Environment    Pt.  Lives Alone  Home environment:    Two story - pt stays on the first floor   Steps to enter first floor:  one  steps to enter with railings   Steps to second floor: N/A  Bathroom: tub/shower unit and standard height commode, shower chair   Equipment owned: [facility] and  (manual)roberta . Preadmission Status:  Pt. Able to drive: No family drives for pt  Pt Fully independent with ADLs: Yes for dressing and sup for showers   Pt. Required assistance from family for: Cleaning and Laundry  Pt will make sandwiches   Pt. independent for transfers and gait and walked with No Device  History of falls No     Pain  Yes  Ratin/10   Location:back   Pain Medicine Status: No request made       Cognition    A&O not formally assessed - [place]    Able to follow 1 step commands     Subjective  Patient lying supine in bed with family at bedside. Pt agreeable to this PT eval & tx. Upper Extremity ROM/Strength  Please see OT evaluation. Lower Extremity ROM / Strength   AROM WFL: Yes    Strength Assessment (measured on a 0-5 scale):  R LE   Quad   4-   Ant Tib  4   Hamstring 4   Iliopsoas 4-   Abductors  4  L LE  Quad   4+   Ant Tib  4   Hamstring 4   Iliopsoas 3+    Abductors  4    Lower Extremity Sensation    NT    Lower Extremity Proprioception:   WFL    Coordination and Tone  WFL    Balance  Sitting:  Good ; Independent  Comments: at EOB. Flexed posture. Standing: Good - ; SBA  Comments: without UE support    Bed Mobility   Supine to Sit:    Modified Independent  Sit to Supine:   Not Tested  Rolling:   Independent  Scooting in sitting: Independent  Scooting in supine:  Not Tested    Transfer Training  (To/from EOB, std height toilet, and recliner)   Sit to stand:   SBA  Stand to sit:   SBA  Bed to Chair:   Not Tested with use of N/A    Gait gait completed as indicated below  Distance:      20 ft + 20 ft with rolling walker       100 ft without device  Deviations (firm surface/linoleum):  decreased cammy and forward flexed posture  Assistive Device Used:    As above  Level of Assist:    SBA  Comment: steady throughout.  No obvious difference in gait pattern or balance with and without the walker. Stair Training  deferred. Pt completed 5x bilat standing marches. Expect that pt has sufficient functional strength, balance, and endurance to complete home stairs with light assist/supervision. Activity Tolerance   Pt completed therapy session with No adverse symptoms noted w/activity    BP (mmHg)  HR (bpm)  SpO2 (%)    Semifowlers before activity   114 94% on RA    Seated /93 113 93-95%    Standing EOB  108-130 95% on RA   After ambulating to/from toilet  114-127 96% on RA     Positioning Needs   Pt up in chair, alarm set, positioned in proper neutral alignment and pressure relief provided. Call light provided and all needs within reach    Exercises Initiated  all completed bilaterally unless indicated  Ankle Pumps x 15 reps  LAQ x 5 reps    Other  None. Patient/Family Education   Pt educated on role of inpatient PT, POC, importance of continued activity, calling for assist with mobility. Assessment  Pt seen for Physical Therapy evaluation in acute care setting. PTA pt lives alone on 1st floor of [address] [place] home, and is indep with all mobility without a device, occasionally with a walker. Pt demonstrated ambulation of household distances without device at SBA level, steady throughout. No desaturation with activity although HR mildly elevated. Recommending Home with initial 24/7 assist/supervision upon discharge as patient functioning close to baseline level and would benefit from continued therapy services. Goals : To be met in 3 visits:  1). Independent with LE Ex x 10 reps    To be met in 6 visits:  1). Supine to/from sit: Independent  2). Sit to/from stand: Independent  3). Bed to chair: Independent  4). Gait: Ambulate 150 ft.  with  Independent and use of No AD  5). Tolerate B LE exercises 3 sets of 10-15 reps  6).   Ascend/descend 2 steps with Independent with use of hand rail bilateral and LRAD (least restrictive assistive device)    Rehabilitation Potential: Good  Strengths for achieving goals include:   Pt motivated, PLOF, Family Support, and Pt cooperative   Barriers to achieving goals include:    No Barriers    Plan    To be seen 2-3 x / week  while in acute care setting for therapeutic exercises, bed mobility, transfers, progressive gait training, balance training, and family/patient education. Signature: Gianni Yañez, PT, DPT    If patient discharges from this facility prior to next visit, this note will serve as the Discharge Summary.

## 2022-12-15 NOTE — LETTER
Karon Meigs, MD        January 4, 2023    [address]  [address]      Dear Samuel Kruger:    I am writing because my staff has left multiple messages asking that you call the office to schedule you CT chest, but to date they have not heard from you. We care about you and the management of your healthcare and want to make sure that you follow up as recommended. As your Pulmonologist I must stress to you how important it is for you to have the tests I order as well to see me in follow up. The tests are necessary so that I can monitor your pulmonary nodule for any changes that could be cancer. I have to also mention, that in an effort to provide quality care and timely appointments to all my patients, it is our policy that patients be discharged from the practice if they do not show for three scheduled appointments. You would be informed of this termination by mail, and would have 30 days from the date of discharge to locate another physician. Please call the office to let us know your plans for treatment and to reschedule your appointment.      Sincerely,        Karon Meigs, MD

## 2022-12-15 NOTE — DISCHARGE INSTR - COC
12Continuity of Care Form    Patient Name: Huey Mercado   :  1955  MRN:  [id number]    Admit date:  2022  Discharge date:  12/15/2022    Code Status Order: Full Code   Advance Directives:   [place] Directive Type of Healthcare Directive Copy in [address] Agent's Name Healthcare Agent's Phone Number    22 1102 No, patient does not have an advance directive for healthcare treatment -- -- -- -- --    22 0730 No, patient does not have an advance directive for healthcare treatment -- -- -- -- --            Admitting Physician:  Carissa Mena MD  PCP: NALINI Arce CNP    Discharging Nurse: [facility] F Unit/Room#: /3155-07  Discharging Unit Phone Number: [phone]    Emergency Contact:   Extended Emergency Contact Information  Primary Emergency Contact: [address] Phone: [phone]  Mobile Phone: [phone]  Relation: Child  Secondary Emergency Contact: [address] Phone: [phone]  Mobile Phone: [phone]  Relation: Child    Past Surgical History:  Past Surgical History:   Procedure Laterality Date    BRONCHOSCOPY N/A 2022    BRONCHOSCOPY ALVEOLAR LAVAGE performed by David Young MD at [facility]  2022    BRONCHOSCOPY THERAPUTIC ASPIRATION INITIAL performed by David Young MD at [address] [place] [address] 10/16/2022    CYSTOSCOPY TRANSURETHRAL RESECTION BLADDER >5CM performed by Trish Snow MD at [address] [place] [address] 2022    CYSTOSCOPY, TRANSURETHERAL RESECTION OF A BLADDER TUMOR performed by Carissa Mena MD at [address] [place] [address]         Immunization History: There is no immunization history on file for this patient.     Active Problems:  Patient Active Problem List   Diagnosis Code    Right carpal tunnel syndrome G56.01    Sepsis ([facility] [address] A41.9    Acute cystitis with hematuria N30.01    Hyponatremia E87.1    MATHEUS (acute kidney injury) ([facility] [address] N17.9    COPD (chronic obstructive pulmonary disease) ([facility]) J44.9    Microcytic anemia D50.9    Hematuria R31.9    Urinary retention R33.9    Severe protein-calorie malnutrition (HCC) E43    Mass of urinary bladder N32.89    Tobacco abuse Z72.0    Acute deep vein thrombosis (DVT) of iliac vein of left lower extremity ([facility]) I82.422    Acute deep vein thrombosis (DVT) of calf muscle vein of left lower extremity ([facility]) I82.462    Clot hematuria R31.0    Malignant neoplasm of urinary bladder ([facility]) C67.9    Tachycardia R00.0    Abnormal CXR R93.89    Cavitary lung disease J98.4    Urinary tract infection with hematuria N39.0, R31.9    Advanced COPD ([facility] [address] J44.9       Isolation/Infection:   Isolation            Contact          Patient Infection Status       Infection Onset Added Last Indicated Last Indicated By Review Planned Expiration Resolved Resolved By    MDRO (multi-drug resistant organism) 12/08/22 12/12/22 12/12/22 Kamar Royal RN        urine    Resolved    Pulmonary Tuberculosis (Rule Out) 12/09/22 12/09/22 12/11/22 Culture with Smear, Acid Fast Bacillius (Ordered)   12/14/22 Rule-Out Test Resulted    COVID-19 (Rule Out) 12/09/22 12/09/22 12/09/22 COVID-19, Rapid (Ordered)   12/09/22 Rule-Out Test Resulted    COVID-19 (Rule Out) 10/13/22 10/13/22 10/13/22 COVID-19 & Influenza Combo (Ordered)   10/13/22 Rule-Out Test Resulted            Nurse Assessment:  Last Vital Signs: BP (!) 154/90   Pulse (!) 107   Temp 97.3 °F (36.3 °C) (Oral)   Resp 22   Ht 5' 6\" (1.676 m)   Wt 113 lb 6.4 oz (51.4 kg)   SpO2 97%   BMI 18.30 kg/m²     Last documented pain score (0-10 scale): Pain Level: 4  Last Weight:   Wt Readings from Last 1 Encounters:   12/15/22 113 lb 6.4 oz (51.4 kg)     Mental Status:  oriented and alert    IV Access:  - None    Nursing Mobility/ADLs:  Walking   Assisted  Transfer  Independent  Bathing Assisted  Dressing  Assisted  Toileting  Assisted  Feeding  Independent  Med Admin  Independent  Med Delivery   whole    Wound Care Documentation and Therapy:        Elimination:  Continence: Bowel: No  Bladder: No  Urinary Catheter: None   Colostomy/Ileostomy/Ileal Conduit: No       Date of Last BM: 12/15/22    Intake/Output Summary (Last 24 hours) at 12/15/2022 1114  Last data filed at 12/14/2022 1751  Gross per 24 hour   Intake 180 ml   Output --   Net 180 ml     I/O last 3 completed shifts: In: 180 [P.O.:180]  Out: -     Safety Concerns: At Risk for Falls, Aspiration risk    Impairments/Disabilities:      Hearing---Patient very [place]    Nutrition Therapy:  Current Nutrition Therapy:   - Oral Diet:  General    Routes of Feeding: Oral  Liquids: Thin Liquids  Daily Fluid Restriction: no  Last Modified Barium Swallow with Video (Video Swallowing Test): done on 12/14/22    Treatments at the Time of Hospital Discharge:   Respiratory Treatments: Nebulizers as needed  Oxygen Therapy:  is not on home oxygen therapy.   Ventilator:    - No ventilator support    Rehab Therapies: Physical Therapy and Occupational Therapy  Weight Bearing Status/Restrictions: No weight bearing restrictions  Other Medical Equipment (for information only, NOT a DME order):  {EQUIPMENT:[id number]}  Other Treatments: ***    Patient's personal belongings (please select all that are sent with patient):  None    RN SIGNATURE:  Electronically signed by Maurilio Pereira RN on 12/15/22 at 5:33 PM EST    CASE MANAGEMENT/SOCIAL WORK SECTION    Inpatient Status Date: 12/06/2022    Readmission Risk Assessment Score:  Readmission Risk              Risk of Unplanned Readmission:  24           Discharging to Facility/ Agency   Name: [address] [place] [address]  Address:  Phone: [phone]    Dialysis Facility (if applicable)   Name:  Address:  Dialysis Schedule:  Phone:  Fax:    / signature: Electronically signed by Nohemy Causey RN on 12/15/22 at 11:17 AM EST    PHYSICIAN SECTION    Prognosis: {Prognosis:0392631481}    Condition at Discharge: [address] Patient Condition:[id number]}    Rehab Potential (if transferring to Rehab): {Prognosis:0056622505}    Recommended Labs or Other Treatments After Discharge:     Physician Certification: I certify the above information and transfer of Chauncey Gonzalez  is necessary for the continuing treatment of the diagnosis listed and that he requires [address] for greater 30 days.      Update Admission H&P: Changes in H&P as follows - see attached    PHYSICIAN SIGNATURE: YASMEEN Martin MD/  Electronically signed by Nohemy Causey RN on 12/15/22 at 11:18 AM EST

## 2023-01-05 NOTE — TELEPHONE ENCOUNTER
Julia (pt emergency contact) called back to schedule CT scan as well as follow up. Appts scheduled on 03/02/2023.  CT order pended

## 2023-02-03 ENCOUNTER — HOSPITAL ENCOUNTER (OUTPATIENT)
Dept: GENERAL RADIOLOGY | Age: 68
Discharge: HOME | End: 2023-02-03
Payer: MEDICARE

## 2023-02-03 ENCOUNTER — HOSPITAL ENCOUNTER (OUTPATIENT)
Age: 68
Discharge: HOME | End: 2023-02-03
Payer: MEDICARE

## 2023-02-03 DIAGNOSIS — R05.1 ACUTE COUGH: ICD-10-CM

## 2023-02-03 PROCEDURE — 71046 X-RAY EXAM CHEST 2 VIEWS: CPT

## 2023-03-02 ENCOUNTER — OFFICE VISIT (OUTPATIENT)
Dept: PULMONOLOGY | Age: 68
End: 2023-03-02
Payer: MEDICARE

## 2023-03-02 ENCOUNTER — TELEPHONE (OUTPATIENT)
Dept: PULMONOLOGY | Age: 68
End: 2023-03-02

## 2023-03-02 ENCOUNTER — HOSPITAL ENCOUNTER (OUTPATIENT)
Dept: CT IMAGING | Age: 68
Discharge: HOME | End: 2023-03-02
Payer: MEDICARE

## 2023-03-02 VITALS
OXYGEN SATURATION: 98 % | SYSTOLIC BLOOD PRESSURE: 117 MMHG | HEIGHT: 66 IN | RESPIRATION RATE: 16 BRPM | BODY MASS INDEX: 16.55 KG/M2 | HEART RATE: 89 BPM | DIASTOLIC BLOOD PRESSURE: 74 MMHG | WEIGHT: 103 LBS

## 2023-03-02 DIAGNOSIS — J18.9 PNEUMONIA DUE TO INFECTIOUS ORGANISM, UNSPECIFIED LATERALITY, UNSPECIFIED PART OF LUNG: Primary | ICD-10-CM

## 2023-03-02 DIAGNOSIS — R93.89 ABNORMAL CXR: ICD-10-CM

## 2023-03-02 PROCEDURE — 1123F ACP DISCUSS/DSCN MKR DOCD: CPT | Performed by: INTERNAL MEDICINE

## 2023-03-02 PROCEDURE — 71250 CT THORAX DX C-: CPT

## 2023-03-02 PROCEDURE — 99214 OFFICE O/P EST MOD 30 MIN: CPT | Performed by: INTERNAL MEDICINE

## 2023-03-02 RX ORDER — FLUTICASONE FUROATE, UMECLIDINIUM BROMIDE AND VILANTEROL TRIFENATATE 100; 62.5; 25 UG/1; UG/1; UG/1
1 POWDER RESPIRATORY (INHALATION) DAILY
Qty: 1 EACH | Refills: 3 | Status: SHIPPED | OUTPATIENT
Start: 2023-03-02

## 2023-03-02 NOTE — PATIENT INSTRUCTIONS
Bronchoscopy has been set up for  arrival time  at [facility]. Please enter at [facility]. Nothing to eat or drink after midnight prior to the procedure. Must have a  to bring you to and from the procedure. Hold blood thinners as instructed prior to the procedure.

## 2023-03-02 NOTE — PROGRESS NOTES
P Pulmonary, Critical [facility]                                 Pulmonary Consult /Progress Note :                                                                  CHIEF COMPLAINT: hypotension ,urinary retention ,    Consulting provider: Dr Stuart Dalton     HPI:     Kobi Mcclendon is a 75 y.o. male who presents to the emergency department for evaluation of urinary /retention in October       He had DVT that time and on elquis   He had cystoscopy and was cancer and had chemotherapy     His ex wife states he is more than 60-70 PPY smoking history and he was not following with any physician     Per wife he has worsening Cough and start to get yellow          Past Medical History:   Diagnosis Date    Anxiety     Depression     Hypertension        Past Surgical History:        Procedure Laterality Date    BRONCHOSCOPY N/A 12/12/2022    BRONCHOSCOPY ALVEOLAR LAVAGE performed by Anabella Joyner MD at [facility]  12/12/2022    BRONCHOSCOPY THERAPUTIC ASPIRATION INITIAL performed by Anabella Joyner MD at [address] [place] [address] 10/16/2022    CYSTOSCOPY TRANSURETHRAL RESECTION BLADDER >5CM performed by Stacia Ventura MD at [address] [place] [address] 12/8/2022    CYSTOSCOPY, TRANSURETHERAL RESECTION OF A BLADDER TUMOR performed by Love Bach MD at [address] [place] [address] St:  is allergic to bee venom and zosyn [piperacillin sod-tazobactam so]. Social History:    TOBACCO:   reports that he quit smoking about 4 months ago. His smoking use included cigarettes. He has a 60.00 pack-year smoking history. He has never used smokeless tobacco.  ETOH:   reports no history of alcohol use. Family History:   History reviewed. No pertinent family history.     Current Medications:    Current Outpatient Medications:     nicotine (NICODERM CQ) 14 MG/24HR, Place 1 patch onto the skin daily, Disp: 30 patch, Rfl: 0 metoprolol succinate (TOPROL XL) 25 MG extended release tablet, Take 1 tablet by mouth daily, Disp: 30 tablet, Rfl: 0    ipratropium-albuterol (DUONEB) 0.5-2.5 (3) MG/3ML SOLN nebulizer solution, Inhale 3 mLs into the lungs 4 times daily as needed for Shortness of Breath, Disp: 360 mL, Rfl:     apixaban (ELIQUIS) 5 MG TABS tablet, Take 1 tablet by mouth 2 times daily, Disp: 60 tablet, Rfl:     guaiFENesin (MUCINEX) 600 MG extended release tablet, Take 1 tablet by mouth 2 times daily, Disp: , Rfl:     melatonin 3 MG TABS tablet, Take 1 tablet by mouth nightly as needed (insomnia), Disp: 3 tablet, Rfl: 3    tamsulosin (FLOMAX) 0.4 MG capsule, Take 1 capsule by mouth daily, Disp: 30 capsule, Rfl: 3    phenol 1.4 % LIQD mouth spray, Take 1 spray by mouth every 2 hours as needed for Sore Throat (Patient not taking: Reported on 3/2/2023), Disp: 1 mL, Rfl: 0      REVIEW OF SYSTEMS:  Constitutional: Negative for fever  HENT: Negative for sore throat  Eyes: Negative for redness   Respiratory: mild   Cardiovascular: Negative for chest pain  Gastrointestinal: Negative for vomiting, diarrhea   Genitourinary:retention   Musculoskeletal: Negative for arthralgias   Skin: Negative for rash  Neurological: Negative for syncope  Hematological: Negative for adenopathy  Psychiatric/Behavorial: Negative for anxiety      Objective:   PHYSICAL EXAM:    Blood pressure 117/74, pulse 89, resp. rate 16, height 5' 6\" (1.676 m), weight 103 lb (46.7 kg), SpO2 98 %.' on RA  Gen: No distress. Eyes: PERRL. No sclera icterus. No conjunctival injection. ENT: No discharge. Pharynx clear. Neck: Trachea midline. No obvious mass. Resp: rhonchi bilateral   CV: Regular rate. Regular rhythm. No murmur or rub. No edema. GI: Non-tender. Non-distended. No hernia. Skin: Warm and dry. No nodule on exposed extremities. Lymph: No cervical LAD. No supraclavicular LAD. M/S: No cyanosis. No joint deformity. No clubbing. Neuro: Awake. Alert.  Moves all four extremities. Psych: Oriented x 3. No anxiety. LABS/IMAGING:    CBC:  Lab Results   Component Value Date    WBC 6.3 12/14/2022    HGB 9.0 (L) 12/14/2022    HCT 28.0 (L) 12/14/2022    MCV 86.8 12/14/2022     12/14/2022    LYMPHOPCT 7.0 12/14/2022    RBC 3.23 (L) 12/14/2022    MCH 27.8 12/14/2022    MCHC 32.0 12/14/2022    RDW 20.6 (H) 12/14/2022    NEUTOPHILPCT 86.5 12/14/2022    MONOPCT 4.2 12/14/2022    BASOPCT 0.8 12/14/2022    NEUTROABS 5.4 12/14/2022    LYMPHSABS 0.4 (L) 12/14/2022    MONOSABS 0.3 12/14/2022    EOSABS 0.1 12/14/2022    BASOSABS 0.1 12/14/2022       No results for input(s): WBC, HGB, HCT, MCV, PLT in the last 720 hours. BMP:   No results for input(s): NA, K, CL, CO2, PHOS, BUN, CREATININE, CA in the last 72 hours. MG:   Lab Results   Component Value Date/Time    MG 1.70 10/18/2022 04:15 AM     Ca/Phos:   Lab Results   Component Value Date    CALCIUM 7.5 (L) 12/15/2022    PHOS 2.6 10/21/2022     LIVER PROFILE:   No results for input(s): AST, ALT, LIPASE, BILIDIR, BILITOT, ALKPHOS in the last 72 hours. Invalid input(s): AMYLASE,  ALB      PT/INR: No results for input(s): PROTIME, INR in the last 72 hours. APTT: No results for input(s): APTT in the last 72 hours. Cardiac Enzymes:  Lab Results   Component Value Date    TROPONINI 0.03 (H) 10/14/2022       Assessment:       -RUL cyst /cavitary lesion ,concern for aspergillosis bs AFB   *- Bladder cancer   -COPD   COPD  Anemia        Plan: With his cavitary lesion ,history of bladder cancer and persistent cough   I would proceed with Bronch   Biopsy at this time risky so will hold  Stop elquis 2 before procedure   Will do work for fungus and AFB   Will start trelegy and albuterol    All risks, benefits, alternatives and potential complications explained thoroughly including, but not limited to, bleeding, infection, lung injury, pneumothorax .  , heart attack, prolonged ventilation,  and even death, and the patient agrees to proceed. Ct chest when better condition   Keep in ICU       Thank you very much for allowing me to participate in the care of this pleasant patient , should you have any questions ,please do not hesitate to contact me      Adebayo Vela MD,[facility]  Pulmonary&Critical Care Medicine    Laura Greene    NOTE: This report was transcribed using voice recognition software. Every effort was made to ensure accuracy; however, inadvertent computerized transcription errors may be present.

## 2023-03-16 NOTE — PROGRESS NOTES
..1.  Do not eat or drink anything after 12 midnight prior to surgery. This includes no water, chewing gum or mints, except for bowel prep complete per MD.  2.  Take the following pills with a small sip of water on the morning of surgery 03/22/2023.  3.  Aspirin, Ibuprofen, Advil, Naproxen, Vitamin E and other Anti-inflammatory products should be stopped for 5 days before surgery or as directed by your physician. 4.  Check with your doctor regarding stopping Plavix, Coumadin, Lovenox, Fragmin or other blood thinners. 5.  Do not smoke and do not drink alcoholic beverages 24 hours prior to surgery. This includes NA Beer. 6.  You may brush your teeth and gargle the morning of surgery. DO NOT SWALLOW WATER.  7.  You MUST make arrangements for a responsible adult to take you home after your surgery. You will not be allowed to leave alone or drive yourself home. It is strongly suggested someone stay with you the first 24 hours. Your surgery will be cancelled if you do not have a ride home. 8.  A parent/legal guardian must accompany a child scheduled for surgery and plan to stay at the hospital until the child is discharged. Please do not bring other children with you. 9.  Please wear simple, loose fitting clothing to the hospital.  Kristeen Cava not bring valuables ( money, credit cards, checkbooks, etc.)  Do not wear any makeup (including no eye makeup) or nail polish on your fingers or toes. 10.  Do not wear any jewelry or piercing on the day of surgery. All body piercing jewelry must be removed. 11.  If you have dentures, they will be removed before going to the OR; we will provide you a container. If you wear contact lenses or glasses, they will be removed; please bring a case for them.   15.  Notify your Surgeon if you develop any illness between now and surgery time; cough, cold, fever, sore throat, nausea, vomiting, etc.  Please notify your surgeon if you experience dizziness, shortness of breath or blurred vision between now and the time of your surgery. To provide excellent care, visitors will be limited to two in a room at any given time. Please no children under the age of 15 in the surgical department.

## 2023-03-16 NOTE — PROGRESS NOTES
PAT completed with ex-wife orders placed per MD, aware of 1200 arrival time on 03/22/2023 at main [facility] states she will be  and caregiver day of procedure. Inna Joseph RN

## 2023-03-21 NOTE — PROGRESS NOTES
Changed procedure time to 1330, talked with Julia regarding change and she stated that was fine.   Pt to arrive at 1230 for a 1330 procedure

## 2023-03-22 ENCOUNTER — APPOINTMENT (OUTPATIENT)
Dept: GENERAL RADIOLOGY | Age: 68
End: 2023-03-22
Attending: INTERNAL MEDICINE
Payer: MEDICARE

## 2023-03-22 ENCOUNTER — ANESTHESIA (OUTPATIENT)
Dept: ENDOSCOPY | Age: 68
End: 2023-03-22
Payer: MEDICARE

## 2023-03-22 ENCOUNTER — ANESTHESIA EVENT (OUTPATIENT)
Dept: ENDOSCOPY | Age: 68
End: 2023-03-22
Payer: MEDICARE

## 2023-03-22 ENCOUNTER — HOSPITAL ENCOUNTER (OUTPATIENT)
Age: 68
Setting detail: OUTPATIENT SURGERY
Discharge: HOME | End: 2023-03-22
Attending: INTERNAL MEDICINE | Admitting: INTERNAL MEDICINE
Payer: MEDICARE

## 2023-03-22 VITALS
WEIGHT: 105 LBS | BODY MASS INDEX: 16.88 KG/M2 | DIASTOLIC BLOOD PRESSURE: 78 MMHG | OXYGEN SATURATION: 94 % | HEIGHT: 66 IN | TEMPERATURE: 97.9 F | HEART RATE: 89 BPM | RESPIRATION RATE: 16 BRPM | SYSTOLIC BLOOD PRESSURE: 105 MMHG

## 2023-03-22 LAB
APPEARANCE BRONCH: ABNORMAL
CLOT SPEC QL: ABNORMAL
COLOR BRONCH: COLORLESS
LYMPHOCYTES NFR BRONCH MANUAL: 2 % (ref 5–10)
Lab: NORMAL
Lab: NORMAL
MACROPHAGES NFR BRONCH MANUAL: 2 % (ref 90–95)
MONOCYTES NFR BRONCH MANUAL: 2 %
NEUTROPHILS NFR BRONCH MANUAL: 94 % (ref 5–10)
NUC CELL # BRONCH MANUAL: 1034 /CUMM
RBC # FLD MANUAL: 600 /CUMM
THIS TEST SENT TO: NORMAL
THIS TEST SENT TO: NORMAL
TOTAL CELLS COUNTED BRONCH: 100

## 2023-03-22 PROCEDURE — 89051 BODY FLUID CELL COUNT: CPT

## 2023-03-22 PROCEDURE — 2580000003 HC RX 258: Performed by: NURSE ANESTHETIST, CERTIFIED REGISTERED

## 2023-03-22 PROCEDURE — 3700000000 HC ANESTHESIA ATTENDED CARE: Performed by: INTERNAL MEDICINE

## 2023-03-22 PROCEDURE — 31623 DX BRONCHOSCOPE/BRUSH: CPT | Performed by: INTERNAL MEDICINE

## 2023-03-22 PROCEDURE — 3700000001 HC ADD 15 MINUTES (ANESTHESIA): Performed by: INTERNAL MEDICINE

## 2023-03-22 PROCEDURE — 88112 CYTOPATH CELL ENHANCE TECH: CPT

## 2023-03-22 PROCEDURE — 87385 HISTOPLASMA CAPSUL AG IA: CPT

## 2023-03-22 PROCEDURE — 87106 FUNGI IDENTIFICATION YEAST: CPT

## 2023-03-22 PROCEDURE — 2709999900 HC NON-CHARGEABLE SUPPLY: Performed by: INTERNAL MEDICINE

## 2023-03-22 PROCEDURE — 87205 SMEAR GRAM STAIN: CPT

## 2023-03-22 PROCEDURE — 88185 FLOWCYTOMETRY/TC ADD-ON: CPT

## 2023-03-22 PROCEDURE — 31645 BRNCHSC W/THER ASPIR 1ST: CPT | Performed by: INTERNAL MEDICINE

## 2023-03-22 PROCEDURE — 87449 NOS EACH ORGANISM AG IA: CPT

## 2023-03-22 PROCEDURE — 88305 TISSUE EXAM BY PATHOLOGIST: CPT

## 2023-03-22 PROCEDURE — 88312 SPECIAL STAINS GROUP 1: CPT

## 2023-03-22 PROCEDURE — 7100000010 HC PHASE II RECOVERY - FIRST 15 MIN: Performed by: INTERNAL MEDICINE

## 2023-03-22 PROCEDURE — 87070 CULTURE OTHR SPECIMN AEROBIC: CPT

## 2023-03-22 PROCEDURE — 2500000003 HC RX 250 WO HCPCS: Performed by: NURSE ANESTHETIST, CERTIFIED REGISTERED

## 2023-03-22 PROCEDURE — 87206 SMEAR FLUORESCENT/ACID STAI: CPT

## 2023-03-22 PROCEDURE — 71045 X-RAY EXAM CHEST 1 VIEW: CPT

## 2023-03-22 PROCEDURE — 3609010800 HC BRONCHOSCOPY ALVEOLAR LAVAGE: Performed by: INTERNAL MEDICINE

## 2023-03-22 PROCEDURE — 6360000002 HC RX W HCPCS: Performed by: NURSE ANESTHETIST, CERTIFIED REGISTERED

## 2023-03-22 PROCEDURE — 3609011100 HC BRONCHOSCOPY BRUSHINGS: Performed by: INTERNAL MEDICINE

## 2023-03-22 PROCEDURE — 87102 FUNGUS ISOLATION CULTURE: CPT

## 2023-03-22 PROCEDURE — 88184 FLOWCYTOMETRY/ TC 1 MARKER: CPT

## 2023-03-22 PROCEDURE — 87116 MYCOBACTERIA CULTURE: CPT

## 2023-03-22 PROCEDURE — 7100000011 HC PHASE II RECOVERY - ADDTL 15 MIN: Performed by: INTERNAL MEDICINE

## 2023-03-22 RX ORDER — MEPERIDINE HYDROCHLORIDE 25 MG/ML
12.5 INJECTION INTRAMUSCULAR; INTRAVENOUS; SUBCUTANEOUS EVERY 5 MIN PRN
Status: CANCELLED | OUTPATIENT
Start: 2023-03-22

## 2023-03-22 RX ORDER — DIPHENHYDRAMINE HYDROCHLORIDE 50 MG/ML
12.5 INJECTION INTRAMUSCULAR; INTRAVENOUS
Status: CANCELLED | OUTPATIENT
Start: 2023-03-22 | End: 2023-03-23

## 2023-03-22 RX ORDER — LABETALOL HYDROCHLORIDE 5 MG/ML
10 INJECTION, SOLUTION INTRAVENOUS
Status: CANCELLED | OUTPATIENT
Start: 2023-03-22

## 2023-03-22 RX ORDER — SODIUM CHLORIDE 0.9 % (FLUSH) 0.9 %
5-40 SYRINGE (ML) INJECTION EVERY 12 HOURS SCHEDULED
Status: CANCELLED | OUTPATIENT
Start: 2023-03-22

## 2023-03-22 RX ORDER — ONDANSETRON 2 MG/ML
4 INJECTION INTRAMUSCULAR; INTRAVENOUS
Status: CANCELLED | OUTPATIENT
Start: 2023-03-22 | End: 2023-03-23

## 2023-03-22 RX ORDER — SODIUM CHLORIDE, SODIUM LACTATE, POTASSIUM CHLORIDE, CALCIUM CHLORIDE 600; 310; 30; 20 MG/100ML; MG/100ML; MG/100ML; MG/100ML
INJECTION, SOLUTION INTRAVENOUS CONTINUOUS PRN
Status: DISCONTINUED | OUTPATIENT
Start: 2023-03-22 | End: 2023-03-22 | Stop reason: SDUPTHER

## 2023-03-22 RX ORDER — OXYCODONE HYDROCHLORIDE 5 MG/1
5 TABLET ORAL PRN
Status: CANCELLED | OUTPATIENT
Start: 2023-03-22 | End: 2023-03-22

## 2023-03-22 RX ORDER — PROPOFOL 10 MG/ML
INJECTION, EMULSION INTRAVENOUS PRN
Status: DISCONTINUED | OUTPATIENT
Start: 2023-03-22 | End: 2023-03-22 | Stop reason: SDUPTHER

## 2023-03-22 RX ORDER — SODIUM CHLORIDE, SODIUM LACTATE, POTASSIUM CHLORIDE, CALCIUM CHLORIDE 600; 310; 30; 20 MG/100ML; MG/100ML; MG/100ML; MG/100ML
INJECTION, SOLUTION INTRAVENOUS CONTINUOUS
Status: DISCONTINUED | OUTPATIENT
Start: 2023-03-22 | End: 2023-03-22 | Stop reason: HOSPADM

## 2023-03-22 RX ORDER — LIDOCAINE HYDROCHLORIDE 20 MG/ML
INJECTION, SOLUTION INFILTRATION; PERINEURAL PRN
Status: DISCONTINUED | OUTPATIENT
Start: 2023-03-22 | End: 2023-03-22 | Stop reason: SDUPTHER

## 2023-03-22 RX ORDER — SODIUM CHLORIDE 0.9 % (FLUSH) 0.9 %
5-40 SYRINGE (ML) INJECTION PRN
Status: CANCELLED | OUTPATIENT
Start: 2023-03-22

## 2023-03-22 RX ORDER — SODIUM CHLORIDE 9 MG/ML
25 INJECTION, SOLUTION INTRAVENOUS PRN
Status: CANCELLED | OUTPATIENT
Start: 2023-03-22

## 2023-03-22 RX ORDER — OXYCODONE HYDROCHLORIDE 5 MG/1
10 TABLET ORAL PRN
Status: CANCELLED | OUTPATIENT
Start: 2023-03-22 | End: 2023-03-22

## 2023-03-22 RX ADMIN — PROPOFOL 200 MG: 10 INJECTION, EMULSION INTRAVENOUS at 13:48

## 2023-03-22 RX ADMIN — SODIUM CHLORIDE, POTASSIUM CHLORIDE, SODIUM LACTATE AND CALCIUM CHLORIDE: 600; 310; 30; 20 INJECTION, SOLUTION INTRAVENOUS at 13:42

## 2023-03-22 RX ADMIN — LIDOCAINE HYDROCHLORIDE 60 MG: 20 INJECTION, SOLUTION INFILTRATION; PERINEURAL at 13:48

## 2023-03-22 ASSESSMENT — PAIN - FUNCTIONAL ASSESSMENT: PAIN_FUNCTIONAL_ASSESSMENT: NONE - DENIES PAIN

## 2023-03-22 NOTE — ANESTHESIA PRE PROCEDURE
Department of Anesthesiology  Preprocedure Note       Name:  Alicia Mcdonough   Age:  75 y.o.  :  1955                                          MRN:  [id number]         Date:  3/22/2023      Surgeon: Sage Hunter):  Lieutenant Yojana MD    Procedure: Procedure(s):  BRONCH NF W/ANES. (12:30) NO LABS. CXR POST BRONCH    Medications prior to admission:   Prior to Admission medications    Medication Sig Start Date End Date Taking? Authorizing Provider   fluticasone-umeclidin-vilant (TRELEGY ELLIPTA) 100-62.5-25 MCG/ACT AEPB inhaler Inhale 1 puff into the lungs daily 3/2/23   [place] MD   metoprolol succinate (TOPROL XL) 25 MG extended release tablet Take 1 tablet by mouth daily 22   Daria Mercer MD   ipratropium-albuterol (DUONEB) 0.5-2.5 (3) MG/3ML SOLN nebulizer solution Inhale 3 mLs into the lungs 4 times daily as needed for Shortness of Breath 10/22/22   Palmira Chamber, DO   apixaban (ELIQUIS) 5 MG TABS tablet Take 1 tablet by mouth 2 times daily 10/22/22   Gideon Chong, DO   guaiFENesin (MUCINEX) 600 MG extended release tablet Take 1 tablet by mouth 2 times daily  Patient not taking: Reported on 3/16/2023 10/22/22   Gideon Chong, DO   melatonin 3 MG TABS tablet Take 1 tablet by mouth nightly as needed (insomnia) 10/22/22   Palmira Chamber, DO   tamsulosin (FLOMAX) 0.4 MG capsule Take 1 capsule by mouth daily 10/23/22   Palmira Chamber, DO       Current medications:    Current Facility-Administered Medications   Medication Dose Route Frequency Provider Last Rate Last Admin    lactated ringers IV soln infusion   IntraVENous Continuous [place] MD           Allergies:     Allergies   Allergen Reactions    Bee Venom Itching and Swelling    Zosyn [Piperacillin Sod-Tazobactam So] Itching       Problem List:    Patient Active Problem List   Diagnosis Code    Right carpal tunnel syndrome G56.01    Sepsis ([facility] [address] A41.9    Acute cystitis with hematuria N30.01    Hyponatremia E87.1

## 2023-03-22 NOTE — PROGRESS NOTES
Discharge instructions given to patient and patients family. Both deny any questions at this time. Valerie Castro RN

## 2023-03-22 NOTE — OP NOTE
Operative Note      Patient: Simona Wasserman  YOB: 1955  MRN: [id number]    Date of Procedure: 3/22/2023    Pre-Op Diagnosis: PNA    Post-Op Diagnosis: Same       Procedure(s):  BRONCHOSCOPY  with ALVEOLAR LAVAGE  Removal of large mucus plugs   Cytology BRONCHOSCOPY BRUSHINGS    Surgeon(s):  Nicolasa Guzman MD    Assistant:   * No surgical staff found *    Anesthesia: Monitor Anesthesia Care    Estimated Blood Loss (mL): Minimal    Complications: None         Regional Anesthesia,       ANESTHESIA:    Lidocaine 2% ,       ANESTHESIOLOGIST:  Per EPIC Note    SPECIMENS:  [x] Bronchial sample(s) for      Fungal smear & culture,   Acid-fast bacillus Smear and Culture,    Gram stain, C&S,    PCP               Cytology               BD glucan              Galactomanin      Description of Procedure: The patient  identified Simona Wasserman  and the procedure verified as Flexible Fiberoptic Bronchoscopy with Brshing         After the patient was controlled with sedation bronchoscope was introduced through mouth piece  without difficulty. The scope was then passed into the trachea. Additional 2% lidocaine was used topically within the airway. Careful inspection of the tracheal lumen was accomplished. The scope was sequentially passed into all bronchial airways.            Endobronchial findings:   Vocal cord mild swelling   Trachea:  Normal mucosa, he the part seen outside the ET tube  Samantha  Normal mucosa     Right Main Stem Bronchus  Normal mucosa large mcuus plug was removed   Right Upper Lobe Bronchi irregular mucosa with endobronchial lesion that was obstruction upper   Right Middle Lobe Bronchi  Normal mucosa larg mcuus plugs   Right Lower Lobe Bronchi (including the Superior segment)  Normal mucosa     Left Main Stem Bronchus Normal mucosa thick secertions  Left Upper Lobe Bronchus, Upper Division Normal mucosa thick secertion   Left Upper Lobe Bronchus, Lingula  Normal mucosa  Left Lower Lobe Bronchus

## 2023-03-22 NOTE — PROGRESS NOTES
Patient admitted from Endo to PACU. Patient immediately hooked up to vitals monitor. Rachel Stinson RN

## 2023-03-22 NOTE — DISCHARGE INSTRUCTIONS
vision  Nausea/vomiting can happen  Shivering, feeling cold, sore throat, cough and muscle aches should stop within 24-48 hours  Trouble urinating - call your surgeon if it has been more than 8 hrs  Bruising or soreness at the IV site - call if it remains red, firm or there is drainage             FEMALES OF CHILDBEARING AGE WHO ARE TAKING BIRTH CONTROL PILLS:  You may have received a medication during your procedure that interferes with the   actions of birth control pills (Bridion or Emend). Use some other kind of birth control in addition to your pills, like a condom, for 1 month after your procedure to prevent unwanted pregnancy. The following instructions are to be followed if you have a known history or diagnosis of sleep apnea: For all sleep apnea patients:  ? Sleep on your side or sitting up in a chair whenever possible, especially the first 24 hours after surgery. ? Use only medicines prescribed by your doctor. ? Do not drink alcohol. ? If you have a dental device to assist you while at rest, use it at all times for the first 24 hours. For patients using CPAP machines:  ? Use your CPAP machine during all periods of sleep as usual.  ? Use your CPAP machine during all periods of daytime rest while on pain medicines. ** Follow up with your primary care doctor for continued care. IF YOU DO NOT TAKE ALL OF YOUR NARCOTIC PAIN MEDICATION, please dispose of them responsibly. There are drop off boxes in the Emergency Departments 24/7 at both [facility] and [facility]. If these locations are not convenient, other options for discarding them can be found at:  [email or web address]    Hospital or office staff may NOT accept any medications to drop off in the cabinet for you.

## 2023-03-22 NOTE — PROGRESS NOTES
Patient discharged via wheelchair in stable condition with all belongings to private car. Grupo Hanna RN

## 2023-03-22 NOTE — ANESTHESIA POSTPROCEDURE EVALUATION
Department of Anesthesiology  Postprocedure Note    Patient: Sri Rowe  MRN: [id number]  YOB: 1955  Date of evaluation: 3/22/2023      Procedure Summary     Date: 03/22/23 Room / Location: [facility] ENDO 01 / [facility]    Anesthesia Start: 2674 Anesthesia Stop: 1414    Procedures:       BRONCHOSCOPY ALVEOLAR LAVAGE      BRONCHOSCOPY BRUSHINGS Diagnosis:       Pneumonia due to infectious organism, unspecified laterality, unspecified part of lung      (PNA)    Surgeons: Winifred Monge MD Responsible Provider: Homer Hernandez MD    Anesthesia Type: MAC ASA Status: 3          Anesthesia Type: No value filed. Saida Phase I: Saida Score: 10    Saida Phase II: Saida Score: 8      Anesthesia Post Evaluation    Patient location during evaluation: PACU  Patient participation: complete - patient participated  Level of consciousness: awake and alert  Airway patency: patent  Nausea & Vomiting: no nausea and no vomiting  Complications: no  Cardiovascular status: blood pressure returned to baseline  Respiratory status: acceptable  Hydration status: euvolemic  Comments: VSS on transfer to phase 2 recovery. No anesthetic complications.

## 2023-03-22 NOTE — H&P
[facility]                                 Pulmonary Consult /Progress Note :                                                                  CHIEF COMPLAINT: hypotension ,urinary retention ,    Consulting provider: Dr Byron Wilson     HPI:     Trish Candelaria is a 75 y.o. male who presents to the emergency department for evaluation of urinary /retention in October       He had DVT that time and on elquis   He had cystoscopy and was cancer and had chemotherapy     His ex wife states he is more than 60-70 PPY smoking history and he was not following with any physician     Per wife he has worsening Cough and start to get yellow          Past Medical History:   Diagnosis Date    Anxiety     Depression     Hypertension        Past Surgical History:        Procedure Laterality Date    BRONCHOSCOPY N/A 12/12/2022    BRONCHOSCOPY ALVEOLAR LAVAGE performed by Alma Noel MD at [place] [address]  12/12/2022    BRONCHOSCOPY THERAPUTIC ASPIRATION INITIAL performed by Alma Noel MD at [address] [place] [address] 10/16/2022    CYSTOSCOPY TRANSURETHRAL RESECTION BLADDER >5CM performed by Rufino Benitez MD at [address] [place] [address] 12/8/2022    CYSTOSCOPY, TRANSURETHERAL RESECTION OF A BLADDER TUMOR performed by Jaylen Villa MD at [address] [place] [address] St:  is allergic to bee venom and zosyn [piperacillin sod-tazobactam so]. Social History:    TOBACCO:   reports that he quit smoking about 5 months ago. His smoking use included cigarettes. He has a 60.00 pack-year smoking history. He has never used smokeless tobacco.  ETOH:   reports no history of alcohol use. Family History:   History reviewed. No pertinent family history. Current Medications:  No current facility-administered medications for this encounter.       REVIEW OF SYSTEMS:  Constitutional: Negative for fever  HENT: Negative for sore throat  Eyes: Negative for redness   Respiratory: mild   Cardiovascular: Negative for chest pain  Gastrointestinal: Negative for vomiting, diarrhea   Genitourinary:retention   Musculoskeletal: Negative for arthralgias   Skin: Negative for rash  Neurological: Negative for syncope  Hematological: Negative for adenopathy  Psychiatric/Behavorial: Negative for anxiety      Objective:   PHYSICAL EXAM:    Blood pressure (!) 144/79, pulse 94, temperature 97.1 °F (36.2 °C), temperature source Temporal, resp. rate 18, height 5' 6\" (1.676 m), weight 105 lb (47.6 kg), SpO2 96 %.' on RA  Gen: No distress. Eyes: PERRL. No sclera icterus. No conjunctival injection. ENT: No discharge. Pharynx clear. Neck: Trachea midline. No obvious mass. Resp: rhonchi bilateral   CV: Regular rate. Regular rhythm. No murmur or rub. No edema. GI: Non-tender. Non-distended. No hernia. Skin: Warm and dry. No nodule on exposed extremities. Lymph: No cervical LAD. No supraclavicular LAD. M/S: No cyanosis. No joint deformity. No clubbing. Neuro: Awake. Alert. Moves all four extremities. Psych: Oriented x 3. No anxiety. LABS/IMAGING:    CBC:  Lab Results   Component Value Date    WBC 6.3 12/14/2022    HGB 9.0 (L) 12/14/2022    HCT 28.0 (L) 12/14/2022    MCV 86.8 12/14/2022     12/14/2022    LYMPHOPCT 7.0 12/14/2022    RBC 3.23 (L) 12/14/2022    MCH 27.8 12/14/2022    MCHC 32.0 12/14/2022    RDW 20.6 (H) 12/14/2022    NEUTOPHILPCT 86.5 12/14/2022    MONOPCT 4.2 12/14/2022    BASOPCT 0.8 12/14/2022    NEUTROABS 5.4 12/14/2022    LYMPHSABS 0.4 (L) 12/14/2022    MONOSABS 0.3 12/14/2022    EOSABS 0.1 12/14/2022    BASOSABS 0.1 12/14/2022       No results for input(s): WBC, HGB, HCT, MCV, PLT in the last 720 hours. BMP:   No results for input(s): NA, K, CL, CO2, PHOS, BUN, CREATININE, CA in the last 72 hours.       MG:   Lab Results   Component Value Date/Time    MG 1.70 10/18/2022 04:15 AM

## 2023-03-23 LAB — ACID FAST STN SPEC QL: NORMAL

## 2023-03-24 LAB
BACTERIA SPEC RESP CULT: NORMAL
FUNGUS SPEC CULT: ABNORMAL
GRAM STN SPEC: NORMAL
LOEFFLER MB STN SPEC: ABNORMAL
ORGANISM: ABNORMAL

## 2023-03-29 LAB
Lab: NORMAL
REPORT: NORMAL
THIS TEST SENT TO: NORMAL

## 2023-04-03 LAB
Lab: NORMAL
REPORT: NORMAL
THIS TEST SENT TO: NORMAL

## 2023-04-04 LAB
ACID FAST STN SPEC QL: NORMAL
MYCOBACTERIUM SPEC CULT: NORMAL

## 2023-04-10 NOTE — PROGRESS NOTES

## 2023-04-13 ENCOUNTER — HOSPITAL ENCOUNTER (OUTPATIENT)
Age: 68
Setting detail: OUTPATIENT SURGERY
Discharge: HOME | End: 2023-04-13
Attending: UROLOGY | Admitting: UROLOGY
Payer: MEDICARE

## 2023-04-13 VITALS
OXYGEN SATURATION: 93 % | RESPIRATION RATE: 33 BRPM | BODY MASS INDEX: 16.88 KG/M2 | WEIGHT: 105 LBS | TEMPERATURE: 97.8 F | HEIGHT: 66 IN | HEART RATE: 83 BPM | DIASTOLIC BLOOD PRESSURE: 84 MMHG | SYSTOLIC BLOOD PRESSURE: 153 MMHG

## 2023-04-13 DIAGNOSIS — C67.9 MALIGNANT NEOPLASM OF URINARY BLADDER, UNSPECIFIED SITE (HCC): ICD-10-CM

## 2023-04-13 LAB
ABO + RH BLD: NORMAL
ABO + RH BLD: NORMAL
ANION GAP SERPL CALCULATED.3IONS-SCNC: 13 MMOL/L (ref 3–16)
BLD GP AB SCN SERPL QL: NORMAL
BUN SERPL-MCNC: 14 MG/DL (ref 7–20)
CALCIUM SERPL-MCNC: 9.1 MG/DL (ref 8.3–10.6)
CHLORIDE SERPL-SCNC: 98 MMOL/L (ref 99–110)
CO2 SERPL-SCNC: 24 MMOL/L (ref 21–32)
CREAT SERPL-MCNC: 0.7 MG/DL (ref 0.8–1.3)
DEPRECATED RDW RBC AUTO: 18.6 % (ref 12.4–15.4)
EKG ATRIAL RATE: 84 BPM
EKG DIAGNOSIS: NORMAL
EKG P AXIS: 55 DEGREES
EKG P-R INTERVAL: 148 MS
EKG Q-T INTERVAL: 390 MS
EKG QRS DURATION: 82 MS
EKG QTC CALCULATION (BAZETT): 460 MS
EKG R AXIS: 58 DEGREES
EKG T AXIS: 49 DEGREES
EKG VENTRICULAR RATE: 84 BPM
GFR SERPLBLD CREATININE-BSD FMLA CKD-EPI: >60 ML/MIN/{1.73_M2}
GLUCOSE SERPL-MCNC: 95 MG/DL (ref 70–99)
HCT VFR BLD AUTO: 31.8 % (ref 40.5–52.5)
HGB BLD-MCNC: 9.8 G/DL (ref 13.5–17.5)
MCH RBC QN AUTO: 23.8 PG (ref 26–34)
MCHC RBC AUTO-ENTMCNC: 30.9 G/DL (ref 31–36)
MCV RBC AUTO: 77 FL (ref 80–100)
PLATELET # BLD AUTO: 509 K/UL (ref 135–450)
PMV BLD AUTO: 6.2 FL (ref 5–10.5)
POTASSIUM SERPL-SCNC: 4 MMOL/L (ref 3.5–5.1)
RBC # BLD AUTO: 4.13 M/UL (ref 4.2–5.9)
SODIUM SERPL-SCNC: 135 MMOL/L (ref 136–145)
WBC # BLD AUTO: 5.5 K/UL (ref 4–11)

## 2023-04-13 PROCEDURE — 3700000000 HC ANESTHESIA ATTENDED CARE: Performed by: UROLOGY

## 2023-04-13 PROCEDURE — 86850 RBC ANTIBODY SCREEN: CPT

## 2023-04-13 PROCEDURE — 93010 ELECTROCARDIOGRAM REPORT: CPT | Performed by: INTERNAL MEDICINE

## 2023-04-13 PROCEDURE — 2709999900 HC NON-CHARGEABLE SUPPLY: Performed by: UROLOGY

## 2023-04-13 PROCEDURE — 36415 COLL VENOUS BLD VENIPUNCTURE: CPT

## 2023-04-13 PROCEDURE — 86900 BLOOD TYPING SEROLOGIC ABO: CPT

## 2023-04-13 PROCEDURE — 6360000002 HC RX W HCPCS: Performed by: UROLOGY

## 2023-04-13 PROCEDURE — 80048 BASIC METABOLIC PNL TOTAL CA: CPT

## 2023-04-13 PROCEDURE — 7100000001 HC PACU RECOVERY - ADDTL 15 MIN: Performed by: UROLOGY

## 2023-04-13 PROCEDURE — 2500000003 HC RX 250 WO HCPCS: Performed by: ANESTHESIOLOGY

## 2023-04-13 PROCEDURE — 3600000004 HC SURGERY LEVEL 4 BASE: Performed by: UROLOGY

## 2023-04-13 PROCEDURE — 2580000003 HC RX 258: Performed by: UROLOGY

## 2023-04-13 PROCEDURE — 7100000010 HC PHASE II RECOVERY - FIRST 15 MIN: Performed by: UROLOGY

## 2023-04-13 PROCEDURE — 3600000014 HC SURGERY LEVEL 4 ADDTL 15MIN: Performed by: UROLOGY

## 2023-04-13 PROCEDURE — 6370000000 HC RX 637 (ALT 250 FOR IP)

## 2023-04-13 PROCEDURE — 93005 ELECTROCARDIOGRAM TRACING: CPT | Performed by: ANESTHESIOLOGY

## 2023-04-13 PROCEDURE — 7100000000 HC PACU RECOVERY - FIRST 15 MIN: Performed by: UROLOGY

## 2023-04-13 PROCEDURE — 2580000003 HC RX 258: Performed by: ANESTHESIOLOGY

## 2023-04-13 PROCEDURE — 85027 COMPLETE CBC AUTOMATED: CPT

## 2023-04-13 PROCEDURE — A4217 STERILE WATER/SALINE, 500 ML: HCPCS | Performed by: UROLOGY

## 2023-04-13 PROCEDURE — 3700000001 HC ADD 15 MINUTES (ANESTHESIA): Performed by: UROLOGY

## 2023-04-13 PROCEDURE — 7100000011 HC PHASE II RECOVERY - ADDTL 15 MIN: Performed by: UROLOGY

## 2023-04-13 PROCEDURE — 86901 BLOOD TYPING SEROLOGIC RH(D): CPT

## 2023-04-13 PROCEDURE — 88307 TISSUE EXAM BY PATHOLOGIST: CPT

## 2023-04-13 RX ORDER — ONDANSETRON 2 MG/ML
4 INJECTION INTRAMUSCULAR; INTRAVENOUS
Status: CANCELLED | OUTPATIENT
Start: 2023-04-13 | End: 2023-04-14

## 2023-04-13 RX ORDER — SODIUM CHLORIDE 0.9 % (FLUSH) 0.9 %
5-40 SYRINGE (ML) INJECTION PRN
Status: DISCONTINUED | OUTPATIENT
Start: 2023-04-13 | End: 2023-04-13 | Stop reason: HOSPADM

## 2023-04-13 RX ORDER — LANOLIN ALCOHOL/MO/W.PET/CERES
325 CREAM (GRAM) TOPICAL
COMMUNITY

## 2023-04-13 RX ORDER — LEVOFLOXACIN 5 MG/ML
500 INJECTION, SOLUTION INTRAVENOUS ONCE
Status: COMPLETED | OUTPATIENT
Start: 2023-04-13 | End: 2023-04-13

## 2023-04-13 RX ORDER — FAMOTIDINE 10 MG/ML
20 INJECTION, SOLUTION INTRAVENOUS ONCE
Status: COMPLETED | OUTPATIENT
Start: 2023-04-13 | End: 2023-04-13

## 2023-04-13 RX ORDER — SODIUM CHLORIDE 9 MG/ML
25 INJECTION, SOLUTION INTRAVENOUS PRN
Status: CANCELLED | OUTPATIENT
Start: 2023-04-13

## 2023-04-13 RX ORDER — MAGNESIUM HYDROXIDE 1200 MG/15ML
LIQUID ORAL CONTINUOUS PRN
Status: COMPLETED | OUTPATIENT
Start: 2023-04-13 | End: 2023-04-13

## 2023-04-13 RX ORDER — MEPERIDINE HYDROCHLORIDE 25 MG/ML
12.5 INJECTION INTRAMUSCULAR; INTRAVENOUS; SUBCUTANEOUS EVERY 5 MIN PRN
Status: CANCELLED | OUTPATIENT
Start: 2023-04-13

## 2023-04-13 RX ORDER — CIPROFLOXACIN 500 MG/1
500 TABLET, FILM COATED ORAL DAILY
Qty: 7 TABLET | Refills: 0 | Status: SHIPPED | OUTPATIENT
Start: 2023-04-13 | End: 2023-04-20

## 2023-04-13 RX ORDER — SODIUM CHLORIDE, SODIUM LACTATE, POTASSIUM CHLORIDE, CALCIUM CHLORIDE 600; 310; 30; 20 MG/100ML; MG/100ML; MG/100ML; MG/100ML
INJECTION, SOLUTION INTRAVENOUS CONTINUOUS
Status: DISCONTINUED | OUTPATIENT
Start: 2023-04-13 | End: 2023-04-13 | Stop reason: HOSPADM

## 2023-04-13 RX ORDER — IPRATROPIUM BROMIDE AND ALBUTEROL SULFATE 2.5; .5 MG/3ML; MG/3ML
1 SOLUTION RESPIRATORY (INHALATION) ONCE
Status: CANCELLED | OUTPATIENT
Start: 2023-04-13 | End: 2023-04-13

## 2023-04-13 RX ORDER — LEVOFLOXACIN 5 MG/ML
750 INJECTION, SOLUTION INTRAVENOUS ONCE
Status: DISCONTINUED | OUTPATIENT
Start: 2023-04-13 | End: 2023-04-13

## 2023-04-13 RX ORDER — SODIUM CHLORIDE 0.9 % (FLUSH) 0.9 %
5-40 SYRINGE (ML) INJECTION EVERY 12 HOURS SCHEDULED
Status: CANCELLED | OUTPATIENT
Start: 2023-04-13

## 2023-04-13 RX ORDER — IPRATROPIUM BROMIDE AND ALBUTEROL SULFATE 2.5; .5 MG/3ML; MG/3ML
SOLUTION RESPIRATORY (INHALATION)
Status: COMPLETED
Start: 2023-04-13 | End: 2023-04-13

## 2023-04-13 RX ORDER — SODIUM CHLORIDE 9 MG/ML
INJECTION, SOLUTION INTRAVENOUS PRN
Status: DISCONTINUED | OUTPATIENT
Start: 2023-04-13 | End: 2023-04-13 | Stop reason: HOSPADM

## 2023-04-13 RX ORDER — OXYCODONE HYDROCHLORIDE 5 MG/1
10 TABLET ORAL PRN
Status: CANCELLED | OUTPATIENT
Start: 2023-04-13 | End: 2023-04-13

## 2023-04-13 RX ORDER — BENZONATATE 100 MG/1
100 CAPSULE ORAL 3 TIMES DAILY PRN
COMMUNITY

## 2023-04-13 RX ORDER — LEVOFLOXACIN 5 MG/ML
INJECTION, SOLUTION INTRAVENOUS
Status: DISCONTINUED
Start: 2023-04-13 | End: 2023-04-13 | Stop reason: HOSPADM

## 2023-04-13 RX ORDER — SODIUM CHLORIDE 0.9 % (FLUSH) 0.9 %
5-40 SYRINGE (ML) INJECTION EVERY 12 HOURS SCHEDULED
Status: DISCONTINUED | OUTPATIENT
Start: 2023-04-13 | End: 2023-04-13 | Stop reason: HOSPADM

## 2023-04-13 RX ORDER — MIRTAZAPINE 15 MG/1
TABLET, FILM COATED ORAL
COMMUNITY
Start: 2023-04-08

## 2023-04-13 RX ORDER — SODIUM CHLORIDE 0.9 % (FLUSH) 0.9 %
5-40 SYRINGE (ML) INJECTION PRN
Status: CANCELLED | OUTPATIENT
Start: 2023-04-13

## 2023-04-13 RX ORDER — OXYCODONE HYDROCHLORIDE 5 MG/1
5 TABLET ORAL PRN
Status: CANCELLED | OUTPATIENT
Start: 2023-04-13 | End: 2023-04-13

## 2023-04-13 RX ADMIN — SODIUM CHLORIDE, POTASSIUM CHLORIDE, SODIUM LACTATE AND CALCIUM CHLORIDE: 600; 310; 30; 20 INJECTION, SOLUTION INTRAVENOUS at 10:27

## 2023-04-13 RX ADMIN — FAMOTIDINE 20 MG: 10 INJECTION, SOLUTION INTRAVENOUS at 10:27

## 2023-04-13 RX ADMIN — CEFAZOLIN 2000 MG: 2 INJECTION, POWDER, FOR SOLUTION INTRAMUSCULAR; INTRAVENOUS at 12:07

## 2023-04-13 RX ADMIN — IPRATROPIUM BROMIDE AND ALBUTEROL SULFATE 3 ML: 2.5; .5 SOLUTION RESPIRATORY (INHALATION) at 10:38

## 2023-04-13 RX ADMIN — LEVOFLOXACIN 500 MG: 5 INJECTION, SOLUTION INTRAVENOUS at 12:35

## 2023-04-13 ASSESSMENT — PAIN - FUNCTIONAL ASSESSMENT
PAIN_FUNCTIONAL_ASSESSMENT: ACTIVITIES ARE NOT PREVENTED
PAIN_FUNCTIONAL_ASSESSMENT: 0-10

## 2023-04-13 ASSESSMENT — PAIN SCALES - GENERAL: PAINLEVEL_OUTOF10: 0

## 2023-04-13 NOTE — OP NOTE
Procedure:  After obtaining informed consent, the patient was brought to the operating room and placed supine on the operating room table. After adequate anesthesia, he was then repositioned in the dorsal lithotomy position and prepped and draped in the standard surgical fashion. I began the case by first doing rigid cystoscopy with a 21-English sheath and a 30 degree lens. The anterior urethra was within normal limits. Once in the bladder, I saw no evidence of stones or diverticula. Ureteral orifices were in normal orthotopic position. 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 The cystoscope was then removed. A 26-English continuous flow resectoscope was then placed into the urethra using the obturator and sheath. Once in the bladder, I exchanged the obturator for the resectoscope bridge. I used the wire loop as my working element. I began resection of the bladder tumor using bipolar electrocautery. The superficial aspects of the tumors were debulked with the resectoscope. Once down to the bladder base, I performed deeper tissue resection in order to get underneath the tumor and to sample muscle tissue. I then focused on using cautery to fulgurate the tumor base to minimize the chance of post-operative bleeding. I worked first and all the smaller tumors can a posterior left lateral dome. Then I made my way to the bladder neck area as that area is more difficult. Once it was adequately resected there was no active bleeding at the conclusion of the case. Made sure all the pieces were removed. I then placed an 22-English 3way Alexander catheter into the bladder and completely drained all the fluid. The patient was then awakened from anesthesia and brought to the PACU in stable condition. There were no apparent complications.         Follow up:   -Nurse visit this coming week on Monday or Wednesday to get his Alexander catheter removed   -4 to 6 weeks he will see me back to review possibly more BCG versus

## 2023-04-13 NOTE — DISCHARGE INSTRUCTIONS
Follow up next week for catheter removal - Monday or Wednesday - our office will call you but you can call to confirm time. Follow up in 4-6 weeks to discuss bladder cancer management    Marian Alba MD  Office: [phone]      =======       Bladder Tumor removal:  The doctor will put a thin, lighted tool called a cystoscope, or scope, into your urethra. The urethra is the tube that carries urine from the bladder to the outside of the body. Then the doctor will gently thread the scope into your bladder. Your bladder will then be filled with fluid. This stretches the bladder so that your doctor can clearly see the inside of your bladder. Your doctor will use small surgical tools through the scope to remove and/or burn away any cancer cells. What can you expect after Bladder Biopsy? You may feel the need to urinate often for a while after the surgery. But this should improve with time. It may burn when you urinate. Drink lots of fluids to help with the burning. Your urine also may look pink for up to 2 to 3 weeks after surgery. This is because there may be blood in it. You may have to avoid strenuous activity and heavy lifting for about 1-2 weeks after surgery. Your doctor may suggest that you have chemotherapy or biological therapy after the procedure. Bladder cancer can come back. You will need regular exams for the rest of your life to check for the cancer. You may need the surgery again. Follow-up care is a key part of your treatment and safety. Be sure to make and go to all appointments, and call your doctor if you are having problems. It's also a good idea to know your test results and keep a list of the medicines you take. © 1723-5248 [employer]. Care instructions adapted under license by [facility] ([facility]).  If you have questions about a medical condition or this instruction, always ask your healthcare professional. Reta Severance disclaims any warranty or liability

## 2023-04-13 NOTE — H&P
injection 5-40 mL  5-40 mL IntraVENous 2 times per day Jami Baptiste MD        sodium chloride flush 0.9 % injection 5-40 mL  5-40 mL IntraVENous PRN Jami Baptiste MD        0.9 % sodium chloride infusion   IntraVENous PRN Jami Baptiste MD           Allergies   Allergen Reactions    Bee Venom Itching and Swelling    Zosyn [Piperacillin Sod-Tazobactam So] Itching       History reviewed. No pertinent family history. Social History     Tobacco Use    Smoking status: Former     Packs/day: 2.00     Years: 30.00     Pack years: 60.00     Types: Cigarettes     Quit date: 10/3/2022     Years since quittin.5    Smokeless tobacco: Never   Vaping Use    Vaping Use: Never used   Substance Use Topics    Alcohol use: Never     Comment: beer 3 times a week    Drug use: No         Review of Systems: A 12 point ROS was performed and was unremarkable unless listed in the history of present illness. No intake/output data recorded. Physical Exam:  Vitals:    04/10/23 1349   Weight: 105 lb (47.6 kg)   Height: 5' 6\" (1.676 m)       Constitutional: pleasant male in NAD, with a normal body habitus   EENT: pink conjunctivae, lips without cyanosis, normal appearance of ears and nose  Neck: no masses or lesions, trachea midline   Respiratory: normal respiratory movements without distress   Cardiovascular: regular rate and rhythm, lower extremities without edema   Abdomen: soft, NTND, no masses, no guarding  Back: Stable spine  Skin: warm and dry, no rashes, lesions, or ulcers  Musculoskeletal: dcr  ROM, m. tone, no digital cyanosis, head normocephalic  Psych: normal mood and affect, alert and appropriately answers questions  : stable bladder, external genitalia stable   Labs:       Lab Results   Component Value Date    PSA 2.71 10/03/2022       Lab Results   Component Value Date    PSA 2.71 10/03/2022     No results for input(s): WBC, HGB, HCT, MCV, PLT in the last 720 hours.   No results found

## 2023-04-13 NOTE — PROGRESS NOTES
Went over discharge instructions with patient and family both verbalize and understand discharge instructions. IV removed and documented. Patient left surgery floor in stable condition to home with family and all belongings. Pt going home with mc cath in place. Pt educated on f/c care.

## 2023-04-18 LAB
ACID FAST STN SPEC QL: NORMAL
MYCOBACTERIUM SPEC CULT: NORMAL

## 2023-04-24 LAB
FUNGUS SPEC CULT: ABNORMAL
LOEFFLER MB STN SPEC: ABNORMAL
ORGANISM: ABNORMAL

## 2023-04-25 LAB
ACID FAST STN SPEC QL: NORMAL
MYCOBACTERIUM SPEC CULT: NORMAL

## 2023-05-02 LAB
ACID FAST STN SPEC QL: NORMAL
MYCOBACTERIUM SPEC CULT: NORMAL

## 2023-05-09 LAB
ACID FAST STN SPEC QL: NORMAL
MYCOBACTERIUM SPEC CULT: NORMAL

## 2023-05-30 ENCOUNTER — TELEPHONE (OUTPATIENT)
Dept: PULMONOLOGY | Age: 68
End: 2023-05-30

## 2023-05-30 NOTE — TELEPHONE ENCOUNTER
Patient cancelled appointment on 5/30/23 with Dr Rosario Mckoy for bronch fua. Reason: pt LM on VM to cancel appt    Patient did not reschedule appointment. Appointment rescheduled for na.

## 2023-05-31 ENCOUNTER — HOSPITAL ENCOUNTER (INPATIENT)
Age: 68
LOS: 2 days | Discharge: HOME | DRG: 871 | End: 2023-06-02
Attending: EMERGENCY MEDICINE | Admitting: INTERNAL MEDICINE
Payer: MEDICARE

## 2023-05-31 ENCOUNTER — APPOINTMENT (OUTPATIENT)
Dept: GENERAL RADIOLOGY | Age: 68
DRG: 871 | End: 2023-05-31
Payer: MEDICARE

## 2023-05-31 ENCOUNTER — APPOINTMENT (OUTPATIENT)
Dept: CT IMAGING | Age: 68
DRG: 871 | End: 2023-05-31
Payer: MEDICARE

## 2023-05-31 DIAGNOSIS — J18.9 PNEUMONIA OF LEFT UPPER LOBE DUE TO INFECTIOUS ORGANISM: Primary | ICD-10-CM

## 2023-05-31 DIAGNOSIS — J96.01 ACUTE RESPIRATORY FAILURE WITH HYPOXIA (HCC): ICD-10-CM

## 2023-05-31 DIAGNOSIS — J44.9 ADVANCED COPD (HCC): ICD-10-CM

## 2023-05-31 LAB
ALBUMIN SERPL-MCNC: 3.3 G/DL (ref 3.4–5)
ALBUMIN/GLOB SERPL: 0.8 {RATIO} (ref 1.1–2.2)
ALP SERPL-CCNC: 81 U/L (ref 40–129)
ALT SERPL-CCNC: 14 U/L (ref 10–40)
ANION GAP SERPL CALCULATED.3IONS-SCNC: 16 MMOL/L (ref 3–16)
AST SERPL-CCNC: 20 U/L (ref 15–37)
BASE EXCESS BLDV CALC-SCNC: -1.3 MMOL/L (ref -3–3)
BASOPHILS # BLD: 0.1 K/UL (ref 0–0.2)
BASOPHILS NFR BLD: 1.6 %
BILIRUB SERPL-MCNC: 0.5 MG/DL (ref 0–1)
BUN SERPL-MCNC: 13 MG/DL (ref 7–20)
CALCIUM SERPL-MCNC: 9 MG/DL (ref 8.3–10.6)
CHLORIDE SERPL-SCNC: 94 MMOL/L (ref 99–110)
CO2 BLDV-SCNC: 24 MMOL/L
CO2 SERPL-SCNC: 20 MMOL/L (ref 21–32)
COHGB MFR BLDV: 3.1 % (ref 0–1.5)
CREAT SERPL-MCNC: 1.1 MG/DL (ref 0.8–1.3)
DEPRECATED RDW RBC AUTO: 21.7 % (ref 12.4–15.4)
EKG ATRIAL RATE: 107 BPM
EKG DIAGNOSIS: NORMAL
EKG P AXIS: 67 DEGREES
EKG P-R INTERVAL: 150 MS
EKG Q-T INTERVAL: 348 MS
EKG QRS DURATION: 92 MS
EKG QTC CALCULATION (BAZETT): 464 MS
EKG R AXIS: 87 DEGREES
EKG T AXIS: 57 DEGREES
EKG VENTRICULAR RATE: 107 BPM
EOSINOPHIL # BLD: 0.1 K/UL (ref 0–0.6)
EOSINOPHIL NFR BLD: 1.8 %
GFR SERPLBLD CREATININE-BSD FMLA CKD-EPI: >60 ML/MIN/{1.73_M2}
GLUCOSE SERPL-MCNC: 120 MG/DL (ref 70–99)
HCO3 BLDV-SCNC: 23.2 MMOL/L (ref 23–29)
HCT VFR BLD AUTO: 34.1 % (ref 40.5–52.5)
HGB BLD-MCNC: 11 G/DL (ref 13.5–17.5)
LACTATE BLDV-SCNC: 2.1 MMOL/L (ref 0.4–1.9)
LACTATE BLDV-SCNC: 2.3 MMOL/L (ref 0.4–1.9)
LYMPHOCYTES # BLD: 0.9 K/UL (ref 1–5.1)
LYMPHOCYTES NFR BLD: 10.5 %
MCH RBC QN AUTO: 24.7 PG (ref 26–34)
MCHC RBC AUTO-ENTMCNC: 32.2 G/DL (ref 31–36)
MCV RBC AUTO: 76.7 FL (ref 80–100)
METHGB MFR BLDV: 0.3 %
MONOCYTES # BLD: 0.5 K/UL (ref 0–1.3)
MONOCYTES NFR BLD: 6.4 %
NEUTROPHILS # BLD: 6.7 K/UL (ref 1.7–7.7)
NEUTROPHILS NFR BLD: 79.7 %
NT-PROBNP SERPL-MCNC: 3759 PG/ML (ref 0–124)
O2 THERAPY: ABNORMAL
PCO2 BLDV: 38.3 MMHG (ref 40–50)
PH BLDV: 7.4 [PH] (ref 7.35–7.45)
PLATELET # BLD AUTO: 291 K/UL (ref 135–450)
PMV BLD AUTO: 7.2 FL (ref 5–10.5)
PO2 BLDV: 43.4 MMHG (ref 25–40)
POTASSIUM SERPL-SCNC: 3.6 MMOL/L (ref 3.5–5.1)
PROCALCITONIN SERPL IA-MCNC: 0.46 NG/ML (ref 0–0.15)
PROT SERPL-MCNC: 7.6 G/DL (ref 6.4–8.2)
RBC # BLD AUTO: 4.45 M/UL (ref 4.2–5.9)
SAO2 % BLDV: 80 %
SODIUM SERPL-SCNC: 130 MMOL/L (ref 136–145)
TROPONIN, HIGH SENSITIVITY: 39 NG/L (ref 0–22)
TROPONIN, HIGH SENSITIVITY: 45 NG/L (ref 0–22)
TROPONIN, HIGH SENSITIVITY: 47 NG/L (ref 0–22)
WBC # BLD AUTO: 8.4 K/UL (ref 4–11)

## 2023-05-31 PROCEDURE — 6370000000 HC RX 637 (ALT 250 FOR IP)

## 2023-05-31 PROCEDURE — 71045 X-RAY EXAM CHEST 1 VIEW: CPT

## 2023-05-31 PROCEDURE — 83605 ASSAY OF LACTIC ACID: CPT

## 2023-05-31 PROCEDURE — 84145 PROCALCITONIN (PCT): CPT

## 2023-05-31 PROCEDURE — 2060000000 HC ICU INTERMEDIATE R&B

## 2023-05-31 PROCEDURE — 99285 EMERGENCY DEPT VISIT HI MDM: CPT

## 2023-05-31 PROCEDURE — 71260 CT THORAX DX C+: CPT

## 2023-05-31 PROCEDURE — 82803 BLOOD GASES ANY COMBINATION: CPT

## 2023-05-31 PROCEDURE — 80053 COMPREHEN METABOLIC PANEL: CPT

## 2023-05-31 PROCEDURE — 84484 ASSAY OF TROPONIN QUANT: CPT

## 2023-05-31 PROCEDURE — 2580000003 HC RX 258

## 2023-05-31 PROCEDURE — 87040 BLOOD CULTURE FOR BACTERIA: CPT

## 2023-05-31 PROCEDURE — 6360000004 HC RX CONTRAST MEDICATION: Performed by: NURSE PRACTITIONER

## 2023-05-31 PROCEDURE — 6360000002 HC RX W HCPCS: Performed by: NURSE PRACTITIONER

## 2023-05-31 PROCEDURE — 2700000000 HC OXYGEN THERAPY PER DAY

## 2023-05-31 PROCEDURE — 96366 THER/PROPH/DIAG IV INF ADDON: CPT

## 2023-05-31 PROCEDURE — 96365 THER/PROPH/DIAG IV INF INIT: CPT

## 2023-05-31 PROCEDURE — 94640 AIRWAY INHALATION TREATMENT: CPT

## 2023-05-31 PROCEDURE — 94761 N-INVAS EAR/PLS OXIMETRY MLT: CPT

## 2023-05-31 PROCEDURE — 6360000002 HC RX W HCPCS

## 2023-05-31 PROCEDURE — 2580000003 HC RX 258: Performed by: EMERGENCY MEDICINE

## 2023-05-31 PROCEDURE — 2580000003 HC RX 258: Performed by: NURSE PRACTITIONER

## 2023-05-31 PROCEDURE — 93005 ELECTROCARDIOGRAM TRACING: CPT | Performed by: NURSE PRACTITIONER

## 2023-05-31 PROCEDURE — 96361 HYDRATE IV INFUSION ADD-ON: CPT

## 2023-05-31 PROCEDURE — 85025 COMPLETE CBC W/AUTO DIFF WBC: CPT

## 2023-05-31 PROCEDURE — 96367 TX/PROPH/DG ADDL SEQ IV INF: CPT

## 2023-05-31 PROCEDURE — 36415 COLL VENOUS BLD VENIPUNCTURE: CPT

## 2023-05-31 PROCEDURE — 83880 ASSAY OF NATRIURETIC PEPTIDE: CPT

## 2023-05-31 PROCEDURE — 93010 ELECTROCARDIOGRAM REPORT: CPT | Performed by: INTERNAL MEDICINE

## 2023-05-31 RX ORDER — IPRATROPIUM BROMIDE AND ALBUTEROL SULFATE 2.5; .5 MG/3ML; MG/3ML
1 SOLUTION RESPIRATORY (INHALATION)
Status: DISCONTINUED | OUTPATIENT
Start: 2023-05-31 | End: 2023-05-31

## 2023-05-31 RX ORDER — MAGNESIUM SULFATE 1 G/100ML
1000 INJECTION INTRAVENOUS PRN
Status: DISCONTINUED | OUTPATIENT
Start: 2023-05-31 | End: 2023-06-02 | Stop reason: HOSPADM

## 2023-05-31 RX ORDER — SODIUM CHLORIDE 9 MG/ML
500 INJECTION, SOLUTION INTRAVENOUS ONCE
Status: COMPLETED | OUTPATIENT
Start: 2023-05-31 | End: 2023-05-31

## 2023-05-31 RX ORDER — SODIUM CHLORIDE 0.9 % (FLUSH) 0.9 %
5-40 SYRINGE (ML) INJECTION EVERY 12 HOURS SCHEDULED
Status: DISCONTINUED | OUTPATIENT
Start: 2023-05-31 | End: 2023-06-02 | Stop reason: HOSPADM

## 2023-05-31 RX ORDER — POTASSIUM CHLORIDE 7.45 MG/ML
10 INJECTION INTRAVENOUS PRN
Status: DISCONTINUED | OUTPATIENT
Start: 2023-05-31 | End: 2023-06-02 | Stop reason: HOSPADM

## 2023-05-31 RX ORDER — POTASSIUM CHLORIDE 20 MEQ/1
40 TABLET, EXTENDED RELEASE ORAL PRN
Status: DISCONTINUED | OUTPATIENT
Start: 2023-05-31 | End: 2023-06-02 | Stop reason: HOSPADM

## 2023-05-31 RX ORDER — ONDANSETRON 2 MG/ML
4 INJECTION INTRAMUSCULAR; INTRAVENOUS EVERY 6 HOURS PRN
Status: DISCONTINUED | OUTPATIENT
Start: 2023-05-31 | End: 2023-06-02 | Stop reason: HOSPADM

## 2023-05-31 RX ORDER — FLUTICASONE PROPIONATE 110 UG/1
1 AEROSOL, METERED RESPIRATORY (INHALATION) 2 TIMES DAILY
Status: DISCONTINUED | OUTPATIENT
Start: 2023-05-31 | End: 2023-06-02 | Stop reason: HOSPADM

## 2023-05-31 RX ORDER — SODIUM CHLORIDE 9 MG/ML
INJECTION, SOLUTION INTRAVENOUS PRN
Status: DISCONTINUED | OUTPATIENT
Start: 2023-05-31 | End: 2023-06-02 | Stop reason: HOSPADM

## 2023-05-31 RX ORDER — METOPROLOL SUCCINATE 25 MG/1
25 TABLET, EXTENDED RELEASE ORAL DAILY
Status: DISCONTINUED | OUTPATIENT
Start: 2023-05-31 | End: 2023-06-02 | Stop reason: HOSPADM

## 2023-05-31 RX ORDER — SODIUM CHLORIDE 9 MG/ML
INJECTION, SOLUTION INTRAVENOUS CONTINUOUS
Status: DISCONTINUED | OUTPATIENT
Start: 2023-05-31 | End: 2023-06-02

## 2023-05-31 RX ORDER — BENZONATATE 100 MG/1
100 CAPSULE ORAL 3 TIMES DAILY PRN
Status: DISCONTINUED | OUTPATIENT
Start: 2023-05-31 | End: 2023-06-02 | Stop reason: HOSPADM

## 2023-05-31 RX ORDER — ONDANSETRON 4 MG/1
4 TABLET, ORALLY DISINTEGRATING ORAL EVERY 8 HOURS PRN
Status: DISCONTINUED | OUTPATIENT
Start: 2023-05-31 | End: 2023-06-02 | Stop reason: HOSPADM

## 2023-05-31 RX ORDER — IPRATROPIUM BROMIDE AND ALBUTEROL SULFATE 2.5; .5 MG/3ML; MG/3ML
1 SOLUTION RESPIRATORY (INHALATION) 4 TIMES DAILY
Status: DISCONTINUED | OUTPATIENT
Start: 2023-06-01 | End: 2023-06-01

## 2023-05-31 RX ORDER — IPRATROPIUM BROMIDE AND ALBUTEROL SULFATE 2.5; .5 MG/3ML; MG/3ML
1 SOLUTION RESPIRATORY (INHALATION) EVERY 4 HOURS PRN
Status: DISCONTINUED | OUTPATIENT
Start: 2023-05-31 | End: 2023-06-02 | Stop reason: HOSPADM

## 2023-05-31 RX ORDER — SODIUM CHLORIDE 0.9 % (FLUSH) 0.9 %
10 SYRINGE (ML) INJECTION PRN
Status: DISCONTINUED | OUTPATIENT
Start: 2023-05-31 | End: 2023-06-02 | Stop reason: HOSPADM

## 2023-05-31 RX ORDER — TAMSULOSIN HYDROCHLORIDE 0.4 MG/1
0.4 CAPSULE ORAL DAILY
Status: DISCONTINUED | OUTPATIENT
Start: 2023-05-31 | End: 2023-06-02 | Stop reason: HOSPADM

## 2023-05-31 RX ORDER — FERROUS SULFATE 325(65) MG
325 TABLET ORAL
Status: DISCONTINUED | OUTPATIENT
Start: 2023-06-01 | End: 2023-06-02 | Stop reason: HOSPADM

## 2023-05-31 RX ORDER — POLYETHYLENE GLYCOL 3350 17 G/17G
17 POWDER, FOR SOLUTION ORAL DAILY PRN
Status: DISCONTINUED | OUTPATIENT
Start: 2023-05-31 | End: 2023-06-02 | Stop reason: HOSPADM

## 2023-05-31 RX ORDER — ACETAMINOPHEN 650 MG/1
650 SUPPOSITORY RECTAL EVERY 6 HOURS PRN
Status: DISCONTINUED | OUTPATIENT
Start: 2023-05-31 | End: 2023-06-02 | Stop reason: HOSPADM

## 2023-05-31 RX ORDER — 0.9 % SODIUM CHLORIDE 0.9 %
30 INTRAVENOUS SOLUTION INTRAVENOUS ONCE
Status: COMPLETED | OUTPATIENT
Start: 2023-05-31 | End: 2023-05-31

## 2023-05-31 RX ORDER — ACETAMINOPHEN 325 MG/1
650 TABLET ORAL EVERY 6 HOURS PRN
Status: DISCONTINUED | OUTPATIENT
Start: 2023-05-31 | End: 2023-06-02 | Stop reason: HOSPADM

## 2023-05-31 RX ADMIN — IOPAMIDOL 75 ML: 755 INJECTION, SOLUTION INTRAVENOUS at 13:43

## 2023-05-31 RX ADMIN — SODIUM CHLORIDE: 9 INJECTION, SOLUTION INTRAVENOUS at 17:14

## 2023-05-31 RX ADMIN — Medication 1 PUFF: at 19:56

## 2023-05-31 RX ADMIN — CEFEPIME 2000 MG: 2 INJECTION, POWDER, FOR SOLUTION INTRAVENOUS at 12:39

## 2023-05-31 RX ADMIN — APIXABAN 5 MG: 5 TABLET, FILM COATED ORAL at 20:21

## 2023-05-31 RX ADMIN — IPRATROPIUM BROMIDE AND ALBUTEROL SULFATE 1 DOSE: .5; 2.5 SOLUTION RESPIRATORY (INHALATION) at 19:56

## 2023-05-31 RX ADMIN — MEROPENEM 1000 MG: 1 INJECTION, POWDER, FOR SOLUTION INTRAVENOUS at 20:21

## 2023-05-31 RX ADMIN — SODIUM CHLORIDE 1389 ML: 9 INJECTION, SOLUTION INTRAVENOUS at 11:53

## 2023-05-31 RX ADMIN — VANCOMYCIN HYDROCHLORIDE 1250 MG: 10 INJECTION, POWDER, LYOPHILIZED, FOR SOLUTION INTRAVENOUS at 13:15

## 2023-05-31 RX ADMIN — SODIUM CHLORIDE 500 ML: 9 INJECTION, SOLUTION INTRAVENOUS at 15:49

## 2023-05-31 ASSESSMENT — PAIN - FUNCTIONAL ASSESSMENT: PAIN_FUNCTIONAL_ASSESSMENT: NONE - DENIES PAIN

## 2023-05-31 ASSESSMENT — ENCOUNTER SYMPTOMS
EYE PAIN: 0
BACK PAIN: 0
COUGH: 1
NAUSEA: 0
VOMITING: 0
SHORTNESS OF BREATH: 1
ABDOMINAL PAIN: 0

## 2023-05-31 NOTE — ED NOTES
Medication reviewed with wife, she states they are up to date and accurate.      Popeye Richardson RN  05/31/23 8697

## 2023-05-31 NOTE — ED NOTES
First set of blood cultures obtained by another RN at this time      Cheryl Vazquez RN  05/31/23 0201

## 2023-05-31 NOTE — ED PROVIDER NOTES
ED Attending Attestation Note    This patient was seen by the advanced practice provider. I personally saw the patient and performed a substantive portion of the visit including all aspects of the medical decision making. Briefly, 70 y.o. male presents with concern for recent fall as well as cough and generalized fatigue. Patient with a significant history of bladder cancer and is currently on chemotherapy. .     Focused exam:   Gen: 70 y.o. male, NAD  He is chronically ill-appearing. At rest, he is in no acute distress. Heart is noted to be regular. He is currently breathing comfortably with equal chest rise. He was sleeping comfortably when I first walked in the room but awakened easily to verbal stimuli. The Ekg interpreted by me shows  sinus tachycardia, lapc=962    Axis is   Normal  QTc is  within an acceptable range  Intervals and Durations are unremarkable. ST Segments: nonspecific changes  No significant change from prior EKG dated 4/13/23    Imaging:  XR CHEST PORTABLE    Result Date: 5/31/2023  EXAMINATION: ONE XRAY VIEW OF THE CHEST 5/31/2023 11:21 am COMPARISON: March 22, 2023 HISTORY: ORDERING SYSTEM PROVIDED HISTORY: cough TECHNOLOGIST PROVIDED HISTORY: Reason for exam:->cough Reason for Exam: cough FINDINGS: The cardiomediastinal silhouette is stable in size and contour. Stable volume loss noted on the right, with grossly stable right parahilar masslike opacity. Bilateral pleural and parenchymal scarring. No definite new infiltrate. No pleural effusion or pneumothorax. Overall, there has been no significant interval change. Grossly stable chest x-ray compared to March 22, 2023. CT CHEST PULMONARY EMBOLISM W CONTRAST    Result Date: 5/31/2023  EXAMINATION: CTA OF THE CHEST 5/31/2023 1:19 pm TECHNIQUE: CTA of the chest was performed after the administration of 75 mL Isovue 370 intravenous contrast.  Multiplanar reformatted images are provided for review.   MIP images are
limits   BLOOD GAS, VENOUS - Abnormal; Notable for the following components:    pCO2, Emeka 38.3 (*)     pO2, Emeka 43.4 (*)     Carboxyhemoglobin 3.1 (*)     All other components within normal limits   TROPONIN - Abnormal; Notable for the following components:    Troponin, High Sensitivity 47 (*)     All other components within normal limits   TROPONIN - Abnormal; Notable for the following components:    Troponin, High Sensitivity 45 (*)     All other components within normal limits   BRAIN NATRIURETIC PEPTIDE - Abnormal; Notable for the following components:    Pro-BNP 3,759 (*)     All other components within normal limits   PROCALCITONIN - Abnormal; Notable for the following components:    Procalcitonin 0.46 (*)     All other components within normal limits   CULTURE, BLOOD 1   CULTURE, BLOOD 2   URINALYSIS WITH REFLEX TO CULTURE       When ordered only abnormal lab results are displayed. All other labs were within normal range or not returned as of this dictation. EKG: When ordered, EKG's are interpreted by the Emergency Department Physician in the absence of a cardiologist.  Please see their note for interpretation of EKG. RADIOLOGY:   Non-plain film images such as CT, Ultrasound and MRI are read by the radiologist. Plain radiographic images are visualized and preliminarily interpreted by the ED Provider with the below findings:        Interpretation per the Radiologist below, if available at the time of this note:    CT CHEST PULMONARY EMBOLISM W CONTRAST   Final Result   1. No pulmonary embolism. 2. Right middle lobe consolidation has improved from prior CT. However there   is new mild consolidation in the posterior left upper lobe which could   represent pneumonia. 3. Moderate emphysema with enlarged central pulmonary arteries suggesting   pulmonary hypertension. 4. Stable right upper lobe volume loss and bronchiectasis.   Stable large   cavitary lesion with air-fluid level in the superior right

## 2023-05-31 NOTE — PLAN OF CARE
Presented with cough, SOB     Found to have new pna with chronic cavitary lesion     -follows with [facility] had recent bronch    Hypoxic requiring 2 L    Zithromax, rocephin     Pulm consulted     PCU     MariANTOINETTE Oliver  05/31/23  4:34 PM

## 2023-05-31 NOTE — ED NOTES
Report given to PCU; pt left ED in stable condition.  /67   Pulse 73   Temp 99.2 °F (37.3 °C) (Axillary)   Resp 18   Wt 102 lb (46.3 kg)   SpO2 100%   BMI 16.46 kg/m²       Nunu Lane RN  05/31/23 0700

## 2023-05-31 NOTE — ED NOTES
Report to Philip Redman RN. Green sheet handed off in report.       Garcia Morales RN  05/31/23 4539

## 2023-05-31 NOTE — ED NOTES
Patient has had 2 pressures in the 90's, provider notified.  Verbal order for 500 ml fluid bolus     Daniel Latif RN  05/31/23 3737

## 2023-05-31 NOTE — ED NOTES
Provider aware patient flagging for sepsis. Green sheet initiated.       Clinton Rodriguez RN  05/31/23 1138

## 2023-06-01 PROBLEM — J96.01 ACUTE RESPIRATORY FAILURE WITH HYPOXIA (HCC): Status: ACTIVE | Noted: 2023-06-01

## 2023-06-01 LAB
ANION GAP SERPL CALCULATED.3IONS-SCNC: 11 MMOL/L (ref 3–16)
BASOPHILS # BLD: 0.1 K/UL (ref 0–0.2)
BASOPHILS NFR BLD: 2.2 %
BUN SERPL-MCNC: 10 MG/DL (ref 7–20)
CALCIUM SERPL-MCNC: 8.2 MG/DL (ref 8.3–10.6)
CHLORIDE SERPL-SCNC: 106 MMOL/L (ref 99–110)
CO2 SERPL-SCNC: 22 MMOL/L (ref 21–32)
CREAT SERPL-MCNC: 0.8 MG/DL (ref 0.8–1.3)
DEPRECATED RDW RBC AUTO: 22 % (ref 12.4–15.4)
EOSINOPHIL # BLD: 0.3 K/UL (ref 0–0.6)
EOSINOPHIL NFR BLD: 5.7 %
GFR SERPLBLD CREATININE-BSD FMLA CKD-EPI: >60 ML/MIN/{1.73_M2}
GLUCOSE SERPL-MCNC: 90 MG/DL (ref 70–99)
HCT VFR BLD AUTO: 33.4 % (ref 40.5–52.5)
HGB BLD-MCNC: 10.6 G/DL (ref 13.5–17.5)
LV EF: 50 %
LVEF MODALITY: NORMAL
LYMPHOCYTES # BLD: 0.6 K/UL (ref 1–5.1)
LYMPHOCYTES NFR BLD: 10.3 %
MCH RBC QN AUTO: 24.9 PG (ref 26–34)
MCHC RBC AUTO-ENTMCNC: 31.7 G/DL (ref 31–36)
MCV RBC AUTO: 78.6 FL (ref 80–100)
MONOCYTES # BLD: 0.3 K/UL (ref 0–1.3)
MONOCYTES NFR BLD: 5.2 %
NEUTROPHILS # BLD: 4.4 K/UL (ref 1.7–7.7)
NEUTROPHILS NFR BLD: 76.6 %
PLATELET # BLD AUTO: 269 K/UL (ref 135–450)
PMV BLD AUTO: 7.9 FL (ref 5–10.5)
POTASSIUM SERPL-SCNC: 3.9 MMOL/L (ref 3.5–5.1)
RBC # BLD AUTO: 4.25 M/UL (ref 4.2–5.9)
SODIUM SERPL-SCNC: 139 MMOL/L (ref 136–145)
WBC # BLD AUTO: 5.7 K/UL (ref 4–11)

## 2023-06-01 PROCEDURE — 2700000000 HC OXYGEN THERAPY PER DAY

## 2023-06-01 PROCEDURE — 99232 SBSQ HOSP IP/OBS MODERATE 35: CPT | Performed by: INTERNAL MEDICINE

## 2023-06-01 PROCEDURE — 94761 N-INVAS EAR/PLS OXIMETRY MLT: CPT

## 2023-06-01 PROCEDURE — 6370000000 HC RX 637 (ALT 250 FOR IP)

## 2023-06-01 PROCEDURE — 2060000000 HC ICU INTERMEDIATE R&B

## 2023-06-01 PROCEDURE — 93306 TTE W/DOPPLER COMPLETE: CPT

## 2023-06-01 PROCEDURE — 6370000000 HC RX 637 (ALT 250 FOR IP): Performed by: INTERNAL MEDICINE

## 2023-06-01 PROCEDURE — 85025 COMPLETE CBC W/AUTO DIFF WBC: CPT

## 2023-06-01 PROCEDURE — 36415 COLL VENOUS BLD VENIPUNCTURE: CPT

## 2023-06-01 PROCEDURE — 94640 AIRWAY INHALATION TREATMENT: CPT

## 2023-06-01 PROCEDURE — 92610 EVALUATE SWALLOWING FUNCTION: CPT

## 2023-06-01 PROCEDURE — 2580000003 HC RX 258: Performed by: INTERNAL MEDICINE

## 2023-06-01 PROCEDURE — 99233 SBSQ HOSP IP/OBS HIGH 50: CPT | Performed by: INTERNAL MEDICINE

## 2023-06-01 PROCEDURE — 6360000002 HC RX W HCPCS

## 2023-06-01 PROCEDURE — 6360000002 HC RX W HCPCS: Performed by: INTERNAL MEDICINE

## 2023-06-01 PROCEDURE — 80048 BASIC METABOLIC PNL TOTAL CA: CPT

## 2023-06-01 PROCEDURE — 2580000003 HC RX 258

## 2023-06-01 PROCEDURE — 92526 ORAL FUNCTION THERAPY: CPT

## 2023-06-01 RX ORDER — IPRATROPIUM BROMIDE AND ALBUTEROL SULFATE 2.5; .5 MG/3ML; MG/3ML
1 SOLUTION RESPIRATORY (INHALATION) 3 TIMES DAILY
Status: DISCONTINUED | OUTPATIENT
Start: 2023-06-01 | End: 2023-06-02

## 2023-06-01 RX ADMIN — APIXABAN 5 MG: 5 TABLET, FILM COATED ORAL at 09:05

## 2023-06-01 RX ADMIN — IPRATROPIUM BROMIDE AND ALBUTEROL SULFATE 1 DOSE: 2.5; .5 SOLUTION RESPIRATORY (INHALATION) at 20:08

## 2023-06-01 RX ADMIN — METOPROLOL SUCCINATE 25 MG: 25 TABLET, EXTENDED RELEASE ORAL at 09:12

## 2023-06-01 RX ADMIN — IPRATROPIUM BROMIDE AND ALBUTEROL SULFATE 1 DOSE: .5; 2.5 SOLUTION RESPIRATORY (INHALATION) at 11:06

## 2023-06-01 RX ADMIN — APIXABAN 5 MG: 5 TABLET, FILM COATED ORAL at 20:39

## 2023-06-01 RX ADMIN — SODIUM CHLORIDE: 9 INJECTION, SOLUTION INTRAVENOUS at 10:03

## 2023-06-01 RX ADMIN — ACETAMINOPHEN 650 MG: 325 TABLET ORAL at 12:33

## 2023-06-01 RX ADMIN — Medication 1 PUFF: at 07:05

## 2023-06-01 RX ADMIN — MEROPENEM 1000 MG: 1 INJECTION, POWDER, FOR SOLUTION INTRAVENOUS at 14:45

## 2023-06-01 RX ADMIN — FERROUS SULFATE TAB 325 MG (65 MG ELEMENTAL FE) 325 MG: 325 (65 FE) TAB at 09:05

## 2023-06-01 RX ADMIN — IPRATROPIUM BROMIDE AND ALBUTEROL SULFATE 1 DOSE: .5; 2.5 SOLUTION RESPIRATORY (INHALATION) at 07:03

## 2023-06-01 RX ADMIN — MEROPENEM 1000 MG: 1 INJECTION, POWDER, FOR SOLUTION INTRAVENOUS at 20:39

## 2023-06-01 RX ADMIN — VANCOMYCIN HYDROCHLORIDE 500 MG: 500 INJECTION, POWDER, LYOPHILIZED, FOR SOLUTION INTRAVENOUS at 00:59

## 2023-06-01 RX ADMIN — Medication 1 PUFF: at 20:10

## 2023-06-01 RX ADMIN — VANCOMYCIN HYDROCHLORIDE 500 MG: 500 INJECTION, POWDER, LYOPHILIZED, FOR SOLUTION INTRAVENOUS at 12:25

## 2023-06-01 RX ADMIN — MEROPENEM 1000 MG: 1 INJECTION, POWDER, FOR SOLUTION INTRAVENOUS at 05:43

## 2023-06-01 RX ADMIN — TAMSULOSIN HYDROCHLORIDE 0.4 MG: 0.4 CAPSULE ORAL at 09:05

## 2023-06-01 RX ADMIN — BENZONATATE 100 MG: 100 CAPSULE ORAL at 05:59

## 2023-06-01 NOTE — PLAN OF CARE
Problem: Discharge Planning  Goal: Discharge to home or other facility with appropriate resources  6/1/2023 0935 by Fidel Marie RN  Outcome: Progressing  5/31/2023 2204 by Javier Bailey RN  Outcome: Progressing     Problem: Safety - Adult  Goal: Free from fall injury  6/1/2023 0935 by Fidel Marie RN  Outcome: Progressing  5/31/2023 2204 by Javier Bailey RN  Outcome: Progressing     Problem: ABCDS Injury Assessment  Goal: Absence of physical injury  6/1/2023 0935 by Fidel Marie RN  Outcome: Progressing  5/31/2023 2204 by Javier Bailey RN  Outcome: Progressing     Problem: Skin/Tissue Integrity  Goal: Absence of new skin breakdown  Description: 1. Monitor for areas of redness and/or skin breakdown  2. Assess vascular access sites hourly  3. Every 4-6 hours minimum:  Change oxygen saturation probe site  4. Every 4-6 hours:  If on nasal continuous positive airway pressure, respiratory therapy assess nares and determine need for appliance change or resting period.   6/1/2023 0935 by Fidel Marie RN  Outcome: Progressing  5/31/2023 2204 by Javier Bailey RN  Outcome: Progressing     Problem: Respiratory - Adult  Goal: Achieves optimal ventilation and oxygenation  6/1/2023 0935 by Fidel Marie RN  Outcome: Progressing  5/31/2023 2204 by Javier Bailey RN  Outcome: Progressing     Problem: Cardiovascular - Adult  Goal: Maintains optimal cardiac output and hemodynamic stability  6/1/2023 0935 by Fidel Marie RN  Outcome: Progressing  5/31/2023 2204 by Javier Bailey RN  Outcome: Progressing  Goal: Absence of cardiac dysrhythmias or at baseline  6/1/2023 0935 by Fidel Marie RN  Outcome: Progressing  5/31/2023 2204 by Javier Bailey RN  Outcome: Progressing     Problem: Skin/Tissue Integrity - Adult  Goal: Skin integrity remains intact  6/1/2023 0935 by Fidel Marie RN  Outcome: Progressing  5/31/2023 2204 by Javier Bailey RN  Outcome: Progressing     Problem:

## 2023-06-01 NOTE — PLAN OF CARE
Problem: Discharge Planning  Goal: Discharge to home or other facility with appropriate resources  Outcome: Progressing     Problem: Safety - Adult  Goal: Free from fall injury  Outcome: Progressing     Problem: ABCDS Injury Assessment  Goal: Absence of physical injury  Outcome: Progressing     Problem: Skin/Tissue Integrity  Goal: Absence of new skin breakdown  Description: 1. Monitor for areas of redness and/or skin breakdown  2. Assess vascular access sites hourly  3. Every 4-6 hours minimum:  Change oxygen saturation probe site  4. Every 4-6 hours:  If on nasal continuous positive airway pressure, respiratory therapy assess nares and determine need for appliance change or resting period.   Outcome: Progressing     Problem: Respiratory - Adult  Goal: Achieves optimal ventilation and oxygenation  Outcome: Progressing     Problem: Cardiovascular - Adult  Goal: Maintains optimal cardiac output and hemodynamic stability  Outcome: Progressing  Goal: Absence of cardiac dysrhythmias or at baseline  Outcome: Progressing     Problem: Skin/Tissue Integrity - Adult  Goal: Skin integrity remains intact  Outcome: Progressing  Goal: Incisions, wounds, or drain sites healing without S/S of infection  Outcome: Progressing  Goal: Oral mucous membranes remain intact  Outcome: Progressing     Problem: Genitourinary - Adult  Goal: Absence of urinary retention  Outcome: Progressing  Goal: Urinary catheter remains patent  Outcome: Progressing     Problem: Infection - Adult  Goal: Absence of infection at discharge  Outcome: Progressing  Goal: Absence of infection during hospitalization  Outcome: Progressing  Goal: Absence of fever/infection during anticipated neutropenic period  Outcome: Progressing     Problem: Hematologic - Adult  Goal: Maintains hematologic stability  Outcome: Progressing

## 2023-06-01 NOTE — CONSULTS
Reason for referral and CC: hypoxia, pneumonia    HISTORY OF PRESENT ILLNESS: 71 yo male with a h/o lung cavities with negative bronchoscopy, Bladder cancer on chemotherapy was sent by his family to the ED for weakness and a fall at home, fatigue, dark urine and a cough. He is hard of hearing. He was not sure why his family sent him to the hospital.  Denies cough. Family did report a cough. Past Medical History:   Diagnosis Date    Anxiety     Depression     Hypertension      Past Surgical History:   Procedure Laterality Date    BRONCHOSCOPY N/A 12/12/2022    BRONCHOSCOPY ALVEOLAR LAVAGE performed by Noreen English MD at [place] [address]  12/12/2022    BRONCHOSCOPY THERAPUTIC ASPIRATION INITIAL performed by Noreen English MD at [place] [address] N/A 03/22/2023    BRONCHOSCOPY ALVEOLAR LAVAGE performed by Carito Ovalle MD at [place] [address]  03/22/2023    BRONCHOSCOPY BRUSHINGS performed by Carito Ovalle MD at [address] [place] [address] N/A 10/16/2022    CYSTOSCOPY TRANSURETHRAL RESECTION BLADDER >5CM performed by Jesse Arellano MD at [address] [place] [address],[address] N/A 12/08/2022    CYSTOSCOPY, TRANSURETHERAL RESECTION OF A BLADDER TUMOR performed by Zoraida Mullins MD at [address] [place] [address],[address]  04/13/2023    Procedure: CYSTOSCOPY TRANSURETHRAL RESECTION BLADDER TUMOR ( f/c placement )    CYSTOSCOPY N/A 4/13/2023    CYSTOSCOPY TRANSURETHRAL RESECTION BLADDER TUMOR performed by Zoraida Mullins MD at [place] [address]. History  family history is not on file. Social History:  reports that he quit smoking about 7 months ago. His smoking use included cigarettes. He has a 60.00 pack-year smoking history. He has never used smokeless tobacco.   reports no history of alcohol use. ALLERGIES:  Patient is allergic to bee venom and zosyn [piperacillin sod-tazobactam so].   Continuous Infusions:   sodium chloride

## 2023-06-01 NOTE — CARE COORDINATION
Case Management Assessment  Initial Evaluation    Date/Time of Evaluation: 6/1/2023 11:49 AM  Assessment Completed by: Heidy Claire RN    If patient is discharged prior to next notation, then this note serves as note for discharge by case management. Patient Name: Petr Qiu                   YOB: 1955  Diagnosis: Acute respiratory failure with hypoxia ([facility] [address] [J96.01]  Pneumonia of left upper lobe due to infectious organism [J18.9]  Multifocal pneumonia [J18.9]                   Date / Time: 5/31/2023 10:47 AM    Patient Admission Status: Inpatient   Readmission Risk (Low < 19, Mod (19-27), High > 27): Readmission Risk Score: 19.3    Current PCP: Brynn De Leon MD  PCP verified by CM? Yes (siva)    Chart Reviewed: Yes      History Provided by: Patient, Other (see comment) (ex wife)  Patient Orientation: Alert and Oriented    Patient Cognition: Alert    Hospitalization in the last 30 days (Readmission):  No    If yes, Readmission Assessment in  Navigator will be completed. Advance Directives:      Code Status: Full Code   Patient's Primary Decision Maker is: Legal Next of Kin    Primary Decision MakerTherese Gisela Leung - [phone]    Discharge Planning:    Patient lives with: Alone Type of Home: House  Primary Care Giver: Self  Patient Support Systems include: Family Members, Friends/Neighbors   Current Financial resources: Medicare  Current community resources: None  Current services prior to admission: None (rollator, shower chair,neb)            Current DME:              Type of Home Care services:  None    ADLS  Prior functional level: Assistance with the following:, Bathing, Dressing, Cooking, Housework, Shopping  Current functional level: Assistance with the following:, Bathing, Dressing, Cooking, Housework, Shopping    PT AM-PAC:   /24  OT AM-PAC:   /24    Family can provide assistance at DC:  Yes  Would you like Case Management to discuss the discharge plan with any other

## 2023-06-01 NOTE — PLAN OF CARE
Bedside swallow evaluation completed this date.     Keri Klein M.S. [id number] [facility]  Speech-language pathologist  [phone]

## 2023-06-01 NOTE — FLOWSHEET NOTE
05/31/23 1957   Assessment   Charting Type Admission   Psychosocial   Psychosocial (WDL) WDL   Neurological   Neuro (WDL) WDL   Level of Consciousness 0   Lambert Coma Scale   Eye Opening 4   Best Verbal Response 5   Best Motor Response 6   Lambert Coma Scale Score 15   HEENT (Head, Ears, Eyes, Nose, & Throat)   HEENT (WDL) X   Right Ear Impaired hearing   Left Ear Impaired hearing   Respiratory   Respiratory (WDL) X  (requires 2L O2)   Localized Breath Sounds   R Basilar Crackles   L Basilar Crackles   Cardiac   Cardiac (WDL) X   Cardiac Regularity Regular   Cardiac Rhythm Sinus rhythm with PAC   Gastrointestinal   Abdominal (WDL) WDL   Genitourinary   Genitourinary (WDL) X  (Alexander)   Peripheral Vascular   Peripheral Vascular (WDL) WDL   Edema None   Dual Clinician Skin Assessment   Dual Skin Assessment (4 Eyes) WDL   Skin Integumentary    Skin Integumentary (WDL) X  (scattered bruising and scabs)   Musculoskeletal   Musculoskeletal (WDL) X  (weakness)     Admission assessment completed. Patient awake in bed. Family at bedside. A/Ox4. Patient is hard of hearing. See flowsheet for vitals. Will review orders and implement. Patient denies any pain or any further needs at this time. Call light and bedside table within reach.

## 2023-06-01 NOTE — FLOWSHEET NOTE
06/01/23 1215 06/01/23 1434   Vital Signs   Temp 100.3 °F (37.9 °C)  --    Temp Source Axillary  --    Pulse 96  --    Heart Rate Source Monitor  --    Respirations 18  --    /61  --    MAP (Calculated) 79  --    BP Location Right upper arm  --    Patient Position Semi fowlers  --    Level of Consciousness 0 0   MEWS Score 1  --    Oxygen Therapy   SpO2 97 %  --    O2 Device Nasal cannula  --    O2 Flow Rate (L/min) 2 L/min  --      Tylenol given for temp per MAR, no distress noted, call light in reach, will continue to monitor

## 2023-06-01 NOTE — H&P
V2.0  History and Physical      Name:  Kailey Nichols /Age/Sex: 1955  (70 y.o. male)   MRN & CSN:  [id number] & [id number] Encounter Date/Time: 2023 6:42 AM EDT   Location:  [facility]- PCP: Dal Dance, MD       Hospital Day: 2    Assessment and Plan:   Kailey Nichols is a 70 y.o. 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 Admit CT chest showed improved RML consolidation with new mild consolidation in left upper lobe. No PE  Patient on Merrem and vancomycin . Follow-up blood cultures and respiratory culture  continue supplemental O2 and wean off as tolerated. Incentive monitor, bronchodilator therapy and bronchial hygiene therapy as tolerated. Continue Eliquis  Monitor blood counts and serum chemistry    Disposition:   Current Living situation: home  Expected Disposition: TBD  Estimated D/C: TBD    Diet ADULT DIET; Regular   DVT Prophylaxis [] Lovenox, []  Heparin, [] SCDs, [] Ambulation,  [x] Eliquis, [] Xarelto, [] Coumadin   Code Status Full Code   Surrogate Decision Maker/ POA      Personally reviewed Lab Studies and Imaging         History from:     electronic medical record    History of Present Illness:     Chief Complaint: Shortness of breath    Kailey Nichols is a 70 y.o. male who presented to the emergency department with symptoms of cough, fatigue, dark urine. During my encounter patient was extremely somnolent and not would provide much information. No family at bedside.   Most information gathered from ED

## 2023-06-01 NOTE — FLOWSHEET NOTE
06/01/23 0047   Vitals   Temp 97.5 °F (36.4 °C)   Temp Source Oral   Pulse 69   Heart Rate Source Monitor   Respirations 18   /75   MAP (Calculated) 94   BP Location Right upper arm   BP Upper/Lower Upper   BP Method Automatic   Patient Position Semi fowlers   Level of Consciousness 0   MEWS Score 1   Oxygen Therapy   SpO2 100 %   O2 Device Nasal cannula   O2 Flow Rate (L/min) 2 L/min     Reassessment completed. Patient awake in bed. A/Ox4. Nasal cannula in place at 2L. Telesitter in place. Call light within reach.

## 2023-06-02 VITALS
OXYGEN SATURATION: 98 % | HEIGHT: 66 IN | HEART RATE: 105 BPM | TEMPERATURE: 98.6 F | WEIGHT: 102 LBS | SYSTOLIC BLOOD PRESSURE: 135 MMHG | BODY MASS INDEX: 16.39 KG/M2 | RESPIRATION RATE: 18 BRPM | DIASTOLIC BLOOD PRESSURE: 79 MMHG

## 2023-06-02 LAB
ANION GAP SERPL CALCULATED.3IONS-SCNC: 13 MMOL/L (ref 3–16)
BASOPHILS # BLD: 0.1 K/UL (ref 0–0.2)
BASOPHILS NFR BLD: 2 %
BUN SERPL-MCNC: 5 MG/DL (ref 7–20)
CALCIUM SERPL-MCNC: 8.4 MG/DL (ref 8.3–10.6)
CHLORIDE SERPL-SCNC: 105 MMOL/L (ref 99–110)
CO2 SERPL-SCNC: 16 MMOL/L (ref 21–32)
CREAT SERPL-MCNC: 0.6 MG/DL (ref 0.8–1.3)
DEPRECATED RDW RBC AUTO: 22.3 % (ref 12.4–15.4)
EOSINOPHIL # BLD: 0.4 K/UL (ref 0–0.6)
EOSINOPHIL NFR BLD: 6.6 %
GFR SERPLBLD CREATININE-BSD FMLA CKD-EPI: >60 ML/MIN/{1.73_M2}
GLUCOSE SERPL-MCNC: 90 MG/DL (ref 70–99)
HCT VFR BLD AUTO: 32.1 % (ref 40.5–52.5)
HGB BLD-MCNC: 10.2 G/DL (ref 13.5–17.5)
LYMPHOCYTES # BLD: 0.6 K/UL (ref 1–5.1)
LYMPHOCYTES NFR BLD: 10.6 %
MAGNESIUM SERPL-MCNC: 2.1 MG/DL (ref 1.8–2.4)
MCH RBC QN AUTO: 25.1 PG (ref 26–34)
MCHC RBC AUTO-ENTMCNC: 31.9 G/DL (ref 31–36)
MCV RBC AUTO: 78.6 FL (ref 80–100)
MONOCYTES # BLD: 0.4 K/UL (ref 0–1.3)
MONOCYTES NFR BLD: 7.9 %
NEUTROPHILS # BLD: 3.9 K/UL (ref 1.7–7.7)
NEUTROPHILS NFR BLD: 72.9 %
PLATELET # BLD AUTO: 295 K/UL (ref 135–450)
PMV BLD AUTO: 7.4 FL (ref 5–10.5)
POTASSIUM SERPL-SCNC: 3.5 MMOL/L (ref 3.5–5.1)
RBC # BLD AUTO: 4.08 M/UL (ref 4.2–5.9)
SODIUM SERPL-SCNC: 134 MMOL/L (ref 136–145)
VANCOMYCIN TROUGH SERPL-MCNC: 10.4 UG/ML (ref 10–20)
WBC # BLD AUTO: 5.3 K/UL (ref 4–11)

## 2023-06-02 PROCEDURE — 6360000002 HC RX W HCPCS

## 2023-06-02 PROCEDURE — 6370000000 HC RX 637 (ALT 250 FOR IP): Performed by: INTERNAL MEDICINE

## 2023-06-02 PROCEDURE — 99233 SBSQ HOSP IP/OBS HIGH 50: CPT | Performed by: INTERNAL MEDICINE

## 2023-06-02 PROCEDURE — 94640 AIRWAY INHALATION TREATMENT: CPT

## 2023-06-02 PROCEDURE — 94761 N-INVAS EAR/PLS OXIMETRY MLT: CPT

## 2023-06-02 PROCEDURE — 80048 BASIC METABOLIC PNL TOTAL CA: CPT

## 2023-06-02 PROCEDURE — 2580000003 HC RX 258

## 2023-06-02 PROCEDURE — 97162 PT EVAL MOD COMPLEX 30 MIN: CPT

## 2023-06-02 PROCEDURE — 85025 COMPLETE CBC W/AUTO DIFF WBC: CPT

## 2023-06-02 PROCEDURE — 83735 ASSAY OF MAGNESIUM: CPT

## 2023-06-02 PROCEDURE — 2580000003 HC RX 258: Performed by: INTERNAL MEDICINE

## 2023-06-02 PROCEDURE — 92526 ORAL FUNCTION THERAPY: CPT

## 2023-06-02 PROCEDURE — 36415 COLL VENOUS BLD VENIPUNCTURE: CPT

## 2023-06-02 PROCEDURE — 6360000002 HC RX W HCPCS: Performed by: INTERNAL MEDICINE

## 2023-06-02 PROCEDURE — 99238 HOSP IP/OBS DSCHRG MGMT 30/<: CPT | Performed by: INTERNAL MEDICINE

## 2023-06-02 PROCEDURE — 97530 THERAPEUTIC ACTIVITIES: CPT

## 2023-06-02 PROCEDURE — 2700000000 HC OXYGEN THERAPY PER DAY

## 2023-06-02 PROCEDURE — 80202 ASSAY OF VANCOMYCIN: CPT

## 2023-06-02 PROCEDURE — 6370000000 HC RX 637 (ALT 250 FOR IP)

## 2023-06-02 RX ORDER — DOXYCYCLINE HYCLATE 100 MG
100 TABLET ORAL 2 TIMES DAILY
Qty: 10 TABLET | Refills: 0 | Status: SHIPPED | OUTPATIENT
Start: 2023-06-02 | End: 2023-06-07

## 2023-06-02 RX ORDER — LEVOFLOXACIN 750 MG/1
750 TABLET ORAL DAILY
Qty: 5 TABLET | Refills: 0 | Status: SHIPPED | OUTPATIENT
Start: 2023-06-02 | End: 2023-06-07

## 2023-06-02 RX ORDER — IPRATROPIUM BROMIDE AND ALBUTEROL SULFATE 2.5; .5 MG/3ML; MG/3ML
1 SOLUTION RESPIRATORY (INHALATION) 4 TIMES DAILY
Status: DISCONTINUED | OUTPATIENT
Start: 2023-06-02 | End: 2023-06-02 | Stop reason: HOSPADM

## 2023-06-02 RX ORDER — BENZONATATE 100 MG/1
100 CAPSULE ORAL 3 TIMES DAILY PRN
Qty: 20 CAPSULE | Refills: 0 | Status: SHIPPED | OUTPATIENT
Start: 2023-06-02

## 2023-06-02 RX ADMIN — VANCOMYCIN HYDROCHLORIDE 500 MG: 500 INJECTION, POWDER, LYOPHILIZED, FOR SOLUTION INTRAVENOUS at 01:08

## 2023-06-02 RX ADMIN — METOPROLOL SUCCINATE 25 MG: 25 TABLET, EXTENDED RELEASE ORAL at 08:17

## 2023-06-02 RX ADMIN — APIXABAN 5 MG: 5 TABLET, FILM COATED ORAL at 08:16

## 2023-06-02 RX ADMIN — IPRATROPIUM BROMIDE AND ALBUTEROL SULFATE 1 DOSE: 2.5; .5 SOLUTION RESPIRATORY (INHALATION) at 11:09

## 2023-06-02 RX ADMIN — IPRATROPIUM BROMIDE AND ALBUTEROL SULFATE 1 DOSE: 2.5; .5 SOLUTION RESPIRATORY (INHALATION) at 07:08

## 2023-06-02 RX ADMIN — TAMSULOSIN HYDROCHLORIDE 0.4 MG: 0.4 CAPSULE ORAL at 08:17

## 2023-06-02 RX ADMIN — FERROUS SULFATE TAB 325 MG (65 MG ELEMENTAL FE) 325 MG: 325 (65 FE) TAB at 08:17

## 2023-06-02 RX ADMIN — BENZONATATE 100 MG: 100 CAPSULE ORAL at 08:17

## 2023-06-02 RX ADMIN — SODIUM CHLORIDE, PRESERVATIVE FREE 10 ML: 5 INJECTION INTRAVENOUS at 08:22

## 2023-06-02 RX ADMIN — MEROPENEM 1000 MG: 1 INJECTION, POWDER, FOR SOLUTION INTRAVENOUS at 05:05

## 2023-06-02 RX ADMIN — Medication 1 PUFF: at 07:11

## 2023-06-02 NOTE — CARE COORDINATION
DISCHARGE ORDER  Date/Time 2023 11:19 AM  Completed by: Heidy Claire RN, Case Management    Patient Name: Petr Qiu      : 1955  Admitting Diagnosis: Acute respiratory failure with hypoxia ([facility] [address] [J96.01]  Pneumonia of left upper lobe due to infectious organism [J18.9]  Multifocal pneumonia [J18.9]      Admit order Date and Status:22 IP  (verify MD's last order for status of admission)      Noted discharge order. If applicable PT/OT recommendation at Discharge:  Home with PRN assistance and Home PT  DME recommendation by PT/OT:needs met  Confirmed discharge plan  (yes): Yes  with whom_____Gary__________  If pt confirmed DC plan does family need to be contacted by CM No     Discharge Plan: Reviewed chart and met with pt at bedside. Pt \"failed\" walk test and will need home O2. Pt is agreeable to [facility]. Spoke to [place] who states he will look at info and call back when pt is able to DC home. Pt will need E tank and concentrator. Pt adamantly refuses home care at this time    Will await return call from Lee Laboy with [facility] returned call. Pt is ready to go with Daysi Shafer and concentrator    Date of Last IMM Given: 23    Reviewed chart. Role of discharge planner explained and patient verbalized understanding. Discharge order is noted. Has Home O2 in place on admit:  No  Informed of need to bring portable home O2 tank on day of discharge for nursing to connect prior to leaving:   Not Indicated  Verbalized agreement/Understanding:   Not Indicated  Pt is being d/c'd to home with family today. Pt's O2 sats are 87% on RA with ambulation. Discharge timeout done with pt, rn,cm. All discharge needs and concerns addressed.

## 2023-06-02 NOTE — PLAN OF CARE
Problem: Safety - Adult  Goal: Free from fall injury  Outcome: Progressing  Flowsheets (Taken 6/2/2023 0033)  Free From Fall Injury: Instruct family/caregiver on patient safety     Problem: Respiratory - Adult  Goal: Achieves optimal ventilation and oxygenation  Outcome: Progressing  Flowsheets (Taken 6/2/2023 0033)  Achieves optimal ventilation and oxygenation:   Assess for changes in respiratory status   Assess for changes in mentation and behavior   Position to facilitate oxygenation and minimize respiratory effort   Assess and instruct to report shortness of breath or any respiratory difficulty     Problem: Skin/Tissue Integrity  Goal: Absence of new skin breakdown  Description: 1. Monitor for areas of redness and/or skin breakdown  2. Assess vascular access sites hourly  3. Every 4-6 hours minimum:  Change oxygen saturation probe site  4. Every 4-6 hours:  If on nasal continuous positive airway pressure, respiratory therapy assess nares and determine need for appliance change or resting period.   Outcome: Progressing     Problem: Genitourinary - Adult  Goal: Urinary catheter remains patent  Outcome: Progressing  Flowsheets (Taken 6/2/2023 0033)  Urinary catheter remains patent: Assess patency of urinary catheter     Problem: Hematologic - Adult  Goal: Maintains hematologic stability  Outcome: Progressing  Flowsheets (Taken 6/2/2023 0033)  Maintains hematologic stability: Assess for signs and symptoms of bleeding or hemorrhage

## 2023-06-02 NOTE — FLOWSHEET NOTE
06/02/23 0400   Vital Signs   Temp 97.5 °F (36.4 °C)   Temp Source Oral   Pulse 72   Heart Rate Source Monitor   Respirations 20   BP (!) 147/83   MAP (Calculated) 104   BP Location Right upper arm   BP Method Automatic   Patient Position Semi fowlers   Level of Consciousness 0   MEWS Score 1   Pain Assessment   Pain Assessment None - Denies Pain   Opioid-Induced Sedation   POSS Score 1   RASS   Singh Agitation Sedation Scale (RASS) 0   Oxygen Therapy   SpO2 100 %   O2 Device Nasal cannula   O2 Flow Rate (L/min) 2 L/min     Reassessment done. Patient is awake and asking for something to drink. Vital signs as above. Noted coughing but no phlegm. Alexander care done. Call light within reach.

## 2023-06-02 NOTE — PROGRESS NOTES
06/01/23 2000   RT Protocol   History Pulmonary Disease 2   Respiratory pattern 2   Breath sounds 2   Cough 0   Indications for Bronchodilator Therapy Decreased or absent breath sounds   Bronchodilator Assessment Score 6     RT Inhaler-Nebulizer Bronchodilator Protocol Note    There is a bronchodilator order in the chart from a provider indicating to follow the RT Bronchodilator Protocol and there is an Initiate RT Inhaler-Nebulizer Bronchodilator Protocol order as well (see protocol at bottom of note). CXR Findings:  XR CHEST PORTABLE    Result Date: 5/31/2023  Grossly stable chest x-ray compared to March 22, 2023. The findings from the last RT Protocol Assessment were as follows:   History Pulmonary Disease: Chronic pulmonary disease  Respiratory Pattern: Dyspnea on exertion or RR 21-25 bpm  Breath Sounds: Slightly diminished and/or crackles  Cough: Strong, spontaneous, non-productive  Indication for Bronchodilator Therapy: Decreased or absent breath sounds  Bronchodilator Assessment Score: 6    Aerosolized bronchodilator medication orders have been revised according to the RT Inhaler-Nebulizer Bronchodilator Protocol below. Respiratory Therapist to perform RT Therapy Protocol Assessment initially then follow the protocol. Repeat RT Therapy Protocol Assessment PRN for score 0-3 or on second treatment, BID, and PRN for scores above 3. No Indications - adjust the frequency to every 6 hours PRN wheezing or bronchospasm, if no treatments needed after 48 hours then discontinue using Per Protocol order mode. If indication present, adjust the RT bronchodilator orders based on the Bronchodilator Assessment Score as indicated below.   Use Inhaler orders unless patient has one or more of the following: on home nebulizer, not able to hold breath for 10 seconds, is not alert and oriented, cannot activate and use MDI correctly, or respiratory rate 25 breaths per minute or more, then use the equivalent
4 Eyes Skin Assessment     The patient is being assess for   Admission    I agree that 2 RN's have performed a thorough Head to Toe Skin Assessment on the patient. ALL assessment sites listed below have been assessed. Areas assessed for pressure by both nurses:   [x]   Head, Face, and Ears   [x]   Shoulders, Back, and Chest, Abdomen  [x]   Arms, Elbows, and Hands   [x]   Coccyx, Sacrum, and Ischium  [x]   Legs, Feet, and Heels    Scattered bruises, scratches. Blanchable redness to buttocks. No wounds. Skin Assessed Under all Medical Devices by both nurses:  O2 device tubing              All Mepilex Borders were peeled back and area peeked at by both nurses:  No: NA  Please list where Mepilex Borders are located:  NA             **SHARE this note so that the co-signing nurse is able to place an eSignature**    Co-signer eSignature: Electronically signed by Rossy Vargas RN on 5/31/23 at 9:50 PM EDT    Does the Patient have Skin Breakdown related to pressure?   No          Damon Prevention initiated:  Yes   Wound Care Orders initiated:  NA      [facility] nurse consulted for Pressure Injury (Stage 3,4, Unstageable, DTI, NWPT, Complex wounds)and New or Established Ostomies:  NA      Primary Nurse eSignature: Electronically signed by Hillary Watkins RN on 5/31/23 at 7:53 PM EDT
[address] [facility] Pharmacokinetic Monitoring Service - Vancomycin     Mohsen Mckeon is a 70 y.o. male starting on vancomycin therapy for CAP. Pharmacy consulted by ANTOINETTE Mathis for monitoring and adjustment. Target Concentration: Goal AUC/ULISSES 400-600 mg*hr/L    Additional Antimicrobials: merrem    Pertinent Laboratory Values: Wt Readings from Last 1 Encounters:   05/31/23 102 lb (46.3 kg)     Temp Readings from Last 1 Encounters:   05/31/23 99.2 °F (37.3 °C) (Axillary)     Estimated Creatinine Clearance: 42 mL/min (based on SCr of 1.1 mg/dL).   Recent Labs     05/31/23  1125   CREATININE 1.1   WBC 8.4         Plan:  Dosing recommendations based on Bayesian software  Start vancomycin 500mg q12h  Anticipated AUC of 500 and trough concentration of 16.9 at steady state  Renal labs as indicated   Vancomycin concentration ordered for 6/2 @ Swathi Brizuela will continue to monitor patient and adjust therapy as indicated    Thank you for the consult,  Lionell Schlatter, [facility]  5/31/2023 5:35 PM
6/1/2023 MEETS for INDICATORS  Indwelling Cath     1. Acute Urinary Retention or Bladder Outlet Obstruction or Irrigation  Acute Bladder Outlet Obstruction:  [x]Prostate Enlargement  [x]Blood Clots, Hematuria  []Urethral Compression    Continuous Bladder Irrigation  [x] TURP  [x]Other: Please Specify:    Per Dr Henry Cruz MD 6/1/23  #HypoNa  -resolved with IVF     #Bladder cancer   #Urinary retention  #Hematuria   -f/b Dr. Whitten Age   -s/p TURBT ~6 mos ago  -undergoing bladder chemo, last session Thursday 5/25  -with increased blood noted in mc following recent fall- now none noted   -continue flomax      6.   Selected Surgical Procedures  Selected Ronda-Operative Needs:    [x]Urologic surgery
6/2  Vanc trough = 10.4 mcg/mL at 0033. Calculated AUC of 366. Will increase vancomycin to 750 mg q12. Recheck vanc trough 6/3 at 1200.   Hali Sher PharmD  6/2/2023 1:20 AM
AM assessment completed. Scheduled medications given per MAR. VSS 2 liter NC, A/O x4, patient does not appear in distress, and patient denies any needs at this time.  Call light in reach, will monitor,
AM assessment completed. Scheduled medications given per MAR. VSS room air, A/O x4, patient does not appear in distress, and patient denies any needs at this time. Call light in reach, will monitor.
Handoff report given to Dave Benton, Care transferred.
Handoff report given to [employer]. Care transferred.
Inpatient Physical Therapy Evaluation & Treatment    Unit: PCU  Date:  6/2/2023  Patient Name:    Srinath Bose  Admitting diagnosis:  Acute respiratory failure with hypoxia ([facility] [address] [J96.01]  Pneumonia of left upper lobe due to infectious organism [J18.9]  Multifocal pneumonia [J18.9]  Admit Date:  5/31/2023  Precautions/Restrictions/WB Status/ Lines/ Wounds/ Oxygen: Fall risk, Bed/chair alarm, Lines (Supplemental O2 (2L) and internal catheter), Telemetry, and Telesitter    Treatment Time:  0830 - 0915  Treatment Number:  1   Timed Code Treatment Minutes: 35 minutes  Total Treatment Minutes:  45  minutes    Patient Stated Goals for Therapy: \" I plan to go back home \" \"I can do that on my own\" [when asked about home PT]         Discharge Recommendations: Home with PRN assistance and Home PT  DME needs for discharge: Needs Met       Therapy recommendation for EMS Transport: can transport by wheelchair    Therapy recommendations for staff:   Stand by assist for transfers with use of No AD and gait belt [facility]  to[facility]from [facility]    History of Present Illness:   Per H&P from Dr. Jorge Zuniga (6/1/23), \"Marcial Steele is a 70 y.o. male who presented to the emergency department with symptoms of cough, fatigue, dark urine. During my encounter patient was extremely somnolent and not would provide much information. No family at bedside. Most information gathered from ED MD note and chart review. Per report patient had a syncopal spell 2 days ago while at home.  he has been chronically debilitated. Patient hasrecent diagnosis of bladder cancer and actually had a large tumor removed from his bladder about 6 months ago. He had finished round of chemotherapy and had improvement in symptoms. Patient had a recent evaluation with urology and no cancer was noted but recommended that he receive 3 more rounds of chemo. His most recent round of chemotherapy was on Thursday.   Since his chemo on Thursday his symptoms of general fatigue,
Patient educated on discharge instructions as well as new medications use, dosage, administration and possible side effects. Patient verified knowledge. IV removed without difficulty and dry dressing in place. Telemetry monitor removed and returned to [facility] [address] [place]. Pt left facility in stable condition to Home with all of their personal belongings.
Patient is not able to demonstrated the ability to move from a reclining position to an upright position within the recliner.  however patient is alert, oriented and able to provide informed consent
Progress Note    Admit Date:  5/31/2023    Hx bladder cancer undergoing chemo  Weakness/fatigue  Fall at home on Eliquis  Increased blood noted in catheter following day  H&H stable  Worsening cough   Hypoxia 2/2 multifocal pneumonia    Subjective:  Mr. Calderon Alvarado seen in bed with no distress   Ex-wife at bedside  States his breathing is at his baseline    Objective:   Patient Vitals for the past 4 hrs:   BP Temp Temp src Pulse Resp SpO2   06/01/23 0722 132/80 99 °F (37.2 °C) Oral (!) 104 21 94 %   06/01/23 0705 -- -- -- -- -- 93 %   06/01/23 0550 -- -- -- 82 -- --   06/01/23 0544 (!) 156/76 98.5 °F (36.9 °C) Oral (!) 113 22 92 %          Intake/Output Summary (Last 24 hours) at 6/1/2023 0840  Last data filed at 6/1/2023 2288  Gross per 24 hour   Intake 2386.22 ml   Output 1000 ml   Net 1386.22 ml       Physical Exam:  Gen: No distress. Alert. Cachectic and chronically ill appearing. Eyes: PERRL. No sclera icterus. No conjunctival injection. ENT: No discharge. Pharynx clear. Neck: No JVD. Trachea midline. Resp: Accessory muscle use. No crackles. No wheezes. Scattered rhonchi, some wheezes  CV: Regular rate. Regular rhythm. No murmur. No rub. No edema. Peripheral Pulses: +2 palpable, equal bilaterally   GI: Non-tender. Non-distended. Normal bowel sounds. Skin: Warm and dry. No nodule on exposed extremities. No rash on exposed extremities. M/S: No cyanosis. No joint deformity. No clubbing. Neuro: Awake. Grossly nonfocal    Psych: Oriented x 3. No anxiety or agitation. Donna Huang MD have reviewed the chart on Benny Chavira and personally interviewed and examined patient, reviewed the data (labs and imaging) and after discussion with my PA formulated the plan. Agree with note with the following edits. HPI:     I reviewed the patient's Past Medical History, Past Surgical History, Medications, and Allergies.        Denies any sob but has mild cough  No further hematuria        General:
Pt settled into room 302, ex-wife came upstairs with patient. Admission questions complete. Skin assessment complete. Pt bathed, diaper changed, and warm blanket provided. Care transferred to [facility], RN, pt stable.
Pulmonary Progress Note  CC: hypoxia, pneumonia    Subjective:  no complaints      EXAM: /79   Pulse (!) 105   Temp 98.6 °F (37 °C) (Oral)   Resp 18   Ht 5' 6\" (1.676 m)   Wt 102 lb (46.3 kg)   SpO2 98%   BMI 16.46 kg/m²  on 1L  Constitutional:  No acute distress. Eyes: PERRL. Conjunctivae anicteric. ENT: Normal nose. Normal tongue. Neck:  Trachea is midline. Respiratory: No accessory muscle usage. no decreased breath sounds. No wheezes. No rales. No Rhonchi. Cardiovascular: Normal S1S2. No digit clubbing. No digit cyanosis. No LE edema. Psychiatric: No anxiety or Agitation. Alert and Oriented to person, place and time. Scheduled Meds:   vancomycin  750 mg IntraVENous Q12H    ipratropium 0.5 mg-albuterol 2.5 mg  1 Dose Inhalation 4x daily    meropenem  1,000 mg IntraVENous Q8H    apixaban  5 mg Oral BID    ferrous sulfate  325 mg Oral Daily with breakfast    tamsulosin  0.4 mg Oral Daily    sodium chloride flush  5-40 mL IntraVENous 2 times per day    metoprolol succinate  25 mg Oral Daily    fluticasone  1 puff Inhalation BID     Continuous Infusions:   sodium chloride       PRN Meds:  benzonatate, sodium chloride flush, sodium chloride, potassium chloride **OR** potassium alternative oral replacement **OR** potassium chloride, magnesium sulfate, ondansetron **OR** ondansetron, polyethylene glycol, acetaminophen **OR** acetaminophen, perflutren lipid microspheres, ipratropium 0.5 mg-albuterol 2.5 mg    Labs:  CBC:   Recent Labs     05/31/23  1125 06/01/23  0431 06/02/23  0446   WBC 8.4 5.7 5.3   HGB 11.0* 10.6* 10.2*   HCT 34.1* 33.4* 32.1*   MCV 76.7* 78.6* 78.6*    269 295     BMP:   Recent Labs     05/31/23  1125 06/01/23  0431 06/02/23  0446   * 139 134*   K 3.6 3.9 3.5   CL 94* 106 105   CO2 20* 22 16*   BUN 13 10 5*   CREATININE 1.1 0.8 0.6*          5/31/23 PCT 0.46     5/31/23 CTPA: 1. No pulmonary embolism.  2. Right middle lobe consolidation has improved from prior
Pulmonology consult called in at this time. Left message with answering service.
Pulse oximetry assessment   93% at rest on room air (if 88% or less, skip next steps)  87% while ambulating on room air  98% at rest on 1LPM  90% while ambulating on 2LPM
RT Inhaler-Nebulizer Bronchodilator Protocol Note    There is a bronchodilator order in the chart from a provider indicating to follow the RT Bronchodilator Protocol and there is an Initiate RT Inhaler-Nebulizer Bronchodilator Protocol order as well (see protocol at bottom of note). CXR Findings:  XR CHEST PORTABLE    Result Date: 5/31/2023  Grossly stable chest x-ray compared to March 22, 2023. The findings from the last RT Protocol Assessment were as follows:   History Pulmonary Disease: Chronic pulmonary disease  Respiratory Pattern: Dyspnea on exertion or RR 21-25 bpm  Breath Sounds: Inspiratory and expiratory or bilateral wheezing and/or rhonchi  Cough: Strong, spontaneous, non-productive  Indication for Bronchodilator Therapy: Wheezing associated with pulm disorder  Bronchodilator Assessment Score: 10    Aerosolized bronchodilator medication orders have been revised according to the RT Inhaler-Nebulizer Bronchodilator Protocol below. Respiratory Therapist to perform RT Therapy Protocol Assessment initially then follow the protocol. Repeat RT Therapy Protocol Assessment PRN for score 0-3 or on second treatment, BID, and PRN for scores above 3. No Indications - adjust the frequency to every 6 hours PRN wheezing or bronchospasm, if no treatments needed after 48 hours then discontinue using Per Protocol order mode. If indication present, adjust the RT bronchodilator orders based on the Bronchodilator Assessment Score as indicated below. Use Inhaler orders unless patient has one or more of the following: on home nebulizer, not able to hold breath for 10 seconds, is not alert and oriented, cannot activate and use MDI correctly, or respiratory rate 25 breaths per minute or more, then use the equivalent nebulizer order(s) with same Frequency and PRN reasons based on the score. If a patient is on this medication at home then do not decrease Frequency below that used at home.     0-3 - enter or
RT Inhaler-Nebulizer Bronchodilator Protocol Note    There is a bronchodilator order in the chart from a provider indicating to follow the RT Bronchodilator Protocol and there is an Initiate RT Inhaler-Nebulizer Bronchodilator Protocol order as well (see protocol at bottom of note). CXR Findings:  XR CHEST PORTABLE    Result Date: 5/31/2023  Grossly stable chest x-ray compared to March 22, 2023. The findings from the last RT Protocol Assessment were as follows:   History Pulmonary Disease: Chronic pulmonary disease  Respiratory Pattern: Dyspnea on exertion or RR 21-25 bpm  Breath Sounds: Slightly diminished and/or crackles  Cough: Strong, spontaneous, non-productive  Indication for Bronchodilator Therapy: Decreased or absent breath sounds  Bronchodilator Assessment Score: 6    Aerosolized bronchodilator medication orders have been revised according to the RT Inhaler-Nebulizer Bronchodilator Protocol below. Respiratory Therapist to perform RT Therapy Protocol Assessment initially then follow the protocol. Repeat RT Therapy Protocol Assessment PRN for score 0-3 or on second treatment, BID, and PRN for scores above 3. No Indications - adjust the frequency to every 6 hours PRN wheezing or bronchospasm, if no treatments needed after 48 hours then discontinue using Per Protocol order mode. If indication present, adjust the RT bronchodilator orders based on the Bronchodilator Assessment Score as indicated below. Use Inhaler orders unless patient has one or more of the following: on home nebulizer, not able to hold breath for 10 seconds, is not alert and oriented, cannot activate and use MDI correctly, or respiratory rate 25 breaths per minute or more, then use the equivalent nebulizer order(s) with same Frequency and PRN reasons based on the score. If a patient is on this medication at home then do not decrease Frequency below that used at home.     0-3 - enter or revise RT bronchodilator order(s) to
Speech Language Pathology  Clinical Bedside Swallow Assessment  Facility/Department: [facility] PCU TELEMETRY      Instrumentation: Not clinically indicated at this time. Will continue to monitor  Diet recommendation: IDDSI 7 Regular Solids; IDDSI 0 Thin Liquids; Meds PO as tolerated  Risk management: upright for all intake, stay upright for at least 30 mins after intake, small bites/sips, oral care 2-3x/day to reduce adverse affects in the event of aspiration, alternate bites/sips, slow rate of intake, general GERD precautions, general aspiration precautions, and hold PO and contact SLP if s/s of aspiration or worsening respiratory status develop.       NAME:Marcial Davis  : 1955 (74 y.o.)   MRN: [id number]  ROOM: [place] [address]  ADMISSION DATE: 2023  PATIENT DIAGNOSIS(ES): Acute respiratory failure with hypoxia (HCC) [J96.01]  Pneumonia of left upper lobe due to infectious organism [J18.9]  Multifocal pneumonia [J18.9]  Chief Complaint   Patient presents with    Fatigue     Bladder cancer; last chemo was ; mc catheter inserted then; pt had a unwitnessed fall yesterday; pt was confused after; taking Eliquis; blood in catheter; not eating or drinking well; cough      Patient Active Problem List    Diagnosis Date Noted    Urinary tract infection with hematuria 2022    Advanced COPD ([address] 2022    Cavitary lung disease 12/10/2022    Abnormal CXR 2022    Clot hematuria 2022    Malignant neoplasm of urinary bladder ([address] 2022    Tachycardia 2022    Mass of urinary bladder 10/15/2022    Tobacco abuse 10/15/2022    Acute deep vein thrombosis (DVT) of iliac vein of left lower extremity ([address] 10/15/2022    Acute deep vein thrombosis (DVT) of calf muscle vein of left lower extremity ([address] 10/15/2022    Acute cystitis with hematuria 10/14/2022    Hyponatremia 10/14/2022    MATHEUS (acute kidney injury) ([address] 10/14/2022    COPD (chronic obstructive pulmonary disease) ([address]
Vancomycin Day: 2/5  Current Regimen: 500 mg IV every 12 hours    Patient's labs, cultures, vitals, and vancomycin regimen reviewed.    SCr stable  U/O not measured/well documented  [employer] updated    Plan:   Order level for 6/2 at [address] [facility] D 6/1/202311:00 AM  .
compensatory strategies. RN ok'd SLP entry. Upon entry pt asleep however, aroused by name. Pt with reduced appetite and declined all PO trials. SLP spent majority of session providing education to pt, see details above. Pt reported coughing/ choking with medication. When SLP asked how pt takes medication he reports whole with thin liquids. SLP advised pt to crush pills and place them in puree. Pt stated \"I don't have issues taking pills\". Unsure if pt heard the conversation correctly d/t Yocha Dehe. Will cont to monitor tolerance to medication. Recommendations: SLP recommends to continue with IDDSI 7 Regular Solids; IDDSI 0 Thin Liquids; Meds PO as tolerated  Risk Management: upright for all intake, stay upright for at least 30 mins after intake, small bites/sips, oral care 2-3x/day to reduce adverse affects in the event of aspiration, alternate bites/sips, slow rate of intake, general GERD precautions, general aspiration precautions, and hold PO and contact SLP if s/s of aspiration or worsening respiratory status develop. Dysphagia Goals:  Timeframe for Long-term Goals: 7 days, 6/8/23  Goal 1: The pt will tolerate safest and least restrictive diet without clinical s/s of aspiration or change in respiratory status. 6/2/2023 : Ongoing, progressing. Short-term Goals  Timeframe for Short-term Goals: 5 days, 6/6/23  Goal 1: The patient will tolerate recommended diet without observed clinical signs of aspiration  6/2/2023 : Goal addressed, see above. Ongoing, progressing. Goal 2: The patient/caregiver will demonstrate understanding of compensatory strategies for improved swallowing safety. 6/2/2023 : Goal addressed, see above. Ongoing, progressing. Goal 3: The patient will tolerate instrumental swallowing procedure  6/2/2023 : Pt declined all PO trials, unable to assess if instrumental is required.        Speech/Language/Cog Goals: N/A      Recommendations:  Solid Consistency: IDDSI 7 Regular Solids  Liquid

## 2023-06-02 NOTE — DISCHARGE SUMMARY
Name:  Joana Taylor  Room:  /3045-18  MRN:    [id number]    Discharge Summary      This discharge summary is in conjunction with a complete physical exam done on the day of discharge. Discharging Physician: Dr. Derrell Myersred: 5/31/2023  Discharge:  6/2/2023    HPI taken from admission H&P:    Aakash Watts is a 70 y.o. male who presented to the emergency department with symptoms of cough, fatigue, dark urine. During my encounter patient was extremely somnolent and not would provide much information. No family at bedside. Most information gathered from ED MD note and chart review. Per report patient had a syncopal spell 2 days ago while at home.  he has been chronically debilitated. Patient hasrecent diagnosis of bladder cancer and actually had a large tumor removed from his bladder about 6 months ago. He had finished round of chemotherapy and had improvement in symptoms. Patient had a recent evaluation with urology and no cancer was noted but recommended that he receive 3 more rounds of chemo. His most recent round of chemotherapy was on Thursday. Since his chemo on Thursday his symptoms of general fatigue, poor appetite, cough, dark urine have worsened. He denies any fever. Patient states he has had a chronic cough. Patient presented with a heart rate of 114 and nausea saturation in the 80s. Patient does not have supplemental oxygen at home. Patient's ex-wife at bedside and she states she is helping care for him. She states in the last 6 months that he very rarely gets up out of bed on his own.   His ex wife states that she is noted, particularly in the last 5 days     Diagnoses this Admission and Hospital Course     #Sepsis, POA (HR, LA, procal)  #Acute on chronic respiratory failure  #Multifocal pneumonia   #COPD  -no O2 needs at baseline - requiring 2L on admission --> 1 L today  -procal and LA elevated  -CTPE shows improved RML consolidation, new AMIE consolidation, no PE  -sputum

## 2023-06-02 NOTE — PLAN OF CARE
Problem: Discharge Planning  Goal: Discharge to home or other facility with appropriate resources  Outcome: Progressing     Problem: Safety - Adult  Goal: Free from fall injury  6/2/2023 0733 by Killian Sanz RN  Outcome: Progressing  6/2/2023 0033 by Garfield Schaeffer RN  Outcome: Progressing  Flowsheets (Taken 6/2/2023 0033)  Free From Fall Injury: Instruct family/caregiver on patient safety     Problem: ABCDS Injury Assessment  Goal: Absence of physical injury  Outcome: Progressing     Problem: Skin/Tissue Integrity  Goal: Absence of new skin breakdown  Description: 1. Monitor for areas of redness and/or skin breakdown  2. Assess vascular access sites hourly  3. Every 4-6 hours minimum:  Change oxygen saturation probe site  4. Every 4-6 hours:  If on nasal continuous positive airway pressure, respiratory therapy assess nares and determine need for appliance change or resting period.   6/2/2023 0733 by Killian Sanz RN  Outcome: Progressing  6/2/2023 0033 by Garfield Schaeffer RN  Outcome: Progressing     Problem: Respiratory - Adult  Goal: Achieves optimal ventilation and oxygenation  6/2/2023 0733 by iKllian Sanz RN  Outcome: Progressing  Flowsheets (Taken 6/2/2023 0400 by Garfield Schaeffer RN)  Achieves optimal ventilation and oxygenation: Assess for changes in respiratory status  6/2/2023 0033 by Garfield Schaeffer RN  Outcome: Progressing  Flowsheets (Taken 6/2/2023 0033)  Achieves optimal ventilation and oxygenation:   Assess for changes in respiratory status   Assess for changes in mentation and behavior   Position to facilitate oxygenation and minimize respiratory effort   Assess and instruct to report shortness of breath or any respiratory difficulty     Problem: Cardiovascular - Adult  Goal: Maintains optimal cardiac output and hemodynamic stability  Outcome: Progressing  Goal: Absence of cardiac dysrhythmias or at baseline  Outcome: Progressing     Problem:

## 2023-06-02 NOTE — FLOWSHEET NOTE
06/01/23 2015   Vital Signs   Temp 97.1 °F (36.2 °C)   Temp Source Oral   Pulse 76   Heart Rate Source Monitor   Respirations 18   BP (!) 144/76   MAP (Calculated) 99   BP Location Right upper arm   BP Method Automatic   Patient Position Semi fowlers   Level of Consciousness 0   MEWS Score 1   Pain Assessment   Pain Assessment None - Denies Pain   Opioid-Induced Sedation   POSS Score 1   RASS   Singh Agitation Sedation Scale (RASS) 0   Oxygen Therapy   SpO2 99 %   O2 Device Nasal cannula   O2 Flow Rate (L/min) 2 L/min     Assessment done. Patient is alert and oriented x4. Denies any pain. Call light within reach.

## 2023-06-04 LAB
BACTERIA BLD CULT ORG #2: NORMAL
BACTERIA BLD CULT: NORMAL

## 2023-06-22 ENCOUNTER — OFFICE VISIT (OUTPATIENT)
Dept: PULMONOLOGY | Age: 68
End: 2023-06-22
Payer: MEDICARE

## 2023-06-22 VITALS
HEART RATE: 96 BPM | WEIGHT: 104 LBS | BODY MASS INDEX: 16.71 KG/M2 | DIASTOLIC BLOOD PRESSURE: 70 MMHG | OXYGEN SATURATION: 96 % | SYSTOLIC BLOOD PRESSURE: 110 MMHG | HEIGHT: 66 IN

## 2023-06-22 DIAGNOSIS — J98.4 CAVITARY LESION OF LUNG: Primary | ICD-10-CM

## 2023-06-22 PROCEDURE — 1123F ACP DISCUSS/DSCN MKR DOCD: CPT | Performed by: INTERNAL MEDICINE

## 2023-06-22 PROCEDURE — 99214 OFFICE O/P EST MOD 30 MIN: CPT | Performed by: INTERNAL MEDICINE

## 2023-06-22 NOTE — PROGRESS NOTES
in the care of this pleasant patient , should you have any questions ,please do not hesitate to contact me      Imelda Vela MD,[facility]  Pulmonary&Critical Care Medicine    Mariusz Adams    NOTE: This report was transcribed using voice recognition software. Every effort was made to ensure accuracy; however, inadvertent computerized transcription errors may be present.

## 2023-07-21 RX ORDER — FLUTICASONE FUROATE, UMECLIDINIUM BROMIDE AND VILANTEROL TRIFENATATE 100; 62.5; 25 UG/1; UG/1; UG/1
POWDER RESPIRATORY (INHALATION)
Qty: 1 EACH | Refills: 5 | Status: SHIPPED | OUTPATIENT
Start: 2023-07-21

## 2023-08-29 ENCOUNTER — OFFICE VISIT (OUTPATIENT)
Dept: INFECTIOUS DISEASES | Age: 68
End: 2023-08-29
Payer: MEDICARE

## 2023-08-29 VITALS
OXYGEN SATURATION: 90 % | HEART RATE: 102 BPM | BODY MASS INDEX: 17.19 KG/M2 | WEIGHT: 107 LBS | DIASTOLIC BLOOD PRESSURE: 60 MMHG | HEIGHT: 66 IN | SYSTOLIC BLOOD PRESSURE: 120 MMHG | TEMPERATURE: 97.9 F

## 2023-08-29 DIAGNOSIS — J44.9 CHRONIC OBSTRUCTIVE PULMONARY DISEASE, UNSPECIFIED COPD TYPE (HCC): ICD-10-CM

## 2023-08-29 DIAGNOSIS — J98.4 CAVITARY LESION OF LUNG: Primary | ICD-10-CM

## 2023-08-29 DIAGNOSIS — R64 CACHEXIA (HCC): ICD-10-CM

## 2023-08-29 PROCEDURE — 99204 OFFICE O/P NEW MOD 45 MIN: CPT | Performed by: INTERNAL MEDICINE

## 2023-08-29 PROCEDURE — 1123F ACP DISCUSS/DSCN MKR DOCD: CPT | Performed by: INTERNAL MEDICINE

## 2023-10-12 ENCOUNTER — HOSPITAL ENCOUNTER (OUTPATIENT)
Dept: CT IMAGING | Age: 68
Discharge: HOME | End: 2023-10-12
Attending: INTERNAL MEDICINE
Payer: MEDICARE

## 2023-10-12 DIAGNOSIS — J98.4 CAVITARY LESION OF LUNG: ICD-10-CM

## 2023-10-12 PROCEDURE — 71250 CT THORAX DX C-: CPT

## 2023-10-26 ENCOUNTER — OFFICE VISIT (OUTPATIENT)
Dept: PULMONOLOGY | Age: 68
End: 2023-10-26
Payer: MEDICARE

## 2023-10-26 VITALS
WEIGHT: 105 LBS | HEART RATE: 122 BPM | RESPIRATION RATE: 18 BRPM | DIASTOLIC BLOOD PRESSURE: 60 MMHG | BODY MASS INDEX: 16.88 KG/M2 | OXYGEN SATURATION: 99 % | SYSTOLIC BLOOD PRESSURE: 98 MMHG | HEIGHT: 66 IN

## 2023-10-26 DIAGNOSIS — J98.4 CAVITARY LESION OF LUNG: Primary | ICD-10-CM

## 2023-10-26 PROCEDURE — 1123F ACP DISCUSS/DSCN MKR DOCD: CPT | Performed by: INTERNAL MEDICINE

## 2023-10-26 PROCEDURE — 99214 OFFICE O/P EST MOD 30 MIN: CPT | Performed by: INTERNAL MEDICINE

## 2023-10-26 RX ORDER — FLUTICASONE FUROATE, UMECLIDINIUM BROMIDE AND VILANTEROL TRIFENATATE 100; 62.5; 25 UG/1; UG/1; UG/1
1 POWDER RESPIRATORY (INHALATION) DAILY
Qty: 1 EACH | Refills: 2 | Status: SHIPPED | OUTPATIENT
Start: 2023-10-26

## 2023-10-26 RX ORDER — GUAIFENESIN 600 MG/1
600 TABLET, EXTENDED RELEASE ORAL 2 TIMES DAILY
Qty: 30 TABLET | Refills: 0 | Status: SHIPPED | OUTPATIENT
Start: 2023-10-26 | End: 2023-11-10

## 2023-10-26 NOTE — PROGRESS NOTES
[facility]                                 Pulmonary Consult /Progress Note :                                                                  CHIEF COMPLAINT: hypotension ,urinary retention ,    Consulting provider: Dr Beck Carver     HPI:     Raymundo Yadav is a 75 y.o. male who presents to the emergency department for evaluation of urinary /retention in October       He had DVT that time and on elquis   He had cystoscopy and was cancer and had chemotherapy     His ex wife states he is more than 60-70 PPY smoking history and he was not following with any physician     Per wife he has worsening Cough and start to get yellow     Today visit  He still with cough   No hemoptysis   SOB has improved some   Wife stopped his chemo for bladder cancer as he could not handle,to address with PCP   His SOB improved since march and he is moving   Was admitted with pneumonia on May   Seen by ID ,elevated Galactomanin ,Dr Jose A Robles does not believe any active infection      Past Medical History:   Diagnosis Date    Anxiety     Depression     Hypertension        Past Surgical History:        Procedure Laterality Date    BRONCHOSCOPY N/A 12/12/2022    BRONCHOSCOPY ALVEOLAR LAVAGE performed by Sandy Hein MD at [address] [place] [address]  12/12/2022    BRONCHOSCOPY THERAPUTIC ASPIRATION INITIAL performed by Sandy Hein MD at [address] [place] [address] N/A 03/22/2023    BRONCHOSCOPY ALVEOLAR LAVAGE performed by Tg Fernando MD at [address] [place] [address]  03/22/2023    BRONCHOSCOPY BRUSHINGS performed by Tg Fernando MD at [facility] N/A 10/16/2022    CYSTOSCOPY TRANSURETHRAL RESECTION BLADDER >5CM performed by Annalee De La Cruz MD at [address] 12/08/2022    CYSTOSCOPY, TRANSURETHERAL RESECTION OF A BLADDER TUMOR performed by Carmencita Closs, MD at [address] [place] [address]

## 2023-11-02 ENCOUNTER — TELEPHONE (OUTPATIENT)
Dept: INFECTIOUS DISEASES | Age: 68
End: 2023-11-02

## 2023-11-02 NOTE — TELEPHONE ENCOUNTER
Please call Josefina Villafuerte    Repeat CT chest reviewed - stable without significant changes     No antibiotic treatment or further workup is planned at this time unless his symptoms change or the CT shows changes    I will defer to Dr. Sheridan De La Cruz to schedule the next CT     Please encourage him to reach out with concerns re change in symptoms     LMK if he has additional questions    Thanks

## 2023-11-02 NOTE — TELEPHONE ENCOUNTER
Called and spoke with Etienne Denson and made her aware of MD note. She verbalized understanding and has no further questions at this time. Encouraged her to call if patient had any changes in symptoms.

## 2024-09-28 ENCOUNTER — APPOINTMENT (OUTPATIENT)
Dept: CT IMAGING | Age: 69
End: 2024-09-28
Payer: MEDICARE

## 2024-09-28 ENCOUNTER — APPOINTMENT (OUTPATIENT)
Dept: GENERAL RADIOLOGY | Age: 69
End: 2024-09-28
Payer: MEDICARE

## 2024-09-28 ENCOUNTER — HOSPITAL ENCOUNTER (EMERGENCY)
Age: 69
Discharge: HOME | End: 2024-09-28
Attending: EMERGENCY MEDICINE
Payer: MEDICARE

## 2024-09-28 VITALS
RESPIRATION RATE: 16 BRPM | TEMPERATURE: 98.3 F | WEIGHT: 100 LBS | DIASTOLIC BLOOD PRESSURE: 90 MMHG | BODY MASS INDEX: 16.07 KG/M2 | HEART RATE: 110 BPM | HEIGHT: 66 IN | OXYGEN SATURATION: 96 % | SYSTOLIC BLOOD PRESSURE: 149 MMHG

## 2024-09-28 DIAGNOSIS — S52.502A CLOSED FRACTURE OF DISTAL END OF LEFT RADIUS, UNSPECIFIED FRACTURE MORPHOLOGY, INITIAL ENCOUNTER: Primary | ICD-10-CM
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
## 2024-09-28 PROCEDURE — 70450 CT HEAD/BRAIN W/O DYE: CPT

## 2024-09-28 PROCEDURE — 73110 X-RAY EXAM OF WRIST: CPT

## 2024-09-28 PROCEDURE — 99284 EMERGENCY DEPT VISIT MOD MDM: CPT

## 2024-09-28 PROCEDURE — 6370000000 HC RX 637 (ALT 250 FOR IP): Performed by: EMERGENCY MEDICINE

## 2024-09-28 PROCEDURE — 29125 APPL SHORT ARM SPLINT STATIC: CPT

## 2024-09-28 PROCEDURE — 99285 EMERGENCY DEPT VISIT HI MDM: CPT | Performed by: ORTHOPAEDIC SURGERY

## 2024-09-28 RX ORDER — HYDROCODONE BITARTRATE AND ACETAMINOPHEN 5; 325 MG/1; MG/1
1 TABLET ORAL EVERY 4 HOURS PRN
Qty: 18 TABLET | Refills: 0 | Status: SHIPPED | OUTPATIENT
Start: 2024-09-28 | End: 2024-10-01

## 2024-09-28 RX ORDER — HYDROCODONE BITARTRATE AND ACETAMINOPHEN 5; 325 MG/1; MG/1
1 TABLET ORAL
Status: COMPLETED | OUTPATIENT
Start: 2024-09-28 | End: 2024-09-28

## 2024-09-28 RX ORDER — ONDANSETRON 4 MG/1
4 TABLET, ORALLY DISINTEGRATING ORAL 3 TIMES DAILY PRN
Qty: 21 TABLET | Refills: 0 | Status: SHIPPED | OUTPATIENT
Start: 2024-09-28

## 2024-09-28 RX ADMIN — HYDROCODONE BITARTRATE AND ACETAMINOPHEN 1 TABLET: 5; 325 TABLET ORAL at 08:30

## 2024-09-28 ASSESSMENT — PAIN SCALES - GENERAL
PAINLEVEL_OUTOF10: 10
PAINLEVEL_OUTOF10: 10

## 2024-09-28 ASSESSMENT — PAIN DESCRIPTION - DESCRIPTORS: DESCRIPTORS: BURNING

## 2024-09-28 ASSESSMENT — PAIN - FUNCTIONAL ASSESSMENT: PAIN_FUNCTIONAL_ASSESSMENT: 0-10

## 2024-09-28 ASSESSMENT — PAIN DESCRIPTION - LOCATION: LOCATION: WRIST

## 2024-09-28 NOTE — DISCHARGE INSTRUCTIONS
Please return the emergency department any new or worsening symptoms including but not limited to: Worsening pain, numbness or weakness of affected extremity.  Please apply ice for up to 15 minutes at a time to reduce swelling.  Please leave splint in place until you follow-up with orthopedics.  Please keep splint clean and dry.  Please follow-up with orthopedics within the next 5 days at the number provided above.  Please use caution when taking prescribed pain medication as it can be sedating.

## 2024-09-28 NOTE — CONSULTS
[facility] Orthopedic Surgery  Consult Note         This patient is seen in consultation at the request of Joey Jay MD     Reason for Consult:  Left wrist pain/ moderately displaced distal radius fracture.    CHIEF COMPLAINT:  Left wrist pain/ moderately displaced distal radius fracture.    History Obtained From:  patient, family member - daughter, electronic medical record    HISTORY OF PRESENT ILLNESS:    Mr. Davis is a 69 y.o.  male right handed who seen today at [facility] ED for evaluation of a left wrist injury.  The patient reports that this injury occurred when he fell at home where he lives on his own.  He was first seen and evaluated in [facility], when he was x-rayed and I was consulted. The patient denies any other injuries. Rates pain a 8/10 VAS moderate, sharp, constant and show no change.  Movement makes the pain worse, the splint and resting makes the pain better. Alleviating factors rest. No numbness or tingling sensation.      Past Medical History:        Diagnosis Date    Anxiety     Depression     Hypertension        Past Surgical History:        Procedure Laterality Date    BRONCHOSCOPY N/A 12/12/2022    BRONCHOSCOPY ALVEOLAR LAVAGE performed by Prakash Busch MD at [facility] ENDOSCOPY    BRONCHOSCOPY  12/12/2022    BRONCHOSCOPY THERAPUTIC ASPIRATION INITIAL performed by Prakash Busch MD at [facility] ENDOSCOPY    BRONCHOSCOPY N/A 03/22/2023    BRONCHOSCOPY ALVEOLAR LAVAGE performed by South Hanson MD at [facility] ENDOSCOPY    BRONCHOSCOPY  03/22/2023    BRONCHOSCOPY BRUSHINGS performed by South Hanson MD at [facility] ENDOSCOPY    CYST REMOVAL      CYSTOSCOPY N/A 10/16/2022    CYSTOSCOPY TRANSURETHRAL RESECTION BLADDER >5CM performed by Prakash Briscoe MD at [facility] OR    CYSTOSCOPY N/A 12/08/2022    CYSTOSCOPY, TRANSURETHERAL RESECTION OF A BLADDER TUMOR performed by Marlon Rodgers MD at [facility] OR    CYSTOSCOPY  04/13/2023    Procedure: CYSTOSCOPY TRANSURETHRAL RESECTION BLADDER  EXAMINATION:  Mr. Davis is a very pleasant 69 y.o. male who seen today in no acute distress, awake, alert, and oriented.  He is well nourished and groomed.  Patient with normal affect. Body mass index is 16.14 kg/m².. Skin warm and dry. Resting respiratory rate is 16.  Resp deep and easy. Pulse is with regular rate and rhythm    BP (!) 149/90   Pulse (!) 110   Temp 98.3 °F (36.8 °C) (Oral)   Resp 16   Ht 1.676 m (5' 6\")   Wt 45.4 kg (100 lb)   SpO2 96%   BMI 16.14 kg/m²        Airway is intact  Chest: chest clear, no wheezing, rales, normal symmetric air entry, no tachypnea, retractions or cyanosis  Heart: regular rate and rhythm ; heart sounds normal   Hearing intact, pupil equal and reactive bilateral  Lymphatics; No groin or axillary enlarged lymph nodes.  Neck; No swelling  Abdomen; soft, non distended.     MUSCULOSKELETAL: On evaluation of his left upper extremity, there is moderate deformity.  There is moderate swelling and minimal ecchymosis.  He is tender to palpation over the distal radius, and otherwise nontender over the remainder of the extremity.  Range of motion is decreased secondary to pain over the left wrist, but no mechanical block.  The skin overlying the left wrist is intact without evidence of lesion, laceration or abrasion.  Distal pulses are 2+ and symmetric bilaterally.  Sensation is grossly intact to light touch and symmetric bilaterally.    NEUROLOGIC:   Sensory:    Touch:                     Right Upper Extremity:  normal                   Left Upper Extremity:  normal                  Right Lower Extremity:  normal                  Left Lower Extremity:  normal              DATA:    CBC:   Lab Results   Component Value Date/Time    WBC 5.3 06/02/2023 04:46 AM    RBC 4.08 06/02/2023 04:46 AM    HGB 10.2 06/02/2023 04:46 AM    HCT 32.1 06/02/2023 04:46 AM    MCV 78.6 06/02/2023 04:46 AM    MCH 25.1 06/02/2023 04:46 AM    MCHC 31.9 06/02/2023 04:46 AM    RDW 22.3 06/02/2023 04:46
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
## 2024-09-30 ENCOUNTER — ANESTHESIA EVENT (OUTPATIENT)
Dept: OPERATING ROOM | Age: 69
End: 2024-09-30
Payer: MEDICARE

## 2024-09-30 ENCOUNTER — APPOINTMENT (OUTPATIENT)
Dept: GENERAL RADIOLOGY | Age: 69
End: 2024-09-30
Attending: ORTHOPAEDIC SURGERY
Payer: MEDICARE

## 2024-09-30 ENCOUNTER — HOSPITAL ENCOUNTER (OUTPATIENT)
Age: 69
Setting detail: OUTPATIENT SURGERY
Discharge: HOME | End: 2024-09-30
Attending: ORTHOPAEDIC SURGERY | Admitting: ORTHOPAEDIC SURGERY
Payer: MEDICARE

## 2024-09-30 ENCOUNTER — ANESTHESIA (OUTPATIENT)
Dept: OPERATING ROOM | Age: 69
End: 2024-09-30
Payer: MEDICARE

## 2024-09-30 VITALS
DIASTOLIC BLOOD PRESSURE: 87 MMHG | BODY MASS INDEX: 15.49 KG/M2 | OXYGEN SATURATION: 94 % | HEIGHT: 66 IN | WEIGHT: 96.4 LBS | TEMPERATURE: 98.6 F | RESPIRATION RATE: 18 BRPM | SYSTOLIC BLOOD PRESSURE: 149 MMHG | HEART RATE: 96 BPM

## 2024-09-30 DIAGNOSIS — S52.572A OTHER CLOSED INTRA-ARTICULAR FRACTURE OF DISTAL END OF LEFT RADIUS, INITIAL ENCOUNTER: Primary | ICD-10-CM

## 2024-09-30 LAB
EKG ATRIAL RATE: 100 BPM
EKG DIAGNOSIS: NORMAL
EKG P AXIS: 71 DEGREES
EKG P-R INTERVAL: 142 MS
EKG Q-T INTERVAL: 352 MS
EKG QRS DURATION: 80 MS
EKG QTC CALCULATION (BAZETT): 454 MS
EKG R AXIS: 80 DEGREES
EKG T AXIS: 50 DEGREES
EKG VENTRICULAR RATE: 100 BPM

## 2024-09-30 PROCEDURE — 2720000010 HC SURG SUPPLY STERILE: Performed by: ORTHOPAEDIC SURGERY

## 2024-09-30 PROCEDURE — 6360000002 HC RX W HCPCS: Performed by: ORTHOPAEDIC SURGERY

## 2024-09-30 PROCEDURE — 25608 OPTX DST RD XART FX/EPI SEP2: CPT | Performed by: ORTHOPAEDIC SURGERY

## 2024-09-30 PROCEDURE — 7100000011 HC PHASE II RECOVERY - ADDTL 15 MIN: Performed by: ORTHOPAEDIC SURGERY

## 2024-09-30 PROCEDURE — C1713 ANCHOR/SCREW BN/BN,TIS/BN: HCPCS | Performed by: ORTHOPAEDIC SURGERY

## 2024-09-30 PROCEDURE — 6360000002 HC RX W HCPCS: Performed by: NURSE ANESTHETIST, CERTIFIED REGISTERED

## 2024-09-30 PROCEDURE — 3600000004 HC SURGERY LEVEL 4 BASE: Performed by: ORTHOPAEDIC SURGERY

## 2024-09-30 PROCEDURE — 3600000014 HC SURGERY LEVEL 4 ADDTL 15MIN: Performed by: ORTHOPAEDIC SURGERY

## 2024-09-30 PROCEDURE — 2500000003 HC RX 250 WO HCPCS: Performed by: NURSE ANESTHETIST, CERTIFIED REGISTERED

## 2024-09-30 PROCEDURE — 3700000000 HC ANESTHESIA ATTENDED CARE: Performed by: ORTHOPAEDIC SURGERY

## 2024-09-30 PROCEDURE — 2709999900 HC NON-CHARGEABLE SUPPLY: Performed by: ORTHOPAEDIC SURGERY

## 2024-09-30 PROCEDURE — 25608 OPTX DST RD XART FX/EPI SEP2: CPT | Performed by: NURSE PRACTITIONER

## 2024-09-30 PROCEDURE — 6370000000 HC RX 637 (ALT 250 FOR IP): Performed by: STUDENT IN AN ORGANIZED HEALTH CARE EDUCATION/TRAINING PROGRAM

## 2024-09-30 PROCEDURE — 73110 X-RAY EXAM OF WRIST: CPT

## 2024-09-30 PROCEDURE — 7100000001 HC PACU RECOVERY - ADDTL 15 MIN: Performed by: ORTHOPAEDIC SURGERY

## 2024-09-30 PROCEDURE — 3700000001 HC ADD 15 MINUTES (ANESTHESIA): Performed by: ORTHOPAEDIC SURGERY

## 2024-09-30 PROCEDURE — A4217 STERILE WATER/SALINE, 500 ML: HCPCS | Performed by: ORTHOPAEDIC SURGERY

## 2024-09-30 PROCEDURE — 2580000003 HC RX 258: Performed by: ORTHOPAEDIC SURGERY

## 2024-09-30 PROCEDURE — 93005 ELECTROCARDIOGRAM TRACING: CPT | Performed by: STUDENT IN AN ORGANIZED HEALTH CARE EDUCATION/TRAINING PROGRAM

## 2024-09-30 PROCEDURE — 2580000003 HC RX 258: Performed by: NURSE ANESTHETIST, CERTIFIED REGISTERED

## 2024-09-30 PROCEDURE — 93010 ELECTROCARDIOGRAM REPORT: CPT | Performed by: INTERNAL MEDICINE

## 2024-09-30 PROCEDURE — 7100000000 HC PACU RECOVERY - FIRST 15 MIN: Performed by: ORTHOPAEDIC SURGERY

## 2024-09-30 PROCEDURE — 7100000010 HC PHASE II RECOVERY - FIRST 15 MIN: Performed by: ORTHOPAEDIC SURGERY

## 2024-09-30 DEVICE — VOLAR DR PLATE INTERM. LEFT EXTRASHORT
Type: IMPLANTABLE DEVICE | Site: WRIST | Status: FUNCTIONAL
Brand: VARIAX

## 2024-09-30 DEVICE — BONE SCREW, FULLY THREADED, T8
Type: IMPLANTABLE DEVICE | Site: WRIST | Status: FUNCTIONAL
Brand: VARIAX

## 2024-09-30 DEVICE — LOCKING SCREW, FULLY THREADED,T8
Type: IMPLANTABLE DEVICE | Site: WRIST | Status: FUNCTIONAL
Brand: VARIAX

## 2024-09-30 RX ORDER — BUPIVACAINE HYDROCHLORIDE 5 MG/ML
INJECTION, SOLUTION EPIDURAL; INTRACAUDAL PRN
Status: DISCONTINUED | OUTPATIENT
Start: 2024-09-30 | End: 2024-09-30 | Stop reason: ALTCHOICE

## 2024-09-30 RX ORDER — ONDANSETRON 2 MG/ML
INJECTION INTRAMUSCULAR; INTRAVENOUS
Status: DISCONTINUED | OUTPATIENT
Start: 2024-09-30 | End: 2024-09-30 | Stop reason: SDUPTHER

## 2024-09-30 RX ORDER — SODIUM CHLORIDE, SODIUM LACTATE, POTASSIUM CHLORIDE, CALCIUM CHLORIDE 600; 310; 30; 20 MG/100ML; MG/100ML; MG/100ML; MG/100ML
INJECTION, SOLUTION INTRAVENOUS
Status: DISCONTINUED | OUTPATIENT
Start: 2024-09-30 | End: 2024-09-30 | Stop reason: SDUPTHER

## 2024-09-30 RX ORDER — PROPOFOL 10 MG/ML
INJECTION, EMULSION INTRAVENOUS
Status: DISCONTINUED | OUTPATIENT
Start: 2024-09-30 | End: 2024-09-30 | Stop reason: SDUPTHER

## 2024-09-30 RX ORDER — OXYCODONE HYDROCHLORIDE 5 MG/1
5 TABLET ORAL PRN
Status: CANCELLED | OUTPATIENT
Start: 2024-09-30 | End: 2024-09-30

## 2024-09-30 RX ORDER — NALOXONE HYDROCHLORIDE 0.4 MG/ML
INJECTION, SOLUTION INTRAMUSCULAR; INTRAVENOUS; SUBCUTANEOUS PRN
Status: CANCELLED | OUTPATIENT
Start: 2024-09-30

## 2024-09-30 RX ORDER — SODIUM CHLORIDE 0.9 % (FLUSH) 0.9 %
5-40 SYRINGE (ML) INJECTION EVERY 12 HOURS SCHEDULED
Status: CANCELLED | OUTPATIENT
Start: 2024-09-30

## 2024-09-30 RX ORDER — CEPHALEXIN 500 MG/1
500 CAPSULE ORAL 4 TIMES DAILY
Qty: 20 CAPSULE | Refills: 0 | Status: SHIPPED | OUTPATIENT
Start: 2024-09-30 | End: 2024-10-05

## 2024-09-30 RX ORDER — TRAMADOL HYDROCHLORIDE 50 MG/1
50 TABLET ORAL EVERY 6 HOURS PRN
Qty: 12 TABLET | Refills: 0 | Status: SHIPPED | OUTPATIENT
Start: 2024-09-30 | End: 2024-10-03

## 2024-09-30 RX ORDER — FENTANYL CITRATE 50 UG/ML
INJECTION, SOLUTION INTRAMUSCULAR; INTRAVENOUS
Status: DISCONTINUED | OUTPATIENT
Start: 2024-09-30 | End: 2024-09-30 | Stop reason: SDUPTHER

## 2024-09-30 RX ORDER — SODIUM CHLORIDE 9 MG/ML
INJECTION, SOLUTION INTRAVENOUS PRN
Status: CANCELLED | OUTPATIENT
Start: 2024-09-30

## 2024-09-30 RX ORDER — DEXAMETHASONE SODIUM PHOSPHATE 4 MG/ML
INJECTION, SOLUTION INTRA-ARTICULAR; INTRALESIONAL; INTRAMUSCULAR; INTRAVENOUS; SOFT TISSUE
Status: DISCONTINUED | OUTPATIENT
Start: 2024-09-30 | End: 2024-09-30 | Stop reason: SDUPTHER

## 2024-09-30 RX ORDER — LIDOCAINE HYDROCHLORIDE 20 MG/ML
INJECTION, SOLUTION INFILTRATION; PERINEURAL
Status: DISCONTINUED | OUTPATIENT
Start: 2024-09-30 | End: 2024-09-30 | Stop reason: SDUPTHER

## 2024-09-30 RX ORDER — SODIUM CHLORIDE 0.9 % (FLUSH) 0.9 %
5-40 SYRINGE (ML) INJECTION PRN
Status: CANCELLED | OUTPATIENT
Start: 2024-09-30

## 2024-09-30 RX ORDER — OXYCODONE HYDROCHLORIDE 5 MG/1
10 TABLET ORAL PRN
Status: CANCELLED | OUTPATIENT
Start: 2024-09-30 | End: 2024-09-30

## 2024-09-30 RX ORDER — ONDANSETRON 2 MG/ML
4 INJECTION INTRAMUSCULAR; INTRAVENOUS
Status: CANCELLED | OUTPATIENT
Start: 2024-09-30

## 2024-09-30 RX ORDER — CEFAZOLIN SODIUM IN 0.9 % NACL 2 G/100 ML
2000 PLASTIC BAG, INJECTION (ML) INTRAVENOUS
Status: COMPLETED | OUTPATIENT
Start: 2024-09-30 | End: 2024-09-30

## 2024-09-30 RX ORDER — DIPHENHYDRAMINE HYDROCHLORIDE 50 MG/ML
12.5 INJECTION INTRAMUSCULAR; INTRAVENOUS
Status: CANCELLED | OUTPATIENT
Start: 2024-09-30 | End: 2024-10-01

## 2024-09-30 RX ORDER — LABETALOL HYDROCHLORIDE 5 MG/ML
5 INJECTION, SOLUTION INTRAVENOUS EVERY 10 MIN PRN
Status: CANCELLED | OUTPATIENT
Start: 2024-09-30

## 2024-09-30 RX ORDER — IPRATROPIUM BROMIDE AND ALBUTEROL SULFATE 2.5; .5 MG/3ML; MG/3ML
1 SOLUTION RESPIRATORY (INHALATION) ONCE
Status: COMPLETED | OUTPATIENT
Start: 2024-09-30 | End: 2024-09-30

## 2024-09-30 RX ADMIN — FENTANYL CITRATE 50 MCG: 50 INJECTION, SOLUTION INTRAMUSCULAR; INTRAVENOUS at 14:44

## 2024-09-30 RX ADMIN — LIDOCAINE HYDROCHLORIDE 60 MG: 20 INJECTION, SOLUTION INFILTRATION; PERINEURAL at 14:32

## 2024-09-30 RX ADMIN — PROPOFOL 70 MG: 10 INJECTION, EMULSION INTRAVENOUS at 14:32

## 2024-09-30 RX ADMIN — FENTANYL CITRATE 50 MCG: 50 INJECTION, SOLUTION INTRAMUSCULAR; INTRAVENOUS at 14:50

## 2024-09-30 RX ADMIN — PROPOFOL 60 MG: 10 INJECTION, EMULSION INTRAVENOUS at 14:42

## 2024-09-30 RX ADMIN — IPRATROPIUM BROMIDE AND ALBUTEROL SULFATE 1 DOSE: .5; 3 SOLUTION RESPIRATORY (INHALATION) at 12:02

## 2024-09-30 RX ADMIN — Medication 2000 MG: at 14:26

## 2024-09-30 RX ADMIN — ONDANSETRON 4 MG: 2 INJECTION INTRAMUSCULAR; INTRAVENOUS at 14:42

## 2024-09-30 RX ADMIN — SODIUM CHLORIDE, SODIUM LACTATE, POTASSIUM CHLORIDE, AND CALCIUM CHLORIDE: .6; .31; .03; .02 INJECTION, SOLUTION INTRAVENOUS at 14:26

## 2024-09-30 RX ADMIN — DEXAMETHASONE SODIUM PHOSPHATE 10 MG: 4 INJECTION, SOLUTION INTRAMUSCULAR; INTRAVENOUS at 14:42

## 2024-09-30 ASSESSMENT — PAIN SCALES - GENERAL
PAINLEVEL_OUTOF10: 0

## 2024-09-30 ASSESSMENT — PAIN - FUNCTIONAL ASSESSMENT: PAIN_FUNCTIONAL_ASSESSMENT: 0-10

## 2024-09-30 NOTE — H&P
Preoperative H&P Update    The patient's History and Physical in the medical record from 9/28/2024 was reviewed by me today.    Past Medical History:   Diagnosis Date    Anxiety     Depression     Hypertension      Past Surgical History:   Procedure Laterality Date    BRONCHOSCOPY N/A 12/12/2022    BRONCHOSCOPY ALVEOLAR LAVAGE performed by Prakash Busch MD at [facility] ENDOSCOPY    BRONCHOSCOPY  12/12/2022    BRONCHOSCOPY THERAPUTIC ASPIRATION INITIAL performed by Prakash Busch MD at [facility] ENDOSCOPY    BRONCHOSCOPY N/A 03/22/2023    BRONCHOSCOPY ALVEOLAR LAVAGE performed by South Hanson MD at [facility] ENDOSCOPY    BRONCHOSCOPY  03/22/2023    BRONCHOSCOPY BRUSHINGS performed by South Hanson MD at [facility] ENDOSCOPY    CYST REMOVAL      CYSTOSCOPY N/A 10/16/2022    CYSTOSCOPY TRANSURETHRAL RESECTION BLADDER >5CM performed by Prakash Briscoe MD at [facility] OR    CYSTOSCOPY N/A 12/08/2022    CYSTOSCOPY, TRANSURETHERAL RESECTION OF A BLADDER TUMOR performed by Marlon Rodgers MD at [facility] OR    CYSTOSCOPY  04/13/2023    Procedure: CYSTOSCOPY TRANSURETHRAL RESECTION BLADDER TUMOR ( f/c placement )    CYSTOSCOPY N/A 4/13/2023    CYSTOSCOPY TRANSURETHRAL RESECTION BLADDER TUMOR performed by Marlon Rodgers MD at [facility] OR    FINGER SURGERY       No current facility-administered medications on file prior to encounter.     Current Outpatient Medications on File Prior to Encounter   Medication Sig Dispense Refill    HYDROcodone-acetaminophen (NORCO) 5-325 MG per tablet Take 1 tablet by mouth every 4 hours as needed for Pain for up to 3 days. Intended supply: 3 days. Take lowest dose possible to manage pain Max Daily Amount: 6 tablets 18 tablet 0    fluticasone-umeclidin-vilant (TRELEGY ELLIPTA) 100-62.5-25 MCG/ACT AEPB inhaler Inhale 1 puff into the lungs daily 1 each 2    benzonatate (TESSALON) 100 MG capsule Take 1 capsule by mouth 3 times daily as needed for Cough 20 capsule 0    ferrous sulfate (FE TABS

## 2024-09-30 NOTE — DISCHARGE INSTRUCTIONS
instructions are to be followed if you have a known history or diagnosis of sleep apnea:  For all sleep apnea patients:  ? Sleep on your side or sitting up in a chair whenever possible, especially the first 24 hours after surgery.  ? Use only medicines prescribed by your doctor.    ? Do not drink alcohol.  ? If you have a dental device to assist you while at rest, use it at all times for the first 24 hours.  For patients using CPAP machines:  ? Use your CPAP machine during all periods of sleep as usual.  ? Use your CPAP machine during all periods of daytime rest while on pain medicines.  ** Follow up with your primary care doctor for continued care.    IF YOU DO NOT TAKE ALL OF YOUR NARCOTIC PAIN MEDICATION, please dispose of them responsibly. There are drop off boxes in the Emergency Departments 24/7 at both [facility] and [place]. If these locations are not convenient, other options for discarding them can be found at:  [email or web address]    Hospital or office staff may NOT accept any medications to drop off in the cabinet for you.      What is a Surgical Site Infection or  (SSI)?        A surgical site infection (SSI) is an infection that occurs after surgery in the part of the body where the surgery took place. Most patients who have surgery do not develop an infection. However, infections can develop in about 1-3 cases for every 100 patients who have had surgery.  Our goal is for you to NOT experience any complications and be completely satisfied with your care!    However, some signs or symptoms to look for and report immediately to your doctor are:   1. Fever above 101 degrees    2. Redness and increasing pain around the area  where you had surgery   3. Drainage of cloudy fluid or pus coming from the surgical area    Some of the things we/ you can do to prevent SSI's are:   1. Clean hands with soap and water or an alcohol-based hand rub before and after caring for the operative area. This occurs

## 2024-09-30 NOTE — PROGRESS NOTES
Dr Nazario notified of run of sinus tach and that pt back to [facility]. Jan present and states that pt refuses to follow his Doctors advice if he goes which is rare. Dr Nazario states okay to discharge pt

## 2024-09-30 NOTE — ANESTHESIA PRE PROCEDURE
pulmonology   Cardiovascular:Negative CV ROS    (+) hypertension:            Echocardiogram reviewed                  Neuro/Psych:   Negative Neuro/Psych ROS  (+) neuromuscular disease:, psychiatric history:depression/anxiety              ROS comment: H/o Right carpal tunnel GI/Hepatic/Renal: Neg GI/Hepatic/Renal ROS       (-) hiatal hernia and GERD       Endo/Other: Negative Endo/Other ROS   (+) malignancy/cancer (h/p prostate cancer).                 Abdominal:             Vascular: negative vascular ROS.         Other Findings:        TTE 6/1/23   Summary   Technically difficult study.   Left ventricular systolic function is lower normal with ejection fraction   estimated at 50%.   No obvious segmental wall motion abnormalities.   Asynchronous septal motion with bounce noted.   Diastolic dysfunction grade and filling pressure are indeterminate.   Aortic valve appears sclerotic but opens adequately.   Mild tricuspid regurgitation.   Systolic pulmonary artery pressure (SPAP) estimated at 41mmHg (RA pressure   8mmHg), c/w mild pulmonary hypertension.     Anesthesia Plan      general     ASA 3     (I discussed with the patient the risks and benefits of PIV, general anesthesia, IV Narcotics, PACU.  All questions were answered the patient agrees with the plan and wishes to proceed.)  Induction: intravenous.      Anesthetic plan and risks discussed with patient.      Plan discussed with CRNA.                    Fabian Hanson MD   9/30/2024

## 2024-10-01 DIAGNOSIS — Z98.890 S/P ORIF (OPEN REDUCTION INTERNAL FIXATION) FRACTURE: Primary | ICD-10-CM

## 2024-10-01 DIAGNOSIS — S52.572A OTHER CLOSED INTRA-ARTICULAR FRACTURE OF DISTAL END OF LEFT RADIUS, INITIAL ENCOUNTER: ICD-10-CM

## 2024-10-01 DIAGNOSIS — Z87.81 S/P ORIF (OPEN REDUCTION INTERNAL FIXATION) FRACTURE: Primary | ICD-10-CM

## 2024-10-01 RX ORDER — HYDROCODONE BITARTRATE AND ACETAMINOPHEN 5; 325 MG/1; MG/1
1 TABLET ORAL EVERY 8 HOURS PRN
Qty: 15 TABLET | Refills: 0 | Status: SHIPPED | OUTPATIENT
Start: 2024-10-01 | End: 2024-10-04 | Stop reason: SDUPTHER

## 2024-10-01 NOTE — TELEPHONE ENCOUNTER
General Question     Subject: PAIN CONTROL  Patient and /or Facility Request: Anabel Davis   Contact Number: [phone]     THE PT'S DAUGHTER ANABEL CALLED THIS MORNING STATING THAT HER DAD ISN'T GETTING ANY PAIN RELIEF SINCE THE SX YESTERDAY.      SHE WANTS TO KNOW IF HE CAN HAVE MORE OR CALL IN SOMETHING DIFFERENT.    PLEASE RETURN HER CALL AT THE ABOVE #

## 2024-10-01 NOTE — BRIEF OP NOTE
Brief Postoperative Note      Patient: Marcial Davis  YOB: 1955  MRN: [id number]    Date of Procedure: 9/30/2024    Pre-Op Diagnosis Codes:      * Closed fracture of left wrist, initial encounter [S62.102A]    Post-Op Diagnosis: Same       Procedure(s):  OPEN REDUCTION INTERNAL FIXATION LEFT DISTAL RADIUS FRACTURE    Surgeon(s):  Gilmar George MD    Assistant:  Physician Assistant: Janki Hartman    Anesthesia: General    Estimated Blood Loss (mL): Minimal    Complications: None    Specimens:   * No specimens in log *    Implants:  Implant Name Type Inv. Item Serial No.  Lot No. LRB No. Used Action   PLATE BNE 10 H EXTRASHORT L DST RAD VOLAR INTMED - [id number]  PLATE BNE 10 H EXTRASHORT L DST RAD VOLAR INTMED  [employer] [facility]-  Left 1 Implanted   SCREW LK 2.7X14MM - [id number]  SCREW LK 2.7X14MM  [employer] [facility]  Left 1 Implanted   SCREW BNE L18MM OD27MM ST AMANDA FULL THRD T8 DRV - [id number]  SCREW BNE L18MM OD27MM ST AMANDA FULL THRD T8 DRV  [employer] [facility]  Left 5 Implanted   SCREW BNE L20MM DIA2.7MM AMANDA FULL THRD T8 DRV [employer] - [id number]  SCREW BNE L20MM DIA2.7MM AMANDA FULL THRD T8 DRV [employer]  [employer] [facility]  Left 2 Implanted   SCREW T8 BONE [id number] - [id number]  SCREW T8 BONE [id number]  [employer] [facility]-  Left 1 Implanted         Drains: * No LDAs found *    Findings:  Infection Present At Time Of Surgery (PATOS) (choose all levels that have infection present):  No infection present  Other Findings: Same.    Electronically signed by Gilmar George MD on 9/30/2024 at 9:36 PM

## 2024-10-01 NOTE — ANESTHESIA POSTPROCEDURE EVALUATION
Department of Anesthesiology  Postprocedure Note    Patient: Marcial Davis  MRN: [id number]  YOB: 1955  Date of evaluation: 10/1/2024    Procedure Summary       Date: 09/30/24 Room / Location: [address]    Anesthesia Start: 1426 Anesthesia Stop: 1508    Procedure: OPEN REDUCTION INTERNAL FIXATION LEFT DISTAL RADIUS FRACTURE (Left: Wrist) Diagnosis:       Closed fracture of left wrist, initial encounter      (Closed fracture of left wrist, initial encounter [S62.102A])    Surgeons: Gilmar George MD Responsible Provider: Soham León MD    Anesthesia Type: general ASA Status: 3            Anesthesia Type: No value filed.    Siada Phase I: Saida Score: 9    Saida Phase II:      Anesthesia Post Evaluation    Patient location during evaluation: bedside  Patient participation: complete - patient participated  Level of consciousness: awake and alert  Airway patency: patent  Nausea & Vomiting: no nausea  Cardiovascular status: hemodynamically stable (short run tachy, other vss stable)  Respiratory status: acceptable  Hydration status: euvolemic  Pain management: adequate      Vitals:    09/30/24 1531 09/30/24 1533 09/30/24 1536 09/30/24 1555   BP:   (!) 142/83 (!) 149/87   Pulse: (!) 139 96 94 96   Resp:   18 18   Temp:   98.6 °F (37 °C)    TempSrc:   Temporal    SpO2:   94% 94%   Weight:       Height:          No notable events documented.

## 2024-10-01 NOTE — OP NOTE
[address] [address]                            OPERATIVE REPORT      PATIENT NAME: PRITI NAVARRO               : 1955  MED REC NO: [id number]                      ROOM: [facility]  ACCOUNT NO: [id number]                       ADMIT DATE: 2024  PROVIDER: Gilmar George MD      DATE OF PROCEDURE:  2024    SURGEON:  Gilmar [facility] MD    ASSISTANT:  Patty Hartman CNP    PREOPERATIVE DIAGNOSIS:  Left distal radius 2-part intra-articular displaced fracture.    POSTOPERATIVE DIAGNOSIS:  Left distal radius 2-part intra-articular displaced fracture.    PROCEDURE DONE:  Open treatment of left distal radius 2-part intra-articular fracture open reduction and internal fixation.    ANESTHESIA:  General anesthesia.    ESTIMATED BLOOD LOSS:  Minimal.    COMPLICATIONS:  None.    TOURNIQUET:  Left upper arm 250 mmHg.    IMPLANTS USED:  Levels titanium intermediate volar locking plate with 3 proximal screws and 7 distal locking screws.    INDICATION:  This is a 69-year-old white male, right-hand dominant, who sustained a fall at home with a left wrist injury.  He was seen as a consult at [facility], where he was found to have a displaced distal radius intra-articular fracture.  All risks, benefits, and alternatives were discussed with the patient.  He agreed to proceed with surgical fixation.    DESCRIPTION OF PROCEDURE:  The patient's left wrist was marked.  He received 2 g Ancef IV preoperatively.  The patient was then brought to the operating room, underwent general anesthesia.  A well-padded tourniquet was placed to the left upper arm.  The left upper extremity was then prepped and draped in regular sterile routine fashion.  A time-out was called confirming the patient's name and site of procedure.    Esmarch was used for exsanguinated, tourniquet was inflated to 250 mmHg.  A volar approach was performed.  An

## 2024-10-04 DIAGNOSIS — S52.572A OTHER CLOSED INTRA-ARTICULAR FRACTURE OF DISTAL END OF LEFT RADIUS, INITIAL ENCOUNTER: ICD-10-CM

## 2024-10-04 DIAGNOSIS — Z87.81 S/P ORIF (OPEN REDUCTION INTERNAL FIXATION) FRACTURE: ICD-10-CM

## 2024-10-04 DIAGNOSIS — Z98.890 S/P ORIF (OPEN REDUCTION INTERNAL FIXATION) FRACTURE: ICD-10-CM

## 2024-10-04 RX ORDER — HYDROCODONE BITARTRATE AND ACETAMINOPHEN 5; 325 MG/1; MG/1
1 TABLET ORAL EVERY 8 HOURS PRN
Qty: 15 TABLET | Refills: 0 | Status: SHIPPED | OUTPATIENT
Start: 2024-10-06 | End: 2024-10-11

## 2024-10-04 NOTE — TELEPHONE ENCOUNTER
Patient last seen 10/1/24 (sx) and medication last filled 10/1/24:    Requested Prescriptions     Pending Prescriptions Disp Refills    HYDROcodone-acetaminophen (NORCO) 5-325 MG per tablet 15 tablet 0     Sig: Take 1 tablet by mouth every 8 hours as needed for Pain for up to 5 days. Max Daily Amount: 3 tablets

## 2024-10-16 ENCOUNTER — PATIENT MESSAGE (OUTPATIENT)
Dept: ORTHOPEDIC SURGERY | Age: 69
End: 2024-10-16

## 2024-10-16 ENCOUNTER — TELEPHONE (OUTPATIENT)
Dept: ORTHOPEDIC SURGERY | Age: 69
End: 2024-10-16

## 2024-10-16 DIAGNOSIS — S52.572A OTHER CLOSED INTRA-ARTICULAR FRACTURE OF DISTAL END OF LEFT RADIUS, INITIAL ENCOUNTER: Primary | ICD-10-CM

## 2024-10-16 RX ORDER — HYDROCODONE BITARTRATE AND ACETAMINOPHEN 5; 325 MG/1; MG/1
1 TABLET ORAL EVERY 8 HOURS PRN
Qty: 15 TABLET | Refills: 0 | Status: SHIPPED | OUTPATIENT
Start: 2024-10-16 | End: 2024-10-21

## 2024-10-16 RX ORDER — TRAMADOL HYDROCHLORIDE 50 MG/1
50 TABLET ORAL EVERY 6 HOURS PRN
Qty: 20 TABLET | Refills: 0 | Status: SHIPPED | OUTPATIENT
Start: 2024-10-16 | End: 2024-10-16

## 2024-10-16 NOTE — TELEPHONE ENCOUNTER
Patient last seen 10/1/24 and medication last filled 10/6/24:    Requested Prescriptions     Pending Prescriptions Disp Refills    HYDROcodone-acetaminophen (NORCO) 5-325 MG per tablet 15 tablet 0     Sig: Take 1 tablet by mouth every 8 hours as needed for Pain for up to 5 days. Max Daily Amount: 3 tablets

## 2024-10-16 NOTE — TELEPHONE ENCOUNTER
rescription Refill ANN     Medication Name:  TRAMADOL   Pharmacy: [facility] [id number] - [place], [place] - [address] - P [phone] - F [phone]   Patient Contact Number:  [phone]       REFERRING BACK TO THE [facility] MESSAGES ABOUT MED. THE Pt HAS HAD TRAMADOL BEFORE AND ASIDE FROM IT MAKES HIM ITCH, IT DOESN'T REALLY HELP WITH THE PAIN, EITHER.      IS THERE A DIFFERENT MED THAT CAN BE SENT IN IN, INSTEAD? IT GOES TO [place] [facility], IF POSSIBLE.     [facility] DONT HAVE THE MEDS YET...    PLEASE ADVISE

## 2024-10-16 NOTE — TELEPHONE ENCOUNTER
Called [facility] to cancel Rx for Tramadol.   Will send 1 more Rx of [place].

## 2024-10-16 NOTE — TELEPHONE ENCOUNTER
Prescription Refill ANN    Medication Name:  TRAMADOL   Pharmacy: [facility] [id number] - [place], [place] - [address] - P [phone] - F [phone]   Patient Contact Number:  [phone]      REFERRING BACK TO THE [facility] MESSAGES ABOUT MED. THE Pt HAS HAD TRAMADOL BEFORE AND ASIDE FROM IT MAKES HIM ITCH, IT DOESN'T REALLY HELP WITH THE PAIN, EITHER.     IS THERE A DIFFERENT MED THAT CAN BE SENT IN IN, INSTEAD? IT GOES TO [place] [facility], IF POSSIBLE.

## 2024-10-16 NOTE — TELEPHONE ENCOUNTER
Patient last seen 10/1/24 and medication last filled 10/6/24   ([place]):    Requested Prescriptions     Pending Prescriptions Disp Refills    traMADol (ULTRAM) 50 MG tablet 20 tablet 0     Sig: Take 1 tablet by mouth every 6 hours as needed for Pain for up to 5 days. Max Daily Amount: 200 mg       PO scheduled for 10/22/24 at [facility]

## 2024-11-14 ENCOUNTER — TELEPHONE (OUTPATIENT)
Dept: ORTHOPEDIC SURGERY | Age: 69
End: 2024-11-14

## 2024-11-14 NOTE — TELEPHONE ENCOUNTER
Patient ex wife, on HIPAA, was informed that [facility] is not a possibility. Patient would need to be seen at  or [place]. Upon return call, Marcial can be scheduled in an open time slot next week at [place] or . Not approved for over-bookings.

## 2024-11-14 NOTE — TELEPHONE ENCOUNTER
Appointment Request     Patient requesting earlier appointment: Yes  Appointment offered to patient: POST OP/LEFT WRIST 9/30 - 12/29  - [facility]  Patient Contact Number: [phone]

## 2024-11-14 NOTE — TELEPHONE ENCOUNTER
Appointment Request     Patient requesting earlier appointment: Yes  Appointment offered to patient: 11/26/24 AND 12/03/24  Patient Contact Number: [phone]     PATIENT WIFE CALLED WANTING TO KNOW IF  CAN REFER THE PATIENT TO A DIFF DOCTOR THAT IS AT [facility]  OR [facility] AS THE OTHER OFFICES ARE TOO FAR OUT FOR THEM     PLEASE CALL BACK THE ABOVE NUMBER

## 2024-11-15 NOTE — TELEPHONE ENCOUNTER
No Answer, No identifier on the voicemail. Left general message to return call.     Upon return call, patient must be seen by Dr. George due to global. Patient has not been seen since surgery on 9/30/2024. [place] is unavailable.  or [place] may be overbooked next week. No post op on Fridays at [place]. IF patient is unwilling to be seen at other offices, patient may have to go outside of [facility] due to global period. We apologized to the patients ex wife who called yesterday that Dr. [facility] is pulling out of the [facility] office and though he has office on this upcoming Tuesday, the schedule is only permitting ED referrals for emergent surgery.

## (undated) DEVICE — BOWL MED L 32OZ PLAS W/ MOLD GRAD EZ OPN PEEL PCH

## (undated) DEVICE — ELECTRODE PT RET AD L9FT HI MOIST COND ADH HYDRGEL CORDED

## (undated) DEVICE — BASIC CYSTO I-LF: Brand: MEDLINE INDUSTRIES, INC.

## (undated) DEVICE — BAG DRNGE 2000ML ROUNDED TEARDROP W/ ANTIREFLX CHMBR DISP

## (undated) DEVICE — GLOVE SURG SZ 8 L12IN FNGR THK94MIL STD WHT LTX FREE

## (undated) DEVICE — TOWEL,OR,DSP,ST,BLUE,STD,4/PK,20PK/CS: Brand: MEDLINE

## (undated) DEVICE — GLOVE ORANGE PI 7 1/2   MSG9075

## (undated) DEVICE — DRESSING,GAUZE,XEROFORM,CURAD,1"X8",ST: Brand: CURAD

## (undated) DEVICE — ELECTRODE LOOP 24FR WIRE DIA0.35MM YEL CUT ANG 1 STEM W/

## (undated) DEVICE — PADDING CAST W4INXL4YD ST COT RAYON MICROPLEATED HIGHLY

## (undated) DEVICE — AIRLIFE™ ADULT AEROSOL MASK VINYL, UNDER-THE-CHIN STYLE: Brand: AIRLIFE™

## (undated) DEVICE — TUBING, SUCTION, 3/16" X 10', STRAIGHT: Brand: MEDLINE

## (undated) DEVICE — MATERIAL PD W2XL4YD ST COT CAST SPLNT NONADHESIVE SPEC

## (undated) DEVICE — KIRSCHNER WIRE
Type: IMPLANTABLE DEVICE | Site: WRIST | Status: NON-FUNCTIONAL
Brand: VARIAX
Removed: 2024-09-30

## (undated) DEVICE — GLOVE SURG SZ 7 L12IN FNGR THK79MIL GRN LTX FREE

## (undated) DEVICE — SINGLE USE BIOPSY VALVE MAJ-210: Brand: SINGLE USE BIOPSY VALVE (STERILE)

## (undated) DEVICE — BRUSH CYTO L150CM CHN L2MM BRIST DIA1.9MM PTFE S STL BULL

## (undated) DEVICE — CABLE ENDOSCP L10FT ACT DISP

## (undated) DEVICE — Device

## (undated) DEVICE — CUTTING ELECTRODE BIPO 24FR 12/30°  FOR RESECTOSCOPES, TELESCOPE Ø 4MM, FOR SHEATHS, INTERMITTENT/CONTINUOUS IRRIGATION, 24/26, FR, LOOP: ROUND, WIRE Ø 0.3MM, FORK COLOR BLUE, STEM COLOR BLUE, PACK=3 PCS, FOR SHARK AND S-LINE RESECTOSCOPES, STERILE, FOR SINGLE USE: Brand: SHARK/S-LINE

## (undated) DEVICE — BAG DRN HYSTSCPY CYSTO ST FL CONT UROBAG

## (undated) DEVICE — SYRINGE MED 50ML LUERSLIP TIP

## (undated) DEVICE — SYRINGE MED 10ML LUERLOCK TIP W/O SFTY DISP

## (undated) DEVICE — CONTAINER,SPECIMEN,OR STERILE,4OZ: Brand: MEDLINE

## (undated) DEVICE — SHEET,DRAPE,40X58,STERILE: Brand: MEDLINE

## (undated) DEVICE — SUTURE MONOCRYL + SZ 4-0 L18IN ABSRB UD L19MM PS-2 3/8 CIR MCP496G

## (undated) DEVICE — ELECTRODE LOOP 27FR WIRE DIA0.35MM BRN CUT ANG 1 STEM W/

## (undated) DEVICE — BANDAGE COMPR W3INXL5YD HI E BGE W/ CLP SURE-WRAP

## (undated) DEVICE — GOWN,SIRUS,NONRNF,SETINSLV,XL,20/CS: Brand: MEDLINE

## (undated) DEVICE — PREP SOL PVP IODINE 4%  4 OZ/BTL

## (undated) DEVICE — CANNULA NSL 13FT TUBE AD ETCO2 DIV SAMP M

## (undated) DEVICE — ELECTRODE LOOP 24FR R ANG MPLR CUT

## (undated) DEVICE — SOLUTION IRRIGATION STRL H2O 1000 ML UROMATIC CONTAINER

## (undated) DEVICE — GLOVE SURG SZ 65 L12IN FNGR THK94MIL STD WHT LTX FREE

## (undated) DEVICE — GLOVE ORANGE PI 7   MSG9070

## (undated) DEVICE — SOLUTION IRRIG 500ML 0.9% SOD CHLO USP POUR PLAS BTL

## (undated) DEVICE — DRILL, AO, SCALED: Brand: VARIAX

## (undated) DEVICE — SYRINGE MED 10ML SLIP TIP BLNT FILL AND LUERLOCK DISP

## (undated) DEVICE — CATHETER URETH 24FR BLLN 30CC STD LTX 3 W TWO OPP DRNGE EYE

## (undated) DEVICE — SPLINT ORTH W3XL12IN LAYERED FBRGLS FOAM PD BRTH BK MOLD

## (undated) DEVICE — PENCIL SMK EVAC ALL IN 1 DSGN ENH VISIBILITY IMPROVED AIR

## (undated) DEVICE — GOWN SIRUS NONREIN LG W/TWL: Brand: MEDLINE INDUSTRIES, INC.

## (undated) DEVICE — CATHETER URETH 22FR 30CC BLLN F 3 W SPEC M RND TIP TWO

## (undated) DEVICE — NEBULIZER AEROSOL TBNG 7 FT FOR ACUTE CARE NEBUTECH

## (undated) DEVICE — SINGLE USE SUCTION VALVE MAJ-209: Brand: SINGLE USE SUCTION VALVE (STERILE)

## (undated) DEVICE — CYSTO PACK: Brand: MEDLINE INDUSTRIES, INC.

## (undated) DEVICE — TUBING, SUCTION, 3/16" X 12', STRAIGHT: Brand: MEDLINE

## (undated) DEVICE — BAG URIN STRL FOR URO CTCH SYS

## (undated) DEVICE — ZIMMER® STERILE DISPOSABLE TOURNIQUET CUFF WITH PLC, DUAL PORT, SINGLE BLADDER, 18 IN. (46 CM)

## (undated) DEVICE — PLUG,CATHETER,DRAINAGE PROTECTOR,TUBE: Brand: MEDLINE

## (undated) DEVICE — SUTURE VICRYL + SZ 3-0 L18IN ABSRB UD SH 1/2 CIR TAPERCUT NDL VCP864D

## (undated) DEVICE — GOWN,SIRUS,NON REINFRCD,LARGE,SET IN SL: Brand: MEDLINE

## (undated) DEVICE — DRAPE EQUIP C ARM MINI 10000100] TIDI PRODUCTS INC]